# Patient Record
Sex: FEMALE | Race: WHITE | NOT HISPANIC OR LATINO | Employment: OTHER | URBAN - METROPOLITAN AREA
[De-identification: names, ages, dates, MRNs, and addresses within clinical notes are randomized per-mention and may not be internally consistent; named-entity substitution may affect disease eponyms.]

---

## 2018-04-11 ENCOUNTER — TELEPHONE (OUTPATIENT)
Dept: FAMILY MEDICINE | Facility: CLINIC | Age: 63
End: 2018-04-11

## 2018-04-11 NOTE — TELEPHONE ENCOUNTER
Pt called back stated she has a knot on head that feels like it has fluid in it and it also have fever. Knot has been there for about a month.

## 2018-04-11 NOTE — TELEPHONE ENCOUNTER
Advised pt should go to the Er, she stated she would go tomorrow as she has another appointment today.

## 2018-04-11 NOTE — TELEPHONE ENCOUNTER
----- Message from Stephanie Quispe sent at 4/11/2018  1:06 PM CDT -----  Please call patient in regards to a knot on her head, she is requesting a MRI, [phone] (home)

## 2018-04-11 NOTE — TELEPHONE ENCOUNTER
Called pt to get details about the knot on her head. No answer left a message. She called requesting an MRI.

## 2018-04-17 ENCOUNTER — TELEPHONE (OUTPATIENT)
Dept: FAMILY MEDICINE | Facility: CLINIC | Age: 63
End: 2018-04-17

## 2018-04-17 NOTE — TELEPHONE ENCOUNTER
----- Message from Devante Ruiz sent at 4/17/2018  2:03 PM CDT -----  Contact: Destiny/ISABEL at [facility]  Unsuccessful call placed to office.  Destiny called in and stated that the attached patient showed up to get an MRI done that patient stated Dr. Riddle ordered for her but there is no order??  [phone] Destiny's call back number

## 2018-04-17 NOTE — TELEPHONE ENCOUNTER
Spoke with pt. She stated that she is at the ER and has been since 2:00 and has not been seen; she stated that Dr. Riddle instructed her to go to the ER and he would order an MRI.  It was explained in detail to pt that Dr. Riddle cannot order an MRI based on the patient being seen at the emergency room, that she would have to be seen here in the office for examination.  Pt did not seem to understand these instructions.  She was instructed to wait to be seen by a physician at the emergency room.  Ana

## 2018-05-02 NOTE — TELEPHONE ENCOUNTER
----- Message from Emeterio Garg sent at 5/2/2018  3:48 PM CDT -----  Contact: Patient  Comfort, [phone]. Calling in regards to a refill request for pantoprazole (PROTONIX) 40 MG tablet. Says the pharmacy faxed over a request and haven't heard back from the office. Please advise. Thanks.    [facility]  [address] [place] [address]  Phone [phone]

## 2018-05-03 RX ORDER — PANTOPRAZOLE SODIUM 40 MG/1
40 TABLET, DELAYED RELEASE ORAL DAILY
Qty: 30 TABLET | Refills: 5 | Status: SHIPPED | OUTPATIENT
Start: 2018-05-03 | End: 2018-12-12 | Stop reason: SDUPTHER

## 2018-05-07 ENCOUNTER — OFFICE VISIT (OUTPATIENT)
Dept: FAMILY MEDICINE | Facility: CLINIC | Age: 63
End: 2018-05-07
Payer: MEDICARE

## 2018-05-07 VITALS
HEIGHT: 68 IN | WEIGHT: 125 LBS | HEART RATE: 92 BPM | RESPIRATION RATE: 18 BRPM | OXYGEN SATURATION: 96 % | BODY MASS INDEX: 18.94 KG/M2 | SYSTOLIC BLOOD PRESSURE: 117 MMHG | DIASTOLIC BLOOD PRESSURE: 83 MMHG

## 2018-05-07 DIAGNOSIS — I65.21 STENOSIS OF RIGHT CAROTID ARTERY: Primary | ICD-10-CM

## 2018-05-07 DIAGNOSIS — R22.0 SUPERFICIAL SWELLING OF SCALP: ICD-10-CM

## 2018-05-07 PROCEDURE — 99999 PR PBB SHADOW E&M-EST. PATIENT-LVL III: CPT | Mod: PBBFAC,,, | Performed by: FAMILY MEDICINE

## 2018-05-07 PROCEDURE — 99213 OFFICE O/P EST LOW 20 MIN: CPT | Mod: S$PBB,,, | Performed by: FAMILY MEDICINE

## 2018-05-07 PROCEDURE — 99213 OFFICE O/P EST LOW 20 MIN: CPT | Mod: PBBFAC,PN | Performed by: FAMILY MEDICINE

## 2018-05-09 NOTE — PROGRESS NOTES
"Subjective:       Patient ID: Comfort Cantu is a 62 y.o. female.    Chief Complaint: Follow-up    HPI    Ms. Cantu presents for follow-up. Has known right carotid artery stenosis, s/p CEA (done ~4 years ago per patient). This is typically evaluated once a year in December with carotid dopplers. She was told by her cardiologist at her last visit that there is a "whistling noise" on that side now that wasn't there before. Reportedly was told to ask her PCP (Dr. Riddle) about getting a carotid doppler ultrasound early to evaluate this.     She also complains of right posterior scalp pain and swelling for approximately 2 months. Denies drainage, trauma to the area. Reports it seems to have gotten slightly smaller in the past few weeks.    Review of Systems   Constitutional: Negative for activity change, appetite change, chills and fever.   Cardiovascular: Negative for chest pain, palpitations and leg swelling.   Neurological: Negative for dizziness, tremors, syncope, facial asymmetry and weakness.       Past Medical History:   Diagnosis Date    Anxiety     Atherosclerosis     Depression     GERD (gastroesophageal reflux disease)     Hypertension      Past Surgical History:   Procedure Laterality Date    APPENDECTOMY      CAROTID STENT  2014    FEMORAL ARTERY STENT       Social History     Social History    Marital status: Single     Spouse name: N/A    Number of children: N/A    Years of education: N/A     Occupational History    Not on file.     Social History Main Topics    Smoking status: Current Every Day Smoker    Smokeless tobacco: Not on file    Alcohol use Yes      Comment: occ    Drug use: No    Sexual activity: Not Currently     Other Topics Concern    Not on file     Social History Narrative    No narrative on file     No family history on file.    Objective:      /83 (BP Location: Left arm, Patient Position: Sitting)   Pulse 92   Resp 18   Ht 5' 8" (1.727 m)   Wt 56.7 kg (125 " lb)   SpO2 96%   BMI 19.01 kg/m²   Physical Exam   Constitutional: She is oriented to person, place, and time. She appears well-developed and well-nourished. No distress.   HENT:   Head: Normocephalic.       Neurological: She is alert and oriented to person, place, and time. No cranial nerve deficit.   Skin: Skin is warm and dry. No rash noted. She is not diaphoretic.   Psychiatric: She has a normal mood and affect. Her behavior is normal. Judgment and thought content normal.   Vitals reviewed.      Assessment:       1. Stenosis of right carotid artery    2. Superficial swelling of scalp        Plan:       Stenosis of right carotid artery  -     CT Head Without Contrast; Future; Expected date: 05/07/2018  -      Carotid Right; Future; Expected date: 05/07/2018    Superficial swelling of scalp  -     CT Head Without Contrast; Future; Expected date: 05/07/2018            Risks, benefits, and side effects were discussed with the patient. All questions were answered to the fullest satisfaction of the patient, and pt verbalized understanding and agreement to treatment plan. Pt was to call with any new or worsening symptoms, or present to the ER.

## 2018-05-10 ENCOUNTER — HOSPITAL ENCOUNTER (OUTPATIENT)
Dept: RADIOLOGY | Facility: HOSPITAL | Age: 63
Discharge: HOME | End: 2018-05-10
Attending: FAMILY MEDICINE
Payer: MEDICARE

## 2018-05-10 DIAGNOSIS — I65.21 OCCLUSION OF RIGHT CAROTID ARTERY: ICD-10-CM

## 2018-05-10 DIAGNOSIS — I65.21 OCCLUSION OF RIGHT CAROTID ARTERY: Primary | ICD-10-CM

## 2018-05-10 DIAGNOSIS — R22.0 SUPERFICIAL SWELLING OF SCALP: ICD-10-CM

## 2018-05-10 DIAGNOSIS — I65.21 STENOSIS OF RIGHT CAROTID ARTERY: ICD-10-CM

## 2018-05-10 PROCEDURE — 93880 EXTRACRANIAL BILAT STUDY: CPT | Mod: TC

## 2018-05-10 PROCEDURE — 70450 CT HEAD/BRAIN W/O DYE: CPT | Mod: TC

## 2018-05-10 PROCEDURE — 93880 EXTRACRANIAL BILAT STUDY: CPT | Mod: 26,,, | Performed by: RADIOLOGY

## 2018-05-10 PROCEDURE — 70450 CT HEAD/BRAIN W/O DYE: CPT | Mod: 26,,, | Performed by: RADIOLOGY

## 2018-05-15 ENCOUNTER — OFFICE VISIT (OUTPATIENT)
Dept: FAMILY MEDICINE | Facility: CLINIC | Age: 63
End: 2018-05-15
Payer: MEDICARE

## 2018-05-15 VITALS
BODY MASS INDEX: 18.94 KG/M2 | WEIGHT: 125 LBS | HEART RATE: 85 BPM | OXYGEN SATURATION: 95 % | DIASTOLIC BLOOD PRESSURE: 73 MMHG | RESPIRATION RATE: 20 BRPM | HEIGHT: 68 IN | SYSTOLIC BLOOD PRESSURE: 120 MMHG

## 2018-05-15 DIAGNOSIS — D17.0 LIPOMA OF SCALP: ICD-10-CM

## 2018-05-15 DIAGNOSIS — R22.0 SUPERFICIAL SWELLING OF SCALP: ICD-10-CM

## 2018-05-15 DIAGNOSIS — I65.21 STENOSIS OF RIGHT CAROTID ARTERY: Primary | ICD-10-CM

## 2018-05-15 PROBLEM — E78.5 HYPERLIPIDEMIA: Status: ACTIVE | Noted: 2018-05-15

## 2018-05-15 PROBLEM — M54.9 BACKACHE: Status: ACTIVE | Noted: 2018-05-15

## 2018-05-15 PROBLEM — I10 BENIGN ESSENTIAL HYPERTENSION: Status: ACTIVE | Noted: 2018-05-15

## 2018-05-15 PROBLEM — M25.569 KNEE PAIN: Status: ACTIVE | Noted: 2018-05-15

## 2018-05-15 PROBLEM — F17.200 TOBACCO DEPENDENCE SYNDROME: Status: ACTIVE | Noted: 2018-05-15

## 2018-05-15 PROBLEM — F41.1 ANXIETY STATE: Status: ACTIVE | Noted: 2018-05-15

## 2018-05-15 PROBLEM — I25.10 CARDIOVASCULAR SYSTEM PROBLEM: Status: ACTIVE | Noted: 2018-05-15

## 2018-05-15 PROCEDURE — 99213 OFFICE O/P EST LOW 20 MIN: CPT | Mod: S$GLB,,, | Performed by: FAMILY MEDICINE

## 2018-05-15 NOTE — PROGRESS NOTES
"Subjective:       Patient ID: Comfort Cantu is a 62 y.o. female.    Chief Complaint: No chief complaint on file.    HPI    Ms. Cantu presents for follow-up. Has known right carotid artery stenosis, s/p CEA (done ~4 years ago per patient). This is typically evaluated once a year in December with carotid dopplers. She was told by her cardiologist at her last visit that there is a "whistling noise" on that side now that wasn't there before. Reportedly was told to ask her PCP (Dr. Riddle) about getting a carotid doppler ultrasound early to evaluate this.   The carotid doppler shows 50% stenosis.    The superficial "bump on her head" does not show on CT evaluation.      Review of Systems   Constitutional: Negative for activity change, appetite change, chills and fever.   Cardiovascular: Negative for chest pain, palpitations and leg swelling.   Neurological: Negative for dizziness, tremors, syncope, facial asymmetry and weakness.       Past Medical History:   Diagnosis Date    Anxiety     Atherosclerosis     Depression     GERD (gastroesophageal reflux disease)     Hypertension      Past Surgical History:   Procedure Laterality Date    APPENDECTOMY      CAROTID STENT  2014    FEMORAL ARTERY STENT       Social History     Social History    Marital status: Single     Spouse name: N/A    Number of children: N/A    Years of education: N/A     Occupational History    Not on file.     Social History Main Topics    Smoking status: Current Every Day Smoker    Smokeless tobacco: Not on file    Alcohol use Yes      Comment: occ    Drug use: No    Sexual activity: Not Currently     Other Topics Concern    Not on file     Social History Narrative    No narrative on file     No family history on file.    Objective:      There were no vitals taken for this visit.  Physical Exam   Constitutional: She is oriented to person, place, and time. She appears well-developed and well-nourished. No distress.   HENT:   Head: " Normocephalic.       Neurological: She is alert and oriented to person, place, and time. No cranial nerve deficit.   Skin: Skin is warm and dry. No rash noted. She is not diaphoretic.   Psychiatric: She has a normal mood and affect. Her behavior is normal. Judgment and thought content normal.   Vitals reviewed.      Assessment:       1. Stenosis of right carotid artery    2. Superficial swelling of scalp        Plan:             Stenosis of right carotid artery    Superficial swelling of scalp    Lipoma of scalp     - Reassurance given

## 2018-06-25 ENCOUNTER — HOSPITAL ENCOUNTER (EMERGENCY)
Facility: HOSPITAL | Age: 63
Discharge: HOME | End: 2018-06-25
Attending: FAMILY MEDICINE
Payer: MEDICARE

## 2018-06-25 VITALS
RESPIRATION RATE: 18 BRPM | BODY MASS INDEX: 19.25 KG/M2 | OXYGEN SATURATION: 98 % | SYSTOLIC BLOOD PRESSURE: 176 MMHG | DIASTOLIC BLOOD PRESSURE: 86 MMHG | WEIGHT: 127 LBS | TEMPERATURE: 98 F | HEART RATE: 86 BPM | HEIGHT: 68 IN

## 2018-06-25 DIAGNOSIS — R52 PAIN: ICD-10-CM

## 2018-06-25 DIAGNOSIS — T14.90XA INJURY: Primary | ICD-10-CM
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
## 2018-06-25 DIAGNOSIS — S90.31XA CONTUSION OF RIGHT FOOT, INITIAL ENCOUNTER: ICD-10-CM

## 2018-06-25 PROCEDURE — 99283 EMERGENCY DEPT VISIT LOW MDM: CPT

## 2018-06-25 PROCEDURE — 73630 X-RAY EXAM OF FOOT: CPT | Mod: TC,FY,RT

## 2018-06-25 PROCEDURE — 73630 X-RAY EXAM OF FOOT: CPT | Mod: 26,RT,, | Performed by: RADIOLOGY

## 2018-06-25 RX ORDER — NAPROXEN 500 MG/1
500 TABLET ORAL 2 TIMES DAILY PRN
Qty: 30 TABLET | Refills: 0 | Status: SHIPPED | OUTPATIENT
Start: 2018-06-25 | End: 2018-11-01

## 2018-06-25 NOTE — ED TRIAGE NOTES
Pt to ed with c/o r foot injury, pt reports ground level fall causing right foot to kick coffee table 2wks pta

## 2018-06-26 NOTE — ED PROVIDER NOTES
Encounter Date: 6/25/2018       History     Chief Complaint   Patient presents with    Foot Injury     Patient with right foot pain after fall   No obvious swelling/discoloration.            Review of patient's allergies indicates:   Allergen Reactions    Penicillins      Past Medical History:   Diagnosis Date    Anxiety     Atherosclerosis     Depression     GERD (gastroesophageal reflux disease)     Hypertension      Past Surgical History:   Procedure Laterality Date    APPENDECTOMY      CAROTID STENT  2014    FEMORAL ARTERY STENT       History reviewed. No pertinent family history.  Social History   Substance Use Topics    Smoking status: Current Every Day Smoker    Smokeless tobacco: Not on file    Alcohol use Yes      Comment: occ     Review of Systems   Constitutional: Negative for fever.   HENT: Negative for sore throat.    Respiratory: Negative for shortness of breath.    Cardiovascular: Negative for chest pain.   Gastrointestinal: Negative for nausea.   Genitourinary: Negative for dysuria.   Musculoskeletal: Negative for back pain. Arthralgias: Right foot pain.   Skin: Negative for rash.   Neurological: Negative for weakness.   Hematological: Does not bruise/bleed easily.   All other systems reviewed and are negative.      Physical Exam     Initial Vitals [06/25/18 1412]   BP Pulse Resp Temp SpO2   (!) 176/86 86 18 98.1 °F (36.7 °C) 98 %      MAP       --         Physical Exam    Nursing note and vitals reviewed.  Constitutional: She appears well-developed and well-nourished.   HENT:   Head: Normocephalic.   Eyes: Pupils are equal, round, and reactive to light.   Neck: Normal range of motion.   Cardiovascular: Normal rate, regular rhythm and normal heart sounds.   Pulmonary/Chest: Breath sounds normal.   Musculoskeletal:        Right foot: There is decreased range of motion, tenderness and bony tenderness. There is no swelling, no deformity and no laceration.        Feet:    Neurological: She  is alert and oriented to person, place, and time.   Skin: Skin is warm and dry.   Psychiatric: She has a normal mood and affect. Her behavior is normal. Judgment and thought content normal.         ED Course   Procedures  Labs Reviewed - No data to display       Imaging Results          X-Ray Foot Complete Right (Final result)  Result time 06/25/18 15:37:10    Final result by Сергей Ulrich MD (06/25/18 15:37:10)                 Impression:      Healing 5th toe fracture      Electronically signed by: Сергей Ulrich MD  Date:    06/25/2018  Time:    15:37             Narrative:    EXAMINATION:  XR FOOT COMPLETE 3 VIEW RIGHT    CLINICAL HISTORY:  . Injury, unspecified, initial encounter    TECHNIQUE:  AP, lateral, and oblique views of the right foot were performed.    COMPARISON:  None    FINDINGS:  There is a healing transverse fracture at the proximal diametaphyseal is of the 5th proximal phalanx.  Alignment and positioning are good.  The foot is otherwise unremarkable except for mild joint space narrowing in the DIP joints.                                 Medical Decision Making:   Initial Assessment:   Foot pain  Differential Diagnosis:   Foot contusion  Foot fracture    ED Management:  Xray done with no acute fracture  Pain medication given  Patient refused crutches.   Surgical shoe     Discussed physical exam findings with patient  No acute emergent medication condition identified at this time to warrant further testing/diagnostics.  Plan to discharge patient home with instructions to follow-up with PCP in 2-5 days or return to ED if symptoms worsen.  Patient verbalized agreement to discharge treatment plan.                        Clinical Impression:   The primary encounter diagnosis was Injury. Diagnoses of Pain, Fall, and Contusion of right foot, initial encounter were also pertinent to this visit.                             Polina Collazo NP  06/25/18 3078

## 2018-07-12 ENCOUNTER — TELEPHONE (OUTPATIENT)
Dept: FAMILY MEDICINE | Facility: CLINIC | Age: 63
End: 2018-07-12

## 2018-07-12 ENCOUNTER — HOSPITAL ENCOUNTER (OUTPATIENT)
Facility: HOSPITAL | Age: 63
Discharge: HOME | End: 2018-07-14
Attending: EMERGENCY MEDICINE | Admitting: INTERNAL MEDICINE
Payer: MEDICARE

## 2018-07-12 DIAGNOSIS — F10.11 H/O ETOH ABUSE: ICD-10-CM

## 2018-07-12 DIAGNOSIS — R05.9 COUGH: ICD-10-CM

## 2018-07-12 DIAGNOSIS — K86.3 PSEUDOCYST OF PANCREAS: ICD-10-CM

## 2018-07-12 DIAGNOSIS — K85.00 IDIOPATHIC ACUTE PANCREATITIS WITHOUT INFECTION OR NECROSIS: ICD-10-CM

## 2018-07-12 DIAGNOSIS — R11.2 NAUSEA AND VOMITING, INTRACTABILITY OF VOMITING NOT SPECIFIED, UNSPECIFIED VOMITING TYPE: ICD-10-CM

## 2018-07-12 DIAGNOSIS — R10.9 ABDOMINAL PAIN, ACUTE: Primary | ICD-10-CM

## 2018-07-12 DIAGNOSIS — D49.0 PANCREAS NEOPLASM: ICD-10-CM

## 2018-07-12 LAB
ALBUMIN SERPL BCP-MCNC: 4.5 G/DL
ALP SERPL-CCNC: 98 U/L
ALT SERPL W/O P-5'-P-CCNC: 15 U/L
ANION GAP SERPL CALC-SCNC: 10 MMOL/L
AST SERPL-CCNC: 22 U/L
BASOPHILS # BLD AUTO: 0.07 K/UL
BASOPHILS NFR BLD: 0.6 %
BILIRUB SERPL-MCNC: 0.7 MG/DL
BUN SERPL-MCNC: 9 MG/DL
CALCIUM SERPL-MCNC: 9.7 MG/DL
CHLORIDE SERPL-SCNC: 95 MMOL/L
CO2 SERPL-SCNC: 27 MMOL/L
CREAT SERPL-MCNC: 0.7 MG/DL
DIFFERENTIAL METHOD: ABNORMAL
EOSINOPHIL # BLD AUTO: 0.2 K/UL
EOSINOPHIL NFR BLD: 2 %
ERYTHROCYTE [DISTWIDTH] IN BLOOD BY AUTOMATED COUNT: 15.2 %
EST. GFR  (AFRICAN AMERICAN): >60 ML/MIN/1.73 M^2
EST. GFR  (NON AFRICAN AMERICAN): >60 ML/MIN/1.73 M^2
GLUCOSE SERPL-MCNC: 107 MG/DL
HCT VFR BLD AUTO: 30.2 %
HGB BLD-MCNC: 10.3 G/DL
IMM GRANULOCYTES # BLD AUTO: 0.04 K/UL
IMM GRANULOCYTES NFR BLD AUTO: 0.3 %
LIPASE SERPL-CCNC: 262 U/L
LYMPHOCYTES # BLD AUTO: 1.7 K/UL
LYMPHOCYTES NFR BLD: 14.2 %
MCH RBC QN AUTO: 32.8 PG
MCHC RBC AUTO-ENTMCNC: 34.1 G/DL
MCV RBC AUTO: 96 FL
MONOCYTES # BLD AUTO: 1.2 K/UL
MONOCYTES NFR BLD: 9.8 %
NEUTROPHILS # BLD AUTO: 8.7 K/UL
NEUTROPHILS NFR BLD: 73.1 %
NRBC BLD-RTO: 0 /100 WBC
PLATELET # BLD AUTO: 415 K/UL
PMV BLD AUTO: 8.9 FL
POTASSIUM SERPL-SCNC: 3.1 MMOL/L
PROT SERPL-MCNC: 8 G/DL
RBC # BLD AUTO: 3.14 M/UL
SODIUM SERPL-SCNC: 132 MMOL/L
WBC # BLD AUTO: 11.9 K/UL

## 2018-07-12 PROCEDURE — 85025 COMPLETE CBC W/AUTO DIFF WBC: CPT

## 2018-07-12 PROCEDURE — G0378 HOSPITAL OBSERVATION PER HR: HCPCS

## 2018-07-12 PROCEDURE — 25500020 PHARM REV CODE 255: Performed by: NURSE PRACTITIONER

## 2018-07-12 PROCEDURE — 99285 EMERGENCY DEPT VISIT HI MDM: CPT | Mod: 25

## 2018-07-12 PROCEDURE — 63600175 PHARM REV CODE 636 W HCPCS: Performed by: NURSE PRACTITIONER

## 2018-07-12 PROCEDURE — 96372 THER/PROPH/DIAG INJ SC/IM: CPT | Mod: 59

## 2018-07-12 PROCEDURE — 71046 X-RAY EXAM CHEST 2 VIEWS: CPT | Mod: TC,FY

## 2018-07-12 PROCEDURE — 71046 X-RAY EXAM CHEST 2 VIEWS: CPT | Mod: 26,,, | Performed by: RADIOLOGY

## 2018-07-12 PROCEDURE — 74177 CT ABD & PELVIS W/CONTRAST: CPT | Mod: TC

## 2018-07-12 PROCEDURE — 25000003 PHARM REV CODE 250: Performed by: NURSE PRACTITIONER

## 2018-07-12 PROCEDURE — 80053 COMPREHEN METABOLIC PANEL: CPT

## 2018-07-12 PROCEDURE — 83690 ASSAY OF LIPASE: CPT

## 2018-07-12 PROCEDURE — 74177 CT ABD & PELVIS W/CONTRAST: CPT | Mod: 26,,, | Performed by: RADIOLOGY

## 2018-07-12 RX ORDER — ONDANSETRON 4 MG/1
4 TABLET, ORALLY DISINTEGRATING ORAL
Status: COMPLETED | OUTPATIENT
Start: 2018-07-12 | End: 2018-07-12

## 2018-07-12 RX ORDER — MEPERIDINE HYDROCHLORIDE 50 MG/ML
50 INJECTION INTRAMUSCULAR; INTRAVENOUS; SUBCUTANEOUS
Status: COMPLETED | OUTPATIENT
Start: 2018-07-12 | End: 2018-07-12

## 2018-07-12 RX ORDER — PROMETHAZINE HYDROCHLORIDE 25 MG/ML
12.5 INJECTION, SOLUTION INTRAMUSCULAR; INTRAVENOUS
Status: COMPLETED | OUTPATIENT
Start: 2018-07-12 | End: 2018-07-12

## 2018-07-12 RX ADMIN — IOHEXOL 15 ML: 350 INJECTION, SOLUTION INTRAVENOUS at 07:07

## 2018-07-12 RX ADMIN — MEPERIDINE HYDROCHLORIDE 50 MG: 50 INJECTION INTRAMUSCULAR; INTRAVENOUS; SUBCUTANEOUS at 05:07

## 2018-07-12 RX ADMIN — PROMETHAZINE HYDROCHLORIDE 12.5 MG: 25 INJECTION INTRAMUSCULAR; INTRAVENOUS at 05:07

## 2018-07-12 RX ADMIN — IOHEXOL 75 ML: 350 INJECTION, SOLUTION INTRAVENOUS at 08:07

## 2018-07-12 RX ADMIN — ONDANSETRON 4 MG: 4 TABLET, ORALLY DISINTEGRATING ORAL at 04:07

## 2018-07-12 NOTE — ED NOTES
Patient reportedly sprayed Spectracide in her house x 1 week ago.Patient has felt poorly for the past 2 days. She attributes her symptoms to the use of that chemical. Lungs are clear. Spo2 is 100% on room air. Patient appears tearful/anxious.

## 2018-07-12 NOTE — ED PROVIDER NOTES
CHIEF COMPLAINT  Chief Complaint   Patient presents with    Nausea     reports she breathed in some spectracide 7 days ago    Vomiting    Diarrhea    Back Pain       HPI  Comfort Wallace a 62 y.o. female who presents to the emergency room with several complaints today.  Originally she stated that she breathed in some SPECT decide about a week ago and has had shortness of breath since.  She then stated that she has been having some nausea and vomiting back pain and abdominal pain. She has not taken anything for symptoms.     CURRENT MEDICATIONS  No current facility-administered medications on file prior to encounter.      Current Outpatient Prescriptions on File Prior to Encounter   Medication Sig Dispense Refill    atorvastatin (LIPITOR) 10 MG tablet Take 10 mg by mouth once daily.      clopidogrel (PLAVIX) 75 mg tablet Take 75 mg by mouth once daily.      hydroCHLOROthiazide (HYDRODIURIL) 25 MG tablet Take 25 mg by mouth once daily.      isosorbide mononitrate (IMDUR) 30 MG 24 hr tablet Take 30 mg by mouth once daily.      lisinopril (PRINIVIL,ZESTRIL) 5 MG tablet Take 5 mg by mouth once daily.      LORazepam (ATIVAN) 0.5 MG tablet Take 0.5 mg by mouth every 6 (six) hours as needed for Anxiety.      naproxen (NAPROSYN) 500 MG tablet Take 1 tablet (500 mg total) by mouth 2 (two) times daily as needed. 30 tablet 0    pantoprazole (PROTONIX) 40 MG tablet Take 1 tablet (40 mg total) by mouth once daily. 30 tablet 5    vilazodone (VIIBRYD) 20 mg Tab Take by mouth.         ALLERGIES  Review of patient's allergies indicates:   Allergen Reactions    Penicillins          There is no immunization history on file for this patient.    PAST MEDICAL HISTORY  Past Medical History:   Diagnosis Date    Anxiety     Atherosclerosis     Depression     GERD (gastroesophageal reflux disease)     Hypertension        SURGICAL HISTORY  Past Surgical History:   Procedure Laterality Date    APPENDECTOMY      CAROTID  "STENT  2014    FEMORAL ARTERY STENT         SOCIAL HISTORY  Social History     Social History    Marital status: Single     Spouse name: N/A    Number of children: N/A    Years of education: N/A     Occupational History    Not on file.     Social History Main Topics    Smoking status: Current Every Day Smoker    Smokeless tobacco: Not on file    Alcohol use Yes      Comment: occ    Drug use: No    Sexual activity: Not Currently     Other Topics Concern    Not on file     Social History Narrative    No narrative on file       FAMILY HISTORY  History reviewed. No pertinent family history.    REVIEW OF SYSTEMS  Constitutional: No fever, chills, or weakness.  Eyes: No redness, pain, or discharge  HENT: No ear pain, no headache, no rhinorrhea, no throat pain  Respiratory: No cough, wheezing or shortness of breath  Cardiovascular: No chest pain, palpitations or edema  GI: No abdominal pain, nausea, vomiting or diarrhea  Gu: No dysuria, no hematuria, or discharge  Musculoskeletal: No pain, full range of motion. Good sensation  Skin: No rash or abrasion  Neurologic: No focal weakness or sensory changes.  All systems otherwise negative except as noted in the Review of Systems and History of Present Illness      PHYSICAL EXAM  Reviewed Triage Note  VITAL SIGNS:   Patient Vitals for the past 24 hrs:   BP Temp Temp src Pulse Resp SpO2 Height Weight   07/12/18 1852 109/76 - - 75 18 98 % - -   07/12/18 1605 (!) 156/82 98.3 °F (36.8 °C) Oral 102 (!) 28 100 % 5' 8" (1.727 m) 57.2 kg (126 lb)     Constitutional: Well developed, well nourished, Alert and oriented x3, No acute distress, non-toxic appearance.  HENT: Normocephalic, Atraumatic, Bilateral external ears normal, external nose negative, oropharynx moist, No oral exudates.  Eyes: PERRL, EOMI, Conjunctiva normal, No discharge.  Neck: Normal range of motion, no tenderness, supple, no carotid bruits  Respiratory: Normal breath sounds, no respiratory distress, no " "wheezing, no rhonchi, no rales  Cardiovascular: Normal heart rate, normal rhythm, no murmurs, no rubs, no gallops.  Gi: Bowel sounds normal, soft, no tenderness, non-distended, no masses, no pulsatile masses.  Musculoskeletal: No edema, no tenderness, no cyanosis, no clubbing. Good range of motion in all major joints. No tenderness to palpation or major deformities noted.   Integument: Warm, Dry, No erythema, no rash  Neurologic: Normal motor function, normal sensory function. No focal deficits noted. Intact distal pulses  Psychiatric: Affect normal, judgment normal, mood normal      LABS  Pertinent labs reviewed. (see chart for details)  Labs Reviewed   CBC W/ AUTO DIFFERENTIAL - Abnormal; Notable for the following:        Result Value    RBC 3.14 (*)     Hemoglobin 10.3 (*)     Hematocrit 30.2 (*)     MCH 32.8 (*)     RDW 15.2 (*)     Platelets 415 (*)     MPV 8.9 (*)     Gran # (ANC) 8.7 (*)     Mono # 1.2 (*)     Gran% 73.1 (*)     Lymph% 14.2 (*)     All other components within normal limits   COMPREHENSIVE METABOLIC PANEL - Abnormal; Notable for the following:     Sodium 132 (*)     Potassium 3.1 (*)     All other components within normal limits   LIPASE - Abnormal; Notable for the following:     Lipase 262 (*)     All other components within normal limits       RADIOLOGY  X-Ray Chest PA And Lateral   Final Result      1. COPD.   2. Mild S shaped scoliosis.         Electronically signed by: Leroy Morgan   Date:    07/12/2018   Time:    16:26      CT Abdomen Pelvis With Contrast    (Results Pending)         PROCEDURE  Procedures      ED COURSE & MEDICAL DECISION MAKING  ED Course as of Jul 12 2109   Thu Jul 12, 2018   1643 Pt states abdominal pain is severe. Only has drank "a couple of sips" of coffee today.   Very nauseated, had diarrhea a couple of days ago  [NP]   1800 Discussed results with Dr Lima  [NP]   2108 Reported off to Dr Lima at end of [facility]shift  [NP]      ED Course User Index  [NP] Eileen Laguna, " AZIZA     [facility]           DISPOSITION  Patient  stable condition No discharge date for patient encounter.      CLINICAL IMPRESSION:  The encounter diagnosis was Cough.    Patient advised to follow-up with your PCP within 3 days for BP re-check if Blood Pressure was >120/80 without history of hypertension.         AZIZA Li  07/12/18 2109       AZIZA Li  07/12/18 2109

## 2018-07-13 LAB
ALBUMIN SERPL BCP-MCNC: 4.1 G/DL
ALP SERPL-CCNC: 90 U/L
ALT SERPL W/O P-5'-P-CCNC: 14 U/L
ANION GAP SERPL CALC-SCNC: 5 MMOL/L
AST SERPL-CCNC: 17 U/L
BASOPHILS # BLD AUTO: 0.06 K/UL
BASOPHILS NFR BLD: 0.7 %
BILIRUB SERPL-MCNC: 0.3 MG/DL
BUN SERPL-MCNC: 6 MG/DL
CALCIUM SERPL-MCNC: 9.4 MG/DL
CHLORIDE SERPL-SCNC: 101 MMOL/L
CO2 SERPL-SCNC: 29 MMOL/L
CREAT SERPL-MCNC: 0.6 MG/DL
DIFFERENTIAL METHOD: ABNORMAL
EOSINOPHIL # BLD AUTO: 0.2 K/UL
EOSINOPHIL NFR BLD: 3 %
ERYTHROCYTE [DISTWIDTH] IN BLOOD BY AUTOMATED COUNT: 15.3 %
EST. GFR  (AFRICAN AMERICAN): >60 ML/MIN/1.73 M^2
EST. GFR  (NON AFRICAN AMERICAN): >60 ML/MIN/1.73 M^2
GLUCOSE SERPL-MCNC: 104 MG/DL
HCT VFR BLD AUTO: 28.8 %
HGB BLD-MCNC: 9.5 G/DL
IMM GRANULOCYTES # BLD AUTO: 0.02 K/UL
IMM GRANULOCYTES NFR BLD AUTO: 0.2 %
LIPASE SERPL-CCNC: 199 U/L
LYMPHOCYTES # BLD AUTO: 1 K/UL
LYMPHOCYTES NFR BLD: 12.1 %
MCH RBC QN AUTO: 32.4 PG
MCHC RBC AUTO-ENTMCNC: 33 G/DL
MCV RBC AUTO: 98 FL
MONOCYTES # BLD AUTO: 0.9 K/UL
MONOCYTES NFR BLD: 11 %
NEUTROPHILS # BLD AUTO: 5.9 K/UL
NEUTROPHILS NFR BLD: 73 %
NRBC BLD-RTO: 0 /100 WBC
PLATELET # BLD AUTO: 360 K/UL
PMV BLD AUTO: 8.7 FL
POTASSIUM SERPL-SCNC: 5.6 MMOL/L
PROT SERPL-MCNC: 7.3 G/DL
RBC # BLD AUTO: 2.93 M/UL
SODIUM SERPL-SCNC: 135 MMOL/L
WBC # BLD AUTO: 8.02 K/UL

## 2018-07-13 PROCEDURE — 80053 COMPREHEN METABOLIC PANEL: CPT

## 2018-07-13 PROCEDURE — 86301 IMMUNOASSAY TUMOR CA 19-9: CPT

## 2018-07-13 PROCEDURE — G0378 HOSPITAL OBSERVATION PER HR: HCPCS

## 2018-07-13 PROCEDURE — 63600175 PHARM REV CODE 636 W HCPCS

## 2018-07-13 PROCEDURE — 25000003 PHARM REV CODE 250: Performed by: INTERNAL MEDICINE

## 2018-07-13 PROCEDURE — 96365 THER/PROPH/DIAG IV INF INIT: CPT

## 2018-07-13 PROCEDURE — 36415 COLL VENOUS BLD VENIPUNCTURE: CPT

## 2018-07-13 PROCEDURE — 96361 HYDRATE IV INFUSION ADD-ON: CPT

## 2018-07-13 PROCEDURE — 85025 COMPLETE CBC W/AUTO DIFF WBC: CPT

## 2018-07-13 PROCEDURE — 83690 ASSAY OF LIPASE: CPT

## 2018-07-13 PROCEDURE — 96375 TX/PRO/DX INJ NEW DRUG ADDON: CPT

## 2018-07-13 PROCEDURE — 25000003 PHARM REV CODE 250: Performed by: EMERGENCY MEDICINE

## 2018-07-13 PROCEDURE — 63600175 PHARM REV CODE 636 W HCPCS: Performed by: EMERGENCY MEDICINE

## 2018-07-13 PROCEDURE — 96376 TX/PRO/DX INJ SAME DRUG ADON: CPT

## 2018-07-13 PROCEDURE — 63600175 PHARM REV CODE 636 W HCPCS: Performed by: INTERNAL MEDICINE

## 2018-07-13 RX ORDER — MORPHINE SULFATE 4 MG/ML
4 INJECTION, SOLUTION INTRAMUSCULAR; INTRAVENOUS EVERY 4 HOURS PRN
Status: DISCONTINUED | OUTPATIENT
Start: 2018-07-13 | End: 2018-07-13

## 2018-07-13 RX ORDER — CLONAZEPAM 0.5 MG/1
0.5 TABLET ORAL 2 TIMES DAILY PRN
COMMUNITY
End: 2019-04-03

## 2018-07-13 RX ORDER — LORAZEPAM 0.5 MG/1
0.5 TABLET ORAL EVERY 6 HOURS PRN
Status: DISCONTINUED | OUTPATIENT
Start: 2018-07-13 | End: 2018-07-14 | Stop reason: HOSPADM

## 2018-07-13 RX ORDER — HYDROCHLOROTHIAZIDE 12.5 MG/1
25 TABLET ORAL DAILY
Status: DISCONTINUED | OUTPATIENT
Start: 2018-07-13 | End: 2018-07-14 | Stop reason: HOSPADM

## 2018-07-13 RX ORDER — ONDANSETRON 2 MG/ML
4 INJECTION INTRAMUSCULAR; INTRAVENOUS EVERY 8 HOURS PRN
Status: DISCONTINUED | OUTPATIENT
Start: 2018-07-13 | End: 2018-07-14 | Stop reason: HOSPADM

## 2018-07-13 RX ORDER — ISOSORBIDE MONONITRATE 30 MG/1
30 TABLET, EXTENDED RELEASE ORAL DAILY
Status: DISCONTINUED | OUTPATIENT
Start: 2018-07-13 | End: 2018-07-14 | Stop reason: HOSPADM

## 2018-07-13 RX ORDER — MORPHINE SULFATE 4 MG/ML
INJECTION, SOLUTION INTRAMUSCULAR; INTRAVENOUS
Status: COMPLETED
Start: 2018-07-13 | End: 2018-07-13

## 2018-07-13 RX ORDER — SODIUM CHLORIDE 9 MG/ML
INJECTION, SOLUTION INTRAVENOUS CONTINUOUS
Status: DISCONTINUED | OUTPATIENT
Start: 2018-07-13 | End: 2018-07-14 | Stop reason: HOSPADM

## 2018-07-13 RX ORDER — SODIUM CHLORIDE 9 MG/ML
1000 INJECTION, SOLUTION INTRAVENOUS
Status: ACTIVE | OUTPATIENT
Start: 2018-07-13 | End: 2018-07-13

## 2018-07-13 RX ORDER — HYDROCODONE BITARTRATE AND ACETAMINOPHEN 5; 325 MG/1; MG/1
1 TABLET ORAL EVERY 6 HOURS PRN
Status: DISCONTINUED | OUTPATIENT
Start: 2018-07-13 | End: 2018-07-14 | Stop reason: HOSPADM

## 2018-07-13 RX ORDER — ATORVASTATIN CALCIUM 10 MG/1
10 TABLET, FILM COATED ORAL DAILY
Status: DISCONTINUED | OUTPATIENT
Start: 2018-07-13 | End: 2018-07-14 | Stop reason: HOSPADM

## 2018-07-13 RX ORDER — LISINOPRIL 2.5 MG/1
5 TABLET ORAL DAILY
Status: DISCONTINUED | OUTPATIENT
Start: 2018-07-13 | End: 2018-07-14 | Stop reason: HOSPADM

## 2018-07-13 RX ORDER — POTASSIUM CHLORIDE 7.45 MG/ML
10 INJECTION INTRAVENOUS ONCE
Status: COMPLETED | OUTPATIENT
Start: 2018-07-13 | End: 2018-07-13

## 2018-07-13 RX ORDER — PANTOPRAZOLE SODIUM 40 MG/1
40 TABLET, DELAYED RELEASE ORAL DAILY
Status: DISCONTINUED | OUTPATIENT
Start: 2018-07-13 | End: 2018-07-14 | Stop reason: HOSPADM

## 2018-07-13 RX ORDER — ONDANSETRON 2 MG/ML
4 INJECTION INTRAMUSCULAR; INTRAVENOUS EVERY 6 HOURS PRN
Status: DISCONTINUED | OUTPATIENT
Start: 2018-07-13 | End: 2018-07-14 | Stop reason: HOSPADM

## 2018-07-13 RX ORDER — CLOPIDOGREL BISULFATE 75 MG/1
75 TABLET ORAL DAILY
Status: DISCONTINUED | OUTPATIENT
Start: 2018-07-13 | End: 2018-07-14 | Stop reason: HOSPADM

## 2018-07-13 RX ORDER — HYDROCODONE BITARTRATE AND ACETAMINOPHEN 5; 325 MG/1; MG/1
1 TABLET ORAL EVERY 6 HOURS PRN
Qty: 20 TABLET | Refills: 0 | Status: SHIPPED | OUTPATIENT
Start: 2018-07-13 | End: 2018-11-01

## 2018-07-13 RX ORDER — POTASSIUM CHLORIDE 20 MEQ/15ML
40 SOLUTION ORAL ONCE
Status: COMPLETED | OUTPATIENT
Start: 2018-07-13 | End: 2018-07-13

## 2018-07-13 RX ADMIN — MORPHINE SULFATE 4 MG: 4 INJECTION, SOLUTION INTRAMUSCULAR; INTRAVENOUS at 12:07

## 2018-07-13 RX ADMIN — SODIUM CHLORIDE 1000 ML: 0.9 INJECTION, SOLUTION INTRAVENOUS at 12:07

## 2018-07-13 RX ADMIN — MORPHINE SULFATE 4 MG: 4 INJECTION, SOLUTION INTRAMUSCULAR; INTRAVENOUS at 11:07

## 2018-07-13 RX ADMIN — MORPHINE SULFATE 4 MG: 4 INJECTION, SOLUTION INTRAMUSCULAR; INTRAVENOUS at 05:07

## 2018-07-13 RX ADMIN — SODIUM POLYSTYRENE SULFONATE 15 G: 15 SUSPENSION ORAL; RECTAL at 06:07

## 2018-07-13 RX ADMIN — SODIUM CHLORIDE: 900 INJECTION, SOLUTION INTRAVENOUS at 11:07

## 2018-07-13 RX ADMIN — SODIUM CHLORIDE: 900 INJECTION, SOLUTION INTRAVENOUS at 05:07

## 2018-07-13 RX ADMIN — POTASSIUM CHLORIDE 10 MEQ: 10 INJECTION, SOLUTION INTRAVENOUS at 09:07

## 2018-07-13 RX ADMIN — POTASSIUM CHLORIDE 40 MEQ: 20 SOLUTION ORAL at 09:07

## 2018-07-13 NOTE — PLAN OF CARE
07/13/18 1602   DASILVA Message   Medicare Outpatient and Observation Notification regarding financial responsibility Given to patient/caregiver;Explained to patient/caregiver;Signed/date by patient/caregiver   Date DASILVA was signed 07/13/18   Time DASILVA was signed 1447   DASILVA SIGNED BY PT

## 2018-07-13 NOTE — HOSPITAL COURSE
This patient wasGiven analgesics in the emergency departmentAnd kept NPO. CT scan of the abdomen and pelvisWas completed but report isPending.  All other labs wereUnremarkable except for potassiumWhich was 3.1  7/14 d/c home out pt MRI pancrease f/u PCP discussed with patient

## 2018-07-13 NOTE — PROGRESS NOTES
[facility] Medicine  Progress Note    Patient Name: Comfort Cantu  MRN: [id number]  Patient Class: OP- Observation   Admission Date: 7/12/2018  Length of Stay: 0 days  Attending Physician: Сергей Galvan MD  Primary Care Provider: Darinel Riddle MD        Subjective:     Principal Problem:Abdominal pain, acute    HPI:  This patient pesented to the emergency department last evening Complaining ofOne day history of nausea vomiting, DiarrheaAnd back painAnd the lower abdominal painThe patientHas a past medical history of coronary heart disease, Vascular disease,Anxiety/DepressionAnd hypertension.Patient sees Dr. Moctezuma  As her cardiologist.Patient states that she was wrenching and had projectile vomiting prior to arrival in the emergency department.She denied any feverAnd stated thatThe nausea and vomitingStarted the day of presentation.She stated that she was eating and then had to throw up.The patient denied chest painAnd localized her pain in the lower abdomen and into the lower backIn the flank.  This patient does have aSignificantHistory of alcohol abuse.    July 13, 2018:The patient is stable in her roomAnd sleeping.She states abdominal pain is much improvedBut still havingSome lower back pain.I felt like this was more related to wretching than pathologic.  This patientDeniesAny nausea and vomiting This morning.LabsDid show a potassium of 3.1 which will be replaced    Hospital Course:  This patient wasGiven analgesics in the emergency departmentAnd kept NPO. CT scan of the abdomen and pelvisWas completed but report isPending.  All other labs wereUnremarkable except for potassiumWhich was 3.1    Interval History: Patient has done well overnightWithoutFurtherAbdominal pain. No epigastric pain was noted.Lipase was in theLow abnormal range of 232.  Hi did not feel that this was a pancreatitis Because the symptoms Were not consistentWithClinical findings of acute  Pancreatitis.    Review of Systems   Constitutional: Positive for fatigue. Negative for chills, diaphoresis and fever.   HENT: Positive for hearing loss. Negative for congestion, ear discharge, ear pain, facial swelling, nosebleeds, rhinorrhea, sinus pressure and sneezing.    Eyes: Negative.    Respiratory: Positive for cough. Negative for apnea, chest tightness, shortness of breath, wheezing and stridor.    Cardiovascular: Positive for palpitations. Negative for chest pain and leg swelling.   Gastrointestinal: Positive for abdominal pain, diarrhea, nausea and vomiting. Negative for anal bleeding, blood in stool and constipation.   Genitourinary: Negative for difficulty urinating, dysuria, hematuria and urgency.   Musculoskeletal: Positive for arthralgias and myalgias. Negative for neck stiffness.   Skin: Negative.    Allergic/Immunologic: Negative.    Neurological: Positive for dizziness, weakness, light-headedness and headaches. Negative for tremors, syncope, facial asymmetry, speech difficulty and numbness.   Hematological: Negative.    Psychiatric/Behavioral: The patient is nervous/anxious.      Objective:     Vital Signs (Most Recent):  Temp: 98.5 °F (36.9 °C) (07/13/18 0802)  Pulse: 77 (07/13/18 0802)  Resp: 18 (07/13/18 0802)  BP: (!) 126/59 (07/13/18 0802)  SpO2: 97 % (07/13/18 0802) Vital Signs (24h Range):  Temp:  [97.7 °F (36.5 °C)-98.5 °F (36.9 °C)] 98.5 °F (36.9 °C)  Pulse:  [] 77  Resp:  [15-28] 18  SpO2:  [93 %-100 %] 97 %  BP: (107-156)/(56-82) 126/59     Weight: 57.6 kg (126 lb 15.8 oz)  Body mass index is 19.31 kg/m².    Intake/Output Summary (Last 24 hours) at 07/13/18 0834  Last data filed at 07/12/18 1735   Gross per 24 hour   Intake             0.05 ml   Output                0 ml   Net             0.05 ml      Physical Exam   Constitutional: She is oriented to person, place, and time. She appears well-developed and well-nourished.   HENT:   Head: Normocephalic and atraumatic.   Eyes: EOM  are normal. Pupils are equal, round, and reactive to light.   Neck: Normal range of motion. Neck supple.   Cardiovascular: Normal rate, regular rhythm and normal heart sounds.    Pulmonary/Chest: Breath sounds normal. No respiratory distress. She has no wheezes. She has no rales. She exhibits no tenderness.   Abdominal: She exhibits no distension. There is tenderness. There is guarding. There is no rebound. No hernia.   Musculoskeletal: Normal range of motion.   Neurological: She is alert and oriented to person, place, and time.   Skin: Skin is warm and dry. Capillary refill takes 2 to 3 seconds.   Psychiatric: She has a normal mood and affect. Thought content normal.       Significant Labs: All pertinent labs within the past 24 hours have been reviewed.    Significant Imaging: I have reviewed and interpreted all pertinent imaging results/findings within the past 24 hours.    Assessment/Plan:      * Abdominal pain, acute    1.Place on the medical surgical unit for hydrationI In bowel rest.    2.Continue current IV fluids  3. Continue current pain medications  4.  Replace potassium  5. Follow up on CT reportAnd treat otherwise as clinically appropriate            VTE Risk Mitigation         Ordered     IP VTE LOW RISK PATIENT  Once      07/13/18 0041              Сергей Galvan MD  Department of Hospital Medicine   [facility] - Med Surg

## 2018-07-13 NOTE — ED NOTES
Pt resting comfortably with family at bedside. Rates pain 7/10. Awake and alert. Respirations non labored. Updated on plan of care. Awaiting contrast for CT. Voiced understanding. Denies needs at this time.

## 2018-07-13 NOTE — PLAN OF CARE
Problem: Patient Care Overview  Goal: Individualization & Mutuality  Outcome: Ongoing (interventions implemented as appropriate)  Reviewed plan of care c pt

## 2018-07-13 NOTE — ED NOTES
Pt updated on plan of care. Aware of admission status. Awaiting room assignment. Pt awake and alert. Respirations non labored. No distress.

## 2018-07-13 NOTE — NURSING
1200>Pt resting well. NADN. Aware of NPO status.   1827>Spoke c Dr. Wilson regarding MRI not being able to be done until Monday. He reports he is aware and that it will be done outpatient.

## 2018-07-13 NOTE — ASSESSMENT & PLAN NOTE
1.Place on the medical surgical unit for hydrationI In bowel rest.    2.Continue current IV fluids  3. Continue current pain medications  4.  Replace potassium  5. Follow up on CT reportAnd treat otherwise as clinically appropriate

## 2018-07-13 NOTE — ED NOTES
No acute changes. Pt lying in bed. Visitor at bedside. Updated on plan of care. Awaiting CT. Denies needs.

## 2018-07-13 NOTE — SUBJECTIVE & OBJECTIVE
Interval History: Patient has done well overnightWithoutFurtherAbdominal pain. No epigastric pain was noted.Lipase was in theLow abnormal range of 232.  Hi did not feel that this was a pancreatitis Because the symptoms Were not consistentWithClinical findings of acute Pancreatitis.    Review of Systems   Constitutional: Positive for fatigue. Negative for chills, diaphoresis and fever.   HENT: Positive for hearing loss. Negative for congestion, ear discharge, ear pain, facial swelling, nosebleeds, rhinorrhea, sinus pressure and sneezing.    Eyes: Negative.    Respiratory: Positive for cough. Negative for apnea, chest tightness, shortness of breath, wheezing and stridor.    Cardiovascular: Positive for palpitations. Negative for chest pain and leg swelling.   Gastrointestinal: Positive for abdominal pain, diarrhea, nausea and vomiting. Negative for anal bleeding, blood in stool and constipation.   Genitourinary: Negative for difficulty urinating, dysuria, hematuria and urgency.   Musculoskeletal: Positive for arthralgias and myalgias. Negative for neck stiffness.   Skin: Negative.    Allergic/Immunologic: Negative.    Neurological: Positive for dizziness, weakness, light-headedness and headaches. Negative for tremors, syncope, facial asymmetry, speech difficulty and numbness.   Hematological: Negative.    Psychiatric/Behavioral: The patient is nervous/anxious.      Objective:     Vital Signs (Most Recent):  Temp: 98.5 °F (36.9 °C) (07/13/18 0802)  Pulse: 77 (07/13/18 0802)  Resp: 18 (07/13/18 0802)  BP: (!) 126/59 (07/13/18 0802)  SpO2: 97 % (07/13/18 0802) Vital Signs (24h Range):  Temp:  [97.7 °F (36.5 °C)-98.5 °F (36.9 °C)] 98.5 °F (36.9 °C)  Pulse:  [] 77  Resp:  [15-28] 18  SpO2:  [93 %-100 %] 97 %  BP: (107-156)/(56-82) 126/59     Weight: 57.6 kg (126 lb 15.8 oz)  Body mass index is 19.31 kg/m².    Intake/Output Summary (Last 24 hours) at 07/13/18 0834  Last data filed at 07/12/18 6655   Gross per 24 hour    Intake             0.05 ml   Output                0 ml   Net             0.05 ml      Physical Exam   Constitutional: She is oriented to person, place, and time. She appears well-developed and well-nourished.   HENT:   Head: Normocephalic and atraumatic.   Eyes: EOM are normal. Pupils are equal, round, and reactive to light.   Neck: Normal range of motion. Neck supple.   Cardiovascular: Normal rate, regular rhythm and normal heart sounds.    Pulmonary/Chest: Breath sounds normal. No respiratory distress. She has no wheezes. She has no rales. She exhibits no tenderness.   Abdominal: She exhibits no distension. There is tenderness. There is guarding. There is no rebound. No hernia.   Musculoskeletal: Normal range of motion.   Neurological: She is alert and oriented to person, place, and time.   Skin: Skin is warm and dry. Capillary refill takes 2 to 3 seconds.   Psychiatric: She has a normal mood and affect. Thought content normal.       Significant Labs: All pertinent labs within the past 24 hours have been reviewed.    Significant Imaging: I have reviewed and interpreted all pertinent imaging results/findings within the past 24 hours.

## 2018-07-13 NOTE — PLAN OF CARE
07/13/18 1603   Discharge Assessment   Assessment Type Discharge Planning Assessment   Confirmed/corrected address and phone number on facesheet? Yes   Assessment information obtained from? Patient   Expected Length of Stay (days) 3   Communicated expected length of stay with patient/caregiver yes   Prior to hospitilization cognitive status: Alert/Oriented   Prior to hospitalization functional status: Independent   Current cognitive status: Alert/Oriented   Current Functional Status: Independent   Facility Arrived From: HOME   Lives With alone   Able to Return to Prior Arrangements yes   Is patient able to care for self after discharge? Yes   Who are your caregiver(s) and their phone number(s)? SISTER CLAUDIO POLO [phone]   Patient's perception of discharge disposition home or selfcare   Readmission Within The Last 30 Days no previous admission in last 30 days   Patient currently being followed by outpatient case management? No   Patient currently receives any other outside agency services? No   Equipment Currently Used at Home none   Do you have any problems affording any of your prescribed medications? (PT HAD MEDICAID UNTIL 6/30/2018 WHEN HER MEDICAID WAS D/C'S DUE TO HER INCREASED INCOME FOR HER WIDOWS' BENEFIT. SHE DOES NOT KNOW HOW MUCH HER MEDICINES WILL COST NOW THAT SHE HAS NO MEDICAID.)   Is the patient taking medications as prescribed? yes   Does the patient have transportation home? Yes   Transportation Available family or friend will provide   Does the patient receive services at the [facility]? No   Discharge Plan A Home   Patient/Family In Agreement With Plan yes   PT ANTICIPATES DISCHARGE TOMORROW. SHE SAYS SHE IS INDEPENDENT AND HAS NO DISCHARGE NEEDS. HER SISTER IN IN HER ROOM AND WILL ASSIST HER IF NEEDED. NO DISCHARGE NEEDS IDENTIFIED AT THIS TIME.

## 2018-07-13 NOTE — ED PROVIDER NOTES
Encounter Date: 7/12/2018       History     Chief Complaint   Patient presents with    Nausea     reports she breathed in some spectracide 7 days ago    Vomiting    Diarrhea    Back Pain     Patient originally chief complaint of nausea vomiting and abdominal pain initially seen by the nurse practitioner.  Patient was diagnosed with belly pain CT scan was ordered.  The patient does have a history of abdominal pain but and alcohol abuse.  Patient states she drinks 3 times a day heavily.  Patient did vomit several times.  States it was a cramping aching pain in her mid abdomen radiating to her back.            Review of patient's allergies indicates:   Allergen Reactions    Penicillins      Past Medical History:   Diagnosis Date    Anxiety     Atherosclerosis     Depression     GERD (gastroesophageal reflux disease)     Hypertension      Past Surgical History:   Procedure Laterality Date    APPENDECTOMY      CAROTID STENT  2014    FEMORAL ARTERY STENT       History reviewed. No pertinent family history.  Social History   Substance Use Topics    Smoking status: Current Every Day Smoker    Smokeless tobacco: Not on file    Alcohol use Yes      Comment: occ     Review of Systems   Constitutional: Negative for fever.   HENT: Negative for sore throat.    Respiratory: Negative for shortness of breath.    Cardiovascular: Negative for chest pain.   Gastrointestinal: Positive for abdominal pain, nausea and vomiting.   Genitourinary: Negative for dysuria.   Musculoskeletal: Negative for back pain.   Skin: Negative for rash.   Neurological: Negative for weakness.   Hematological: Does not bruise/bleed easily.       Physical Exam     Initial Vitals [07/12/18 1605]   BP Pulse Resp Temp SpO2   (!) 156/82 102 (!) 28 98.3 °F (36.8 °C) 100 %      MAP       --         Physical Exam    Nursing note and vitals reviewed.  Constitutional: Vital signs are normal. She appears well-developed and well-nourished. She appears  cachectic. She is Obese .   HENT:   Head: Normocephalic and atraumatic.   Eyes: Lids are normal. Lids are everted and swept, no foreign bodies found.   Neck: Trachea normal, normal range of motion and phonation normal. Neck supple.   Cardiovascular: Normal rate, regular rhythm and normal pulses.   Pulmonary/Chest: Breath sounds normal.   Abdominal: Soft. Normal appearance and bowel sounds are normal. There is tenderness. There is guarding.   Epigastric pain and tenderness   Musculoskeletal: Normal range of motion.   Lymphadenopathy:        Head (right side): No submental, no tonsillar, no preauricular, no posterior auricular and no occipital adenopathy present.        Head (left side): No tonsillar, no preauricular and no posterior auricular adenopathy present.     She has no cervical adenopathy.        Right cervical: No deep cervical adenopathy present.       Left cervical: No superficial cervical adenopathy present.   Neurological: She is alert and oriented to person, place, and time. No cranial nerve deficit or sensory deficit. GCS eye subscore is 4. GCS verbal subscore is 5. GCS motor subscore is 6.   Skin: Skin is warm, dry and intact.   Psychiatric: She has a normal mood and affect. Her speech is normal and behavior is normal. Cognition and memory are normal.         ED Course   Procedures  Labs Reviewed   CBC W/ AUTO DIFFERENTIAL - Abnormal; Notable for the following:        Result Value    RBC 3.14 (*)     Hemoglobin 10.3 (*)     Hematocrit 30.2 (*)     MCH 32.8 (*)     RDW 15.2 (*)     Platelets 415 (*)     MPV 8.9 (*)     Gran # (ANC) 8.7 (*)     Mono # 1.2 (*)     Gran% 73.1 (*)     Lymph% 14.2 (*)     All other components within normal limits   COMPREHENSIVE METABOLIC PANEL - Abnormal; Notable for the following:     Sodium 132 (*)     Potassium 3.1 (*)     All other components within normal limits   LIPASE - Abnormal; Notable for the following:     Lipase 262 (*)     All other components within normal  "limits          Imaging Results          CT Abdomen Pelvis With Contrast (In process)                X-Ray Chest PA And Lateral (Final result)  Result time 07/12/18 16:26:17    Final result by Leroy Morgan MD (07/12/18 16:26:17)                 Impression:      1. COPD.  2. Mild S shaped scoliosis.      Electronically signed by: Leroy Morgan  Date:    07/12/2018  Time:    16:26             Narrative:    EXAMINATION:  XR CHEST PA AND LATERAL    CLINICAL HISTORY:  Cough    TECHNIQUE:  PA and lateral views of the chest were performed.    COMPARISON:  Chest x-ray 12/22/2017    FINDINGS:  Hyperinflation with flattening diaphragms consistent with COPD.  Mild chronic interstitial change.  No focal consolidation.    Heart size normal.  Mild calcified aortic plaque mediastinal contours unremarkable.    Mild S shaped scoliosis of the thoracolumbar spine.                              X-Rays:   Independently Interpreted Readings:   Other Readings:  Patient did have a CT scan which showed of pancreatitis 1st with a pseudocyst versus a mass.    Medical Decision Making:   Initial Assessment:   Patient would have a CT scan of the abdomen and pelvis to rule out possible mass versus pancreatitis.  Differential Diagnosis:   Pancreatitis verses of gallstones versus cholecystitis versus obstruction  Clinical Tests:   Lab Tests: Reviewed  The following lab test(s) were unremarkable: CBC, BMP and Lipase  Radiological Study: Reviewed and Ordered  Other:   I discussed test(s) with the performing physician.  I have discussed this case with another health care provider.       <> Summary of the Discussion: Patient will be admitted to the hospitalist with IV fluids and pain medication                   ED Course as of Jul 12 2302   Thu Jul 12, 2018   1643 Pt states abdominal pain is severe. Only has drank "a couple of sips" of coffee today.   Very nauseated, had diarrhea a couple of days ago  [NP]   1800 Discussed results with Dr Lima  " [NP]   2108 Reported off to Dr Lima at end of myshift  [NP]      ED Course User Index  [NP] AZIZA Li     Clinical Impression:   The primary encounter diagnosis was Abdominal pain, acute. Diagnoses of Cough, Idiopathic acute pancreatitis without infection or necrosis, Pseudocyst of pancreas, Nausea and vomiting, intractability of vomiting not specified, unspecified vomiting type, and H/O ETOH abuse were also pertinent to this visit.                             Yvon Lima,   07/12/18 8891       Yvon Lima,   07/12/18 5913

## 2018-07-13 NOTE — HPI
This patient pesented to the emergency department last evening Complaining ofOne day history of nausea vomiting, DiarrheaAnd back painAnd the lower abdominal painThe patientHas a past medical history of coronary heart disease, Vascular disease,Anxiety/DepressionAnd hypertension.Patient sees Dr. Moctezuma  As her cardiologist.Patient states that she was wrenching and had projectile vomiting prior to arrival in the emergency department.She denied any feverAnd stated thatThe nausea and vomitingStarted the day of presentation.She stated that she was eating and then had to throw up.The patient denied chest painAnd localized her pain in the lower abdomen and into the lower backIn the flank.  This patient does have aSignificantHistory of alcohol abuse.    July 13, 2018:The patient is stable in her roomAnd sleeping.She states abdominal pain is much improvedBut still havingSome lower back pain.I felt like this was more related to wretching than pathologic.  This patientDeniesAny nausea and vomiting This morning.LabsDid show a potassium of 3.1 which will be replaced

## 2018-07-13 NOTE — ED NOTES
Pt and family notified of CT result.  Dr Lima reviewing results now and will be in soon for update.

## 2018-07-13 NOTE — PLAN OF CARE
Problem: Patient Care Overview  Goal: Plan of Care Review  Outcome: Ongoing (interventions implemented as appropriate)  CONT. POC

## 2018-07-13 NOTE — H&P
[facility] Medicine  History & Physical    Patient Name: Comfort Cantu  MRN: [id number]  Admission Date: 7/12/2018  Attending Physician: Сергей Galvan MD  Primary Care Provider: Darinel Riddle MD         Patient information was obtained from patient and ER records.     Subjective:     Principal Problem:<principal problem not specified>    Chief Complaint:   Chief Complaint   Patient presents with    Nausea     reports she breathed in some spectracide 7 days ago    Vomiting    Diarrhea    Back Pain        HPI: This patient pesented to the emergency department last evening Complaining ofOne day history of nausea vomiting, DiarrheaAnd back painAnd the lower abdominal painThe patientHas a past medical history of coronary heart disease, Vascular disease,Anxiety/DepressionAnd hypertension.Patient sees Dr. Moctezuma  As her cardiologist.Patient states that she was wrenching and had projectile vomiting prior to arrival in the emergency department.She denied any feverAnd stated thatThe nausea and vomitingStarted the day of presentation.She stated that she was eating and then had to throw up.The patient denied chest painAnd localized her pain in the lower abdomen and into the lower backIn the flank.  This patient does have aSignificantHistory of alcohol abuse.    Past Medical History:   Diagnosis Date    Anxiety     Atherosclerosis     Depression     GERD (gastroesophageal reflux disease)     Hypertension        Past Surgical History:   Procedure Laterality Date    APPENDECTOMY      CAROTID STENT  2014    FEMORAL ARTERY STENT         Review of patient's allergies indicates:   Allergen Reactions    Penicillins        No current facility-administered medications on file prior to encounter.      Current Outpatient Prescriptions on File Prior to Encounter   Medication Sig    atorvastatin (LIPITOR) 10 MG tablet Take 10 mg by mouth once daily.    clopidogrel (PLAVIX) 75 mg  tablet Take 75 mg by mouth once daily.    hydroCHLOROthiazide (HYDRODIURIL) 25 MG tablet Take 25 mg by mouth once daily.    isosorbide mononitrate (IMDUR) 30 MG 24 hr tablet Take 30 mg by mouth once daily.    lisinopril (PRINIVIL,ZESTRIL) 5 MG tablet Take 5 mg by mouth once daily.    naproxen (NAPROSYN) 500 MG tablet Take 1 tablet (500 mg total) by mouth 2 (two) times daily as needed.    pantoprazole (PROTONIX) 40 MG tablet Take 1 tablet (40 mg total) by mouth once daily.    vilazodone (VIIBRYD) 20 mg Tab Take by mouth.    [DISCONTINUED] LORazepam (ATIVAN) 0.5 MG tablet Take 0.5 mg by mouth every 6 (six) hours as needed for Anxiety.     Family History     None        Social History Main Topics    Smoking status: Current Every Day Smoker    Smokeless tobacco: Not on file    Alcohol use Yes      Comment: occ    Drug use: No    Sexual activity: Not Currently     Review of Systems   Constitutional: Positive for fatigue. Negative for chills, diaphoresis and fever.   HENT: Positive for hearing loss. Negative for congestion, ear discharge, ear pain, facial swelling, nosebleeds, rhinorrhea, sinus pressure and sneezing.    Eyes: Negative.    Respiratory: Positive for cough. Negative for apnea, chest tightness, shortness of breath, wheezing and stridor.    Cardiovascular: Positive for palpitations. Negative for chest pain and leg swelling.   Gastrointestinal: Positive for abdominal pain, diarrhea, nausea and vomiting. Negative for anal bleeding, blood in stool and constipation.   Genitourinary: Negative for difficulty urinating, dysuria, hematuria and urgency.   Musculoskeletal: Positive for arthralgias and myalgias. Negative for neck stiffness.   Skin: Negative.    Allergic/Immunologic: Negative.    Neurological: Positive for dizziness, weakness, light-headedness and headaches. Negative for tremors, syncope, facial asymmetry, speech difficulty and numbness.   Hematological: Negative.    Psychiatric/Behavioral:  The patient is nervous/anxious.      Objective:     Vital Signs (Most Recent):  Temp: 98.5 °F (36.9 °C) (07/13/18 0802)  Pulse: 77 (07/13/18 0802)  Resp: 18 (07/13/18 0802)  BP: (!) 126/59 (07/13/18 0802)  SpO2: 97 % (07/13/18 0802) Vital Signs (24h Range):  Temp:  [97.7 °F (36.5 °C)-98.5 °F (36.9 °C)] 98.5 °F (36.9 °C)  Pulse:  [] 77  Resp:  [15-28] 18  SpO2:  [93 %-100 %] 97 %  BP: (107-156)/(56-82) 126/59     Weight: 57.6 kg (126 lb 15.8 oz)  Body mass index is 19.31 kg/m².    Physical Exam   Constitutional: She is oriented to person, place, and time. She appears well-developed and well-nourished.   HENT:   Head: Normocephalic and atraumatic.   Eyes: EOM are normal. Pupils are equal, round, and reactive to light.   Neck: Normal range of motion. Neck supple.   Cardiovascular: Normal rate, regular rhythm and normal heart sounds.    Pulmonary/Chest: Breath sounds normal. No respiratory distress. She has no wheezes. She has no rales. She exhibits no tenderness.   Abdominal: She exhibits no distension. There is tenderness. There is guarding. There is no rebound. No hernia.   Musculoskeletal: Normal range of motion.   Neurological: She is alert and oriented to person, place, and time.   Skin: Skin is warm and dry. Capillary refill takes 2 to 3 seconds.   Psychiatric: She has a normal mood and affect. Thought content normal.         CRANIAL NERVES     CN III, IV, VI   Pupils are equal, round, and reactive to light.  Extraocular motions are normal.        Significant Labs: All pertinent labs within the past 24 hours have been reviewed.    Significant Imaging: I have reviewed and interpreted all pertinent imaging results/findings within the past 24 hours.    Assessment/Plan:     Abdominal pain, acute    1.Place on the medical surgical unit for hydrationI In bowel rest.    2.Continue current IV fluids  3. Continue current pain medications  4.  Replace potassium  5. Follow up on CT reportAnd treat otherwise as  clinically appropriate            VTE Risk Mitigation         Ordered     IP VTE LOW RISK PATIENT  Once      07/13/18 0041             Сергей Galvan MD  Department of Hospital Medicine   [facility] - Med Surg

## 2018-07-13 NOTE — SUBJECTIVE & OBJECTIVE
Past Medical History:   Diagnosis Date    Anxiety     Atherosclerosis     Depression     GERD (gastroesophageal reflux disease)     Hypertension        Past Surgical History:   Procedure Laterality Date    APPENDECTOMY      CAROTID STENT  2014    FEMORAL ARTERY STENT         Review of patient's allergies indicates:   Allergen Reactions    Penicillins        No current facility-administered medications on file prior to encounter.      Current Outpatient Prescriptions on File Prior to Encounter   Medication Sig    atorvastatin (LIPITOR) 10 MG tablet Take 10 mg by mouth once daily.    clopidogrel (PLAVIX) 75 mg tablet Take 75 mg by mouth once daily.    hydroCHLOROthiazide (HYDRODIURIL) 25 MG tablet Take 25 mg by mouth once daily.    isosorbide mononitrate (IMDUR) 30 MG 24 hr tablet Take 30 mg by mouth once daily.    lisinopril (PRINIVIL,ZESTRIL) 5 MG tablet Take 5 mg by mouth once daily.    naproxen (NAPROSYN) 500 MG tablet Take 1 tablet (500 mg total) by mouth 2 (two) times daily as needed.    pantoprazole (PROTONIX) 40 MG tablet Take 1 tablet (40 mg total) by mouth once daily.    vilazodone (VIIBRYD) 20 mg Tab Take by mouth.    [DISCONTINUED] LORazepam (ATIVAN) 0.5 MG tablet Take 0.5 mg by mouth every 6 (six) hours as needed for Anxiety.     Family History     None        Social History Main Topics    Smoking status: Current Every Day Smoker    Smokeless tobacco: Not on file    Alcohol use Yes      Comment: occ    Drug use: No    Sexual activity: Not Currently     Review of Systems   Constitutional: Positive for fatigue. Negative for chills, diaphoresis and fever.   HENT: Positive for hearing loss. Negative for congestion, ear discharge, ear pain, facial swelling, nosebleeds, rhinorrhea, sinus pressure and sneezing.    Eyes: Negative.    Respiratory: Positive for cough. Negative for apnea, chest tightness, shortness of breath, wheezing and stridor.    Cardiovascular: Positive for palpitations.  Negative for chest pain and leg swelling.   Gastrointestinal: Positive for abdominal pain, diarrhea, nausea and vomiting. Negative for anal bleeding, blood in stool and constipation.   Genitourinary: Negative for difficulty urinating, dysuria, hematuria and urgency.   Musculoskeletal: Positive for arthralgias and myalgias. Negative for neck stiffness.   Skin: Negative.    Allergic/Immunologic: Negative.    Neurological: Positive for dizziness, weakness, light-headedness and headaches. Negative for tremors, syncope, facial asymmetry, speech difficulty and numbness.   Hematological: Negative.    Psychiatric/Behavioral: The patient is nervous/anxious.      Objective:     Vital Signs (Most Recent):  Temp: 98.5 °F (36.9 °C) (07/13/18 0802)  Pulse: 77 (07/13/18 0802)  Resp: 18 (07/13/18 0802)  BP: (!) 126/59 (07/13/18 0802)  SpO2: 97 % (07/13/18 0802) Vital Signs (24h Range):  Temp:  [97.7 °F (36.5 °C)-98.5 °F (36.9 °C)] 98.5 °F (36.9 °C)  Pulse:  [] 77  Resp:  [15-28] 18  SpO2:  [93 %-100 %] 97 %  BP: (107-156)/(56-82) 126/59     Weight: 57.6 kg (126 lb 15.8 oz)  Body mass index is 19.31 kg/m².    Physical Exam   Constitutional: She is oriented to person, place, and time. She appears well-developed and well-nourished.   HENT:   Head: Normocephalic and atraumatic.   Eyes: EOM are normal. Pupils are equal, round, and reactive to light.   Neck: Normal range of motion. Neck supple.   Cardiovascular: Normal rate, regular rhythm and normal heart sounds.    Pulmonary/Chest: Breath sounds normal. No respiratory distress. She has no wheezes. She has no rales. She exhibits no tenderness.   Abdominal: She exhibits no distension. There is tenderness. There is guarding. There is no rebound. No hernia.   Musculoskeletal: Normal range of motion.   Neurological: She is alert and oriented to person, place, and time.   Skin: Skin is warm and dry. Capillary refill takes 2 to 3 seconds.   Psychiatric: She has a normal mood and affect.  Thought content normal.         CRANIAL NERVES     CN III, IV, VI   Pupils are equal, round, and reactive to light.  Extraocular motions are normal.        Significant Labs: All pertinent labs within the past 24 hours have been reviewed.    Significant Imaging: I have reviewed and interpreted all pertinent imaging results/findings within the past 24 hours.

## 2018-07-14 VITALS
DIASTOLIC BLOOD PRESSURE: 67 MMHG | HEIGHT: 68 IN | HEART RATE: 90 BPM | RESPIRATION RATE: 18 BRPM | BODY MASS INDEX: 19.25 KG/M2 | TEMPERATURE: 98 F | SYSTOLIC BLOOD PRESSURE: 155 MMHG | OXYGEN SATURATION: 97 % | WEIGHT: 127 LBS

## 2018-07-14 LAB
ANION GAP SERPL CALC-SCNC: 6 MMOL/L
BASOPHILS # BLD AUTO: 0.06 K/UL
BASOPHILS NFR BLD: 0.9 %
BUN SERPL-MCNC: 6 MG/DL
CALCIUM SERPL-MCNC: 8.8 MG/DL
CANCER AG19-9 SERPL-ACNC: 4 U/ML
CHLORIDE SERPL-SCNC: 104 MMOL/L
CO2 SERPL-SCNC: 28 MMOL/L
CREAT SERPL-MCNC: 0.4 MG/DL
DIFFERENTIAL METHOD: ABNORMAL
EOSINOPHIL # BLD AUTO: 0.3 K/UL
EOSINOPHIL NFR BLD: 3.9 %
ERYTHROCYTE [DISTWIDTH] IN BLOOD BY AUTOMATED COUNT: 15.1 %
EST. GFR  (AFRICAN AMERICAN): >60 ML/MIN/1.73 M^2
EST. GFR  (NON AFRICAN AMERICAN): >60 ML/MIN/1.73 M^2
GLUCOSE SERPL-MCNC: 80 MG/DL
HCT VFR BLD AUTO: 25.9 %
HGB BLD-MCNC: 8.4 G/DL
IMM GRANULOCYTES # BLD AUTO: 0.02 K/UL
IMM GRANULOCYTES NFR BLD AUTO: 0.3 %
LIPASE SERPL-CCNC: 169 U/L
LYMPHOCYTES # BLD AUTO: 1.2 K/UL
LYMPHOCYTES NFR BLD: 17.5 %
MCH RBC QN AUTO: 32.2 PG
MCHC RBC AUTO-ENTMCNC: 32.4 G/DL
MCV RBC AUTO: 99 FL
MONOCYTES # BLD AUTO: 0.9 K/UL
MONOCYTES NFR BLD: 12.4 %
NEUTROPHILS # BLD AUTO: 4.5 K/UL
NEUTROPHILS NFR BLD: 65 %
NRBC BLD-RTO: 0 /100 WBC
PLATELET # BLD AUTO: 332 K/UL
PMV BLD AUTO: 9 FL
POTASSIUM SERPL-SCNC: 3.8 MMOL/L
RBC # BLD AUTO: 2.61 M/UL
SODIUM SERPL-SCNC: 138 MMOL/L
WBC # BLD AUTO: 6.91 K/UL

## 2018-07-14 PROCEDURE — 96361 HYDRATE IV INFUSION ADD-ON: CPT

## 2018-07-14 PROCEDURE — G0378 HOSPITAL OBSERVATION PER HR: HCPCS

## 2018-07-14 PROCEDURE — 83690 ASSAY OF LIPASE: CPT

## 2018-07-14 PROCEDURE — 36415 COLL VENOUS BLD VENIPUNCTURE: CPT

## 2018-07-14 PROCEDURE — 25000003 PHARM REV CODE 250: Performed by: INTERNAL MEDICINE

## 2018-07-14 PROCEDURE — 80048 BASIC METABOLIC PNL TOTAL CA: CPT

## 2018-07-14 PROCEDURE — 85025 COMPLETE CBC W/AUTO DIFF WBC: CPT

## 2018-07-14 RX ADMIN — HYDROCODONE BITARTRATE AND ACETAMINOPHEN 1 TABLET: 5; 325 TABLET ORAL at 01:07

## 2018-07-14 RX ADMIN — SODIUM CHLORIDE: 900 INJECTION, SOLUTION INTRAVENOUS at 01:07

## 2018-07-14 NOTE — PLAN OF CARE
Problem: Pancreatitis, Acute/Chronic (Adult)  Intervention: Monitor/Manage Fluid Electrolyte Balance   07/14/18 0242   Nutrition Interventions   Fluid/Electrolyte Management fluids provided;oral rehydration therapy initiated

## 2018-07-14 NOTE — DISCHARGE SUMMARY
[facility]  Hospital Medicine  Discharge Summary      Patient Name: Comfort Cantu  MRN: [id number]  Admission Date: 7/12/2018  Hospital Length of Stay: 0 days  Discharge Date and Time:  07/14/2018 7:27 AM  Attending Physician: Сергей Galvan MD   Discharging Provider: Adan Wilson MD  Primary Care Provider: Darinel Riddle MD      HPI:   This patient pesented to the emergency department last evening Complaining ofOne day history of nausea vomiting, DiarrheaAnd back painAnd the lower abdominal painThe patientHas a past medical history of coronary heart disease, Vascular disease,Anxiety/DepressionAnd hypertension.Patient sees Dr. Moctezuma  As her cardiologist.Patient states that she was wrenching and had projectile vomiting prior to arrival in the emergency department.She denied any feverAnd stated thatThe nausea and vomitingStarted the day of presentation.She stated that she was eating and then had to throw up.The patient denied chest painAnd localized her pain in the lower abdomen and into the lower backIn the flank.  This patient does have aSignificantHistory of alcohol abuse.    July 13, 2018:The patient is stable in her roomAnd sleeping.She states abdominal pain is much improvedBut still havingSome lower back pain.I felt like this was more related to wretching than pathologic.  This patientDeniesAny nausea and vomiting This morning.LabsDid show a potassium of 3.1 which will be replaced    * No surgery found *      Hospital Course:   This patient wasGiven analgesics in the emergency departmentAnd kept NPO. CT scan of the abdomen and pelvisWas completed but report isPending.  All other labs wereUnremarkable except for potassiumWhich was 3.1  7/14 d/c home out pt MRI pancrease f/u PCP discussed with patient     Consults:     * Abdominal pain, acute    1.Place on the medical surgical unit for hydrationI In bowel rest.    2.Continue current IV fluids  3. Continue current pain  medications  4.  Replace potassium  5. Follow up on CT reportAnd treat otherwise as clinically appropriate  7/14 will need out pt MRI pancrease anf f/u with PCP on MRI and ca 19-9,and adjustment of life style,impressed need for f/u to w/u suspected pancreatic issue and possible cystic mass ie r/o cancer            Final Active Diagnoses:    Diagnosis Date Noted POA    PRINCIPAL PROBLEM:  Abdominal pain, acute [R10.9] 07/12/2018 Yes      Problems Resolved During this Admission:    Diagnosis Date Noted Date Resolved POA       Discharged Condition: good    Disposition: Home or Self Care    Follow Up:  Follow-up Information     Darinel Riddle MD In 3 days.    Specialty:  Family Medicine  Contact information:  [address] [place] [address] [phone]             Darinel Riddle MD.    Specialty:  Family Medicine  Contact information:  [address] [place] [address] [phone]                 Patient Instructions:     MRI Abdomen W WO Contrast   Standing Status: Future  Standing Exp. Date: 07/14/19   Order Specific Question Answer Comments   Does the patient have a pacemaker or a defibrilator? No    Does the patient have a cerebral aneurysm or surgical clip, pump, nerve or brain stimulator, middle or inner ear prosthesis, or other metal implant or  been injured by a metal object(i.e. bullet, bb, shrapnel)? No    Is the patient claustrophobic? No    Will the patient require sedation? No    Does the patient have any of the following conditions? Diabetes, History of Renal Disease or Hypertension requiring medical therapy? No    May the Radiologist modify the order per protocol to meet the clinical needs of the patient? Yes    Is this part of a Research Study? No    Recist criteria? No    Will this service be billed to a Worker's Comp policy? No    Does the patient have on a skin patch for medication with aluminized backing? No          Significant Diagnostic Studies: Labs:   BMP:    Recent Labs  Lab 07/12/18  1649 07/13/18  1029 07/14/18  0536    104 80   * 135* 138   K 3.1* 5.6* 3.8   CL 95 101 104   CO2 27 29 28   BUN 9 6* 6*   CREATININE 0.7 0.6 0.4*   CALCIUM 9.7 9.4 8.8   , CMP   Recent Labs  Lab 07/12/18  1649 07/13/18  1029 07/14/18  0536   * 135* 138   K 3.1* 5.6* 3.8   CL 95 101 104   CO2 27 29 28    104 80   BUN 9 6* 6*   CREATININE 0.7 0.6 0.4*   CALCIUM 9.7 9.4 8.8   PROT 8.0 7.3  --    ALBUMIN 4.5 4.1  --    BILITOT 0.7 0.3  --    ALKPHOS 98 90  --    AST 22 17  --    ALT 15 14  --    ANIONGAP 10 5* 6*   ESTGFRAFRICA >60.0 >60.0 >60.0   EGFRNONAA >60.0 >60.0 >60.0   , CBC   Recent Labs  Lab 07/12/18  1649 07/13/18  1030 07/14/18  0536   WBC 11.90 8.02 6.91   HGB 10.3* 9.5* 8.4*   HCT 30.2* 28.8* 25.9*   * 360* 332    and All labs within the past 24 hours have been reviewed    Pending Diagnostic Studies:     Procedure Component Value Units Date/Time    MRI Abdomen W WO Contrast [[id number]]     Order Status:  Sent Lab Status:  No result          Medications:  Reconciled Home Medications:      Medication List      START taking these medications    HYDROcodone-acetaminophen 5-325 mg per tablet  Commonly known as:  NORCO  Take 1 tablet by mouth every 6 (six) hours as needed for Pain.        CONTINUE taking these medications    atorvastatin 10 MG tablet  Commonly known as:  LIPITOR  Take 10 mg by mouth once daily.     clonazePAM 0.5 MG tablet  Commonly known as:  KLONOPIN  Take 0.5 mg by mouth 2 (two) times daily as needed for Anxiety.     clopidogrel 75 mg tablet  Commonly known as:  PLAVIX  Take 75 mg by mouth once daily.     hydroCHLOROthiazide 25 MG tablet  Commonly known as:  HYDRODIURIL  Take 25 mg by mouth once daily.     isosorbide mononitrate 30 MG 24 hr tablet  Commonly known as:  IMDUR  Take 30 mg by mouth once daily.     lisinopril 5 MG tablet  Commonly known as:  PRINIVIL,ZESTRIL  Take 5 mg by mouth once daily.     naproxen 500 MG  tablet  Commonly known as:  NAPROSYN  Take 1 tablet (500 mg total) by mouth 2 (two) times daily as needed.     pantoprazole 40 MG tablet  Commonly known as:  PROTONIX  Take 1 tablet (40 mg total) by mouth once daily.     VIIBRYD 20 mg Tab  Generic drug:  vilazodone  Take by mouth.            Indwelling Lines/Drains at time of discharge:   Lines/Drains/Airways          No matching active lines, drains, or airways          Time spent on the discharge of patient: 35 minutes  Patient was seen and examined on the date of discharge and determined to be suitable for discharge.         Adan Wilson MD  Department of Hospital Medicine  [facility] - Med Surg

## 2018-07-14 NOTE — ASSESSMENT & PLAN NOTE
1.Place on the medical surgical unit for hydrationI In bowel rest.    2.Continue current IV fluids  3. Continue current pain medications  4.  Replace potassium  5. Follow up on CT reportAnd treat otherwise as clinically appropriate  7/14 will need out pt MRI pancrease anf f/u with PCP on MRI and ca 19-9,and adjustment of life style,impressed need for f/u to w/u suspected pancreatic issue and possible cystic mass ie r/o cancer

## 2018-07-14 NOTE — NURSING
0800>Pt refused am meds and reports she will take her home meds this morning. IV removed per SN Radha.   0919>Pt ambulated off unit after verb dc instruction. Discouraged any alcohol use as well. Escorted to exit. Pt hugged and thanked nurse. Message left with case management to make follow up appointment with Dr. Riddle for outpatient MRI abdomen.

## 2018-07-14 NOTE — PLAN OF CARE
Problem: Pancreatitis, Acute/Chronic (Adult)  Goal: Signs and Symptoms of Listed Potential Problems Will be Absent, Minimized or Managed (Pancreatitis, Acute/Chronic)  Signs and symptoms of listed potential problems will be absent, minimized or managed by discharge/transition of care (reference Pancreatitis, Acute/Chronic (Adult) CPG).   Outcome: Ongoing (interventions implemented as appropriate)  Educated pt and friend about pancreatitis

## 2018-07-14 NOTE — PLAN OF CARE
Problem: Pancreatitis, Acute/Chronic (Adult)  Intervention: Monitor/Manage Pain   07/14/18 0241   Safety Interventions   Medication Review/Management medications reviewed   Pain/Comfort/Sleep Interventions   Pain Management Interventions medication;pain management plan reviewed with patient/caregiver;quiet environment facilitated

## 2018-07-14 NOTE — PLAN OF CARE
Problem: Patient Care Overview  Goal: Plan of Care Review  Outcome: Ongoing (interventions implemented as appropriate)   07/14/18 8334   Coping/Psychosocial   Plan Of Care Reviewed With patient

## 2018-07-16 NOTE — PLAN OF CARE
CM called pt to advise of date and time for follow up appt 7/17 2:30 pm.  Patient states that her discharge instructions state that she is to go to Dr. Riddle today at 3pm. CM called Dr Faith's office back to confirm if there was an appt  For 7/16 3pm . Christiane states that there is no appt for 7/16 at 3p and the appt just made by RAMA at 2:30 7/17 stands.  CM advised pt and apologized for any confusion and confirmed her understanding that appt is actually tomorrow 7/17 at 2:.30 knowledge demonstrated by feedback

## 2018-07-16 NOTE — PLAN OF CARE
07/16/18 1059   Final Note   Assessment Type Final Discharge Note   Discharge Disposition Home   What phone number can be called within the next 1-3 days to see how you are doing after discharge? [phone]   Hospital Follow Up  Appt(s) scheduled? Yes   Discharge plans and expectations educations in teach back method with documentation complete? Yes

## 2018-07-17 ENCOUNTER — OFFICE VISIT (OUTPATIENT)
Dept: FAMILY MEDICINE | Facility: CLINIC | Age: 63
End: 2018-07-17
Payer: MEDICARE

## 2018-07-17 VITALS
DIASTOLIC BLOOD PRESSURE: 62 MMHG | SYSTOLIC BLOOD PRESSURE: 124 MMHG | HEIGHT: 68 IN | BODY MASS INDEX: 19.25 KG/M2 | HEART RATE: 91 BPM | RESPIRATION RATE: 18 BRPM | OXYGEN SATURATION: 99 % | WEIGHT: 127 LBS

## 2018-07-17 DIAGNOSIS — D49.0 PANCREAS NEOPLASM: ICD-10-CM

## 2018-07-17 DIAGNOSIS — K86.2 CYSTIC MASS OF PANCREAS: Primary | ICD-10-CM

## 2018-07-17 PROCEDURE — 99495 TRANSJ CARE MGMT MOD F2F 14D: CPT | Mod: PBBFAC,PN | Performed by: FAMILY MEDICINE

## 2018-07-17 PROCEDURE — 99213 OFFICE O/P EST LOW 20 MIN: CPT | Mod: PBBFAC,PN | Performed by: FAMILY MEDICINE

## 2018-07-17 PROCEDURE — 99999 PR PBB SHADOW E&M-EST. PATIENT-LVL III: CPT | Mod: PBBFAC,,, | Performed by: FAMILY MEDICINE

## 2018-07-17 PROCEDURE — 99213 OFFICE O/P EST LOW 20 MIN: CPT | Mod: S$PBB,,, | Performed by: FAMILY MEDICINE

## 2018-07-17 NOTE — PROGRESS NOTES
Subjective:       Patient ID: Comfort Cantu is a 62 y.o. female.    Chief Complaint: Follow-up (Hospital ([facility] [place] [facility]) follow up)    Patient presents for hospital follow up (transition of care visit).    Was admitted for nausea and vomiting, found to have mild panreatitis, which improved with npo and IV fluids. Reports today that she has been able to tolerate a bland diet, and denies any further abdominal pain.  On the CT scan at the hospital, a mass was seen on CT, which is most likely a pseudocyst, but a neoplasm could not be ruled out. Ca19-19 was negative.      Review of Systems   Constitutional: Negative for activity change, appetite change, chills, fatigue and fever.   HENT: Negative for congestion, dental problem, facial swelling, nosebleeds, postnasal drip, sinus pain, sore throat, trouble swallowing and voice change.    Eyes: Negative for pain, discharge and visual disturbance.   Respiratory: Negative for apnea, cough, chest tightness and shortness of breath.    Cardiovascular: Negative for chest pain and palpitations.   Gastrointestinal: Negative for abdominal pain, blood in stool, constipation and nausea.   Endocrine: Negative for cold intolerance, polydipsia and polyuria.   Genitourinary: Negative for difficulty urinating, enuresis and flank pain.   Musculoskeletal: Negative for arthralgias and back pain.   Skin: Negative for color change.   Allergic/Immunologic: Negative for environmental allergies and immunocompromised state.   Neurological: Negative for dizziness and light-headedness.   Hematological: Negative for adenopathy.   Psychiatric/Behavioral: Negative for agitation, behavioral problems, decreased concentration and dysphoric mood. The patient is not nervous/anxious.    All other systems reviewed and are negative.        Reviewed family, medical, surgical, and social history.    Objective:      /62 (BP Location: Left arm, Patient Position: Sitting, BP Method: Medium (Automatic))   Pulse 91    "Resp 18   Ht 5' 8" (1.727 m)   Wt 57.6 kg (127 lb)   SpO2 99%   BMI 19.31 kg/m²   Physical Exam   Constitutional: She is oriented to person, place, and time. She appears well-developed and well-nourished. No distress.   HENT:   Head: Normocephalic and atraumatic.   Nose: Nose normal.   Mouth/Throat: Oropharynx is clear and moist. No oropharyngeal exudate.   Eyes: Conjunctivae and EOM are normal. Pupils are equal, round, and reactive to light. No scleral icterus.   Neck: Normal range of motion. Neck supple. No thyromegaly present.   Cardiovascular: Normal rate, regular rhythm and normal heart sounds.  Exam reveals no gallop and no friction rub.    No murmur heard.  Pulmonary/Chest: Effort normal and breath sounds normal. No respiratory distress. She has no wheezes. She has no rales. She exhibits no tenderness.   Abdominal: Soft. Bowel sounds are normal. She exhibits no distension. There is no tenderness. There is no guarding.   Musculoskeletal: Normal range of motion. She exhibits no edema, tenderness or deformity.   Lymphadenopathy:     She has no cervical adenopathy.   Neurological: She is alert and oriented to person, place, and time. She displays normal reflexes. No cranial nerve deficit or sensory deficit. She exhibits normal muscle tone.   Skin: Skin is warm and dry. No rash noted. She is not diaphoretic. No erythema. No pallor.   Psychiatric: She has a normal mood and affect. Her behavior is normal. Judgment and thought content normal.   Nursing note and vitals reviewed.      Assessment:       1. Cystic mass of pancreas    2. Pancreas neoplasm        Plan:       Cystic mass of pancreas  -     MRI Abdomen W WO Contrast; Future; Expected date: 07/17/2018  -     Comprehensive metabolic panel; Future; Expected date: 07/17/2018    Pancreas neoplasm  -     MRI Abdomen W WO Contrast; Future; Expected date: 07/17/2018  -     Comprehensive metabolic panel; Future; Expected date: 07/17/2018    Will obtain MRI to " rule out neoplasm of pancreas.  Patient was contacted within two days of discharge for appointment; med rec was performed at today's visit, and a moderate amount of mdm was used.        Risks, benefits, and side effects were discussed with the patient. All questions were answered to the fullest satisfaction of the patient, and pt verbalized understanding and agreement to treatment plan. Pt was to call with any new or worsening symptoms, or present to the ER.

## 2018-07-18 ENCOUNTER — HOSPITAL ENCOUNTER (OUTPATIENT)
Dept: RADIOLOGY | Facility: HOSPITAL | Age: 63
Discharge: HOME | End: 2018-07-18
Attending: FAMILY MEDICINE
Payer: MEDICARE

## 2018-07-18 DIAGNOSIS — K86.2 CYSTIC MASS OF PANCREAS: ICD-10-CM

## 2018-07-18 DIAGNOSIS — D49.0 PANCREAS NEOPLASM: ICD-10-CM

## 2018-07-18 PROCEDURE — 25500020 PHARM REV CODE 255: Performed by: FAMILY MEDICINE

## 2018-07-18 PROCEDURE — 74183 MRI ABD W/O CNTR FLWD CNTR: CPT | Mod: TC

## 2018-07-18 PROCEDURE — 74183 MRI ABD W/O CNTR FLWD CNTR: CPT | Mod: 26,,, | Performed by: RADIOLOGY

## 2018-07-18 PROCEDURE — A9579 GAD-BASE MR CONTRAST NOS,1ML: HCPCS | Performed by: FAMILY MEDICINE

## 2018-07-18 RX ADMIN — GADOPENTETATE DIMEGLUMINE 20 ML: 469.01 INJECTION INTRAVENOUS at 03:07

## 2018-07-20 RX ORDER — POTASSIUM CHLORIDE 20 MEQ/1
20 TABLET, EXTENDED RELEASE ORAL 2 TIMES DAILY
Qty: 20 TABLET | Refills: 0 | Status: SHIPPED | OUTPATIENT
Start: 2018-07-20 | End: 2018-07-30

## 2018-08-16 ENCOUNTER — OFFICE VISIT (OUTPATIENT)
Dept: FAMILY MEDICINE | Facility: CLINIC | Age: 63
End: 2018-08-16
Payer: MEDICARE

## 2018-08-16 VITALS
DIASTOLIC BLOOD PRESSURE: 59 MMHG | SYSTOLIC BLOOD PRESSURE: 121 MMHG | HEART RATE: 81 BPM | RESPIRATION RATE: 16 BRPM | HEIGHT: 68 IN | WEIGHT: 123 LBS | OXYGEN SATURATION: 95 % | BODY MASS INDEX: 18.64 KG/M2

## 2018-08-16 DIAGNOSIS — K86.89 PANCREATIC MASS: Primary | ICD-10-CM

## 2018-08-16 PROCEDURE — 99999 PR PBB SHADOW E&M-EST. PATIENT-LVL IV: CPT | Mod: PBBFAC,,, | Performed by: FAMILY MEDICINE

## 2018-08-16 PROCEDURE — 99214 OFFICE O/P EST MOD 30 MIN: CPT | Mod: PBBFAC,PN | Performed by: FAMILY MEDICINE

## 2018-08-16 PROCEDURE — 99213 OFFICE O/P EST LOW 20 MIN: CPT | Mod: S$PBB,,, | Performed by: FAMILY MEDICINE

## 2018-08-16 NOTE — PROGRESS NOTES
"Subjective:       Patient ID: Comfort Cantu is a 62 y.o. female.    Chief Complaint: Follow-up (MRI results)    Discussed MRI with patient, no current abdominal pain, no current nausea.      Review of Systems   Constitutional: Negative for activity change, appetite change, chills, fatigue and fever.   HENT: Negative for congestion, dental problem, facial swelling, nosebleeds, postnasal drip, sinus pain, sore throat, trouble swallowing and voice change.    Eyes: Negative for pain, discharge and visual disturbance.   Respiratory: Negative for apnea, cough, chest tightness and shortness of breath.    Cardiovascular: Negative for chest pain and palpitations.   Gastrointestinal: Negative for abdominal pain, blood in stool, constipation and nausea.   Endocrine: Negative for cold intolerance, polydipsia and polyuria.   Genitourinary: Negative for difficulty urinating, enuresis and flank pain.   Musculoskeletal: Negative for arthralgias and back pain.   Skin: Negative for color change.   Allergic/Immunologic: Negative for environmental allergies and immunocompromised state.   Neurological: Negative for dizziness and light-headedness.   Hematological: Negative for adenopathy.   Psychiatric/Behavioral: Negative for agitation, behavioral problems, decreased concentration and dysphoric mood. The patient is not nervous/anxious.    All other systems reviewed and are negative.        Reviewed family, medical, surgical, and social history.    Objective:      BP (!) 121/59 (BP Location: Left arm, Patient Position: Sitting, BP Method: Small (Automatic))   Pulse 81   Resp 16   Ht 5' 8" (1.727 m)   Wt 55.8 kg (123 lb)   SpO2 95%   BMI 18.70 kg/m²   Physical Exam   Constitutional: She is oriented to person, place, and time. She appears well-developed and well-nourished. No distress.   HENT:   Head: Normocephalic and atraumatic.   Nose: Nose normal.   Mouth/Throat: Oropharynx is clear and moist. No oropharyngeal exudate.   Eyes: " Conjunctivae and EOM are normal. Pupils are equal, round, and reactive to light. No scleral icterus.   Neck: Normal range of motion. Neck supple. No thyromegaly present.   Cardiovascular: Normal rate, regular rhythm and normal heart sounds. Exam reveals no gallop and no friction rub.   No murmur heard.  Pulmonary/Chest: Effort normal and breath sounds normal. No respiratory distress. She has no wheezes. She has no rales. She exhibits no tenderness.   Abdominal: Soft. Bowel sounds are normal. She exhibits no distension. There is no tenderness. There is no guarding.   Musculoskeletal: Normal range of motion. She exhibits no edema, tenderness or deformity.   Lymphadenopathy:     She has no cervical adenopathy.   Neurological: She is alert and oriented to person, place, and time. She displays normal reflexes. No cranial nerve deficit or sensory deficit. She exhibits normal muscle tone.   Skin: Skin is warm and dry. No rash noted. She is not diaphoretic. No erythema. No pallor.   Psychiatric: She has a normal mood and affect. Her behavior is normal. Judgment and thought content normal.   Nursing note and vitals reviewed.      Assessment:       1. Pancreatic mass        Plan:       Pancreatic mass  -     Ambulatory referral to Gastroenterology    Will refer for consideration of ERCP.        Risks, benefits, and side effects were discussed with the patient. All questions were answered to the fullest satisfaction of the patient, and pt verbalized understanding and agreement to treatment plan. Pt was to call with any new or worsening symptoms, or present to the ER.

## 2018-09-19 ENCOUNTER — OFFICE VISIT (OUTPATIENT)
Dept: GASTROENTEROLOGY | Facility: CLINIC | Age: 63
End: 2018-09-19
Payer: MEDICARE

## 2018-09-19 VITALS
SYSTOLIC BLOOD PRESSURE: 109 MMHG | DIASTOLIC BLOOD PRESSURE: 59 MMHG | WEIGHT: 125.69 LBS | HEART RATE: 85 BPM | BODY MASS INDEX: 19.11 KG/M2

## 2018-09-19 DIAGNOSIS — K86.2 CYSTIC MASS OF PANCREAS: Primary | ICD-10-CM

## 2018-09-19 PROCEDURE — 99999 PR PBB SHADOW E&M-EST. PATIENT-LVL III: CPT | Mod: PBBFAC,,, | Performed by: INTERNAL MEDICINE

## 2018-09-19 PROCEDURE — 99213 OFFICE O/P EST LOW 20 MIN: CPT | Mod: PBBFAC,PO | Performed by: INTERNAL MEDICINE

## 2018-09-19 PROCEDURE — 99204 OFFICE O/P NEW MOD 45 MIN: CPT | Mod: S$PBB,,, | Performed by: INTERNAL MEDICINE

## 2018-09-19 RX ORDER — PRASUGREL 5 MG/1
5 TABLET, FILM COATED ORAL DAILY
COMMUNITY
Start: 2018-08-21 | End: 2024-02-01 | Stop reason: SDUPTHER

## 2018-09-19 RX ORDER — TRAZODONE HYDROCHLORIDE 100 MG/1
TABLET ORAL NIGHTLY
COMMUNITY
End: 2019-09-04

## 2018-09-19 NOTE — LETTER
September 20, 2018      Darinel Riddle MD  [address] [place] [address]           [place] [facility]  [address]  [place] [address]  Phone: [phone]          Patient: Comfort Cantu   MR Number: [id number]   YOB: 1955   Date of Visit: 9/19/2018       Dear Dr. Darinel Riddle:    Thank you for referring Comfort Cantu to me for evaluation. Attached you will find relevant portions of my assessment and plan of care.    If you have questions, please do not hesitate to call me. I look forward to following Comfort Cantu along with you.    Sincerely,    Celeste Musa MD    Enclosure  CC:  No Recipients    If you would like to receive this communication electronically, please contact [email or web address] or [phone] to request more information on [employer] access.    For providers and/or their staff who would like to refer a patient to [facility], please contact us through our one-stop-shop provider referral line, [facility], at [phone].    If you feel you have received this communication in error or would no longer like to receive these types of communications, please e-mail [email or web address]

## 2018-09-20 NOTE — PROGRESS NOTES
[facility]     CC: Pancreatic mass    HPI 62 y.o. female with history of GERD and tobacco abuse presents for evaluation of ~2 cm cystic pancreatic tail mass with ? Extension into pancreatic duct, not associated with biliary obstruction, jaundice, weight loss or abdominal pain. The mass was found during admission for acute pancreatitis that was ? ETOH induced. She denies recurrent acute pancreatitis and since hospital discharge she has been feeling well. There is no family history of pancreatic cancer, however her sister does have a benign pancreatic lesion.     Past Medical History:   Diagnosis Date    Anxiety     Atherosclerosis     Depression     GERD (gastroesophageal reflux disease)     Hypertension        Past Surgical History:   Procedure Laterality Date    APPENDECTOMY      CAROTID STENT  2014    FEMORAL ARTERY STENT         Social History     Tobacco Use    Smoking status: Current Every Day Smoker   Substance Use Topics    Alcohol use: Yes     Comment: occ    Drug use: No       Family History:  -No family history of colon or pancreatic cancer; sister with benign pancreatic lesion    Review of Systems  General ROS: negative for - chills, fever or weight loss  Psychological ROS: negative for - hallucination, depression or suicidal ideation  Ophthalmic ROS: negative for - blurry vision, photophobia or eye pain  ENT ROS: negative for - epistaxis, sore throat or rhinorrhea  Respiratory ROS: no cough, shortness of breath, or wheezing  Cardiovascular ROS: no chest pain or dyspnea on exertion  Gastrointestinal ROS: resolved abdominal pain, nausea and vomiting, no diarrhea  Genito-Urinary ROS: no dysuria, trouble voiding, or hematuria  Musculoskeletal ROS: negative for - arthralgia, myalgia, weakness  Neurological ROS: no syncope or seizures; no ataxia  Dermatological ROS: negative for pruritis, rash and jaundice    Physical Examination  BP (!) 109/59   Pulse 85   Wt 57 kg (125 lb 10.6 oz)    BMI 19.11 kg/m²   General appearance: alert, cooperative, no distress  HENT: Normocephalic, atraumatic, neck symmetrical, no nasal discharge   Eyes: conjunctivae/corneas clear, PERRL, EOM's intact, sclera anicteric  Lungs: clear to auscultation bilaterally, no dullness to percussion bilaterally, symmetric expansion, breathing unlabored  Heart: regular rate and rhythm without rub; no displacement of the PMI   Abdomen: soft, nontender,nondistended, BS normoactive , no organomegaly appreciated  Extremities: extremities symmetric; no clubbing, cyanosis, or edema  Integument: Skin color, texture, turgor normal; no rashes; hair distrubution normal, no jaundice  Neurologic: Alert and oriented X 3, no focal sensory or motor neurologic deficits  Psychiatric: no pressured speech; normal affect; no evidence of impaired cognition, no anxiety/depression     Labs:  Lab Results   Component Value Date    WBC 6.91 07/14/2018    HGB 8.4 (L) 07/14/2018    HCT 25.9 (L) 07/14/2018    MCV 99 (H) 07/14/2018     07/14/2018     -Bilirubin- 0.3, Alk Phos- 75, AST- 16, ALT- 11  -Ca 19-9- 4  -Lipase- 262    Imaging:  MRI abdomen report was independently visualized and reviewed by me and showed mild pancreatitis, 2.3 x 1.4 x 2.2 cm cystic pancreatic tail mass with ? Extension into pancreatic duct    Assessment:   62 y.o. female presents for evaluation of cystic pancreatic tail lesion, not associated with elevated LFTs or biliary dilation.     Plan:  -Refer to [facility] advanced endoscopy for EUS    Celeste Musa MD  [facility]  [address], [address]  [place] [address]  Office: [phone]  Fax: [phone]

## 2018-09-21 ENCOUNTER — TELEPHONE (OUTPATIENT)
Dept: ENDOSCOPY | Facility: HOSPITAL | Age: 63
End: 2018-09-21

## 2018-09-21 DIAGNOSIS — R93.89 ABNORMAL FINDING ON IMAGING: Primary | ICD-10-CM

## 2018-09-21 NOTE — TELEPHONE ENCOUNTER
----- Message from Guy Santillan MD sent at 9/21/2018  1:52 PM CDT -----  No problem Hermila guerrero- Please arrange for EUS of pancreas. Can be a 30 min slot.    ----- Message -----  From: Celeste Musa MD  Sent: 9/20/2018   2:15 PM  To: Guy Santillan MD    Hey!    This lady was referred to me for ~2 cm pancreatic tail cystic mass with ? Extension into pancreatic duct, normal LFTS and Ca 19-9, was discovered on admission for first episode of acute pancreatitis (? Due to ETOH). Was hoping you could EUS her    Thanks!  Celeste

## 2018-09-25 ENCOUNTER — LAB VISIT (OUTPATIENT)
Dept: LAB | Facility: HOSPITAL | Age: 63
End: 2018-09-25
Attending: INTERNAL MEDICINE
Payer: MEDICARE

## 2018-09-25 ENCOUNTER — TELEPHONE (OUTPATIENT)
Dept: ENDOSCOPY | Facility: HOSPITAL | Age: 63
End: 2018-09-25

## 2018-09-25 DIAGNOSIS — E04.1 NONTOXIC UNINODULAR GOITER: Primary | ICD-10-CM

## 2018-09-25 LAB — TSH SERPL DL<=0.005 MIU/L-ACNC: 1.8 UIU/ML

## 2018-09-25 PROCEDURE — 84443 ASSAY THYROID STIM HORMONE: CPT

## 2018-09-25 PROCEDURE — 36415 COLL VENOUS BLD VENIPUNCTURE: CPT

## 2018-09-25 NOTE — TELEPHONE ENCOUNTER
----- Message from Tanesha Varghese MA sent at 9/25/2018  1:27 PM CDT -----  Contact: [phone]  Rosmery,  Are you trying to reach this pt  to set up a bx of the pancreas?  She had a message to call gastro but no other info.     [phone]

## 2018-09-27 ENCOUNTER — TELEPHONE (OUTPATIENT)
Dept: ENDOSCOPY | Facility: HOSPITAL | Age: 63
End: 2018-09-27

## 2018-09-27 NOTE — TELEPHONE ENCOUNTER
----- Message from Beatrice Davidson sent at 9/27/2018 10:53 AM CDT -----  Contact: Self- [phone]  Janelle pt is returning a missed call from Rosmery- please contact pt at [phone]

## 2018-09-27 NOTE — TELEPHONE ENCOUNTER
Spoke with patient. EUS scheduled for 10/15 at 11a pending ok ot hold Effient. Reviewed prep instructions. Ms Cantu verbalized understanding.

## 2018-09-28 ENCOUNTER — TELEPHONE (OUTPATIENT)
Dept: ENDOSCOPY | Facility: HOSPITAL | Age: 63
End: 2018-09-28

## 2018-09-28 NOTE — TELEPHONE ENCOUNTER
Faxed patient's vascular surgeon,Dr Сергей Malagon in [place], [place] (ph [phone]/fx [phone]) requesting permission to hold Effient for 5 days prior to UEUS scheduled 10/16/18.

## 2018-10-02 ENCOUNTER — TELEPHONE (OUTPATIENT)
Dept: ENDOSCOPY | Facility: HOSPITAL | Age: 63
End: 2018-10-02

## 2018-10-02 NOTE — TELEPHONE ENCOUNTER
Received fax from Dr Сергей Malagon giving permission to hold Effient for 5 days prior to UEUS scheduled 10/16/18 at 1100.  Scanned to media tab.  Called patient about procedure .  Left message with call back number for questions.  Instructions mailed.

## 2018-10-16 ENCOUNTER — HOSPITAL ENCOUNTER (OUTPATIENT)
Facility: HOSPITAL | Age: 63
Discharge: HOME | End: 2018-10-16
Attending: INTERNAL MEDICINE | Admitting: INTERNAL MEDICINE
Payer: MEDICARE

## 2018-10-16 ENCOUNTER — ANESTHESIA (OUTPATIENT)
Dept: ENDOSCOPY | Facility: HOSPITAL | Age: 63
End: 2018-10-16
Payer: MEDICARE

## 2018-10-16 ENCOUNTER — ANESTHESIA EVENT (OUTPATIENT)
Dept: ENDOSCOPY | Facility: HOSPITAL | Age: 63
End: 2018-10-16
Payer: MEDICARE

## 2018-10-16 VITALS
SYSTOLIC BLOOD PRESSURE: 154 MMHG | HEART RATE: 71 BPM | OXYGEN SATURATION: 99 % | HEIGHT: 68 IN | RESPIRATION RATE: 20 BRPM | BODY MASS INDEX: 19.4 KG/M2 | WEIGHT: 128 LBS | TEMPERATURE: 98 F | DIASTOLIC BLOOD PRESSURE: 73 MMHG

## 2018-10-16 DIAGNOSIS — K86.2 PANCREAS CYST: Primary | ICD-10-CM

## 2018-10-16 PROCEDURE — 37000008 HC ANESTHESIA 1ST 15 MINUTES: Performed by: INTERNAL MEDICINE

## 2018-10-16 PROCEDURE — 43242 EGD US FINE NEEDLE BX/ASPIR: CPT | Performed by: INTERNAL MEDICINE

## 2018-10-16 PROCEDURE — 27202059 HC NEEDLE, FNA (ANY): Performed by: INTERNAL MEDICINE

## 2018-10-16 PROCEDURE — 25000003 PHARM REV CODE 250: Performed by: NURSE ANESTHETIST, CERTIFIED REGISTERED

## 2018-10-16 PROCEDURE — 88305 TISSUE EXAM BY PATHOLOGIST: CPT | Performed by: PATHOLOGY

## 2018-10-16 PROCEDURE — 25000003 PHARM REV CODE 250: Performed by: INTERNAL MEDICINE

## 2018-10-16 PROCEDURE — 37000009 HC ANESTHESIA EA ADD 15 MINS: Performed by: INTERNAL MEDICINE

## 2018-10-16 PROCEDURE — 63600175 PHARM REV CODE 636 W HCPCS: Performed by: NURSE ANESTHETIST, CERTIFIED REGISTERED

## 2018-10-16 PROCEDURE — 88173 CYTOPATH EVAL FNA REPORT: CPT | Mod: 26,,, | Performed by: PATHOLOGY

## 2018-10-16 PROCEDURE — D9220A PRA ANESTHESIA: Mod: ANES,,, | Performed by: ANESTHESIOLOGY

## 2018-10-16 PROCEDURE — 88172 CYTP DX EVAL FNA 1ST EA SITE: CPT | Mod: 26,,, | Performed by: PATHOLOGY

## 2018-10-16 PROCEDURE — 43242 EGD US FINE NEEDLE BX/ASPIR: CPT | Mod: ,,, | Performed by: INTERNAL MEDICINE

## 2018-10-16 PROCEDURE — D9220A PRA ANESTHESIA: Mod: CRNA,,, | Performed by: NURSE ANESTHETIST, CERTIFIED REGISTERED

## 2018-10-16 RX ORDER — SODIUM CHLORIDE 9 MG/ML
INJECTION, SOLUTION INTRAVENOUS CONTINUOUS
Status: DISCONTINUED | OUTPATIENT
Start: 2018-10-16 | End: 2018-10-16 | Stop reason: HOSPADM

## 2018-10-16 RX ORDER — CIPROFLOXACIN 2 MG/ML
INJECTION, SOLUTION INTRAVENOUS
Status: DISCONTINUED | OUTPATIENT
Start: 2018-10-16 | End: 2018-10-16

## 2018-10-16 RX ORDER — PROPOFOL 10 MG/ML
VIAL (ML) INTRAVENOUS CONTINUOUS PRN
Status: DISCONTINUED | OUTPATIENT
Start: 2018-10-16 | End: 2018-10-16

## 2018-10-16 RX ORDER — ONDANSETRON 2 MG/ML
4 INJECTION INTRAMUSCULAR; INTRAVENOUS ONCE AS NEEDED
Status: DISCONTINUED | OUTPATIENT
Start: 2018-10-16 | End: 2018-10-16 | Stop reason: HOSPADM

## 2018-10-16 RX ORDER — PROPOFOL 10 MG/ML
VIAL (ML) INTRAVENOUS
Status: DISCONTINUED | OUTPATIENT
Start: 2018-10-16 | End: 2018-10-16

## 2018-10-16 RX ORDER — GLYCOPYRROLATE 0.2 MG/ML
INJECTION INTRAMUSCULAR; INTRAVENOUS
Status: DISCONTINUED | OUTPATIENT
Start: 2018-10-16 | End: 2018-10-16

## 2018-10-16 RX ORDER — LIDOCAINE HCL/PF 100 MG/5ML
SYRINGE (ML) INTRAVENOUS
Status: DISCONTINUED | OUTPATIENT
Start: 2018-10-16 | End: 2018-10-16

## 2018-10-16 RX ORDER — SODIUM CHLORIDE 0.9 % (FLUSH) 0.9 %
3 SYRINGE (ML) INJECTION
Status: DISCONTINUED | OUTPATIENT
Start: 2018-10-16 | End: 2018-10-16 | Stop reason: HOSPADM

## 2018-10-16 RX ORDER — CIPROFLOXACIN 500 MG/1
500 TABLET ORAL EVERY 12 HOURS
Qty: 6 TABLET | Refills: 0 | Status: SHIPPED | OUTPATIENT
Start: 2018-10-16 | End: 2018-10-19

## 2018-10-16 RX ADMIN — LIDOCAINE HYDROCHLORIDE 100 MG: 20 INJECTION, SOLUTION INTRAVENOUS at 11:10

## 2018-10-16 RX ADMIN — PROPOFOL 60 MG: 10 INJECTION, EMULSION INTRAVENOUS at 11:10

## 2018-10-16 RX ADMIN — SODIUM CHLORIDE: 0.9 INJECTION, SOLUTION INTRAVENOUS at 10:10

## 2018-10-16 RX ADMIN — PROPOFOL 150 MCG/KG/MIN: 10 INJECTION, EMULSION INTRAVENOUS at 11:10

## 2018-10-16 RX ADMIN — PROPOFOL 40 MG: 10 INJECTION, EMULSION INTRAVENOUS at 11:10

## 2018-10-16 RX ADMIN — GLYCOPYRROLATE 0.1 MG: 0.2 INJECTION, SOLUTION INTRAMUSCULAR; INTRAVENOUS at 11:10

## 2018-10-16 RX ADMIN — CIPROFLOXACIN 400 MG: 2 INJECTION, SOLUTION INTRAVENOUS at 11:10

## 2018-10-16 NOTE — ANESTHESIA POSTPROCEDURE EVALUATION
"Anesthesia Post Evaluation    Patient: Comfort Cantu    Procedure(s) Performed: Procedure(s) (LRB):  ULTRASOUND, UPPER GI TRACT, ENDOSCOPIC (N/A)    Final Anesthesia Type: general  Patient location during evaluation: PACU  Patient participation: Yes- Able to Participate  Level of consciousness: awake and alert and oriented  Post-procedure vital signs: reviewed and stable  Pain management: adequate  Airway patency: patent  PONV status at discharge: No PONV  Anesthetic complications: no      Cardiovascular status: blood pressure returned to baseline and hemodynamically stable  Respiratory status: unassisted, room air and spontaneous ventilation  Hydration status: euvolemic  Follow-up not needed.        Visit Vitals  /63 (BP Location: Left arm, Patient Position: Lying)   Pulse 87   Temp 36.6 °C (97.9 °F) (Temporal)   Resp 20   Ht 5' 8" (1.727 m)   Wt 58.1 kg (128 lb)   SpO2 98%   Breastfeeding? No   BMI 19.46 kg/m²       Pain/Deann Score: Pain Assessment Performed: Yes (10/16/2018 11:49 AM)  Presence of Pain: denies (10/16/2018 11:49 AM)        "

## 2018-10-16 NOTE — PLAN OF CARE
Discharge instructions given and explained. Patient verbalized understanding. Consents in chart. VS back to baseline. Paper prescriptions given.     Dr Philip Wang came to review case with patient.

## 2018-10-16 NOTE — ANESTHESIA PREPROCEDURE EVALUATION
10/16/2018  Pre-operative evaluation for Procedure(s) (LRB):  ULTRASOUND, UPPER GI TRACT, ENDOSCOPIC (N/A)    Comfort Cantu is a 62 y.o. female     Patient Active Problem List   Diagnosis    Anxiety state    Backache    Benign essential hypertension    Cardiovascular system problem    Hyperlipidemia    Knee pain    Tobacco dependence syndrome    Abdominal pain, acute       Review of patient's allergies indicates:   Allergen Reactions    Penicillins        No current facility-administered medications on file prior to encounter.      Current Outpatient Medications on File Prior to Encounter   Medication Sig Dispense Refill    atorvastatin (LIPITOR) 10 MG tablet Take 10 mg by mouth once daily.      clonazePAM (KLONOPIN) 0.5 MG tablet Take 0.5 mg by mouth 2 (two) times daily as needed for Anxiety.      hydroCHLOROthiazide (HYDRODIURIL) 25 MG tablet Take 25 mg by mouth once daily.      isosorbide mononitrate (IMDUR) 30 MG 24 hr tablet Take 30 mg by mouth once daily.      lisinopril (PRINIVIL,ZESTRIL) 5 MG tablet Take 5 mg by mouth once daily.      pantoprazole (PROTONIX) 40 MG tablet Take 1 tablet (40 mg total) by mouth once daily. 30 tablet 5    traZODone (DESYREL) 100 MG tablet nightly.      vilazodone (VIIBRYD) 20 mg Tab Take by mouth.      HYDROcodone-acetaminophen (NORCO) 5-325 mg per tablet Take 1 tablet by mouth every 6 (six) hours as needed for Pain. 20 tablet 0    naproxen (NAPROSYN) 500 MG tablet Take 1 tablet (500 mg total) by mouth 2 (two) times daily as needed. 30 tablet 0    prasugrel (EFFIENT) 5 mg Tab once daily.         Past Surgical History:   Procedure Laterality Date    APPENDECTOMY      CAROTID STENT  2014    FEMORAL ARTERY STENT         Social History     Socioeconomic History    Marital status: Single     Spouse name: Not on file    Number of children: Not on file     Years of education: Not on file    Highest education level: Not on file   Social Needs    Financial resource strain: Not on file    Food insecurity - worry: Not on file    Food insecurity - inability: Not on file    Transportation needs - medical: Not on file    Transportation needs - non-medical: Not on file   Occupational History    Not on file   Tobacco Use    Smoking status: Current Every Day Smoker     Packs/day: 1.00     Years: 23.00     Pack years: 23.00     Types: Cigarettes     Start date: 10/1/1988    Smokeless tobacco: Never Used   Substance and Sexual Activity    Alcohol use: Yes     Comment: occ    Drug use: No    Sexual activity: Not Currently   Other Topics Concern    Not on file   Social History Narrative    Not on file       Diagnostic Studies:      EKD Echo:  No results found for this or any previous visit.      Anesthesia Evaluation    I have reviewed the Patient Summary Reports.    I have reviewed the Nursing Notes.   I have reviewed the Medications.     Review of Systems  Anesthesia Hx:  Denies Family Hx of Anesthesia complications.   Denies Personal Hx of Anesthesia complications.   Social:  Alcohol Use, Smoker    Cardiovascular:   Hypertension Denies CABG/stent.  Denies Dysrhythmias.   Denies Angina.            Pulmonary:   Denies COPD.  Denies Asthma.    Renal/:   Denies Chronic Renal Disease.     Hepatic/GI:   GERD  Pancreatic Disease, Cystic Neoplasm Pancreatic Tumor  Musculoskeletal:   Denies Arthritis.     Neurological:   Denies CVA. Denies Seizures.    Endocrine:   Denies Diabetes.    Psych:   anxiety depression          Physical Exam  General:  Well nourished    Airway/Jaw/Neck:  Airway Findings: Mouth Opening: Normal Tongue: Normal  General Airway Assessment: Adult  Mallampati: IV  Improves to III with phonation.  TM Distance: Normal, at least 6 cm  Jaw/Neck Findings:  Neck ROM: Normal ROM      Dental:  Dental Findings: Upper Dentures, lower partial  dentures    Chest/Lungs:  Chest/Lungs Findings: Clear to auscultation, Normal Respiratory Rate     Heart/Vascular:  Heart Findings: Rate: Normal  Rhythm: Regular Rhythm        Mental Status:  Mental Status Findings:  Cooperative, Alert and Oriented         Anesthesia Plan  Type of Anesthesia, risks & benefits discussed:  Anesthesia Type:  general  Patient's Preference:   Intra-op Monitoring Plan: standard ASA monitors  Intra-op Monitoring Plan Comments:   Post Op Pain Control Plan: multimodal analgesia and per primary service following discharge from PACU  Post Op Pain Control Plan Comments:   Induction:   IV  Beta Blocker:  Patient is not currently on a Beta-Blocker (No further documentation required).       Informed Consent: Patient understands risks and agrees with Anesthesia plan.  Questions answered. Anesthesia consent signed with patient.  ASA Score: 2     Day of Surgery Review of History & Physical:    H&P update referred to the surgeon.         Ready For Surgery From Anesthesia Perspective.

## 2018-10-16 NOTE — H&P
History & Physical - Short Stay  Gastroenterology      SUBJECTIVE:     Procedure: EUS    Chief Complaint/Indication for Procedure: pancreas cyst      History of Present Illness:  Patient is a 62 y.o. female with pancreas cyst coming for EUS eval.     PTA Medications   Medication Sig    atorvastatin (LIPITOR) 10 MG tablet Take 10 mg by mouth once daily.    clonazePAM (KLONOPIN) 0.5 MG tablet Take 0.5 mg by mouth 2 (two) times daily as needed for Anxiety.    hydroCHLOROthiazide (HYDRODIURIL) 25 MG tablet Take 25 mg by mouth once daily.    isosorbide mononitrate (IMDUR) 30 MG 24 hr tablet Take 30 mg by mouth once daily.    lisinopril (PRINIVIL,ZESTRIL) 5 MG tablet Take 5 mg by mouth once daily.    pantoprazole (PROTONIX) 40 MG tablet Take 1 tablet (40 mg total) by mouth once daily.    traZODone (DESYREL) 100 MG tablet nightly.    vilazodone (VIIBRYD) 20 mg Tab Take by mouth.    HYDROcodone-acetaminophen (NORCO) 5-325 mg per tablet Take 1 tablet by mouth every 6 (six) hours as needed for Pain.    naproxen (NAPROSYN) 500 MG tablet Take 1 tablet (500 mg total) by mouth 2 (two) times daily as needed.    prasugrel (EFFIENT) 5 mg Tab once daily.       Review of patient's allergies indicates:   Allergen Reactions    Penicillins         Past Medical History:   Diagnosis Date    Anxiety     Atherosclerosis     Depression     GERD (gastroesophageal reflux disease)     Hypertension      Past Surgical History:   Procedure Laterality Date    APPENDECTOMY      CAROTID STENT  2014    FEMORAL ARTERY STENT       History reviewed. No pertinent family history.  Social History     Tobacco Use    Smoking status: Current Every Day Smoker     Packs/day: 1.00     Years: 23.00     Pack years: 23.00     Types: Cigarettes     Start date: 10/1/1988    Smokeless tobacco: Never Used   Substance Use Topics    Alcohol use: Yes     Comment: occ    Drug use: No          OBJECTIVE:     Vital Signs (Most Recent)  Temp: 98.1 °F  (36.7 °C) (10/16/18 1010)  Pulse: 77 (10/16/18 1010)  Resp: 16 (10/16/18 1010)  BP: 125/66 (10/16/18 1011)  SpO2: 99 % (10/16/18 1010)         ASSESSMENT/PLAN:     Patient is a 62 y.o. female with pancreas cyst coming for EUS eval.     Plan: EUS    Anesthesia Plan: Moderate Sedation    ASA Grade: ASA 2 - Patient with mild systemic disease with no functional limitations

## 2018-10-16 NOTE — PROVATION PATIENT INSTRUCTIONS
Discharge Summary/Instructions after an Endoscopic Procedure  Patient Name: Comfort Cantu  Patient MRN: [id number]  Patient YOB: 1955 Tuesday, October 16, 2018  Kumar Wang MD  RESTRICTIONS:  During your procedure today, you received medications for sedation.  These   medications may affect your judgment, balance and coordination.  Therefore,   for 24 hours, you have the following restrictions:   - DO NOT drive a car, operate machinery, make legal/financial decisions,   sign important papers or drink alcohol.    ACTIVITY:  Today: no heavy lifting, straining or running due to procedural   sedation/anesthesia.  The following day: return to full activity including work.  DIET:  Eat and drink normally unless instructed otherwise.     TREATMENT FOR COMMON SIDE EFFECTS:  - Mild abdominal pain, nausea, belching, bloating or excessive gas:  rest,   eat lightly and use a heating pad.  - Sore Throat: treat with throat lozenges and/or gargle with warm salt   water.  - Because air was used during the procedure, expelling large amounts of air   from your rectum or belching is normal.  - If a bowel prep was taken, you may not have a bowel movement for 1-3 days.    This is normal.  SYMPTOMS TO WATCH FOR AND REPORT TO YOUR PHYSICIAN:  1. Abdominal pain or bloating, other than gas cramps.  2. Chest pain.  3. Back pain.  4. Signs of infection such as: chills or fever occurring within 24 hours   after the procedure.  5. Rectal bleeding, which would show as bright red, maroon, or black stools.   (A tablespoon of blood from the rectum is not serious, especially if   hemorrhoids are present.)  6. Vomiting.  7. Weakness or dizziness.  GO DIRECTLY TO THE NEAREST EMERGENCY ROOM IF YOU HAVE ANY OF THE FOLLOWING:      Difficulty breathing              Chills and/or fever over 101 F   Persistent vomiting and/or vomiting blood   Severe abdominal pain   Severe chest pain   Black, tarry stools   Bleeding- more than one  tablespoon   Any other symptom or condition that you feel may need urgent attention  Your doctor recommends these additional instructions:  If any biopsies were taken, your doctors clinic will contact you in 1 to 2   weeks with any results.  - Discharge patient to home.   - Resume previous diet.   - Continue present medications.   - Return to referring physician.   - Await cytology results.  For questions, problems or results please call your physician - Kumar Wang MD at Work:  [phone].  [facility], EMERGENCY ROOM PHONE NUMBER: [phone]  IF A COMPLICATION OR EMERGENCY SITUATION ARISES AND YOU ARE UNABLE TO REACH   YOUR PHYSICIAN - GO DIRECTLY TO THE EMERGENCY ROOM.  Kumar Wang MD  10/16/2018 11:59:50 AM  This report has been verified and signed electronically.  [employer]

## 2018-10-16 NOTE — DISCHARGE INSTRUCTIONS

## 2018-10-16 NOTE — TRANSFER OF CARE
"Anesthesia Transfer of Care Note    Patient: Comfort Cantu    Procedure(s) Performed: Procedure(s) (LRB):  ULTRASOUND, UPPER GI TRACT, ENDOSCOPIC (N/A)    Patient location: PACU    Anesthesia Type: general    Transport from OR: Transported from OR on 2-3 L/min O2 by NC with adequate spontaneous ventilation    Post pain: adequate analgesia    Post assessment: no apparent anesthetic complications    Post vital signs: stable    Level of consciousness: awake, alert and oriented    Nausea/Vomiting: no nausea/vomiting    Complications: none    Transfer of care protocol was followed      Last vitals:   Visit Vitals  /66   Pulse 77   Temp 36.7 °C (98.1 °F) (Temporal)   Resp 16   Ht 5' 8" (1.727 m)   Wt 58.1 kg (128 lb)   SpO2 99%   Breastfeeding? No   BMI 19.46 kg/m²     "

## 2018-10-17 ENCOUNTER — TELEPHONE (OUTPATIENT)
Dept: ENDOSCOPY | Facility: HOSPITAL | Age: 63
End: 2018-10-17

## 2018-10-17 DIAGNOSIS — R93.2 ABNORMAL FINDINGS ON DIAGNOSTIC IMAGING OF LIVER AND BILIARY TRACT: ICD-10-CM

## 2018-10-17 DIAGNOSIS — R93.89 ABNORMAL FINDING ON IMAGING: Primary | ICD-10-CM

## 2018-10-17 NOTE — TELEPHONE ENCOUNTER
----- Message from Kumar Wang MD sent at 10/17/2018 12:22 PM CDT -----  Biopsy of the pancreas is normal. Need to stop alc and smoking. MRCP in 2 months to follow on pancreas tail lesion.

## 2018-10-23 ENCOUNTER — TELEPHONE (OUTPATIENT)
Dept: ENDOSCOPY | Facility: HOSPITAL | Age: 63
End: 2018-10-23

## 2018-10-24 ENCOUNTER — TELEPHONE (OUTPATIENT)
Dept: ENDOSCOPY | Facility: HOSPITAL | Age: 63
End: 2018-10-24

## 2018-10-24 NOTE — TELEPHONE ENCOUNTER
----- Message from Zully Duckworth sent at 10/24/2018  1:11 PM CDT -----  Contact: self   [address] - returning your call - please call patient at

## 2018-10-29 ENCOUNTER — TELEPHONE (OUTPATIENT)
Dept: ENDOSCOPY | Facility: HOSPITAL | Age: 63
End: 2018-10-29

## 2018-10-29 NOTE — TELEPHONE ENCOUNTER
----- Message from Beatrice Davidson sent at 10/29/2018  2:04 PM CDT -----  Contact: Self- [phone]  Philip Wang- pt is returning a call to schedule MRI- please contact pt at [phone]

## 2018-10-29 NOTE — TELEPHONE ENCOUNTER
----- Message from Melida Pelaez sent at 10/29/2018  1:20 PM CDT -----  Contact: self [phone]  Patient Returning Call from [facility]    Who Left Message for Patient: Rosmery  Communication Preference: self [phone]  Additional Information: Pt ask if Rosmery can give her a call back within the next hour

## 2018-10-29 NOTE — TELEPHONE ENCOUNTER
----- Message from Beatrice Davidson sent at 10/29/2018  2:04 PM CDT -----  Contact: Self- [phone]  Philip Wang- pt is returning a call to schedule MRI- please contact pt at [phone]

## 2018-10-30 ENCOUNTER — TELEPHONE (OUTPATIENT)
Dept: ENDOSCOPY | Facility: HOSPITAL | Age: 63
End: 2018-10-30

## 2018-10-30 ENCOUNTER — TELEPHONE (OUTPATIENT)
Dept: FAMILY MEDICINE | Facility: CLINIC | Age: 63
End: 2018-10-30

## 2018-10-30 NOTE — TELEPHONE ENCOUNTER
----- Message from Naye Argueta sent at 10/30/2018  2:05 PM CDT -----  Call pt at [phone] / has an infection in left ovary asking for antibiotics   [facility] - [place] - [address]  [address] [place] [address]  Phone: [phone] Fax: [phone]

## 2018-10-30 NOTE — TELEPHONE ENCOUNTER
----- Message from Beatrice Davidson sent at 10/30/2018  4:12 PM CDT -----  Contact: Self- [phone]  Philip Wang- pt is returning a call to schedule MRI- please contact pt at [phone]

## 2018-10-30 NOTE — TELEPHONE ENCOUNTER
----- Message from Candy Solorio sent at 10/30/2018  1:12 PM CDT -----  Contact: Pt:[phone]  .Patient Returning Call from [facility]    Who Left Message for Patient:Anna   Communication Preference:[phone]  Additional Information:Pt states she missed a call in regards to schedule her MRI. Pt would like to know if she could have her MRI done at [place].

## 2018-10-30 NOTE — TELEPHONE ENCOUNTER
I have spoken with pt and explained that she will need to be seen, as pt has never had a diagnosis of left ovarian cyst and due to her recent h/o pancreatic disease.  Appt made for Thursday with Gabby Acuna.  Christiane

## 2018-10-30 NOTE — TELEPHONE ENCOUNTER
----- Message from Beatrice Davidson sent at 10/30/2018  4:12 PM CDT -----  Contact: Self- [phone]  Philip Wang- pt is returning a call to schedule MRI- please contact pt at [phone]

## 2018-10-31 NOTE — TELEPHONE ENCOUNTER
Message   Received: Today   Message Contents   MD Anna Stallworth MA   Caller: Unspecified (Yesterday,  4:56 PM)             No, it is only for prophylaxis. I'm not treating anything.

## 2018-11-01 ENCOUNTER — HOSPITAL ENCOUNTER (OUTPATIENT)
Dept: RADIOLOGY | Facility: HOSPITAL | Age: 63
Discharge: HOME | End: 2018-11-01
Attending: NURSE PRACTITIONER
Payer: MEDICARE

## 2018-11-01 ENCOUNTER — OFFICE VISIT (OUTPATIENT)
Dept: FAMILY MEDICINE | Facility: CLINIC | Age: 63
End: 2018-11-01
Payer: MEDICARE

## 2018-11-01 VITALS
SYSTOLIC BLOOD PRESSURE: 133 MMHG | HEART RATE: 73 BPM | DIASTOLIC BLOOD PRESSURE: 72 MMHG | HEIGHT: 68 IN | TEMPERATURE: 98 F | WEIGHT: 127 LBS | BODY MASS INDEX: 19.25 KG/M2

## 2018-11-01 DIAGNOSIS — R10.9 FLANK PAIN, ACUTE: ICD-10-CM

## 2018-11-01 DIAGNOSIS — R31.9 HEMATURIA, UNSPECIFIED TYPE: ICD-10-CM

## 2018-11-01 DIAGNOSIS — R30.0 DYSURIA: Primary | ICD-10-CM

## 2018-11-01 LAB
BILIRUB SERPL-MCNC: ABNORMAL MG/DL
BLOOD URINE, POC: ABNORMAL
COLOR, POC UA: YELLOW
GLUCOSE UR QL STRIP: ABNORMAL
KETONES UR QL STRIP: ABNORMAL
LEUKOCYTE ESTERASE URINE, POC: ABNORMAL
NITRITE, POC UA: ABNORMAL
PH, POC UA: 7
PROTEIN, POC: ABNORMAL
SPECIFIC GRAVITY, POC UA: 10.1
UROBILINOGEN, POC UA: ABNORMAL

## 2018-11-01 PROCEDURE — 99213 OFFICE O/P EST LOW 20 MIN: CPT | Mod: PBBFAC,PN | Performed by: NURSE PRACTITIONER

## 2018-11-01 PROCEDURE — 74018 RADEX ABDOMEN 1 VIEW: CPT | Mod: TC,FY

## 2018-11-01 PROCEDURE — 99999 PR PBB SHADOW E&M-EST. PATIENT-LVL III: CPT | Mod: PBBFAC,,, | Performed by: NURSE PRACTITIONER

## 2018-11-01 PROCEDURE — 99214 OFFICE O/P EST MOD 30 MIN: CPT | Mod: S$PBB,,, | Performed by: NURSE PRACTITIONER

## 2018-11-01 PROCEDURE — 74018 RADEX ABDOMEN 1 VIEW: CPT | Mod: 26,,, | Performed by: RADIOLOGY

## 2018-11-01 PROCEDURE — 81002 URINALYSIS NONAUTO W/O SCOPE: CPT | Mod: PBBFAC,PN | Performed by: NURSE PRACTITIONER

## 2018-11-01 RX ORDER — NITROFURANTOIN (MACROCRYSTALS) 100 MG/1
100 CAPSULE ORAL EVERY 12 HOURS
Qty: 14 CAPSULE | Refills: 0 | Status: SHIPPED | OUTPATIENT
Start: 2018-11-01 | End: 2018-11-08

## 2018-11-01 NOTE — PROGRESS NOTES
"Chief Complaint  Chief Complaint   Patient presents with    Bladder Pain       HPI:  Comfort Cantu is a 62 y.o. female with medical diagnoses as listed and reviewed within the medical history and problem list that presents for complaints of left flank pain with dysuria x2 days. Pt reports that she had an endoscopic procedure done on 10/16 for a cyst on her pancreas. She took antibiotics for 3 days following this procedure. She began having some vomiting that is now resolved but is now having pain to her left flank radiating into her groin. She has slight burning with urination. Denies frequency or hesitancy. She does not notice any blood in her urine. Denies fever or chills. Pt is calm and cooperative. States, "I think I have a cyst again".       PAST MEDICAL HISTORY:  Past Medical History:   Diagnosis Date    Anxiety     Atherosclerosis     Depression     GERD (gastroesophageal reflux disease)     Hypertension        PAST SURGICAL HISTORY:  Past Surgical History:   Procedure Laterality Date    APPENDECTOMY      CAROTID STENT  2014    ENDOSCOPIC ULTRASOUND OF UPPER GASTROINTESTINAL TRACT N/A 10/16/2018    Procedure: ULTRASOUND, UPPER GI TRACT, ENDOSCOPIC;  Surgeon: Kumar Wang MD;  Location: [facility] ([address] [place] [address]);  Service: Endoscopy;  Laterality: N/A;  5 day hold Effient, Dr Сергей Malagon    FEMORAL ARTERY STENT      ULTRASOUND, UPPER GI TRACT, ENDOSCOPIC N/A 10/16/2018    Performed by Kumar Wang MD at [facility] ([address] [place] [address])       SOCIAL HISTORY:  Social History     Socioeconomic History    Marital status: Single     Spouse name: Not on file    Number of children: Not on file    Years of education: Not on file    Highest education level: Not on file   Social Needs    Financial resource strain: Not on file    Food insecurity - worry: Not on file    Food insecurity - inability: Not on file    Transportation needs - medical: Not on file    Transportation needs - non-medical: Not on " file   Occupational History    Not on file   Tobacco Use    Smoking status: Current Every Day Smoker     Packs/day: 1.00     Years: 23.00     Pack years: 23.00     Types: Cigarettes     Start date: 10/1/1988    Smokeless tobacco: Never Used   Substance and Sexual Activity    Alcohol use: Yes     Comment: occ    Drug use: No    Sexual activity: Not Currently   Other Topics Concern    Not on file   Social History Narrative    Not on file       FAMILY HISTORY:  History reviewed. No pertinent family history.    ALLERGIES AND MEDICATIONS: updated and reviewed.  Review of patient's allergies indicates:   Allergen Reactions    Penicillins      Current Outpatient Medications   Medication Sig Dispense Refill    atorvastatin (LIPITOR) 10 MG tablet Take 10 mg by mouth once daily.      clonazePAM (KLONOPIN) 0.5 MG tablet Take 0.5 mg by mouth 2 (two) times daily as needed for Anxiety.      hydroCHLOROthiazide (HYDRODIURIL) 25 MG tablet Take 25 mg by mouth once daily.      isosorbide mononitrate (IMDUR) 30 MG 24 hr tablet Take 30 mg by mouth once daily.      lisinopril (PRINIVIL,ZESTRIL) 5 MG tablet Take 5 mg by mouth once daily.      pantoprazole (PROTONIX) 40 MG tablet Take 1 tablet (40 mg total) by mouth once daily. 30 tablet 5    prasugrel (EFFIENT) 5 mg Tab once daily.      traZODone (DESYREL) 100 MG tablet nightly.      vilazodone (VIIBRYD) 20 mg Tab Take by mouth.      nitrofurantoin (MACRODANTIN) 100 MG capsule Take 1 capsule (100 mg total) by mouth every 12 (twelve) hours. for 7 days 14 capsule 0     No current facility-administered medications for this visit.          ROS  Review of Systems   Constitutional: Negative for appetite change, chills, fatigue and fever.   HENT: Negative for congestion, postnasal drip, rhinorrhea and sore throat.    Respiratory: Negative for cough, chest tightness, shortness of breath and wheezing.    Cardiovascular: Negative for chest pain and palpitations.  "  Gastrointestinal: Positive for abdominal pain. Negative for abdominal distention, constipation, diarrhea, nausea and vomiting.   Genitourinary: Positive for dysuria, flank pain and pelvic pain. Negative for difficulty urinating, hematuria, urgency, vaginal discharge and vaginal pain.   Musculoskeletal: Positive for myalgias.   Skin: Negative for color change and rash.   Neurological: Negative for dizziness, weakness and headaches.   Psychiatric/Behavioral: Negative for confusion and sleep disturbance. The patient is not nervous/anxious.            PHYSICAL EXAM  Vitals:    11/01/18 1310   BP: 133/72   BP Location: Left arm   Patient Position: Sitting   BP Method: Medium (Automatic)   Pulse: 73   Temp: 98.2 °F (36.8 °C)   TempSrc: Oral   Weight: 57.6 kg (127 lb)   Height: 5' 8" (1.727 m)    Body mass index is 19.31 kg/m².  Weight: 57.6 kg (127 lb)   Height: 5' 8" (172.7 cm)       Physical Exam   Constitutional: She is oriented to person, place, and time. She appears well-developed and well-nourished.   HENT:   Head: Normocephalic.   Eyes: Pupils are equal, round, and reactive to light.   Neck: Normal range of motion. Neck supple.   Cardiovascular: Normal rate, regular rhythm, S1 normal and S2 normal.   No murmur heard.  Pulmonary/Chest: Effort normal and breath sounds normal. She has no wheezes.   Abdominal: Soft. Normal appearance and bowel sounds are normal. She exhibits no distension. There is no tenderness.   Genitourinary:   Genitourinary Comments: Left CV tenderness.    Musculoskeletal: Normal range of motion.   Neurological: She is alert and oriented to person, place, and time.   Skin: Skin is warm and dry. Capillary refill takes less than 2 seconds.   Psychiatric: She has a normal mood and affect. Her speech is normal and behavior is normal. Thought content normal. Cognition and memory are normal.   Vitals reviewed.        Health Maintenance       Date Due Completion Date    Hepatitis C Screening 1955 " ---    TETANUS VACCINE 11/14/1973 ---    Pneumococcal PPSV23 (Medium Risk) (1) 11/14/1973 ---    Zoster Vaccine 11/14/2015 ---    Influenza Vaccine 08/01/2018 ---    Mammogram 10/23/2019 10/23/2017    High Dose Statin 11/01/2019 11/1/2018    Pap Smear with HPV Cotest 09/06/2020 9/6/2017    Lipid Panel 06/12/2022 6/12/2017    Colonoscopy 10/30/2027 10/30/2017               Assessment & Plan    Comfort was seen today for bladder pain.    Diagnoses and all orders for this visit:    Dysuria  -     POCT URINE DIPSTICK WITHOUT MICROSCOPE  -     nitrofurantoin (MACRODANTIN) 100 MG capsule; Take 1 capsule (100 mg total) by mouth every 12 (twelve) hours. for 7 days    Hematuria, unspecified type  -     X-Ray Abdomen AP 1 View; Future    Flank pain, acute  -     POCT URINE DIPSTICK WITHOUT MICROSCOPE - Moderate hematuria present  -     X-Ray Abdomen AP 1 View; Future    CT scan from July 2018 shows small bilateral kidney stones. Will get a KUB to verify that there is no hydronephrosis present.       Follow-up: Follow-up in about 3 months (around 2/1/2019), or if symptoms worsen or fail to improve.      Risks, benefits, and side effects were discussed with the patient. All questions were answered to the fullest satisfaction of the patient, and pt verbalized understanding and agreement to treatment plan. Pt was to call with any new or worsening symptoms, or present to the ER.

## 2018-11-02 ENCOUNTER — TELEPHONE (OUTPATIENT)
Dept: FAMILY MEDICINE | Facility: CLINIC | Age: 63
End: 2018-11-02

## 2018-11-02 ENCOUNTER — TELEPHONE (OUTPATIENT)
Dept: ENDOSCOPY | Facility: HOSPITAL | Age: 63
End: 2018-11-02

## 2018-11-02 DIAGNOSIS — N20.0 RENAL STONES: Primary | ICD-10-CM

## 2018-11-02 NOTE — TELEPHONE ENCOUNTER
Follow up MRI scheduled at [facility] as patient requested. Left message to inform of date as she requested.

## 2018-11-02 NOTE — TELEPHONE ENCOUNTER
LVM to return call about lab results.       ----- Message from Gabby Acuna NP sent at 11/2/2018  7:40 AM CDT -----  Will you please let patient know that she does have multiple kidney stones but they are not obstructing. I am going to put in a refferal to Urology for her to follow up. Thanks!

## 2018-11-08 ENCOUNTER — TELEPHONE (OUTPATIENT)
Dept: FAMILY MEDICINE | Facility: CLINIC | Age: 63
End: 2018-11-08

## 2018-11-08 NOTE — TELEPHONE ENCOUNTER
----- Message from Emeterio Garg sent at 11/8/2018  2:22 PM CST -----  Contact: patient  Type:  Test Results    Who Called:  Comfort  Name of Test (Lab/Mammo/Etc):  xray  Date of Test:  11/1  Ordering Provider:  Armand   Where the test was performed:  [facility]  Best Call Back Number:  [phone]  Additional Information:  n/a

## 2018-11-13 ENCOUNTER — TELEPHONE (OUTPATIENT)
Dept: FAMILY MEDICINE | Facility: CLINIC | Age: 63
End: 2018-11-13

## 2018-11-13 NOTE — TELEPHONE ENCOUNTER
----- Message from Faibenne Garcia RT sent at 11/13/2018  3:04 PM CST -----  Contact: pt    pt , requesting a call back soon to discuss her pain issues, thanks.

## 2018-11-13 NOTE — TELEPHONE ENCOUNTER
I have spoken with patient. She is asking what she may take for the pain from the  kidney stones because her appt with Dr. Esquivel is not until 12/13.     As a side note, when informed that she has an appt with you in two days, she stated that she very likely cannot make that because tomorrow is her birthday and she doesn't think she will be able to make a doctor's appointment the day after.  It was discussed with her that alcoholic beverages are very certainly not advised at this time and that making the doctor's appt is strongly advised. Pt stated she could not make is possibly because she would be out late the night before and that is why she doesn't think she will be able to make it; pt informed that her appt with Dr. Riddle is at 2:30.

## 2018-12-12 RX ORDER — PANTOPRAZOLE SODIUM 40 MG/1
40 TABLET, DELAYED RELEASE ORAL DAILY
Qty: 30 TABLET | Refills: 5 | Status: SHIPPED | OUTPATIENT
Start: 2018-12-12 | End: 2019-05-22 | Stop reason: SDUPTHER

## 2018-12-12 NOTE — TELEPHONE ENCOUNTER
----- Message from Lizet Penny sent at 12/12/2018  1:40 PM CST -----  Type:  RX Refill Request    Who Called:  Pataient  Refill or New Rx:  refill  RX Name and Strength:  pantoprazole (PROTONIX) 40 MG tablet   How is the patient currently taking it? (ex. 1XDay):  1xday  Is this a 30 day or 90 day RX:  30 pills  Preferred Pharmacy with phone number:   [facility] - [place] - [address]  [address] [address]  Phone: [phone] Fax: [phone]  Local or Mail Order:  Local  Ordering Provider:  same  Best Call Back Number:  [phone]  Additional Information:  Please call when completed.

## 2018-12-13 ENCOUNTER — OFFICE VISIT (OUTPATIENT)
Dept: UROLOGY | Facility: CLINIC | Age: 63
End: 2018-12-13
Payer: MEDICARE

## 2018-12-13 VITALS
TEMPERATURE: 97 F | DIASTOLIC BLOOD PRESSURE: 86 MMHG | BODY MASS INDEX: 18.31 KG/M2 | HEART RATE: 79 BPM | WEIGHT: 120.81 LBS | SYSTOLIC BLOOD PRESSURE: 145 MMHG | HEIGHT: 68 IN

## 2018-12-13 DIAGNOSIS — N20.0 NEPHROLITHIASIS: ICD-10-CM

## 2018-12-13 PROCEDURE — 99203 OFFICE O/P NEW LOW 30 MIN: CPT | Mod: S$PBB,,, | Performed by: UROLOGY

## 2018-12-13 PROCEDURE — 99213 OFFICE O/P EST LOW 20 MIN: CPT | Mod: PBBFAC,PN | Performed by: UROLOGY

## 2018-12-13 PROCEDURE — 99999 PR PBB SHADOW E&M-EST. PATIENT-LVL III: CPT | Mod: PBBFAC,,, | Performed by: UROLOGY

## 2018-12-13 RX ORDER — BUSPIRONE HYDROCHLORIDE 15 MG/1
15 TABLET ORAL 3 TIMES DAILY
COMMUNITY
End: 2019-05-06 | Stop reason: SDUPTHER

## 2018-12-13 RX ORDER — DIAZEPAM 5 MG/1
TABLET ORAL
COMMUNITY
Start: 2018-11-21 | End: 2019-05-06 | Stop reason: SDUPTHER

## 2018-12-13 NOTE — Clinical Note
December 13, 2018      Gabby Acuna, NP  [address]  [address]  [facility] [place] [address]           [facility] - Urology  [address]  [place] [address]  Phone: [phone]  Fax: [phone]          Patient: Comfort Cantu   MR Number: [id number]   YOB: 1955   Date of Visit: 12/13/2018       Dear Gabby Acuna:    Thank you for referring Comfort Cantu to me for evaluation. Attached you will find relevant portions of my assessment and plan of care.    If you have questions, please do not hesitate to call me. I look forward to following Comfort Cantu along with you.    Sincerely,    Wilmer Esquivel MD    Enclosure  CC:  No Recipients    If you would like to receive this communication electronically, please contact [email or web address] or [phone] to request more information on [facility] access.    For providers and/or their staff who would like to refer a patient to [facility], please contact us through our one-stop-shop provider referral line, [facility], at [phone].    If you feel you have received this communication in error or would no longer like to receive these types of communications, please e-mail [email or web address]

## 2018-12-13 NOTE — PROGRESS NOTES
Subjective:       Patient ID: Comfort Cantu is a 63 y.o. female.    Chief Complaint:   DATE OF VISIT:  12/13/2018    This is a consultation with Ms. Gabby Acuna NP.    CHIEF COMPLAINT:  Bilateral kidney stones.    Ms. Cantu is a 62-year-old female who was evaluated by Ms. Acuna last  time on 11/01/2018.  The patient did complain at that time to have left  flank pain radiating into her groin.  The evaluation revealed that the  patient has bilateral small stones with one upper third left ureteral  stone.  Since then, the patient had passed that stone that she did not safe  and at the present time is free of any symptoms.  The remaining of the  stone seen in the KUB are small in nature in size.  They are mildly  calcified.  The patient referred that this is the first time that she has  been told to have kidney stones and the first time that she have passed a  kidney stone.  At the present time, she has no dysuria, no hematuria, no  flank pain.  No nausea or vomiting.  The patient was unable to provide us  with a urine specimen for examination.    The patient is taking multiple medications.  They were reviewed by me  during this visit.  The patient is allergic to penicillin and the past  medical and surgical history are well documented in the medical record and  was also reviewed during this visit.    I offered the patient a 24-hour urine chemistry analysis in order to  determine the nature of the stone disease and what can be done to prevent  any further stones, but she preferred not to have that test done at this  point.  I also suggested that probably she needs to increase the water  intake and also suggested that probably will be important to do a KUB film  in a year from now if she does not have any further pain in order to  determine if she has any changes in the stone size.  She understood.  All  the questions were answered at her satisfaction and she left the office in  satisfactory  condition.        EOR/HN dd: 12/13/2018 12:34:56 (CST)   td: 12/13/2018 13:35:04 (CST)  Doc ID #[id number]   Job ID #[id number]    CC: Gabby Acuna NP            HPI  Review of Systems   Constitutional: Negative.  Negative for activity change.   HENT: Negative.  Negative for facial swelling.    Eyes: Negative for discharge.   Respiratory: Negative for cough and shortness of breath.    Cardiovascular: Negative for chest pain and palpitations.   Gastrointestinal: Negative for abdominal distention, blood in stool and constipation.   Genitourinary: Negative for dysuria, flank pain, frequency, hematuria, urgency and vaginal pain.   Musculoskeletal: Negative.  Negative for arthralgias.   Skin: Negative.    Neurological: Negative.  Negative for dizziness.   Hematological: Negative for adenopathy.   Psychiatric/Behavioral: The patient is not nervous/anxious.            Objective:      Physical Exam  not done today  Assessment:       1. Nephrolithiasis        Plan:       Nephrolithiasis    RTC in 1 yr. With a KUB

## 2018-12-14 ENCOUNTER — HOSPITAL ENCOUNTER (OUTPATIENT)
Dept: RADIOLOGY | Facility: HOSPITAL | Age: 63
Discharge: HOME | End: 2018-12-14
Attending: INTERNAL MEDICINE
Payer: MEDICARE

## 2018-12-14 DIAGNOSIS — R93.89 ABNORMAL FINDING ON IMAGING: ICD-10-CM

## 2018-12-14 DIAGNOSIS — R93.2 ABNORMAL FINDINGS ON DIAGNOSTIC IMAGING OF LIVER AND BILIARY TRACT: ICD-10-CM

## 2018-12-14 PROCEDURE — 76376 3D RENDER W/INTRP POSTPROCES: CPT | Mod: 26,,, | Performed by: RADIOLOGY

## 2018-12-14 PROCEDURE — 74181 MRI ABDOMEN W/O CONTRAST: CPT | Mod: 26,,, | Performed by: RADIOLOGY

## 2018-12-14 PROCEDURE — 76376 3D RENDER W/INTRP POSTPROCES: CPT | Mod: TC

## 2018-12-14 PROCEDURE — 74181 MRI ABDOMEN W/O CONTRAST: CPT | Mod: TC

## 2018-12-17 ENCOUNTER — TELEPHONE (OUTPATIENT)
Dept: ENDOSCOPY | Facility: HOSPITAL | Age: 63
End: 2018-12-17

## 2018-12-19 ENCOUNTER — TELEPHONE (OUTPATIENT)
Dept: ENDOSCOPY | Facility: HOSPITAL | Age: 63
End: 2018-12-19

## 2018-12-19 NOTE — TELEPHONE ENCOUNTER
Result Notes for MRI MRCP Without Contrast     Notes recorded by Kumar Wang MD on 12/17/2018 at 2:56 PM CST  Resolved pancreas cyst. No need to repeat imaging since this was inflammatory.   Return to clinic for follow up in few months.      Patient informed.

## 2018-12-19 NOTE — TELEPHONE ENCOUNTER
----- Message from Zully Duckworth sent at 12/19/2018 10:40 AM CST -----  Contact: self   lizzeth - calling for test results - please call patient at

## 2019-01-02 ENCOUNTER — OFFICE VISIT (OUTPATIENT)
Dept: FAMILY MEDICINE | Facility: CLINIC | Age: 64
End: 2019-01-02
Payer: MEDICARE

## 2019-01-02 VITALS
DIASTOLIC BLOOD PRESSURE: 82 MMHG | OXYGEN SATURATION: 98 % | HEIGHT: 68 IN | RESPIRATION RATE: 18 BRPM | HEART RATE: 77 BPM | BODY MASS INDEX: 18.34 KG/M2 | WEIGHT: 121 LBS | SYSTOLIC BLOOD PRESSURE: 156 MMHG

## 2019-01-02 DIAGNOSIS — I10 BENIGN ESSENTIAL HYPERTENSION: Primary | ICD-10-CM

## 2019-01-02 PROCEDURE — 99213 PR OFFICE/OUTPT VISIT, EST, LEVL III, 20-29 MIN: ICD-10-PCS | Mod: S$GLB,,, | Performed by: FAMILY MEDICINE

## 2019-01-02 PROCEDURE — 99999 PR PBB SHADOW E&M-EST. PATIENT-LVL III: CPT | Mod: PBBFAC,,, | Performed by: FAMILY MEDICINE

## 2019-01-02 PROCEDURE — 99213 OFFICE O/P EST LOW 20 MIN: CPT | Mod: S$GLB,,, | Performed by: FAMILY MEDICINE

## 2019-01-02 PROCEDURE — 99999 PR PBB SHADOW E&M-EST. PATIENT-LVL III: ICD-10-PCS | Mod: PBBFAC,,, | Performed by: FAMILY MEDICINE

## 2019-01-02 PROCEDURE — 99213 OFFICE O/P EST LOW 20 MIN: CPT | Mod: PBBFAC,PN | Performed by: FAMILY MEDICINE

## 2019-01-02 RX ORDER — CYANOCOBALAMIN 1000 UG/ML
100 INJECTION, SOLUTION INTRAMUSCULAR; SUBCUTANEOUS ONCE
Status: COMPLETED | OUTPATIENT
Start: 2019-01-02 | End: 2019-04-03

## 2019-01-02 NOTE — PROGRESS NOTES
"Subjective:       Patient ID: Comfort Cantu is a 63 y.o. female.    Chief Complaint: Follow-up    Follow up    Has vascular surgery on 1/7  Having some fatigue  Worsening  No sob      Review of Systems   Constitutional: Positive for fatigue. Negative for appetite change, chills and fever.   HENT: Negative for congestion, postnasal drip, rhinorrhea and sore throat.    Respiratory: Negative for cough, chest tightness, shortness of breath and wheezing.    Cardiovascular: Negative for chest pain and palpitations.   Gastrointestinal: Negative for abdominal distention, abdominal pain, constipation, diarrhea, nausea and vomiting.   Genitourinary: Negative for difficulty urinating, dysuria, flank pain, hematuria, pelvic pain, urgency, vaginal discharge and vaginal pain.   Musculoskeletal: Positive for myalgias.   Skin: Negative for color change and rash.   Neurological: Negative for dizziness, weakness and headaches.   Psychiatric/Behavioral: Negative for confusion and sleep disturbance. The patient is not nervous/anxious.          Reviewed family, medical, surgical, and social history.    Objective:      BP (!) 156/82 (BP Location: Left arm, Patient Position: Sitting, BP Method: Medium (Automatic))   Pulse 77   Resp 18   Ht 5' 8" (1.727 m)   Wt 54.9 kg (121 lb)   SpO2 98%   BMI 18.40 kg/m²   Physical Exam   Constitutional: She is oriented to person, place, and time. She appears well-developed and well-nourished. No distress.   HENT:   Head: Normocephalic and atraumatic.   Nose: Nose normal.   Mouth/Throat: Oropharynx is clear and moist. No oropharyngeal exudate.   Eyes: Conjunctivae and EOM are normal. Pupils are equal, round, and reactive to light. No scleral icterus.   Neck: Normal range of motion. Neck supple. No thyromegaly present.   Cardiovascular: Normal rate, regular rhythm and normal heart sounds. Exam reveals no gallop and no friction rub.   No murmur heard.  Pulmonary/Chest: Effort normal and breath " sounds normal. No respiratory distress. She has no wheezes. She has no rales. She exhibits no tenderness.   Abdominal: Soft. Bowel sounds are normal. She exhibits no distension. There is no tenderness. There is no guarding.   Musculoskeletal: Normal range of motion. She exhibits no edema, tenderness or deformity.   Lymphadenopathy:     She has no cervical adenopathy.   Neurological: She is alert and oriented to person, place, and time. She displays normal reflexes. No cranial nerve deficit or sensory deficit. She exhibits normal muscle tone.   Skin: Skin is warm and dry. No rash noted. She is not diaphoretic. No erythema. No pallor.   Psychiatric: She has a normal mood and affect. Her behavior is normal. Judgment and thought content normal.   Nursing note and vitals reviewed.      Assessment:       1. Benign essential hypertension        Plan:       Benign essential hypertension    Other orders  -     cyanocobalamin injection 100 mcg            Risks, benefits, and side effects were discussed with the patient. All questions were answered to the fullest satisfaction of the patient, and pt verbalized understanding and agreement to treatment plan. Pt was to call with any new or worsening symptoms, or present to the ER.

## 2019-01-29 ENCOUNTER — TELEPHONE (OUTPATIENT)
Dept: FAMILY MEDICINE | Facility: CLINIC | Age: 64
End: 2019-01-29

## 2019-01-29 RX ORDER — VARENICLINE TARTRATE 0.5 (11)-1
KIT ORAL
Qty: 1 PACKAGE | Refills: 0 | Status: SHIPPED | OUTPATIENT
Start: 2019-01-29 | End: 2019-04-03

## 2019-01-29 NOTE — TELEPHONE ENCOUNTER
Pt is requesting medication to help her stop smoking.   Please advise, thank you.       ----- Message from Stephanie Trevino sent at 1/29/2019  2:38 PM CST -----  Type: Needs Medical Advice    Who Called:  Patient    Pharmacy name and phone #:    [id number] - [place], [place] - [address]  [address]  [place] [address]  Phone: [phone] Fax: [phone]      Best Call Back Number:[phone] (home)     Additional Information: Stating had neck surgery previously, would like to stop smoking, will need a prescription sent to her pharmacy for a medication she need to assist. Please contact to discuss

## 2019-04-03 ENCOUNTER — OFFICE VISIT (OUTPATIENT)
Dept: FAMILY MEDICINE | Facility: CLINIC | Age: 64
End: 2019-04-03
Payer: MEDICARE

## 2019-04-03 ENCOUNTER — HOSPITAL ENCOUNTER (OUTPATIENT)
Dept: RADIOLOGY | Facility: HOSPITAL | Age: 64
Discharge: HOME | End: 2019-04-03
Attending: FAMILY MEDICINE
Payer: MEDICARE

## 2019-04-03 VITALS
BODY MASS INDEX: 17.9 KG/M2 | RESPIRATION RATE: 16 BRPM | SYSTOLIC BLOOD PRESSURE: 138 MMHG | DIASTOLIC BLOOD PRESSURE: 70 MMHG | WEIGHT: 118.13 LBS | HEART RATE: 86 BPM | HEIGHT: 68 IN | OXYGEN SATURATION: 98 %

## 2019-04-03 DIAGNOSIS — I10 BENIGN ESSENTIAL HYPERTENSION: Primary | ICD-10-CM

## 2019-04-03 DIAGNOSIS — M25.561 ACUTE PAIN OF RIGHT KNEE: ICD-10-CM

## 2019-04-03 DIAGNOSIS — J44.1 COPD EXACERBATION: ICD-10-CM

## 2019-04-03 PROCEDURE — 99999 PR PBB SHADOW E&M-EST. PATIENT-LVL III: ICD-10-PCS | Mod: PBBFAC,,, | Performed by: FAMILY MEDICINE

## 2019-04-03 PROCEDURE — 3008F PR BODY MASS INDEX (BMI) DOCUMENTED: ICD-10-PCS | Mod: CPTII,S$GLB,, | Performed by: FAMILY MEDICINE

## 2019-04-03 PROCEDURE — 99999 PR PBB SHADOW E&M-EST. PATIENT-LVL III: CPT | Mod: PBBFAC,,, | Performed by: FAMILY MEDICINE

## 2019-04-03 PROCEDURE — 73562 X-RAY EXAM OF KNEE 3: CPT | Mod: 26,RT,, | Performed by: RADIOLOGY

## 2019-04-03 PROCEDURE — 96372 PR INJECTION,THERAP/PROPH/DIAG2ST, IM OR SUBCUT: ICD-10-PCS | Mod: S$GLB,,, | Performed by: FAMILY MEDICINE

## 2019-04-03 PROCEDURE — 3078F DIAST BP <80 MM HG: CPT | Mod: CPTII,S$GLB,, | Performed by: FAMILY MEDICINE

## 2019-04-03 PROCEDURE — 3075F PR MOST RECENT SYSTOLIC BLOOD PRESS GE 130-139MM HG: ICD-10-PCS | Mod: CPTII,S$GLB,, | Performed by: FAMILY MEDICINE

## 2019-04-03 PROCEDURE — 3008F BODY MASS INDEX DOCD: CPT | Mod: CPTII,S$GLB,, | Performed by: FAMILY MEDICINE

## 2019-04-03 PROCEDURE — 3075F SYST BP GE 130 - 139MM HG: CPT | Mod: CPTII,S$GLB,, | Performed by: FAMILY MEDICINE

## 2019-04-03 PROCEDURE — 3078F PR MOST RECENT DIASTOLIC BLOOD PRESSURE < 80 MM HG: ICD-10-PCS | Mod: CPTII,S$GLB,, | Performed by: FAMILY MEDICINE

## 2019-04-03 PROCEDURE — 96372 THER/PROPH/DIAG INJ SC/IM: CPT | Mod: S$GLB,,, | Performed by: FAMILY MEDICINE

## 2019-04-03 PROCEDURE — 73562 X-RAY EXAM OF KNEE 3: CPT | Mod: TC,PN,RT

## 2019-04-03 PROCEDURE — 73562 XR KNEE 3 VIEW RIGHT: ICD-10-PCS | Mod: 26,RT,, | Performed by: RADIOLOGY

## 2019-04-03 PROCEDURE — 99214 OFFICE O/P EST MOD 30 MIN: CPT | Mod: 25,S$GLB,, | Performed by: FAMILY MEDICINE

## 2019-04-03 PROCEDURE — 99214 PR OFFICE/OUTPT VISIT, EST, LEVL IV, 30-39 MIN: ICD-10-PCS | Mod: 25,S$GLB,, | Performed by: FAMILY MEDICINE

## 2019-04-03 RX ORDER — DEXAMETHASONE SODIUM PHOSPHATE 4 MG/ML
8 INJECTION, SOLUTION INTRA-ARTICULAR; INTRALESIONAL; INTRAMUSCULAR; INTRAVENOUS; SOFT TISSUE
Status: COMPLETED | OUTPATIENT
Start: 2019-04-03 | End: 2019-04-03

## 2019-04-03 RX ORDER — AZITHROMYCIN 250 MG/1
TABLET, FILM COATED ORAL
Qty: 6 TABLET | Refills: 0 | Status: ON HOLD | OUTPATIENT
Start: 2019-04-03 | End: 2019-08-11 | Stop reason: HOSPADM

## 2019-04-03 RX ORDER — HYDROCODONE BITARTRATE AND ACETAMINOPHEN 5; 325 MG/1; MG/1
TABLET ORAL
Refills: 0 | COMMUNITY
Start: 2019-01-08 | End: 2019-04-03

## 2019-04-03 RX ORDER — DULOXETIN HYDROCHLORIDE 30 MG/1
CAPSULE, DELAYED RELEASE ORAL
COMMUNITY
Start: 2019-03-08 | End: 2019-05-06 | Stop reason: SDUPTHER

## 2019-04-03 RX ORDER — PREDNISONE 20 MG/1
20 TABLET ORAL 2 TIMES DAILY
Qty: 10 TABLET | Refills: 0 | Status: SHIPPED | OUTPATIENT
Start: 2019-04-03 | End: 2019-04-08

## 2019-04-03 RX ADMIN — CYANOCOBALAMIN 100 MCG: 1000 INJECTION, SOLUTION INTRAMUSCULAR; SUBCUTANEOUS at 02:04

## 2019-04-03 RX ADMIN — DEXAMETHASONE SODIUM PHOSPHATE 8 MG: 4 INJECTION, SOLUTION INTRA-ARTICULAR; INTRALESIONAL; INTRAMUSCULAR; INTRAVENOUS; SOFT TISSUE at 02:04

## 2019-04-03 NOTE — PROGRESS NOTES
"Subjective:       Patient ID: Comfort Cantu is a 63 y.o. female.    Chief Complaint: Shortness of Breath; Knee Injury (R knee); and Dizziness    Follow up    Cough  Wheezing  Last few weeks  No fever  Some mild sob  No chest pain    Fell and struck R knee  Yesterday  Swollen  Painful    Review of Systems   Constitutional: Positive for activity change, appetite change and fever. Negative for chills and fatigue.   HENT: Positive for congestion, ear pain, postnasal drip, rhinorrhea, sinus pressure, sinus pain and sore throat. Negative for dental problem, facial swelling, nosebleeds, trouble swallowing and voice change.    Eyes: Negative for pain, discharge and visual disturbance.   Respiratory: Positive for cough and wheezing. Negative for apnea, chest tightness and shortness of breath.    Cardiovascular: Negative for chest pain and palpitations.   Gastrointestinal: Negative for abdominal pain, blood in stool, constipation and nausea.   Endocrine: Negative for cold intolerance, polydipsia and polyuria.   Genitourinary: Negative for difficulty urinating, enuresis and flank pain.   Musculoskeletal: Positive for arthralgias, back pain, gait problem and myalgias.   Skin: Negative for color change.   Allergic/Immunologic: Negative for environmental allergies and immunocompromised state.   Neurological: Negative for dizziness and light-headedness.   Hematological: Negative for adenopathy.   Psychiatric/Behavioral: Negative for agitation, behavioral problems, decreased concentration and dysphoric mood. The patient is not nervous/anxious.    All other systems reviewed and are negative.        Reviewed family, medical, surgical, and social history.    Objective:      /70 (BP Location: Left arm, Patient Position: Sitting, BP Method: Small (Automatic))   Pulse 86   Resp 16   Ht 5' 8" (1.727 m)   Wt 53.6 kg (118 lb 1.6 oz)   SpO2 98%   BMI 17.96 kg/m²   Physical Exam   Constitutional: She is oriented to person, " place, and time. She appears well-developed and well-nourished. No distress.   HENT:   Head: Normocephalic and atraumatic.   Nose: Mucosal edema and rhinorrhea present. Right sinus exhibits maxillary sinus tenderness. Left sinus exhibits maxillary sinus tenderness and frontal sinus tenderness.   Mouth/Throat: Oropharynx is clear and moist. No oropharyngeal exudate.   Eyes: Pupils are equal, round, and reactive to light. Conjunctivae and EOM are normal. No scleral icterus.   Neck: Normal range of motion. Neck supple. No thyromegaly present.   Cardiovascular: Normal rate, regular rhythm and normal heart sounds. Exam reveals no gallop and no friction rub.   No murmur heard.  Pulmonary/Chest: Effort normal. No respiratory distress. She has decreased breath sounds. She has wheezes. She has rhonchi. She has no rales. She exhibits no tenderness.   Abdominal: Soft. Bowel sounds are normal. She exhibits no distension. There is no tenderness. There is no guarding.   Musculoskeletal: Normal range of motion. She exhibits tenderness and deformity. She exhibits no edema.   Lymphadenopathy:     She has no cervical adenopathy.   Neurological: She is alert and oriented to person, place, and time. She displays normal reflexes. No cranial nerve deficit or sensory deficit. She exhibits normal muscle tone.   Skin: Skin is warm and dry. No rash noted. She is not diaphoretic. No erythema. No pallor.   Psychiatric: She has a normal mood and affect. Her behavior is normal. Judgment and thought content normal.   Nursing note and vitals reviewed.      Assessment:       1. Benign essential hypertension    2. Acute pain of right knee    3. COPD exacerbation        Plan:       Benign essential hypertension    Acute pain of right knee  -     X-Ray Knee 3 View Right; Future; Expected date: 04/03/2019    COPD exacerbation  -     azithromycin (Z-ROSALIE) 250 MG tablet; Take two tablets on day 1, then 1 tablet on days 2-5.  Dispense: 6 tablet; Refill:  0  -     dexamethasone injection 8 mg  -     predniSONE (DELTASONE) 20 MG tablet; Take 1 tablet (20 mg total) by mouth 2 (two) times daily. for 5 days  Dispense: 10 tablet; Refill: 0            Risks, benefits, and side effects were discussed with the patient. All questions were answered to the fullest satisfaction of the patient, and pt verbalized understanding and agreement to treatment plan. Pt was to call with any new or worsening symptoms, or present to the ER.

## 2019-04-24 ENCOUNTER — TELEPHONE (OUTPATIENT)
Dept: ENDOSCOPY | Facility: HOSPITAL | Age: 64
End: 2019-04-24

## 2019-04-30 ENCOUNTER — TELEPHONE (OUTPATIENT)
Dept: ENDOSCOPY | Facility: HOSPITAL | Age: 64
End: 2019-04-30

## 2019-05-03 DIAGNOSIS — Z11.59 NEED FOR HEPATITIS C SCREENING TEST: ICD-10-CM

## 2019-05-06 RX ORDER — DIAZEPAM 5 MG/1
5 TABLET ORAL EVERY 12 HOURS PRN
Qty: 60 TABLET | Refills: 2 | Status: SHIPPED | OUTPATIENT
Start: 2019-05-06 | End: 2019-09-30 | Stop reason: SDUPTHER

## 2019-05-06 RX ORDER — DULOXETIN HYDROCHLORIDE 30 MG/1
30 CAPSULE, DELAYED RELEASE ORAL DAILY
Qty: 30 CAPSULE | Refills: 11 | Status: SHIPPED | OUTPATIENT
Start: 2019-05-06 | End: 2019-05-28 | Stop reason: SDUPTHER

## 2019-05-06 RX ORDER — BUSPIRONE HYDROCHLORIDE 15 MG/1
15 TABLET ORAL 2 TIMES DAILY
Qty: 60 TABLET | Refills: 11 | Status: SHIPPED | OUTPATIENT
Start: 2019-05-06 | End: 2019-05-28 | Stop reason: SDUPTHER

## 2019-05-06 NOTE — TELEPHONE ENCOUNTER
----- Message from Deavnte FANG Melaniericardo sent at 5/6/2019  2:59 PM CDT -----  Contact: same  Patient called in and stated she needs refills on the following Rx's and these would all be new Rx's for Dr. Riddle to fill.  Patient stated Dr. Riddle is aware she is on these medications and informed her to call office and he would refill for her.      1.  Buspirone 15 mg, 1 tablet, 2 x daily, Due for refill on 5/19/19    2.  Duloxetine (Cymbalta) 30 mg, 1 x daily, Due for refill on 5/15/19    3.  Diazepam, 5 mg, 1 tablets, 2 x daily, Due for refill on 5/19/19      [facility] - [place], [place] - [address] [place] [address]  Phone: [phone] Fax: [phone]    Patient call back number is [phone]

## 2019-05-22 RX ORDER — PANTOPRAZOLE SODIUM 40 MG/1
40 TABLET, DELAYED RELEASE ORAL DAILY
Qty: 30 TABLET | Refills: 5 | Status: SHIPPED | OUTPATIENT
Start: 2019-05-22 | End: 2019-05-28 | Stop reason: SDUPTHER

## 2019-05-23 ENCOUNTER — TELEPHONE (OUTPATIENT)
Dept: FAMILY MEDICINE | Facility: CLINIC | Age: 64
End: 2019-05-23

## 2019-05-23 NOTE — TELEPHONE ENCOUNTER
----- Message from RT Dar sent at 5/23/2019  2:14 PM CDT -----  Contact: pt    pt , requesting to inform her prescription for valium have not reached the pharmacy as of yet, please call to confirm, thanks.

## 2019-05-23 NOTE — TELEPHONE ENCOUNTER
I have spoken with Cole at [facility].  When pt picked up her regular prescriptions at the beginning of the month, the Valium was not yet due.  The Rx is now available for , pt informed of this. Christiane

## 2019-05-24 ENCOUNTER — TELEPHONE (OUTPATIENT)
Dept: ENDOSCOPY | Facility: HOSPITAL | Age: 64
End: 2019-05-24

## 2019-05-27 NOTE — TELEPHONE ENCOUNTER
Message   Received: Today   Message Contents   MD Anna Stallworth MA   Caller: Unspecified (3 days ago,  3:56 PM)             No need for repeat imaging at this time.

## 2019-05-28 RX ORDER — DULOXETIN HYDROCHLORIDE 30 MG/1
30 CAPSULE, DELAYED RELEASE ORAL DAILY
Qty: 90 CAPSULE | Refills: 3 | Status: ON HOLD | OUTPATIENT
Start: 2019-05-28 | End: 2019-08-11 | Stop reason: HOSPADM

## 2019-05-28 RX ORDER — BUSPIRONE HYDROCHLORIDE 15 MG/1
15 TABLET ORAL 2 TIMES DAILY
Qty: 180 TABLET | Refills: 3 | Status: SHIPPED | OUTPATIENT
Start: 2019-05-28 | End: 2020-04-11 | Stop reason: SDUPTHER

## 2019-05-28 RX ORDER — PANTOPRAZOLE SODIUM 40 MG/1
40 TABLET, DELAYED RELEASE ORAL DAILY
Qty: 90 TABLET | Refills: 3 | Status: SHIPPED | OUTPATIENT
Start: 2019-05-28 | End: 2020-04-11 | Stop reason: SDUPTHER

## 2019-06-20 DIAGNOSIS — Z12.39 BREAST CANCER SCREENING: ICD-10-CM

## 2019-08-08 ENCOUNTER — TELEPHONE (OUTPATIENT)
Dept: FAMILY MEDICINE | Facility: CLINIC | Age: 64
End: 2019-08-08

## 2019-08-08 ENCOUNTER — NURSE TRIAGE (OUTPATIENT)
Dept: ADMINISTRATIVE | Facility: CLINIC | Age: 64
End: 2019-08-08

## 2019-08-08 NOTE — TELEPHONE ENCOUNTER
"Spoke with pt she stated that for the last two days she has been eating fried foods and her BP has been increased. She checked her BP this am and it was 193/100(left arm sitting) and she had a terrible headache. Pt stated she took her BP medication, drank 3 bottles of water and put a cold towel on her head and that seemed to help a little. Pt stated she took her BP at 2:45 and it was 184/95(left arm, sitting), pt checked her BP again at 4:45 and it was 161/80(rt arm sitting). Pt rechecked BP while on the phone and BP is 176/90. Pt encouraged to go to urgent care or ER to be evaluated and she refused stating, "I don't like [facility] and if I go to urgent care they'll just send me to the hospital."  Pt scheduled with IVAN Flowers, AZIZA 08/09/19, in [facility].   "

## 2019-08-08 NOTE — TELEPHONE ENCOUNTER
----- Message from Koko Telles sent at 8/8/2019  3:24 PM CDT -----  Type: Needs Medical Advice    Who Called:  Patient  Symptoms (please be specific):  /100  How long has patient had these symptoms:  2 days  Pharmacy name and phone #:    [facility] - [place], [place] - [address]  [address]  [place] [address]  Phone: [phone] Fax: [phone]    Best Call Back Number: [phone]  Additional Information: Please call to advise

## 2019-08-10 ENCOUNTER — HOSPITAL ENCOUNTER (OUTPATIENT)
Facility: HOSPITAL | Age: 64
Discharge: HOME | End: 2019-08-11
Attending: FAMILY MEDICINE | Admitting: INTERNAL MEDICINE
Payer: MEDICARE

## 2019-08-10 DIAGNOSIS — R41.0 CONFUSION: ICD-10-CM

## 2019-08-10 DIAGNOSIS — R07.9 CHEST PAIN: ICD-10-CM

## 2019-08-10 DIAGNOSIS — E87.1 HYPONATREMIA: Primary | ICD-10-CM

## 2019-08-10 LAB
ALBUMIN SERPL BCP-MCNC: 4.6 G/DL (ref 3.5–5.2)
ALP SERPL-CCNC: 83 U/L (ref 55–135)
ALT SERPL W/O P-5'-P-CCNC: 20 U/L (ref 10–44)
AMPHET+METHAMPHET UR QL: NEGATIVE
ANION GAP SERPL CALC-SCNC: 19 MMOL/L (ref 8–16)
AST SERPL-CCNC: 36 U/L (ref 10–40)
BACTERIA #/AREA URNS HPF: ABNORMAL /HPF
BASOPHILS # BLD AUTO: 0.05 K/UL (ref 0–0.2)
BASOPHILS NFR BLD: 0.5 % (ref 0–1.9)
BENZODIAZ UR QL SCN>200 NG/ML: NORMAL
BILIRUB SERPL-MCNC: 0.6 MG/DL (ref 0.1–1)
BILIRUB UR QL STRIP: NEGATIVE
BUN SERPL-MCNC: 8 MG/DL (ref 8–23)
BZE UR QL SCN: NEGATIVE
CALCIUM SERPL-MCNC: 9 MG/DL (ref 8.7–10.5)
CANNABINOIDS UR QL SCN: NORMAL
CHLORIDE SERPL-SCNC: 87 MMOL/L (ref 95–110)
CLARITY UR: CLEAR
CO2 SERPL-SCNC: 15 MMOL/L (ref 23–29)
COLOR UR: YELLOW
CREAT SERPL-MCNC: 0.7 MG/DL (ref 0.5–1.4)
CREAT UR-MCNC: 24.8 MG/DL (ref 15–325)
DIFFERENTIAL METHOD: ABNORMAL
EOSINOPHIL # BLD AUTO: 0.1 K/UL (ref 0–0.5)
EOSINOPHIL NFR BLD: 0.5 % (ref 0–8)
ERYTHROCYTE [DISTWIDTH] IN BLOOD BY AUTOMATED COUNT: 22.3 % (ref 11.5–14.5)
EST. GFR  (AFRICAN AMERICAN): >60 ML/MIN/1.73 M^2
EST. GFR  (NON AFRICAN AMERICAN): >60 ML/MIN/1.73 M^2
GLUCOSE SERPL-MCNC: 208 MG/DL (ref 70–110)
GLUCOSE UR QL STRIP: ABNORMAL
HCT VFR BLD AUTO: 30.7 % (ref 37–48.5)
HGB BLD-MCNC: 9.6 G/DL (ref 12–16)
HGB UR QL STRIP: ABNORMAL
HYALINE CASTS #/AREA URNS LPF: 3 /LPF
IMM GRANULOCYTES # BLD AUTO: 0.07 K/UL (ref 0–0.04)
IMM GRANULOCYTES NFR BLD AUTO: 0.7 % (ref 0–0.5)
KETONES UR QL STRIP: NEGATIVE
LEUKOCYTE ESTERASE UR QL STRIP: NEGATIVE
LYMPHOCYTES # BLD AUTO: 1.1 K/UL (ref 1–4.8)
LYMPHOCYTES NFR BLD: 10.7 % (ref 18–48)
MAGNESIUM SERPL-MCNC: 1.8 MG/DL (ref 1.6–2.6)
MCH RBC QN AUTO: 26.7 PG (ref 27–31)
MCHC RBC AUTO-ENTMCNC: 31.3 G/DL (ref 32–36)
MCV RBC AUTO: 85 FL (ref 82–98)
MICROSCOPIC COMMENT: ABNORMAL
MONOCYTES # BLD AUTO: 0.6 K/UL (ref 0.3–1)
MONOCYTES NFR BLD: 6 % (ref 4–15)
NEUTROPHILS # BLD AUTO: 8.7 K/UL (ref 1.8–7.7)
NEUTROPHILS NFR BLD: 81.6 % (ref 38–73)
NITRITE UR QL STRIP: NEGATIVE
NRBC BLD-RTO: 0 /100 WBC
OPIATES UR QL SCN: NEGATIVE
PCP UR QL SCN>25 NG/ML: NEGATIVE
PH UR STRIP: 7 [PH] (ref 5–8)
PLATELET # BLD AUTO: 343 K/UL (ref 150–350)
PMV BLD AUTO: 9.2 FL (ref 9.2–12.9)
POTASSIUM SERPL-SCNC: 3.3 MMOL/L (ref 3.5–5.1)
PROT SERPL-MCNC: 7.6 G/DL (ref 6–8.4)
PROT UR QL STRIP: ABNORMAL
RBC # BLD AUTO: 3.6 M/UL (ref 4–5.4)
RBC #/AREA URNS HPF: 20 /HPF (ref 0–4)
SODIUM SERPL-SCNC: 121 MMOL/L (ref 136–145)
SP GR UR STRIP: 1.01 (ref 1–1.03)
TOXICOLOGY INFORMATION: NORMAL
URN SPEC COLLECT METH UR: ABNORMAL
UROBILINOGEN UR STRIP-ACNC: NEGATIVE EU/DL
WBC # BLD AUTO: 10.59 K/UL (ref 3.9–12.7)
WBC #/AREA URNS HPF: 3 /HPF (ref 0–5)

## 2019-08-10 PROCEDURE — G0378 HOSPITAL OBSERVATION PER HR: HCPCS

## 2019-08-10 PROCEDURE — 51702 INSERT TEMP BLADDER CATH: CPT

## 2019-08-10 PROCEDURE — 99285 EMERGENCY DEPT VISIT HI MDM: CPT | Mod: 25

## 2019-08-10 PROCEDURE — 96374 THER/PROPH/DIAG INJ IV PUSH: CPT | Performed by: FAMILY MEDICINE

## 2019-08-10 PROCEDURE — P9612 CATHETERIZE FOR URINE SPEC: HCPCS

## 2019-08-10 PROCEDURE — 96375 TX/PRO/DX INJ NEW DRUG ADDON: CPT | Performed by: FAMILY MEDICINE

## 2019-08-10 PROCEDURE — 81000 URINALYSIS NONAUTO W/SCOPE: CPT | Mod: 59

## 2019-08-10 PROCEDURE — 71045 XR CHEST AP PORTABLE: ICD-10-PCS | Mod: 26,,, | Performed by: RADIOLOGY

## 2019-08-10 PROCEDURE — 70450 CT HEAD/BRAIN W/O DYE: CPT | Mod: TC

## 2019-08-10 PROCEDURE — 20000000 HC ICU ROOM

## 2019-08-10 PROCEDURE — 63600175 PHARM REV CODE 636 W HCPCS: Performed by: FAMILY MEDICINE

## 2019-08-10 PROCEDURE — 71045 X-RAY EXAM CHEST 1 VIEW: CPT | Mod: 26,,, | Performed by: RADIOLOGY

## 2019-08-10 PROCEDURE — 85025 COMPLETE CBC W/AUTO DIFF WBC: CPT

## 2019-08-10 PROCEDURE — 70450 CT HEAD/BRAIN W/O DYE: CPT | Mod: 26,,, | Performed by: RADIOLOGY

## 2019-08-10 PROCEDURE — 80053 COMPREHEN METABOLIC PANEL: CPT

## 2019-08-10 PROCEDURE — 71045 X-RAY EXAM CHEST 1 VIEW: CPT | Mod: TC,FY

## 2019-08-10 PROCEDURE — 70450 CT HEAD WITHOUT CONTRAST: ICD-10-PCS | Mod: 26,,, | Performed by: RADIOLOGY

## 2019-08-10 PROCEDURE — 80307 DRUG TEST PRSMV CHEM ANLYZR: CPT

## 2019-08-10 PROCEDURE — 83735 ASSAY OF MAGNESIUM: CPT

## 2019-08-10 PROCEDURE — 93005 ELECTROCARDIOGRAM TRACING: CPT

## 2019-08-10 RX ORDER — LORAZEPAM 2 MG/ML
2 INJECTION INTRAMUSCULAR
Status: COMPLETED | OUTPATIENT
Start: 2019-08-10 | End: 2019-08-10

## 2019-08-10 RX ORDER — PRASUGREL 5 MG/1
5 TABLET, FILM COATED ORAL DAILY
Status: DISCONTINUED | OUTPATIENT
Start: 2019-08-11 | End: 2019-08-11 | Stop reason: HOSPADM

## 2019-08-10 RX ORDER — LORAZEPAM 2 MG/ML
INJECTION INTRAMUSCULAR
Status: COMPLETED
Start: 2019-08-10 | End: 2019-08-10

## 2019-08-10 RX ORDER — SODIUM CHLORIDE 0.9 % (FLUSH) 0.9 %
10 SYRINGE (ML) INJECTION
Status: DISCONTINUED | OUTPATIENT
Start: 2019-08-10 | End: 2019-08-11 | Stop reason: HOSPADM

## 2019-08-10 RX ORDER — PANTOPRAZOLE SODIUM 40 MG/1
40 TABLET, DELAYED RELEASE ORAL DAILY
Status: DISCONTINUED | OUTPATIENT
Start: 2019-08-11 | End: 2019-08-11 | Stop reason: HOSPADM

## 2019-08-10 RX ORDER — SODIUM CHLORIDE 9 MG/ML
INJECTION, SOLUTION INTRAVENOUS CONTINUOUS
Status: DISCONTINUED | OUTPATIENT
Start: 2019-08-10 | End: 2019-08-11 | Stop reason: HOSPADM

## 2019-08-10 RX ORDER — LISINOPRIL 2.5 MG/1
5 TABLET ORAL DAILY
Status: DISCONTINUED | OUTPATIENT
Start: 2019-08-11 | End: 2019-08-11 | Stop reason: HOSPADM

## 2019-08-10 RX ORDER — HYDROCHLOROTHIAZIDE 12.5 MG/1
25 TABLET ORAL DAILY
Status: DISCONTINUED | OUTPATIENT
Start: 2019-08-11 | End: 2019-08-11

## 2019-08-10 RX ORDER — KETOROLAC TROMETHAMINE 30 MG/ML
30 INJECTION, SOLUTION INTRAMUSCULAR; INTRAVENOUS
Status: COMPLETED | OUTPATIENT
Start: 2019-08-10 | End: 2019-08-10

## 2019-08-10 RX ORDER — SODIUM CHLORIDE 9 MG/ML
1000 INJECTION, SOLUTION INTRAVENOUS
Status: COMPLETED | OUTPATIENT
Start: 2019-08-10 | End: 2019-08-10

## 2019-08-10 RX ORDER — BUSPIRONE HYDROCHLORIDE 5 MG/1
15 TABLET ORAL 2 TIMES DAILY
Status: DISCONTINUED | OUTPATIENT
Start: 2019-08-10 | End: 2019-08-11 | Stop reason: HOSPADM

## 2019-08-10 RX ORDER — ATORVASTATIN CALCIUM 10 MG/1
10 TABLET, FILM COATED ORAL DAILY
Status: DISCONTINUED | OUTPATIENT
Start: 2019-08-11 | End: 2019-08-11 | Stop reason: HOSPADM

## 2019-08-10 RX ORDER — DULOXETIN HYDROCHLORIDE 30 MG/1
30 CAPSULE, DELAYED RELEASE ORAL DAILY
Status: DISCONTINUED | OUTPATIENT
Start: 2019-08-11 | End: 2019-08-11

## 2019-08-10 RX ORDER — TRAZODONE HYDROCHLORIDE 50 MG/1
100 TABLET ORAL NIGHTLY
Status: DISCONTINUED | OUTPATIENT
Start: 2019-08-10 | End: 2019-08-11 | Stop reason: HOSPADM

## 2019-08-10 RX ORDER — DIAZEPAM 5 MG/1
5 TABLET ORAL EVERY 12 HOURS PRN
Status: DISCONTINUED | OUTPATIENT
Start: 2019-08-10 | End: 2019-08-11 | Stop reason: HOSPADM

## 2019-08-10 RX ORDER — ISOSORBIDE MONONITRATE 30 MG/1
30 TABLET, EXTENDED RELEASE ORAL DAILY
Status: DISCONTINUED | OUTPATIENT
Start: 2019-08-11 | End: 2019-08-11 | Stop reason: HOSPADM

## 2019-08-10 RX ADMIN — KETOROLAC TROMETHAMINE 30 MG: 30 INJECTION, SOLUTION INTRAMUSCULAR at 10:08

## 2019-08-10 RX ADMIN — LORAZEPAM 2 MG: 2 INJECTION INTRAMUSCULAR; INTRAVENOUS at 10:08

## 2019-08-10 RX ADMIN — SODIUM CHLORIDE 1000 ML: 0.9 INJECTION, SOLUTION INTRAVENOUS at 09:08

## 2019-08-11 VITALS
HEART RATE: 71 BPM | RESPIRATION RATE: 18 BRPM | BODY MASS INDEX: 17.32 KG/M2 | HEIGHT: 70 IN | TEMPERATURE: 98 F | WEIGHT: 121 LBS | OXYGEN SATURATION: 92 % | SYSTOLIC BLOOD PRESSURE: 102 MMHG | DIASTOLIC BLOOD PRESSURE: 62 MMHG

## 2019-08-11 LAB
ANION GAP SERPL CALC-SCNC: 10 MMOL/L (ref 8–16)
BASOPHILS # BLD AUTO: 0.04 K/UL (ref 0–0.2)
BASOPHILS NFR BLD: 0.5 % (ref 0–1.9)
BUN SERPL-MCNC: 7 MG/DL (ref 8–23)
CALCIUM SERPL-MCNC: 8.8 MG/DL (ref 8.7–10.5)
CHLORIDE SERPL-SCNC: 95 MMOL/L (ref 95–110)
CO2 SERPL-SCNC: 24 MMOL/L (ref 23–29)
CREAT SERPL-MCNC: 0.5 MG/DL (ref 0.5–1.4)
DIFFERENTIAL METHOD: ABNORMAL
EOSINOPHIL # BLD AUTO: 0.2 K/UL (ref 0–0.5)
EOSINOPHIL NFR BLD: 2.5 % (ref 0–8)
ERYTHROCYTE [DISTWIDTH] IN BLOOD BY AUTOMATED COUNT: 22.1 % (ref 11.5–14.5)
EST. GFR  (AFRICAN AMERICAN): >60 ML/MIN/1.73 M^2
EST. GFR  (NON AFRICAN AMERICAN): >60 ML/MIN/1.73 M^2
GLUCOSE SERPL-MCNC: 97 MG/DL (ref 70–110)
HCT VFR BLD AUTO: 27.4 % (ref 37–48.5)
HGB BLD-MCNC: 8.9 G/DL (ref 12–16)
IMM GRANULOCYTES # BLD AUTO: 0.02 K/UL (ref 0–0.04)
IMM GRANULOCYTES NFR BLD AUTO: 0.2 % (ref 0–0.5)
LYMPHOCYTES # BLD AUTO: 1.4 K/UL (ref 1–4.8)
LYMPHOCYTES NFR BLD: 17.2 % (ref 18–48)
MCH RBC QN AUTO: 27.1 PG (ref 27–31)
MCHC RBC AUTO-ENTMCNC: 32.5 G/DL (ref 32–36)
MCV RBC AUTO: 84 FL (ref 82–98)
MONOCYTES # BLD AUTO: 0.7 K/UL (ref 0.3–1)
MONOCYTES NFR BLD: 9 % (ref 4–15)
NEUTROPHILS # BLD AUTO: 5.7 K/UL (ref 1.8–7.7)
NEUTROPHILS NFR BLD: 70.6 % (ref 38–73)
NRBC BLD-RTO: 0 /100 WBC
PLATELET # BLD AUTO: 290 K/UL (ref 150–350)
PMV BLD AUTO: 9.5 FL (ref 9.2–12.9)
POTASSIUM SERPL-SCNC: 3.3 MMOL/L (ref 3.5–5.1)
RBC # BLD AUTO: 3.28 M/UL (ref 4–5.4)
SODIUM SERPL-SCNC: 129 MMOL/L (ref 136–145)
WBC # BLD AUTO: 8.1 K/UL (ref 3.9–12.7)

## 2019-08-11 PROCEDURE — 80048 BASIC METABOLIC PNL TOTAL CA: CPT

## 2019-08-11 PROCEDURE — 63600175 PHARM REV CODE 636 W HCPCS: Performed by: FAMILY MEDICINE

## 2019-08-11 PROCEDURE — G0378 HOSPITAL OBSERVATION PER HR: HCPCS

## 2019-08-11 PROCEDURE — 94760 N-INVAS EAR/PLS OXIMETRY 1: CPT

## 2019-08-11 PROCEDURE — 85025 COMPLETE CBC W/AUTO DIFF WBC: CPT

## 2019-08-11 PROCEDURE — 27000221 HC OXYGEN, UP TO 24 HOURS

## 2019-08-11 PROCEDURE — 96361 HYDRATE IV INFUSION ADD-ON: CPT

## 2019-08-11 PROCEDURE — 25000003 PHARM REV CODE 250: Performed by: INTERNAL MEDICINE

## 2019-08-11 RX ORDER — POTASSIUM CHLORIDE 20 MEQ/1
40 TABLET, EXTENDED RELEASE ORAL ONCE
Status: COMPLETED | OUTPATIENT
Start: 2019-08-11 | End: 2019-08-11

## 2019-08-11 RX ADMIN — ATORVASTATIN CALCIUM 10 MG: 10 TABLET, FILM COATED ORAL at 09:08

## 2019-08-11 RX ADMIN — LISINOPRIL 5 MG: 2.5 TABLET ORAL at 09:08

## 2019-08-11 RX ADMIN — ISOSORBIDE MONONITRATE 30 MG: 30 TABLET, EXTENDED RELEASE ORAL at 09:08

## 2019-08-11 RX ADMIN — BUSPIRONE HYDROCHLORIDE 15 MG: 5 TABLET ORAL at 09:08

## 2019-08-11 RX ADMIN — POTASSIUM CHLORIDE 40 MEQ: 1500 TABLET, EXTENDED RELEASE ORAL at 09:08

## 2019-08-11 RX ADMIN — SODIUM CHLORIDE: 0.9 INJECTION, SOLUTION INTRAVENOUS at 01:08

## 2019-08-11 RX ADMIN — PRASUGREL HYDROCHLORIDE 5 MG: 5 TABLET, FILM COATED ORAL at 09:08

## 2019-08-11 RX ADMIN — PANTOPRAZOLE SODIUM 40 MG: 40 TABLET, DELAYED RELEASE ORAL at 09:08

## 2019-08-11 NOTE — SUBJECTIVE & OBJECTIVE
Past Medical History:   Diagnosis Date    Anxiety     Atherosclerosis     Depression     GERD (gastroesophageal reflux disease)     Hypertension        Past Surgical History:   Procedure Laterality Date    APPENDECTOMY      CAROTID STENT  2014    FEMORAL ARTERY STENT      ULTRASOUND, UPPER GI TRACT, ENDOSCOPIC N/A 10/16/2018    Performed by Kumar Wang MD at [facility] (2ND FLR)       Review of patient's allergies indicates:   Allergen Reactions    Penicillins        No current facility-administered medications on file prior to encounter.      Current Outpatient Medications on File Prior to Encounter   Medication Sig    atorvastatin (LIPITOR) 10 MG tablet Take 10 mg by mouth once daily.    azithromycin (Z-ROSALIE) 250 MG tablet Take two tablets on day 1, then 1 tablet on days 2-5.    busPIRone (BUSPAR) 15 MG tablet Take 1 tablet (15 mg total) by mouth 2 (two) times daily.    diazePAM (VALIUM) 5 MG tablet Take 1 tablet (5 mg total) by mouth every 12 (twelve) hours as needed for Anxiety.    DULoxetine (CYMBALTA) 30 MG capsule Take 1 capsule (30 mg total) by mouth once daily.    hydroCHLOROthiazide (HYDRODIURIL) 25 MG tablet Take 25 mg by mouth once daily.    isosorbide mononitrate (IMDUR) 30 MG 24 hr tablet Take 30 mg by mouth once daily.    lisinopril (PRINIVIL,ZESTRIL) 5 MG tablet Take 5 mg by mouth once daily.    pantoprazole (PROTONIX) 40 MG tablet Take 1 tablet (40 mg total) by mouth once daily.    prasugrel (EFFIENT) 5 mg Tab once daily.    traZODone (DESYREL) 100 MG tablet nightly.     Family History     None        Tobacco Use    Smoking status: Current Every Day Smoker     Packs/day: 1.00     Years: 23.00     Pack years: 23.00     Types: Cigarettes     Start date: 10/1/1988    Smokeless tobacco: Never Used   Substance and Sexual Activity    Alcohol use: Yes     Comment: occ    Drug use: No    Sexual activity: Not Currently       Objective:     Vital Signs (Most Recent):  Temp:  98.1 °F (36.7 °C) (08/11/19 0400)  Pulse: 68 (08/11/19 0500)  Resp: 16 (08/11/19 0500)  BP: (!) 141/79 (08/11/19 0500)  SpO2: 100 % (08/11/19 0500) Vital Signs (24h Range):  Temp:  [97.1 °F (36.2 °C)-98.5 °F (36.9 °C)] 98.1 °F (36.7 °C)  Pulse:  [] 68  Resp:  [11-21] 16  SpO2:  [98 %-100 %] 100 %  BP: (103-202)/() 141/79     Weight: 54.9 kg (121 lb)  Body mass index is 17.36 kg/m².  ROS;  Constitutional well-developed well-nourished no acute distress.  HEENT  normal cephalic ears within normal limits tympanic membranes are normal hearing is normal.  Eyes round reactive to light accommodations EOMs intact. Nose is negative.  Throat dry mucous membranes otherwise oral cavity oropharynx X in posterior friend X is normal.  NECK:  Range of motion of neck is within normal limits.  There is no bruit heard no increased thyroid.  No lymphatics are felt.  LUNGS:  Clear to auscultation and percussion.  CV:  Normal sinus rhythm no murmurs heard.  Peripheral pulses are within normal limits.  ABD:  Bowel sounds positive nontender no organomegaly no masses felt.  MUS:  Full range of motion muscle mass is intact. No abnormal findings of the musculoskeletal system.  NEURO:  Neurological system is within normal limits.  Patient is alert cooperative she is slightly drowsy.  SKIN:  Within normal limits no lesions.  IMMUNE SYSTEM; within normal limits.  Psychiatric system:  Normal behavior mood affect is normal judgment appears normal.  Physical Exam   Constitutional: She is oriented to person, place, and time. She appears well-developed and well-nourished.   HENT:   Head: Normocephalic and atraumatic.   Right Ear: External ear normal.   Left Ear: External ear normal.   Nose: Nose normal.   Eyes: Pupils are equal, round, and reactive to light. Conjunctivae, EOM and lids are normal.   Neck: Trachea normal, normal range of motion and full passive range of motion without pain. Neck supple.   Cardiovascular: Normal rate, regular  rhythm, S1 normal, S2 normal, normal heart sounds, intact distal pulses and normal pulses.   Pulmonary/Chest: Effort normal and breath sounds normal.   Abdominal: Soft. Normal aorta and bowel sounds are normal.   Musculoskeletal: Normal range of motion.   Neurological: She is alert and oriented to person, place, and time. She has normal reflexes.   Skin: Skin is warm, dry and intact.   Psychiatric: She has a normal mood and affect. Her speech is normal and behavior is normal. Judgment and thought content normal. Cognition and memory are normal.   Nursing note and vitals reviewed.        CRANIAL NERVES     CN III, IV, VI   Pupils are equal, round, and reactive to light.  Extraocular motions are normal.        Significant Labs:   BMP:   Recent Labs   Lab 08/10/19  1945   *   *   K 3.3*   CL 87*   CO2 15*   BUN 8   CREATININE 0.7   CALCIUM 9.0   MG 1.8     CBC:   Recent Labs   Lab 08/10/19  1945 08/11/19  0540   WBC 10.59 8.10   HGB 9.6* 8.9*   HCT 30.7* 27.4*    290     CMP:   Recent Labs   Lab 08/10/19  1945   *   K 3.3*   CL 87*   CO2 15*   *   BUN 8   CREATININE 0.7   CALCIUM 9.0   PROT 7.6   ALBUMIN 4.6   BILITOT 0.6   ALKPHOS 83   AST 36   ALT 20   ANIONGAP 19*   EGFRNONAA >60.0     POCT Glucose: No results for input(s): POCTGLUCOSE in the last 48 hours.  TSH: No results for input(s): TSH in the last 4320 hours.  All pertinent labs within the past 24 hours have been reviewed.    Significant Imaging: CT: I have reviewed all pertinent results/findings within the past 24 hours and my personal findings are:  CT head scan is normal small area low attenuation suggestive but not indicative of microvascular disease.  CXR: I have reviewed all pertinent results/findings within the past 24 hours and my personal findings are:  Within normal limits.  I have reviewed and interpreted all pertinent imaging results/findings within the past 24 hours.

## 2019-08-11 NOTE — ED NOTES
Pt assisted with use of bedpan. Small amount of urine obtained. Pt awaiting admit bed assignment. No change in pt's assessment. nad noted. Pt c/o headache. Dr. Jaramillo notified. V/u. No new orders received at this time.

## 2019-08-11 NOTE — ED NOTES
"Pt c/o "chest locking up." pt began with stiffening and jerking movements. Lasting approx 35 seconds. Dr. Jaramillo at  for eval. Pt placed on %. Pt's family report that just prior, pt c/o feeling hot and then chest locking up.  "

## 2019-08-11 NOTE — DISCHARGE SUMMARY
[facility] - ICU Hospital Medicine  Discharge Summary      Patient Name: Comfort Cantu  MRN: [id number]  Admission Date: 8/10/2019  Hospital Length of Stay: 1 days  Discharge Date and Time:  08/11/2019 8:43 AM  Attending Physician: Adan Wilson MD   Discharging Provider: Adan Wilson MD  Primary Care Provider: Darinel Riddle MD      HPI:   Patient was brought in by EMS due to tonic-clonic seizure activity been given 1 mg Ativan on the scene.  Patient is drowsy but alert emergency room.  Patient has history of hypertension has been compliant with the medications.  Patient felt she had been taking in excessive salt recently and increased her fluid intake.  She is not sure the quantity of water she has been drinking at this time these questions will be has again when the patient is less drowsy and more alert.  Sodium is 121 potassium 3.3the glucose is 208.    * No surgery found *      Hospital Course:   ICU normal saline monitor for seizure activity repeat electrolytes home meds.  Also hydrochlorothiazide on duloxetine also contribute to hyponatremia have DC both of those.  08/11/2019.  Patient's sodium is improved to 129.  Will discharge the patient home long discussion with the patient and the mother concern infected she did not have a seizure she did have muscle tonic clonic type activity secondary to hyponatremia she could talk during these.  The patient did not bite her tongue did not sore self.  So therefore do not feel that patient has seizure activity.  She will have a long discussion with Dr. Riddle concerning hydrochlorothiazide duloxetine and also the possibility taking potassium tablets as needed if she is going to take a diuretic.  DC home to be followed up with PCP.  Patient was not an inpatient admission did not me to be placed in the ICU.  The patient has been changed observation will be DC home this morning.  Patient is medically ready to be discharged.      Consults:     * Hyponatremia  Will DC hydrochlorothiazide also DC duloxetine both of which can cause hyponatremia.  08/11/2019.  Patient did not have a seizure disorder she had muscle rigidity in movement secondary to hyponatremia discussed with her not using hydrochlorothiazide duloxetine is is are eyes.  Also discussed with her she is going to take a diuretic she is to take potassium.  Also have her electrolytes monitored.  Long discussion with the patient and the mother concerning these facts.  The patient had other reasons for hyponatremia such as eating greasy food and she did drink a 14 balls of water over 2D.  In this seem to be the only fluids he was drinking.  This should not cause hyponatremia so other causes are present.  Hydrochlorothiazide and duloxetine most likely causes.  These will be discussed with her PCP.  Patient mother seem to have a good understanding of the findings the diagnosis and treatment and follow-up care.        Final Active Diagnoses:    Diagnosis Date Noted POA    PRINCIPAL PROBLEM:  Hyponatremia [E87.1] 08/10/2019 Yes      Problems Resolved During this Admission:       Discharged Condition: good    Disposition: Home or Self Care    Follow Up:    Patient Instructions:   No discharge procedures on file.    Significant Diagnostic Studies: Labs:   BMP:   Recent Labs   Lab 08/10/19  1945 08/11/19  0540   * 97   * 129*   K 3.3* 3.3*   CL 87* 95   CO2 15* 24   BUN 8 7*   CREATININE 0.7 0.5   CALCIUM 9.0 8.8   MG 1.8  --    , CMP   Recent Labs   Lab 08/10/19  1945 08/11/19  0540   * 129*   K 3.3* 3.3*   CL 87* 95   CO2 15* 24   * 97   BUN 8 7*   CREATININE 0.7 0.5   CALCIUM 9.0 8.8   PROT 7.6  --    ALBUMIN 4.6  --    BILITOT 0.6  --    ALKPHOS 83  --    AST 36  --    ALT 20  --    ANIONGAP 19* 10   ESTGFRAFRICA >60.0 >60.0   EGFRNONAA >60.0 >60.0   , CBC   Recent Labs   Lab 08/10/19  1945 08/11/19  0540   WBC 10.59 8.10   HGB 9.6* 8.9*   HCT 30.7* 27.4*     290    and All labs within the past 24 hours have been reviewed    Pending Diagnostic Studies:     Procedure Component Value Units Date/Time    CT Head Without Contrast [[id number]] Resulted:  08/10/19 1949    Order Status:  Sent Lab Status:  In process Updated:  08/10/19 2013    X-Ray Chest AP Portable [[id number]] Resulted:  08/10/19 1949    Order Status:  Sent Lab Status:  In process Updated:  08/10/19 1959         Medications:  Reconciled Home Medications:      Medication List      CONTINUE taking these medications    atorvastatin 10 MG tablet  Commonly known as:  LIPITOR  Take 10 mg by mouth once daily.     busPIRone 15 MG tablet  Commonly known as:  BUSPAR  Take 1 tablet (15 mg total) by mouth 2 (two) times daily.     diazePAM 5 MG tablet  Commonly known as:  VALIUM  Take 1 tablet (5 mg total) by mouth every 12 (twelve) hours as needed for Anxiety.     isosorbide mononitrate 30 MG 24 hr tablet  Commonly known as:  IMDUR  Take 30 mg by mouth once daily.     lisinopril 5 MG tablet  Commonly known as:  PRINIVIL,ZESTRIL  Take 5 mg by mouth once daily.     pantoprazole 40 MG tablet  Commonly known as:  PROTONIX  Take 1 tablet (40 mg total) by mouth once daily.     prasugrel 5 mg Tab  Commonly known as:  EFFIENT  once daily.     traZODone 100 MG tablet  Commonly known as:  DESYREL  nightly.        STOP taking these medications    azithromycin 250 MG tablet  Commonly known as:  Z-ROSALIE     DULoxetine 30 MG capsule  Commonly known as:  CYMBALTA     hydroCHLOROthiazide 25 MG tablet  Commonly known as:  HYDRODIURIL            Indwelling Lines/Drains at time of discharge:   Lines/Drains/Airways     Drain                 Urethral Catheter 08/11/19 less than 1 day                Time spent on the discharge of patient: 38 minutes  Patient was seen and examined on the date of discharge and determined to be suitable for discharge.         Adan Wilson MD  Department of Hospital Medicine  [facility]

## 2019-08-11 NOTE — ED NOTES
Dr Jaramillo at bedside. Updated pt and family on admission status. Pt reports she's been drinking a large amount of water for a few days. She reports she drank at least 8 bottle berrios yesterday.

## 2019-08-11 NOTE — H&P
[facility] - ICU Hospital Medicine  History & Physical    Patient Name: Comfort Cantu  MRN: [id number]  Admission Date: 8/10/2019  Attending Physician: Adan Wilson MD   Primary Care Provider: Darinel Riddle MD         Patient information was obtained from patient and ER records.     Subjective:     Principal Problem:Hyponatremia    Chief Complaint:   Chief Complaint   Patient presents with    Seizures        HPI: Patient was brought in by EMS due to tonic-clonic seizure activity been given 1 mg Ativan on the scene.  Patient is drowsy but alert emergency room.  Patient has history of hypertension has been compliant with the medications.  Patient felt she had been taking in excessive salt recently and increased her fluid intake.  She is not sure the quantity of water she has been drinking at this time these questions will be has again when the patient is less drowsy and more alert.  Sodium is 121 potassium 3.3the glucose is 208.    Past Medical History:   Diagnosis Date    Anxiety     Atherosclerosis     Depression     GERD (gastroesophageal reflux disease)     Hypertension        Past Surgical History:   Procedure Laterality Date    APPENDECTOMY      CAROTID STENT  2014    FEMORAL ARTERY STENT      ULTRASOUND, UPPER GI TRACT, ENDOSCOPIC N/A 10/16/2018    Performed by Kumar Wang MD at [facility] (2ND FLR)       Review of patient's allergies indicates:   Allergen Reactions    Penicillins        No current facility-administered medications on file prior to encounter.      Current Outpatient Medications on File Prior to Encounter   Medication Sig    atorvastatin (LIPITOR) 10 MG tablet Take 10 mg by mouth once daily.    azithromycin (Z-ROSALIE) 250 MG tablet Take two tablets on day 1, then 1 tablet on days 2-5.    busPIRone (BUSPAR) 15 MG tablet Take 1 tablet (15 mg total) by mouth 2 (two) times daily.    diazePAM (VALIUM) 5 MG tablet Take 1 tablet (5 mg total) by  mouth every 12 (twelve) hours as needed for Anxiety.    DULoxetine (CYMBALTA) 30 MG capsule Take 1 capsule (30 mg total) by mouth once daily.    hydroCHLOROthiazide (HYDRODIURIL) 25 MG tablet Take 25 mg by mouth once daily.    isosorbide mononitrate (IMDUR) 30 MG 24 hr tablet Take 30 mg by mouth once daily.    lisinopril (PRINIVIL,ZESTRIL) 5 MG tablet Take 5 mg by mouth once daily.    pantoprazole (PROTONIX) 40 MG tablet Take 1 tablet (40 mg total) by mouth once daily.    prasugrel (EFFIENT) 5 mg Tab once daily.    traZODone (DESYREL) 100 MG tablet nightly.     Family History     None        Tobacco Use    Smoking status: Current Every Day Smoker     Packs/day: 1.00     Years: 23.00     Pack years: 23.00     Types: Cigarettes     Start date: 10/1/1988    Smokeless tobacco: Never Used   Substance and Sexual Activity    Alcohol use: Yes     Comment: occ    Drug use: No    Sexual activity: Not Currently       Objective:     Vital Signs (Most Recent):  Temp: 98.1 °F (36.7 °C) (08/11/19 0400)  Pulse: 68 (08/11/19 0500)  Resp: 16 (08/11/19 0500)  BP: (!) 141/79 (08/11/19 0500)  SpO2: 100 % (08/11/19 0500) Vital Signs (24h Range):  Temp:  [97.1 °F (36.2 °C)-98.5 °F (36.9 °C)] 98.1 °F (36.7 °C)  Pulse:  [] 68  Resp:  [11-21] 16  SpO2:  [98 %-100 %] 100 %  BP: (103-202)/() 141/79     Weight: 54.9 kg (121 lb)  Body mass index is 17.36 kg/m².  ROS;  Constitutional well-developed well-nourished no acute distress.  HEENT  normal cephalic ears within normal limits tympanic membranes are normal hearing is normal.  Eyes round reactive to light accommodations EOMs intact. Nose is negative.  Throat dry mucous membranes otherwise oral cavity oropharynx X in posterior friend X is normal.  NECK:  Range of motion of neck is within normal limits.  There is no bruit heard no increased thyroid.  No lymphatics are felt.  LUNGS:  Clear to auscultation and percussion.  CV:  Normal sinus rhythm no murmurs heard.   Peripheral pulses are within normal limits.  ABD:  Bowel sounds positive nontender no organomegaly no masses felt.  MUS:  Full range of motion muscle mass is intact. No abnormal findings of the musculoskeletal system.  NEURO:  Neurological system is within normal limits.  Patient is alert cooperative she is slightly drowsy.  SKIN:  Within normal limits no lesions.  IMMUNE SYSTEM; within normal limits.  Psychiatric system:  Normal behavior mood affect is normal judgment appears normal.  Physical Exam   Constitutional: She is oriented to person, place, and time. She appears well-developed and well-nourished.   HENT:   Head: Normocephalic and atraumatic.   Right Ear: External ear normal.   Left Ear: External ear normal.   Nose: Nose normal.   Eyes: Pupils are equal, round, and reactive to light. Conjunctivae, EOM and lids are normal.   Neck: Trachea normal, normal range of motion and full passive range of motion without pain. Neck supple.   Cardiovascular: Normal rate, regular rhythm, S1 normal, S2 normal, normal heart sounds, intact distal pulses and normal pulses.   Pulmonary/Chest: Effort normal and breath sounds normal.   Abdominal: Soft. Normal aorta and bowel sounds are normal.   Musculoskeletal: Normal range of motion.   Neurological: She is alert and oriented to person, place, and time. She has normal reflexes.   Skin: Skin is warm, dry and intact.   Psychiatric: She has a normal mood and affect. Her speech is normal and behavior is normal. Judgment and thought content normal. Cognition and memory are normal.   Nursing note and vitals reviewed.        CRANIAL NERVES     CN III, IV, VI   Pupils are equal, round, and reactive to light.  Extraocular motions are normal.        Significant Labs:   BMP:   Recent Labs   Lab 08/10/19  1945   *   *   K 3.3*   CL 87*   CO2 15*   BUN 8   CREATININE 0.7   CALCIUM 9.0   MG 1.8     CBC:   Recent Labs   Lab 08/10/19  1945 08/11/19  0540   WBC 10.59 8.10   HGB  9.6* 8.9*   HCT 30.7* 27.4*    290     CMP:   Recent Labs   Lab 08/10/19  1945   *   K 3.3*   CL 87*   CO2 15*   *   BUN 8   CREATININE 0.7   CALCIUM 9.0   PROT 7.6   ALBUMIN 4.6   BILITOT 0.6   ALKPHOS 83   AST 36   ALT 20   ANIONGAP 19*   EGFRNONAA >60.0     POCT Glucose: No results for input(s): POCTGLUCOSE in the last 48 hours.  TSH: No results for input(s): TSH in the last 4320 hours.  All pertinent labs within the past 24 hours have been reviewed.    Significant Imaging: CT: I have reviewed all pertinent results/findings within the past 24 hours and my personal findings are:  CT head scan is normal small area low attenuation suggestive but not indicative of microvascular disease.  CXR: I have reviewed all pertinent results/findings within the past 24 hours and my personal findings are:  Within normal limits.  I have reviewed and interpreted all pertinent imaging results/findings within the past 24 hours.    Assessment/Plan:     * Hyponatremia          VTE Risk Mitigation (From admission, onward)        Ordered     Place VIDHYA hose  Until discontinued      08/10/19 2322     IP VTE LOW RISK PATIENT  Once      08/10/19 2322             Adan Wilson MD  Department of Hospital Medicine   [facility] - [facility]

## 2019-08-11 NOTE — NURSING
Pt AAOx3, VSS, NAD noted, B AC IV d/c, pt able to void spontaneously w/o difficulty after cole catheter removed, D/C instructions reviewed w/ pt and sister, verbalized understanding, pt d/c home per car w/ sister

## 2019-08-11 NOTE — ED NOTES
Bed: Exam 02  Expected date:   Expected time:   Means of arrival: Ambulance Service  Comments:  EMS

## 2019-08-11 NOTE — HPI
Patient was brought in by EMS due to tonic-clonic seizure activity been given 1 mg Ativan on the scene.  Patient is drowsy but alert emergency room.  Patient has history of hypertension has been compliant with the medications.  Patient felt she had been taking in excessive salt recently and increased her fluid intake.  She is not sure the quantity of water she has been drinking at this time these questions will be has again when the patient is less drowsy and more alert.  Sodium is 121 potassium 3.3the glucose is 208.

## 2019-08-11 NOTE — NURSING
Admitted patient to room ICU-9 per genevieve from ER.  Continues sleepy @ this time.  Able to awaken fully when spoken to and asked questions.  Asked if her sister is still here.  Oriented to room, bed, TV, call light.  Explained bed exit alarm and need to always call for assistance before attempting to get OOB.  Verbalized understanding.

## 2019-08-11 NOTE — ED TRIAGE NOTES
"Pt to ed via ems with c/o seizure activity. Pt initial c/o was chest pain and anxiety. Ems reports "tonic clonic" behavior while awake and alert. Ems gave ativan 1mg and placed a nasal trumpet/NRB.  Pt responds to verbal stimuli.   "

## 2019-08-11 NOTE — ED NOTES
Pt spontaneously opens eyes and trying to reach for NRB mask. NRB removed. Pt's resp e/u. Pt jumped and reached for PIV site upon insertion.

## 2019-08-11 NOTE — ED PROVIDER NOTES
Encounter Date: 8/10/2019       History     Chief Complaint   Patient presents with    Seizures     Patient presents to the ED by EMS after reported seizure activity prior to arrival.  EMS states patient had tonic clonic activity and was given a dose of Ativan for this.  Upon arrival to the ED patient is drowsy but answers questions.  Arouses easily to verbal stimuli.  Oriented to person only, states she is at home when asked her location.  Patient denies any history of seizure activity.  Patient does state that for the past several days she has had elevated blood pressure due to a change in diet, states she has been compliant with medications.  Denies any other complaints.        Review of patient's allergies indicates:   Allergen Reactions    Penicillins      Past Medical History:   Diagnosis Date    Anxiety     Atherosclerosis     Depression     GERD (gastroesophageal reflux disease)     Hypertension      Past Surgical History:   Procedure Laterality Date    APPENDECTOMY      CAROTID STENT  2014    FEMORAL ARTERY STENT      ULTRASOUND, UPPER GI TRACT, ENDOSCOPIC N/A 10/16/2018    Performed by Kumar Wang MD at [facility] ([address] [place] [address])     History reviewed. No pertinent family history.  Social History     Tobacco Use    Smoking status: Current Every Day Smoker     Packs/day: 1.00     Years: 23.00     Pack years: 23.00     Types: Cigarettes     Start date: 10/1/1988    Smokeless tobacco: Never Used   Substance Use Topics    Alcohol use: Yes     Comment: occ    Drug use: No     Review of Systems   Neurological: Positive for headaches.   All other systems reviewed and are negative.      Physical Exam     Initial Vitals [08/10/19 1936]   BP Pulse Resp Temp SpO2   (!) 184/89 99 12 97.1 °F (36.2 °C) 99 %      MAP       --         Physical Exam    Nursing note and vitals reviewed.  Constitutional: She appears well-developed and well-nourished. She is not diaphoretic. No distress.   HENT:   Head:  Normocephalic and atraumatic.   Mucous membranes slightly dry.   Eyes: Conjunctivae and EOM are normal. Pupils are equal, round, and reactive to light. No scleral icterus.   Neck: Normal range of motion. Neck supple.   Cardiovascular: Normal rate, regular rhythm, normal heart sounds and intact distal pulses. Exam reveals no gallop and no friction rub.    No murmur heard.  Pulmonary/Chest: Breath sounds normal. No respiratory distress. She has no wheezes. She has no rales.   Abdominal: Soft. Bowel sounds are normal. She exhibits no distension. There is no tenderness. There is no rebound and no guarding.   Musculoskeletal: Normal range of motion. She exhibits no edema.   Neurological: She has normal strength. No cranial nerve deficit.   Pt drowsy but arouses easily to verbal and tactile stimuli. Is oriented to person only upon arrival in ED.    Skin: Skin is warm and dry. Capillary refill takes less than 2 seconds. No rash noted. No erythema.         ED Course   Procedures  Labs Reviewed   CBC W/ AUTO DIFFERENTIAL   COMPREHENSIVE METABOLIC PANEL   URINALYSIS, REFLEX TO URINE CULTURE   MAGNESIUM     EKG Readings: (Independently Interpreted)   Rhythm: Normal Sinus Rhythm. Heart Rate: 89. Ectopy: No Ectopy. Conduction: Normal. ST Segments: Normal ST Segments. T Waves: Normal. Clinical Impression: Normal Sinus Rhythm       Imaging Results    None                 Labs Reviewed   CBC W/ AUTO DIFFERENTIAL - Abnormal; Notable for the following components:       Result Value    RBC 3.60 (*)     Hemoglobin 9.6 (*)     Hematocrit 30.7 (*)     Mean Corpuscular Hemoglobin 26.7 (*)     Mean Corpuscular Hemoglobin Conc 31.3 (*)     RDW 22.3 (*)     Immature Granulocytes 0.7 (*)     Gran # (ANC) 8.7 (*)     Immature Grans (Abs) 0.07 (*)     Gran% 81.6 (*)     Lymph% 10.7 (*)     All other components within normal limits   COMPREHENSIVE METABOLIC PANEL - Abnormal; Notable for the following components:    Sodium 121 (*)     Potassium  3.3 (*)     Chloride 87 (*)     CO2 15 (*)     Glucose 208 (*)     Anion Gap 19 (*)     All other components within normal limits   URINALYSIS, REFLEX TO URINE CULTURE - Abnormal; Notable for the following components:    Protein, UA 1+ (*)     Glucose, UA Trace (*)     Occult Blood UA 2+ (*)     All other components within normal limits    Narrative:     Preferred Collection Type->Urine, Clean Catch   URINALYSIS MICROSCOPIC - Abnormal; Notable for the following components:    RBC, UA 20 (*)     Hyaline Casts, UA 3 (*)     All other components within normal limits    Narrative:     Preferred Collection Type->Urine, Clean Catch   MAGNESIUM   DRUG SCREEN PANEL, URINE EMERGENCY    Narrative:     Preferred Collection Type->Urine, Clean Catch                 ED Course as of Aug 11 0509   Sat Aug 10, 2019   2035 Ct head per Vrad: FINDINGS:  Brain: No acute intracranial hemorrhage. Small areas of low attenuation in periventricular and deep  white matter are non-specific but could represent chronic microvascular ischemic changes. If there is  clinical suspicion for acute ischemia, consider MRI.  Ventricles: No ventriculomegaly.  Bones/joints: Calvarium appears intact.  Sinuses: Mild mucosal thickening of ethmoid air cells neck and right maxillary sinus..  Mastoid air cells: Unremarkable.  Soft tissues: Unremarkable.  IMPRESSION:  1. No acute intracranial hemorrhage.  2. Small areas of low attenuation in periventricular and deep white matter are non-specific but could  represent chronic microvascular ischemic changes.    [MD]   2126 Spoke with Dr. Wilson, will admit for saline and repeat labs/    [MD]   2214 Pt had active seizure activity, witnessed by myself and nursing staff. Tonic clonic seizure activity, resolved after ativan administration. Will admit to ICU for precaution.    [MD]      ED Course User Index  [MD] Yeimi Jaramillo MD     Clinical Impression:       ICD-10-CM ICD-9-CM   1. Hyponatremia E87.1 276.1    2. Confusion R41.0 298.9   3. Chest pain R07.9 786.50                                Yeimi Jaramillo MD  08/10/19 2133       Yeimi Jaramillo MD  08/11/19 0514

## 2019-08-11 NOTE — HOSPITAL COURSE
ICU normal saline monitor for seizure activity repeat electrolytes home meds.  Also hydrochlorothiazide on duloxetine also contribute to hyponatremia have DC both of those.  08/11/2019.  Patient's sodium is improved to 129.  Will discharge the patient home long discussion with the patient and the mother concern infected she did not have a seizure she did have muscle tonic clonic type activity secondary to hyponatremia she could talk during these.  The patient did not bite her tongue did not sore self.  So therefore do not feel that patient has seizure activity.  She will have a long discussion with Dr. Riddle concerning hydrochlorothiazide duloxetine and also the possibility taking potassium tablets as needed if she is going to take a diuretic.  DC home to be followed up with PCP.  Patient was not an inpatient admission did not me to be placed in the ICU.  The patient has been changed observation will be DC home this morning.  Patient is medically ready to be discharged.

## 2019-08-11 NOTE — ASSESSMENT & PLAN NOTE
Will DC hydrochlorothiazide also DC duloxetine both of which can cause hyponatremia.  08/11/2019.  Patient did not have a seizure disorder she had muscle rigidity in movement secondary to hyponatremia discussed with her not using hydrochlorothiazide duloxetine is is are eyes.  Also discussed with her she is going to take a diuretic she is to take potassium.  Also have her electrolytes monitored.  Long discussion with the patient and the mother concerning these facts.  The patient had other reasons for hyponatremia such as eating greasy food and she did drink a 14 balls of water over 2D.  In this seem to be the only fluids he was drinking.  This should not cause hyponatremia so other causes are present.  Hydrochlorothiazide and duloxetine most likely causes.  These will be discussed with her PCP.  Patient mother seem to have a good understanding of the findings the diagnosis and treatment and follow-up care.

## 2019-08-11 NOTE — PLAN OF CARE
Problem: Adult Inpatient Plan of Care  Goal: Plan of Care Review  Transferred to ICU for reported seizure activity.  Bed alarm on and instructed to call for assistance to be OOB.  Verbalized understanding

## 2019-08-12 ENCOUNTER — TELEPHONE (OUTPATIENT)
Dept: FAMILY MEDICINE | Facility: CLINIC | Age: 64
End: 2019-08-12

## 2019-08-12 NOTE — PLAN OF CARE
08/12/19 1402   Final Note   Assessment Type Final Discharge Note   Anticipated Discharge Disposition Home   What phone number can be called within the next 1-3 days to see how you are doing after discharge? [phone]   Hospital Follow Up  Appt(s) scheduled? No   Discharge plans and expectations educations in teach back method with documentation complete? Yes   Called patient to let her know someone will call her with a follow up appointment with Dr Riddle. States she just spoke to their office but they did not give her an appointment yet. States she has also called Dr Moctezuma office too to see if he wants to see her but waiting on call back from them. Denies any other discharge needs at this time.

## 2019-08-12 NOTE — TELEPHONE ENCOUNTER
Spoke with pt and she stated that she had a seizure two days ago and was in ICU overnight. She stated that she was told that she had no electrolytes left in her body. She stated that the attending  told her that she needed to stop the Cymbalta because it is contributing to her sickness. Pt is requesting a new medication be sent to the pharmacy.   Please advise.

## 2019-08-12 NOTE — TELEPHONE ENCOUNTER
----- Message from Allison Clifton sent at 8/12/2019 12:24 PM CDT -----  Contact: LEONIE FLETCHER [[id number]]  Name of Who is Calling: LEONIE FLETCHER [[id number]]      What is the request in detail:   Patient called requesting a call back as this pertains to discontinuing the medication Cymbalta. Please give a call back at your earliest convenience.     THANKS!    Can the clinic reply by [facility]: no     Number to Call Back: LEONIE FLETCHER / # [phone]

## 2019-08-12 NOTE — TELEPHONE ENCOUNTER
Comfort from Case management, stated that this pt needs an appt with you within a day or two. She states that pt was admitted to the hospital with extreme confusion a few days ago and needs a f/u.  Please advise as to whether you want pt added to your schedule.

## 2019-08-12 NOTE — TELEPHONE ENCOUNTER
----- Message from Jackie Montgomery sent at 8/12/2019  3:09 PM CDT -----  Contact: Patient  Type:  Patient Returning Call    Who Called:  Patient  Who Left Message for Patient:  Debbie  Does the patient know what this is regarding?:  scheduling  Best Call Back Number:  [phone] (home)    Additional Information:  Dr. Tran or Marilou Flowers are unavailable until 9/12

## 2019-08-13 ENCOUNTER — OFFICE VISIT (OUTPATIENT)
Dept: FAMILY MEDICINE | Facility: CLINIC | Age: 64
End: 2019-08-13
Payer: MEDICARE

## 2019-08-13 ENCOUNTER — LAB VISIT (OUTPATIENT)
Dept: LAB | Facility: HOSPITAL | Age: 64
End: 2019-08-13
Attending: FAMILY MEDICINE
Payer: MEDICARE

## 2019-08-13 VITALS
RESPIRATION RATE: 18 BRPM | BODY MASS INDEX: 18.94 KG/M2 | HEIGHT: 68 IN | DIASTOLIC BLOOD PRESSURE: 83 MMHG | HEART RATE: 77 BPM | WEIGHT: 125 LBS | OXYGEN SATURATION: 95 % | SYSTOLIC BLOOD PRESSURE: 169 MMHG

## 2019-08-13 DIAGNOSIS — D64.9 ANEMIA, UNSPECIFIED TYPE: ICD-10-CM

## 2019-08-13 DIAGNOSIS — I10 BENIGN ESSENTIAL HYPERTENSION: ICD-10-CM

## 2019-08-13 DIAGNOSIS — R73.9 HYPERGLYCEMIA: ICD-10-CM

## 2019-08-13 DIAGNOSIS — Z79.899 ENCOUNTER FOR LONG-TERM (CURRENT) USE OF MEDICATIONS: ICD-10-CM

## 2019-08-13 DIAGNOSIS — E87.1 HYPONATREMIA: Primary | ICD-10-CM

## 2019-08-13 DIAGNOSIS — Z11.59 NEED FOR HEPATITIS C SCREENING TEST: ICD-10-CM

## 2019-08-13 LAB
ALBUMIN SERPL BCP-MCNC: 4.6 G/DL (ref 3.5–5.2)
ALP SERPL-CCNC: 74 U/L (ref 55–135)
ALT SERPL W/O P-5'-P-CCNC: 20 U/L (ref 10–44)
ANION GAP SERPL CALC-SCNC: 13 MMOL/L (ref 8–16)
AST SERPL-CCNC: 28 U/L (ref 10–40)
BASOPHILS # BLD AUTO: 0.08 K/UL (ref 0–0.2)
BASOPHILS NFR BLD: 0.7 % (ref 0–1.9)
BILIRUB SERPL-MCNC: 0.4 MG/DL (ref 0.1–1)
BUN SERPL-MCNC: 8 MG/DL (ref 8–23)
CALCIUM SERPL-MCNC: 9.6 MG/DL (ref 8.7–10.5)
CHLORIDE SERPL-SCNC: 90 MMOL/L (ref 95–110)
CO2 SERPL-SCNC: 24 MMOL/L (ref 23–29)
CREAT SERPL-MCNC: 0.6 MG/DL (ref 0.5–1.4)
DIFFERENTIAL METHOD: ABNORMAL
EOSINOPHIL # BLD AUTO: 0.2 K/UL (ref 0–0.5)
EOSINOPHIL NFR BLD: 1.4 % (ref 0–8)
ERYTHROCYTE [DISTWIDTH] IN BLOOD BY AUTOMATED COUNT: 22.7 % (ref 11.5–14.5)
EST. GFR  (AFRICAN AMERICAN): >60 ML/MIN/1.73 M^2
EST. GFR  (NON AFRICAN AMERICAN): >60 ML/MIN/1.73 M^2
ESTIMATED AVG GLUCOSE: 111 MG/DL (ref 68–131)
FOLATE SERPL-MCNC: 16 NG/ML (ref 4–24)
GLUCOSE SERPL-MCNC: 111 MG/DL (ref 70–110)
HBA1C MFR BLD HPLC: 5.5 % (ref 4.5–6.2)
HCT VFR BLD AUTO: 31.1 % (ref 37–48.5)
HGB BLD-MCNC: 9.7 G/DL (ref 12–16)
IMM GRANULOCYTES # BLD AUTO: 0.04 K/UL (ref 0–0.04)
IMM GRANULOCYTES NFR BLD AUTO: 0.4 % (ref 0–0.5)
IRON SERPL-MCNC: 37 UG/DL (ref 30–160)
LYMPHOCYTES # BLD AUTO: 1.2 K/UL (ref 1–4.8)
LYMPHOCYTES NFR BLD: 11.3 % (ref 18–48)
MCH RBC QN AUTO: 26.6 PG (ref 27–31)
MCHC RBC AUTO-ENTMCNC: 31.2 G/DL (ref 32–36)
MCV RBC AUTO: 85 FL (ref 82–98)
MONOCYTES # BLD AUTO: 0.9 K/UL (ref 0.3–1)
MONOCYTES NFR BLD: 8.5 % (ref 4–15)
NEUTROPHILS # BLD AUTO: 8.5 K/UL (ref 1.8–7.7)
NEUTROPHILS NFR BLD: 77.7 % (ref 38–73)
NRBC BLD-RTO: 0 /100 WBC
PLATELET # BLD AUTO: 342 K/UL (ref 150–350)
PMV BLD AUTO: 9.7 FL (ref 9.2–12.9)
POTASSIUM SERPL-SCNC: 3.5 MMOL/L (ref 3.5–5.1)
PROT SERPL-MCNC: 7.8 G/DL (ref 6–8.4)
RBC # BLD AUTO: 3.64 M/UL (ref 4–5.4)
SATURATED IRON: 7 % (ref 20–50)
SODIUM SERPL-SCNC: 127 MMOL/L (ref 136–145)
TOTAL IRON BINDING CAPACITY: 537 UG/DL (ref 250–450)
TRANSFERRIN SERPL-MCNC: 363 MG/DL (ref 200–375)
TSH SERPL DL<=0.005 MIU/L-ACNC: 2.13 UIU/ML (ref 0.34–5.6)
VIT B12 SERPL-MCNC: 1922 PG/ML (ref 210–950)
WBC # BLD AUTO: 10.87 K/UL (ref 3.9–12.7)

## 2019-08-13 PROCEDURE — 82746 ASSAY OF FOLIC ACID SERUM: CPT

## 2019-08-13 PROCEDURE — 83036 HEMOGLOBIN GLYCOSYLATED A1C: CPT

## 2019-08-13 PROCEDURE — 84443 ASSAY THYROID STIM HORMONE: CPT

## 2019-08-13 PROCEDURE — 3008F PR BODY MASS INDEX (BMI) DOCUMENTED: ICD-10-PCS | Mod: CPTII,S$GLB,, | Performed by: FAMILY MEDICINE

## 2019-08-13 PROCEDURE — 3077F PR MOST RECENT SYSTOLIC BLOOD PRESSURE >= 140 MM HG: ICD-10-PCS | Mod: CPTII,S$GLB,, | Performed by: FAMILY MEDICINE

## 2019-08-13 PROCEDURE — 80053 COMPREHEN METABOLIC PANEL: CPT

## 2019-08-13 PROCEDURE — 86803 HEPATITIS C AB TEST: CPT

## 2019-08-13 PROCEDURE — 99214 OFFICE O/P EST MOD 30 MIN: CPT | Mod: S$GLB,,, | Performed by: FAMILY MEDICINE

## 2019-08-13 PROCEDURE — 3079F DIAST BP 80-89 MM HG: CPT | Mod: CPTII,S$GLB,, | Performed by: FAMILY MEDICINE

## 2019-08-13 PROCEDURE — 99999 PR PBB SHADOW E&M-EST. PATIENT-LVL III: ICD-10-PCS | Mod: PBBFAC,,, | Performed by: FAMILY MEDICINE

## 2019-08-13 PROCEDURE — 3077F SYST BP >= 140 MM HG: CPT | Mod: CPTII,S$GLB,, | Performed by: FAMILY MEDICINE

## 2019-08-13 PROCEDURE — 99214 PR OFFICE/OUTPT VISIT, EST, LEVL IV, 30-39 MIN: ICD-10-PCS | Mod: S$GLB,,, | Performed by: FAMILY MEDICINE

## 2019-08-13 PROCEDURE — 83540 ASSAY OF IRON: CPT

## 2019-08-13 PROCEDURE — 3079F PR MOST RECENT DIASTOLIC BLOOD PRESSURE 80-89 MM HG: ICD-10-PCS | Mod: CPTII,S$GLB,, | Performed by: FAMILY MEDICINE

## 2019-08-13 PROCEDURE — 3008F BODY MASS INDEX DOCD: CPT | Mod: CPTII,S$GLB,, | Performed by: FAMILY MEDICINE

## 2019-08-13 PROCEDURE — 82607 VITAMIN B-12: CPT

## 2019-08-13 PROCEDURE — 85025 COMPLETE CBC W/AUTO DIFF WBC: CPT

## 2019-08-13 PROCEDURE — 99999 PR PBB SHADOW E&M-EST. PATIENT-LVL III: CPT | Mod: PBBFAC,,, | Performed by: FAMILY MEDICINE

## 2019-08-13 PROCEDURE — 36415 COLL VENOUS BLD VENIPUNCTURE: CPT

## 2019-08-13 RX ORDER — LISINOPRIL 20 MG/1
20 TABLET ORAL DAILY
Qty: 90 TABLET | Refills: 1 | Status: SHIPPED | OUTPATIENT
Start: 2019-08-13 | End: 2019-09-11 | Stop reason: SDUPTHER

## 2019-08-13 NOTE — PROGRESS NOTES
Subjective:       Patient ID: Comfort Cantu is a 63 y.o. female.    Chief Complaint: Hospital Follow Up (would like to discuss medications, taken off of cymbalta & hctz in hospital)    HPI   Ms. Cantu presents for hospital follow-up. Was admitted to the ICU 3 days ago and discharged the following day. Diagnoses include hyponatremia and tardive dyskinesia. HCTZ and cymbalta were discontinued. Patient admits she has been extremely thirsty, drinking ~10 bottles of water, for the past few months. Also admits to consuming 6-7 bottles of beer 3-4 days a week. Hospital labs also showed elevated glucose as well as anemia. She reports she is taking iron and that she had a colonoscopy at [facility] with Dr. Rehman 2-3 years ago (cannot remember the results).    Would like to get back on depression medication. Can't remember all the meds she has tried but remembers trintellix did not work well.    Review of Systems   Constitutional: Positive for fatigue. Negative for chills, fever and unexpected weight change.   Respiratory: Positive for shortness of breath. Negative for cough, chest tightness and wheezing.    Cardiovascular: Negative for chest pain, palpitations and leg swelling.   Endocrine: Positive for cold intolerance, polydipsia and polyuria. Negative for heat intolerance.   Neurological: Positive for weakness. Negative for dizziness, tremors, seizures and headaches.   Psychiatric/Behavioral: Positive for dysphoric mood. Negative for decreased concentration, hallucinations and suicidal ideas.       Past Medical History:   Diagnosis Date    Anxiety     Atherosclerosis     Depression     GERD (gastroesophageal reflux disease)     Hypertension      Past Surgical History:   Procedure Laterality Date    APPENDECTOMY      CAROTID STENT  2014    FEMORAL ARTERY STENT      ULTRASOUND, UPPER GI TRACT, ENDOSCOPIC N/A 10/16/2018    Performed by Kumar Wang MD at [facility] END[facility] (2ND FLR)     Social History  "    Socioeconomic History    Marital status:      Spouse name: Not on file    Number of children: Not on file    Years of education: Not on file    Highest education level: Not on file   Occupational History    Not on file   Social Needs    Financial resource strain: Not on file    Food insecurity:     Worry: Not on file     Inability: Not on file    Transportation needs:     Medical: Not on file     Non-medical: Not on file   Tobacco Use    Smoking status: Current Every Day Smoker     Packs/day: 1.00     Years: 23.00     Pack years: 23.00     Types: Cigarettes     Start date: 10/1/1988    Smokeless tobacco: Never Used   Substance and Sexual Activity    Alcohol use: Yes     Comment: occ    Drug use: No    Sexual activity: Not Currently   Lifestyle    Physical activity:     Days per week: Not on file     Minutes per session: Not on file    Stress: Not on file   Relationships    Social connections:     Talks on phone: Not on file     Gets together: Not on file     Attends [employer] service: Not on file     Active member of club or organization: Not on file     Attends meetings of clubs or organizations: Not on file     Relationship status: Not on file   Other Topics Concern    Not on file   Social History Narrative    Not on file     History reviewed. No pertinent family history.    Objective:      BP (!) 169/83 (BP Location: Left arm, Patient Position: Sitting, BP Method: Small (Automatic))   Pulse 77   Resp 18   Ht 5' 8" (1.727 m)   Wt 56.7 kg (125 lb)   SpO2 95%   BMI 19.01 kg/m²   Physical Exam   Constitutional: She is oriented to person, place, and time. She appears well-developed and well-nourished. No distress.   Cardiovascular: Normal rate, regular rhythm and normal heart sounds.   No murmur heard.  Pulmonary/Chest: Effort normal and breath sounds normal. No stridor. No respiratory distress.   Neurological: She is alert and oriented to person, place, and time. No cranial nerve " deficit.   Skin: She is not diaphoretic.   Psychiatric: She has a normal mood and affect. Her behavior is normal. Judgment and thought content normal.   Vitals reviewed.      Assessment:       1. Hyponatremia    2. Benign essential hypertension    3. Anemia, unspecified type    4. Hyperglycemia    5. Encounter for long-term (current) use of medications        Plan:       Hyponatremia  - repeat cmp today  - consider starting low dose ssri   - patient encouraged to reduce alcohol consumption    Benign essential hypertension  -     Elevated since hctz d/c'd; increase lisinopril from 5 mg to 20 mg daily  -     TSH; Future; Expected date: 08/13/2019    Anemia, unspecified type  -     Repeat labs; refer back to Dr. Rehman, as patient known to him/his clinic  -     CBC auto differential; Future; Expected date: 08/13/2019  -     Iron and TIBC; Future; Expected date: 08/13/2019  -     Ambulatory referral to General Surgery    Hyperglycemia  -     Comprehensive metabolic panel; Future; Expected date: 08/13/2019  -     Hemoglobin A1c; Future; Expected date: 08/13/2019    Encounter for long-term (current) use of medications  -     Vitamin B12; Future; Expected date: 08/13/2019  -     Folate; Future; Expected date: 08/13/2019            Risks, benefits, and side effects were discussed with the patient. All questions were answered to the fullest satisfaction of the patient, and pt verbalized understanding and agreement to treatment plan. Pt was to call with any new or worsening symptoms, or present to the ER.

## 2019-08-13 NOTE — PATIENT INSTRUCTIONS
We are checking your sodium level, your anemia, your b12, iron, and a diabetes test. Please follow-up as scheduled to discuss these results. I am increasing your lisinopril for your blood pressure. Please cut back on any intake of alcohol, as this can lower your sodium as wel.

## 2019-08-14 LAB — HCV AB SERPL QL IA: NEGATIVE

## 2019-08-15 ENCOUNTER — PATIENT OUTREACH (OUTPATIENT)
Dept: ADMINISTRATIVE | Facility: HOSPITAL | Age: 64
End: 2019-08-15

## 2019-08-15 NOTE — LETTER
August 15, 2019    Comfort Cantu  [address] [place] [address]             [facility]  [address] [address]  Phone: [phone] Dear Comfort    [facility] is committed to your overall health and would like to ensure that you are up to date on your recommended test and/or procedures.   Darinel Riddle MD  has found that your chart shows you may be due for the following:    ANNUAL LABS  PAP SMEAR  MAMMOGRAM  BONE MINERAL DENSITY SCAN  COLORECTAL CANCER SCREENING    If you have had any of the above done at another facility, please let us know so that we may obtain copies from that facility.  If you have a copy of these records, please provide a copy for us to scan into your chart.  You are welcome to request that the report be faxed to us at  ([phone]).     Otherwise, please schedule these appointments at your earliest convenience by calling [phone] or going to [email or web address].    If you have an upcoming scheduled appointment for the item above, please disregard this letter.    Sincerely,  Your [facility]  MD Daysi Cardoza L.P.N. Clinical Care Coordinator  [address] [place] [address], [place] [address] [phone]

## 2019-08-17 ENCOUNTER — HOSPITAL ENCOUNTER (EMERGENCY)
Facility: HOSPITAL | Age: 64
Discharge: HOME | End: 2019-08-17
Attending: FAMILY MEDICINE
Payer: MEDICARE

## 2019-08-17 VITALS
HEIGHT: 68 IN | OXYGEN SATURATION: 96 % | TEMPERATURE: 99 F | HEART RATE: 78 BPM | SYSTOLIC BLOOD PRESSURE: 135 MMHG | WEIGHT: 124 LBS | RESPIRATION RATE: 19 BRPM | BODY MASS INDEX: 18.79 KG/M2 | DIASTOLIC BLOOD PRESSURE: 97 MMHG

## 2019-08-17 DIAGNOSIS — N30.00 ACUTE CYSTITIS WITHOUT HEMATURIA: Primary | ICD-10-CM

## 2019-08-17 DIAGNOSIS — I10 HYPERTENSION: ICD-10-CM

## 2019-08-17 LAB
ALBUMIN SERPL BCP-MCNC: 4.4 G/DL (ref 3.5–5.2)
ALP SERPL-CCNC: 79 U/L (ref 55–135)
ALT SERPL W/O P-5'-P-CCNC: 17 U/L (ref 10–44)
ANION GAP SERPL CALC-SCNC: 8 MMOL/L (ref 8–16)
AST SERPL-CCNC: 21 U/L (ref 10–40)
BACTERIA #/AREA URNS HPF: ABNORMAL /HPF
BASOPHILS # BLD AUTO: 0.05 K/UL (ref 0–0.2)
BASOPHILS NFR BLD: 0.8 % (ref 0–1.9)
BILIRUB SERPL-MCNC: 0.4 MG/DL (ref 0.1–1)
BILIRUB UR QL STRIP: NEGATIVE
BUN SERPL-MCNC: 5 MG/DL (ref 8–23)
CALCIUM SERPL-MCNC: 9.2 MG/DL (ref 8.7–10.5)
CHLORIDE SERPL-SCNC: 99 MMOL/L (ref 95–110)
CLARITY UR: ABNORMAL
CO2 SERPL-SCNC: 24 MMOL/L (ref 23–29)
COLOR UR: YELLOW
CREAT SERPL-MCNC: 0.5 MG/DL (ref 0.5–1.4)
DIFFERENTIAL METHOD: ABNORMAL
EOSINOPHIL # BLD AUTO: 0.1 K/UL (ref 0–0.5)
EOSINOPHIL NFR BLD: 2.1 % (ref 0–8)
ERYTHROCYTE [DISTWIDTH] IN BLOOD BY AUTOMATED COUNT: 22.3 % (ref 11.5–14.5)
EST. GFR  (AFRICAN AMERICAN): >60 ML/MIN/1.73 M^2
EST. GFR  (NON AFRICAN AMERICAN): >60 ML/MIN/1.73 M^2
GLUCOSE SERPL-MCNC: 129 MG/DL (ref 70–110)
GLUCOSE UR QL STRIP: NEGATIVE
HCT VFR BLD AUTO: 28.4 % (ref 37–48.5)
HGB BLD-MCNC: 8.9 G/DL (ref 12–16)
HGB UR QL STRIP: ABNORMAL
IMM GRANULOCYTES # BLD AUTO: 0.01 K/UL (ref 0–0.04)
IMM GRANULOCYTES NFR BLD AUTO: 0.2 % (ref 0–0.5)
KETONES UR QL STRIP: NEGATIVE
LEUKOCYTE ESTERASE UR QL STRIP: ABNORMAL
LYMPHOCYTES # BLD AUTO: 1.1 K/UL (ref 1–4.8)
LYMPHOCYTES NFR BLD: 16.4 % (ref 18–48)
MCH RBC QN AUTO: 27.1 PG (ref 27–31)
MCHC RBC AUTO-ENTMCNC: 31.3 G/DL (ref 32–36)
MCV RBC AUTO: 86 FL (ref 82–98)
MICROSCOPIC COMMENT: ABNORMAL
MONOCYTES # BLD AUTO: 0.7 K/UL (ref 0.3–1)
MONOCYTES NFR BLD: 10.4 % (ref 4–15)
NEUTROPHILS # BLD AUTO: 4.7 K/UL (ref 1.8–7.7)
NEUTROPHILS NFR BLD: 70.1 % (ref 38–73)
NITRITE UR QL STRIP: NEGATIVE
NRBC BLD-RTO: 0 /100 WBC
PH UR STRIP: 7 [PH] (ref 5–8)
PLATELET # BLD AUTO: 331 K/UL (ref 150–350)
PMV BLD AUTO: 9.2 FL (ref 9.2–12.9)
POTASSIUM SERPL-SCNC: 3.5 MMOL/L (ref 3.5–5.1)
PROT SERPL-MCNC: 7.3 G/DL (ref 6–8.4)
PROT UR QL STRIP: NEGATIVE
RBC # BLD AUTO: 3.29 M/UL (ref 4–5.4)
RBC #/AREA URNS HPF: 4 /HPF (ref 0–4)
SODIUM SERPL-SCNC: 131 MMOL/L (ref 136–145)
SP GR UR STRIP: 1.01 (ref 1–1.03)
URN SPEC COLLECT METH UR: ABNORMAL
UROBILINOGEN UR STRIP-ACNC: NEGATIVE EU/DL
WBC # BLD AUTO: 6.64 K/UL (ref 3.9–12.7)
WBC #/AREA URNS HPF: 10 /HPF (ref 0–5)

## 2019-08-17 PROCEDURE — 70450 CT HEAD/BRAIN W/O DYE: CPT | Mod: 26,,, | Performed by: RADIOLOGY

## 2019-08-17 PROCEDURE — 96372 THER/PROPH/DIAG INJ SC/IM: CPT | Mod: 59

## 2019-08-17 PROCEDURE — 93005 ELECTROCARDIOGRAM TRACING: CPT

## 2019-08-17 PROCEDURE — 81000 URINALYSIS NONAUTO W/SCOPE: CPT

## 2019-08-17 PROCEDURE — 70450 CT HEAD WITHOUT CONTRAST: ICD-10-PCS | Mod: 26,,, | Performed by: RADIOLOGY

## 2019-08-17 PROCEDURE — 96374 THER/PROPH/DIAG INJ IV PUSH: CPT

## 2019-08-17 PROCEDURE — 63600175 PHARM REV CODE 636 W HCPCS: Performed by: INTERNAL MEDICINE

## 2019-08-17 PROCEDURE — 80053 COMPREHEN METABOLIC PANEL: CPT

## 2019-08-17 PROCEDURE — 70450 CT HEAD/BRAIN W/O DYE: CPT | Mod: TC

## 2019-08-17 PROCEDURE — 99285 EMERGENCY DEPT VISIT HI MDM: CPT | Mod: 25

## 2019-08-17 PROCEDURE — 96375 TX/PRO/DX INJ NEW DRUG ADDON: CPT

## 2019-08-17 PROCEDURE — 85025 COMPLETE CBC W/AUTO DIFF WBC: CPT

## 2019-08-17 RX ORDER — DEXAMETHASONE SODIUM PHOSPHATE 100 MG/10ML
4 INJECTION INTRAMUSCULAR; INTRAVENOUS
Status: COMPLETED | OUTPATIENT
Start: 2019-08-17 | End: 2019-08-17

## 2019-08-17 RX ORDER — KETOROLAC TROMETHAMINE 30 MG/ML
15 INJECTION, SOLUTION INTRAMUSCULAR; INTRAVENOUS
Status: COMPLETED | OUTPATIENT
Start: 2019-08-17 | End: 2019-08-17

## 2019-08-17 RX ORDER — ONDANSETRON 2 MG/ML
4 INJECTION INTRAMUSCULAR; INTRAVENOUS
Status: COMPLETED | OUTPATIENT
Start: 2019-08-17 | End: 2019-08-17

## 2019-08-17 RX ORDER — SULFAMETHOXAZOLE AND TRIMETHOPRIM 800; 160 MG/1; MG/1
1 TABLET ORAL 2 TIMES DAILY
Qty: 14 TABLET | Refills: 0 | Status: SHIPPED | OUTPATIENT
Start: 2019-08-17 | End: 2019-08-24

## 2019-08-17 RX ORDER — HYDRALAZINE HYDROCHLORIDE 20 MG/ML
10 INJECTION INTRAMUSCULAR; INTRAVENOUS
Status: COMPLETED | OUTPATIENT
Start: 2019-08-17 | End: 2019-08-17

## 2019-08-17 RX ORDER — CLONIDINE HYDROCHLORIDE 0.1 MG/1
0.1 TABLET ORAL 2 TIMES DAILY
Qty: 60 TABLET | Refills: 11 | Status: SHIPPED | OUTPATIENT
Start: 2019-08-17 | End: 2020-03-24 | Stop reason: SDUPTHER

## 2019-08-17 RX ADMIN — HYDRALAZINE HYDROCHLORIDE 10 MG: 20 INJECTION INTRAMUSCULAR; INTRAVENOUS at 07:08

## 2019-08-17 RX ADMIN — KETOROLAC TROMETHAMINE 15 MG: 30 INJECTION, SOLUTION INTRAMUSCULAR at 06:08

## 2019-08-17 RX ADMIN — DEXAMETHASONE SODIUM PHOSPHATE 4 MG: 10 INJECTION INTRAMUSCULAR; INTRAVENOUS at 06:08

## 2019-08-17 RX ADMIN — ONDANSETRON 4 MG: 2 INJECTION INTRAMUSCULAR; INTRAVENOUS at 06:08

## 2019-08-17 NOTE — ED PROVIDER NOTES
Encounter Date: 8/17/2019       History     Chief Complaint   Patient presents with    Hypertension    Headache     Patient is a 63-year-old female that presented to the emergency department complaining of increased blood pressure today.  Patient was recently admitted to the hospital for similar problem.  Blood pressure today she states was over 200 over 100 and presented due to headache.  Patient was eating lunch and got a headache and caused her to present to the emergency department for treatment.  Patient denies any nausea vomiting fever or chills and has had no other intercurrent illnesses.  Patient denies chest pain palpitations or diaphoresis patient patient is a past medical history of anxiety, arterial sclerosis, depression, gastroesophageal reflux disease and hypertension        Review of patient's allergies indicates:   Allergen Reactions    Penicillins      Past Medical History:   Diagnosis Date    Anxiety     Atherosclerosis     Depression     GERD (gastroesophageal reflux disease)     Hypertension      Past Surgical History:   Procedure Laterality Date    APPENDECTOMY      CAROTID STENT  2014    FEMORAL ARTERY STENT      ULTRASOUND, UPPER GI TRACT, ENDOSCOPIC N/A 10/16/2018    Performed by Kumar Wang MD at [facility] ([address] [place] [address])     History reviewed. No pertinent family history.  Social History     Tobacco Use    Smoking status: Current Every Day Smoker     Packs/day: 1.00     Years: 23.00     Pack years: 23.00     Types: Cigarettes     Start date: 10/1/1988    Smokeless tobacco: Never Used   Substance Use Topics    Alcohol use: Yes     Comment: occ    Drug use: No     Review of Systems   Constitutional: Negative for activity change, appetite change, fatigue and fever.   HENT: Negative for congestion, ear discharge, mouth sores, nosebleeds, rhinorrhea, sinus pressure, sinus pain and tinnitus.    Eyes: Negative.  Negative for pain, redness and itching.   Respiratory: Negative for  apnea, cough, choking, chest tightness, shortness of breath, wheezing and stridor.    Cardiovascular: Negative for chest pain, palpitations and leg swelling.   Gastrointestinal: Negative for abdominal distention, abdominal pain, anal bleeding, blood in stool, constipation and diarrhea.   Endocrine: Negative.    Genitourinary: Negative for difficulty urinating, flank pain, frequency and urgency.   Musculoskeletal: Negative for arthralgias, back pain, gait problem and myalgias.   Skin: Negative for color change and pallor.   Allergic/Immunologic: Negative.    Neurological: Positive for dizziness, weakness, light-headedness and headaches. Negative for facial asymmetry.   Hematological: Negative for adenopathy. Does not bruise/bleed easily.   Psychiatric/Behavioral: The patient is nervous/anxious.        Physical Exam     Initial Vitals [08/17/19 1729]   BP Pulse Resp Temp SpO2   (!) 203/103 88 20 98.7 °F (37.1 °C) 100 %      MAP       --         Physical Exam    Nursing note and vitals reviewed.  Constitutional: She appears well-developed and well-nourished.   HENT:   Head: Normocephalic and atraumatic.   Eyes: EOM are normal. Pupils are equal, round, and reactive to light.   Neck: Normal range of motion. Neck supple.   Cardiovascular: Normal rate.   Pulmonary/Chest: Breath sounds normal.   Abdominal: Soft. Bowel sounds are normal.   Musculoskeletal: Normal range of motion.   Neurological: She is alert and oriented to person, place, and time. She has normal strength and normal reflexes. GCS score is 15. GCS eye subscore is 4. GCS verbal subscore is 5. GCS motor subscore is 6.   Skin: Skin is warm and dry. Capillary refill takes less than 2 seconds.   Psychiatric: She has a normal mood and affect. Her behavior is normal. Thought content normal.         ED Course   Procedures  Labs Reviewed - No data to display       Imaging Results    None                            ED Course as of Aug 18 0642   Sat Aug 17, 2019   1947  LabsReviewed with patient and shows hemoglobin 8.9 hematocrit 28.7 urine shows white blood cell count greater than 10 leukocytes 2+ and chemistry revealed no significant abnormalities.    [JK]   1947 Hemoglobin(!): 8.9 [JK]   1948 Hematocrit(!): 28.4 [JK]   1948 Occult Blood UA(!): 3+ [JK]   1948 Leukocytes, UA(!): 2+ [JK]      ED Course User Index  [JK] Сергей Galvan MD     Clinical Impression:       ICD-10-CM ICD-9-CM   1. Acute cystitis without hematuria N30.00 595.0   2. Hypertension I10 401.9                                Lino Torres MD  08/18/19 0646

## 2019-08-18 NOTE — ED NOTES
Discharge instructions given to patient. Educated patient to take medications as prescribed, be sure to complete antibiotic regimen, and follow up with PCP. Encouraged patient to return to ER as needed for new or worsening symptoms. Verbalized understanding. No questions or concerns at this time. Patient ambulatory to registration to complete discharge.

## 2019-08-18 NOTE — DISCHARGE INSTRUCTIONS
Medications as prescribed  Follow up with primary care physician if no improvement in 1-3 days  Finished all antibiotics.

## 2019-08-30 ENCOUNTER — OFFICE VISIT (OUTPATIENT)
Dept: FAMILY MEDICINE | Facility: CLINIC | Age: 64
End: 2019-08-30
Payer: MEDICARE

## 2019-08-30 VITALS
BODY MASS INDEX: 18.46 KG/M2 | HEART RATE: 78 BPM | SYSTOLIC BLOOD PRESSURE: 129 MMHG | WEIGHT: 121.81 LBS | RESPIRATION RATE: 20 BRPM | HEIGHT: 68 IN | DIASTOLIC BLOOD PRESSURE: 65 MMHG

## 2019-08-30 DIAGNOSIS — R27.0 ATAXIA: ICD-10-CM

## 2019-08-30 DIAGNOSIS — E55.9 VITAMIN D DEFICIENCY: ICD-10-CM

## 2019-08-30 DIAGNOSIS — E78.5 HYPERLIPIDEMIA, UNSPECIFIED HYPERLIPIDEMIA TYPE: Primary | ICD-10-CM

## 2019-08-30 DIAGNOSIS — D50.8 OTHER IRON DEFICIENCY ANEMIA: ICD-10-CM

## 2019-08-30 PROCEDURE — 3008F PR BODY MASS INDEX (BMI) DOCUMENTED: ICD-10-PCS | Mod: CPTII,S$GLB,, | Performed by: FAMILY MEDICINE

## 2019-08-30 PROCEDURE — 3008F BODY MASS INDEX DOCD: CPT | Mod: CPTII,S$GLB,, | Performed by: FAMILY MEDICINE

## 2019-08-30 PROCEDURE — 3074F SYST BP LT 130 MM HG: CPT | Mod: CPTII,S$GLB,, | Performed by: FAMILY MEDICINE

## 2019-08-30 PROCEDURE — 3078F DIAST BP <80 MM HG: CPT | Mod: CPTII,S$GLB,, | Performed by: FAMILY MEDICINE

## 2019-08-30 PROCEDURE — 99999 PR PBB SHADOW E&M-EST. PATIENT-LVL IV: CPT | Mod: PBBFAC,,, | Performed by: FAMILY MEDICINE

## 2019-08-30 PROCEDURE — 99214 PR OFFICE/OUTPT VISIT, EST, LEVL IV, 30-39 MIN: ICD-10-PCS | Mod: S$GLB,,, | Performed by: FAMILY MEDICINE

## 2019-08-30 PROCEDURE — 99214 OFFICE O/P EST MOD 30 MIN: CPT | Mod: S$GLB,,, | Performed by: FAMILY MEDICINE

## 2019-08-30 PROCEDURE — 3078F PR MOST RECENT DIASTOLIC BLOOD PRESSURE < 80 MM HG: ICD-10-PCS | Mod: CPTII,S$GLB,, | Performed by: FAMILY MEDICINE

## 2019-08-30 PROCEDURE — 99999 PR PBB SHADOW E&M-EST. PATIENT-LVL IV: ICD-10-PCS | Mod: PBBFAC,,, | Performed by: FAMILY MEDICINE

## 2019-08-30 PROCEDURE — 3074F PR MOST RECENT SYSTOLIC BLOOD PRESSURE < 130 MM HG: ICD-10-PCS | Mod: CPTII,S$GLB,, | Performed by: FAMILY MEDICINE

## 2019-08-30 RX ORDER — HYDROCHLOROTHIAZIDE 25 MG/1
TABLET ORAL
COMMUNITY
Start: 2019-08-28 | End: 2019-10-04

## 2019-08-30 NOTE — PROGRESS NOTES
"Subjective:       Patient ID: Comfort Cantu is a 63 y.o. female.    Chief Complaint: Follow-up    Follow up    Still with fatigue  Ataxia  Taking meds as prescribed  Currently taking 10mg of lisinopril    Review of Systems   Constitutional: Positive for fatigue. Negative for chills, fever and unexpected weight change.   Respiratory: Positive for shortness of breath. Negative for cough, chest tightness and wheezing.    Cardiovascular: Negative for chest pain, palpitations and leg swelling.   Endocrine: Positive for cold intolerance, polydipsia and polyuria. Negative for heat intolerance.   Neurological: Positive for weakness. Negative for dizziness, tremors, seizures and headaches.   Psychiatric/Behavioral: Positive for dysphoric mood. Negative for decreased concentration, hallucinations and suicidal ideas.         Reviewed family, medical, surgical, and social history.    Objective:      /65 Comment: home reading  Pulse 78   Resp 20   Ht 5' 8" (1.727 m)   Wt 55.2 kg (121 lb 12.8 oz)   BMI 18.52 kg/m²   Physical Exam   Constitutional: She is oriented to person, place, and time. She appears well-developed and well-nourished. No distress.   Cardiovascular: Normal rate, regular rhythm and normal heart sounds.   No murmur heard.  Pulmonary/Chest: Effort normal and breath sounds normal. No stridor. No respiratory distress.   Neurological: She is alert and oriented to person, place, and time. No cranial nerve deficit.   Skin: She is not diaphoretic.   Psychiatric: She has a normal mood and affect. Her behavior is normal. Judgment and thought content normal.   Vitals reviewed.      Assessment:       1. Hyperlipidemia, unspecified hyperlipidemia type    2. Vitamin D deficiency    3. Other iron deficiency anemia    4. Ataxia        Plan:       Hyperlipidemia, unspecified hyperlipidemia type  -     Vitamin D; Future; Expected date: 08/30/2019  -     CBC auto differential; Future; Expected date: 08/30/2019  -    "  Comprehensive metabolic panel; Future; Expected date: 08/30/2019  -     Iron and TIBC; Future; Expected date: 08/30/2019    Vitamin D deficiency  -     Vitamin D; Future; Expected date: 08/30/2019  -     CBC auto differential; Future; Expected date: 08/30/2019  -     Comprehensive metabolic panel; Future; Expected date: 08/30/2019  -     Iron and TIBC; Future; Expected date: 08/30/2019    Other iron deficiency anemia  -     Vitamin D; Future; Expected date: 08/30/2019  -     CBC auto differential; Future; Expected date: 08/30/2019  -     Comprehensive metabolic panel; Future; Expected date: 08/30/2019  -     Iron and TIBC; Future; Expected date: 08/30/2019    Ataxia  -     MRI Brain Without Contrast; Future; Expected date: 08/30/2019    Concern for cpm with continued ataxia, will obtain mri        Risks, benefits, and side effects were discussed with the patient. All questions were answered to the fullest satisfaction of the patient, and pt verbalized understanding and agreement to treatment plan. Pt was to call with any new or worsening symptoms, or present to the ER.

## 2019-09-03 ENCOUNTER — HOSPITAL ENCOUNTER (OUTPATIENT)
Dept: RADIOLOGY | Facility: HOSPITAL | Age: 64
Discharge: HOME | End: 2019-09-03
Attending: FAMILY MEDICINE
Payer: MEDICARE

## 2019-09-03 VITALS — HEIGHT: 68 IN | BODY MASS INDEX: 18.34 KG/M2 | WEIGHT: 121 LBS

## 2019-09-03 DIAGNOSIS — Z12.39 BREAST CANCER SCREENING: ICD-10-CM

## 2019-09-03 PROCEDURE — 77067 SCR MAMMO BI INCL CAD: CPT | Mod: TC

## 2019-09-03 PROCEDURE — 77067 SCR MAMMO BI INCL CAD: CPT | Mod: 26,,, | Performed by: RADIOLOGY

## 2019-09-03 PROCEDURE — 77063 BREAST TOMOSYNTHESIS BI: CPT | Mod: 26,,, | Performed by: RADIOLOGY

## 2019-09-03 PROCEDURE — 77067 MAMMO DIGITAL SCREENING BILAT WITH TOMOSYNTHESIS_CAD: ICD-10-PCS | Mod: 26,,, | Performed by: RADIOLOGY

## 2019-09-03 PROCEDURE — 77063 MAMMO DIGITAL SCREENING BILAT WITH TOMOSYNTHESIS_CAD: ICD-10-PCS | Mod: 26,,, | Performed by: RADIOLOGY

## 2019-09-04 ENCOUNTER — OFFICE VISIT (OUTPATIENT)
Dept: SURGERY | Facility: CLINIC | Age: 64
End: 2019-09-04
Payer: MEDICARE

## 2019-09-04 VITALS
HEIGHT: 68 IN | BODY MASS INDEX: 18.33 KG/M2 | HEART RATE: 90 BPM | OXYGEN SATURATION: 95 % | RESPIRATION RATE: 18 BRPM | DIASTOLIC BLOOD PRESSURE: 74 MMHG | TEMPERATURE: 98 F | WEIGHT: 120.94 LBS | SYSTOLIC BLOOD PRESSURE: 112 MMHG

## 2019-09-04 DIAGNOSIS — Z01.818 PRE-OP TESTING: ICD-10-CM

## 2019-09-04 DIAGNOSIS — D64.9 ANEMIA, UNSPECIFIED TYPE: Primary | ICD-10-CM

## 2019-09-04 PROCEDURE — 3078F DIAST BP <80 MM HG: CPT | Mod: CPTII,S$GLB,, | Performed by: SURGERY

## 2019-09-04 PROCEDURE — 3074F SYST BP LT 130 MM HG: CPT | Mod: CPTII,S$GLB,, | Performed by: SURGERY

## 2019-09-04 PROCEDURE — 99204 PR OFFICE/OUTPT VISIT, NEW, LEVL IV, 45-59 MIN: ICD-10-PCS | Mod: S$GLB,,, | Performed by: SURGERY

## 2019-09-04 PROCEDURE — 3078F PR MOST RECENT DIASTOLIC BLOOD PRESSURE < 80 MM HG: ICD-10-PCS | Mod: CPTII,S$GLB,, | Performed by: SURGERY

## 2019-09-04 PROCEDURE — 99204 OFFICE O/P NEW MOD 45 MIN: CPT | Mod: S$GLB,,, | Performed by: SURGERY

## 2019-09-04 PROCEDURE — 3074F PR MOST RECENT SYSTOLIC BLOOD PRESSURE < 130 MM HG: ICD-10-PCS | Mod: CPTII,S$GLB,, | Performed by: SURGERY

## 2019-09-04 PROCEDURE — 3008F PR BODY MASS INDEX (BMI) DOCUMENTED: ICD-10-PCS | Mod: CPTII,S$GLB,, | Performed by: SURGERY

## 2019-09-04 PROCEDURE — 3008F BODY MASS INDEX DOCD: CPT | Mod: CPTII,S$GLB,, | Performed by: SURGERY

## 2019-09-04 RX ORDER — DULOXETIN HYDROCHLORIDE 30 MG/1
30 CAPSULE, DELAYED RELEASE ORAL DAILY
COMMUNITY
End: 2019-09-10 | Stop reason: SDUPTHER

## 2019-09-04 RX ORDER — SODIUM CHLORIDE 9 MG/ML
INJECTION, SOLUTION INTRAVENOUS CONTINUOUS
Status: CANCELLED | OUTPATIENT
Start: 2019-09-04

## 2019-09-04 RX ORDER — LIDOCAINE HYDROCHLORIDE 10 MG/ML
1 INJECTION, SOLUTION EPIDURAL; INFILTRATION; INTRACAUDAL; PERINEURAL ONCE
Status: DISCONTINUED | OUTPATIENT
Start: 2019-09-04 | End: 2019-10-07 | Stop reason: HOSPADM

## 2019-09-04 NOTE — LETTER
September 4, 2019      Celestina Tran DO  [address] [facility] [place] [address]           [facility] - General [facility]  [address] [facility] [place] [address]  Phone: [phone]  Fax: [phone]          Patient: Comfort Cantu   MR Number: [id number]   YOB: 1955   Date of Visit: 9/4/2019       Dear Dr. Celestina Tran:    Thank you for referring Comfort Cantu to me for evaluation. Attached you will find relevant portions of my assessment and plan of care.    If you have questions, please do not hesitate to call me. I look forward to following Comfort Cantu along with you.    Sincerely,    Olaf Alba MD    Enclosure  CC:  No Recipients    If you would like to receive this communication electronically, please contact [email or web address] or [phone] to request more information on [facility] access.    For providers and/or their staff who would like to refer a patient to [facility], please contact us through our one-stop-shop provider referral line, [facility], at [phone].    If you feel you have received this communication in error or would no longer like to receive these types of communications, please e-mail [email or web address]

## 2019-09-04 NOTE — H&P
[facility] H&P Note    Subjective:       Patient ID: Comfort Cantu is a 63 y.o. female.    Chief Complaint: Consult (Fpwufypv-Dgyafi-[facility])    HPI:  Comfort Cantu is a 63 y.o. female with history of anxiety, atherosclerosis, depression, gastroesophageal reflux disease, hypertension, previous colon polyps, previous leg and neck stents for atherosclerosis currently effient therapy presents today as a referral from Dr. Tran for evaluation of anemia with iron deficiency.  Patient states recently she has been having issues with high blood pressure.  This is better controlled now.  Patient recently saw her cardiologist who recommend a stress test.  This has not been done yet.  She recently saw her primary care physician and underwent routine laboratory assessments.  She was found to have and iron deficient component anemia.  Patient previously underwent EGD and colonoscopy 2 years ago.  Evidence of gastritis.  Evidence of colon polyps x3.  Patient with no family history of colon cancer.  No blood seen in the stool.  No weight changes or bowel habit changes.  She now presents today for evaluation of iron deficiency anemia.    Past Medical History:   Diagnosis Date    Anxiety     Atherosclerosis     Depression     GERD (gastroesophageal reflux disease)     Hypertension      Past Surgical History:   Procedure Laterality Date    APPENDECTOMY      CAROTID STENT  2014    FEMORAL ARTERY STENT      ULTRASOUND, UPPER GI TRACT, ENDOSCOPIC N/A 10/16/2018    Performed by Kumar Wang MD at [facility] ([address] [place] [address])     Family History   Problem Relation Age of Onset    Heart attack Brother     Diabetes Brother     Hyperlipidemia Brother      Social History     Socioeconomic History    Marital status:      Spouse name: Not on file    Number of children: Not on file    Years of education: Not on file    Highest education level: Not on file   Occupational History    Not on file   Social  Needs    Financial resource strain: Not on file    Food insecurity:     Worry: Not on file     Inability: Not on file    Transportation needs:     Medical: Not on file     Non-medical: Not on file   Tobacco Use    Smoking status: Current Every Day Smoker     Packs/day: 1.00     Years: 23.00     Pack years: 23.00     Types: Cigarettes     Start date: 10/1/1988    Smokeless tobacco: Never Used   Substance and Sexual Activity    Alcohol use: Yes     Comment: occ    Drug use: No    Sexual activity: Not Currently   Lifestyle    Physical activity:     Days per week: Not on file     Minutes per session: Not on file    Stress: Not on file   Relationships    Social connections:     Talks on phone: Not on file     Gets together: Not on file     Attends Denominational service: Not on file     Active member of club or organization: Not on file     Attends meetings of clubs or organizations: Not on file     Relationship status: Not on file   Other Topics Concern    Not on file   Social History Narrative    Not on file       Current Outpatient Medications   Medication Sig Dispense Refill    atorvastatin (LIPITOR) 10 MG tablet Take 10 mg by mouth once daily.      busPIRone (BUSPAR) 15 MG tablet Take 1 tablet (15 mg total) by mouth 2 (two) times daily. 180 tablet 3    cloNIDine (CATAPRES) 0.1 MG tablet Take 1 tablet (0.1 mg total) by mouth 2 (two) times daily. 60 tablet 11    diazePAM (VALIUM) 5 MG tablet Take 1 tablet (5 mg total) by mouth every 12 (twelve) hours as needed for Anxiety. 60 tablet 2    DULoxetine (CYMBALTA) 30 MG capsule Take 30 mg by mouth once daily.      hydroCHLOROthiazide (HYDRODIURIL) 25 MG tablet       isosorbide mononitrate (IMDUR) 30 MG 24 hr tablet Take 30 mg by mouth once daily.      lisinopril (PRINIVIL,ZESTRIL) 20 MG tablet Take 1 tablet (20 mg total) by mouth once daily. 90 tablet 1    pantoprazole (PROTONIX) 40 MG tablet Take 1 tablet (40 mg total) by mouth once daily. 90 tablet 3     "prasugrel (EFFIENT) 5 mg Tab once daily.       Current Facility-Administered Medications   Medication Dose Route Frequency Provider Last Rate Last Dose    lidocaine (PF) 10 mg/ml (1%) injection 10 mg  1 mL Intradermal Once Olaf Alba MD         Review of patient's allergies indicates:   Allergen Reactions    Penicillins        Review of Systems   Constitutional: Negative for activity change, appetite change, chills and fever.   HENT: Negative for congestion, dental problem and ear discharge.    Eyes: Negative for discharge and itching.   Respiratory: Negative for apnea, choking and chest tightness.    Cardiovascular: Negative for chest pain and leg swelling.   Gastrointestinal: Negative for abdominal distention, abdominal pain, anal bleeding, constipation, diarrhea and nausea.   Endocrine: Negative for cold intolerance and heat intolerance.   Genitourinary: Negative for difficulty urinating and dyspareunia.   Musculoskeletal: Negative for arthralgias and back pain.   Skin: Negative for color change and pallor.   Neurological: Negative for dizziness and facial asymmetry.   Hematological: Negative for adenopathy. Does not bruise/bleed easily.   Psychiatric/Behavioral: Negative for agitation and behavioral problems.       Objective:      Vitals:    09/04/19 1459   BP: 112/74   Pulse: 90   Resp: 18   Temp: 98.2 °F (36.8 °C)   TempSrc: Oral   SpO2: 95%   Weight: 54.9 kg (120 lb 14.8 oz)   Height: 5' 8" (1.727 m)     Physical Exam   Constitutional: She is oriented to person, place, and time. She appears well-developed and well-nourished. No distress.   HENT:   Head: Normocephalic and atraumatic.   Eyes: Pupils are equal, round, and reactive to light. EOM are normal.   Neck: Normal range of motion. Neck supple. No thyromegaly present.   Cardiovascular: Normal rate and regular rhythm.   Pulmonary/Chest: Effort normal and breath sounds normal.   Abdominal: Soft. Bowel sounds are normal. She exhibits no " distension. There is no tenderness.   Musculoskeletal: Normal range of motion. She exhibits no edema or deformity.   Neurological: She is alert and oriented to person, place, and time. No cranial nerve deficit.   Skin: Skin is warm. Capillary refill takes less than 2 seconds. No erythema.   Psychiatric: She has a normal mood and affect. Her behavior is normal.           Lab Results   Component Value Date    WBC 6.64 08/17/2019    HGB 8.9 (L) 08/17/2019    HCT 28.4 (L) 08/17/2019    MCV 86 08/17/2019     08/17/2019     Iron 37.  Saturated iron 7%.  TIBC 537, up.    Assessment:       1. Anemia, unspecified type    2. Pre-op testing        Plan:   Anemia, unspecified type  -     Case Request Operating Room: COLONOSCOPY, ESOPHAGOGASTRODUODENOSCOPY (EGD)  -     Insert peripheral IV; Standing  -     Full code; Standing    Pre-op testing  -     CBC auto differential; Future; Expected date: 12/04/2019  -     Comprehensive metabolic panel; Future; Expected date: 12/04/2019  -     EKG 12-lead; Future; Expected date: 12/04/2019    Other orders  -     lidocaine (PF) 10 mg/ml (1%) injection 10 mg        Medical Decision Making/Counseling:  New issue.  Iron deficiency anemia.  Patient with previous EGD and colonoscopy 2 years ago.  Colon polyps found. Differential diagnosis with iron deficiency anemia could be gastrointestinal neoplasms, arteriovenous malformations, hemorrhoids, diverticulosis, gastritis, peptic ulcer disease, Helicobacter pylori infection, etc.  Ultimately I believe the next best steps for this patient's care would be for the patient undergo EGD and colonoscopy for further delineation of this iron deficiency anemia.    Patient high risk for post endoscopy bleeding.  Chronic anticoagulant therapy.  Will obtain cardiac clearance from the patient's cardiologist given stress test pending.  Will also obtain vascular surgery clearance for the patient to stop effient for 3 days prior to endoscopy.  Patient  voiced understanding.  She will be notified to stop Effient 3 days before if her vascular surgeon clears her to do so.    Will obtain preoperative lab test to include CBC, CMP and EKG for review by the Anesthesiologist the day of the procedure prior to induction of the anesthetic agent of choice.    Risk and benefits of EGD were discussed in clinic in depth.  Risk of EGD were discussed to include bleeding as well as perforation.  Patient understands that if the above procedural risks were to occur, he could need further intervention to include but not exclude a blood transfusion, repeat procedure, admission to the hospital, or even surgery which would likely require transfer to a higher level of care.     Risks and benefits of Colonoscopy were discussed in depth in clinic as well.  From a procedural standpoint, we discuss the benefits of colonoscopy to be finding colon cancers at early stages, including polyps which can be endoscopically resectable, to finding early stage colon cancers which can be better treated with current medical and surgical therapies in order to give patients a longer survival, if found in these early stages.  From a standpoint of risks, the risk of bleeding and perforation of the colon were discussed.  I personally discussed that if complications of bleeding or perforation were to occur, the patient could need as little as a blood transfusion and as much as possible hospital admission, repeat procedure, or even surgery.  During today's discussion of the procedure of colonoscopy with the patient, I personally christine the patient a picture to assist with counseling.  Total clinic time spent today 45 minutes face-to-face, with greater than half of the time spent in face to face counseling.    Patient instructed that best way to communicate with my office staff is for patient to get on the [facility] patient portal to expedite communication and communication issues that may occur.  Patient was  given instructions on how to get on the portal.  I encouraged patient to obtain portal access as well.  Ultimately it is up to the patient to obtain access.  Patient voiced understanding.

## 2019-09-06 ENCOUNTER — HOSPITAL ENCOUNTER (OUTPATIENT)
Dept: RADIOLOGY | Facility: HOSPITAL | Age: 64
Discharge: HOME | End: 2019-09-06
Attending: FAMILY MEDICINE
Payer: MEDICARE

## 2019-09-06 DIAGNOSIS — R27.0 ATAXIA: ICD-10-CM

## 2019-09-06 PROCEDURE — 70551 MRI BRAIN STEM W/O DYE: CPT | Mod: 26,,, | Performed by: RADIOLOGY

## 2019-09-06 PROCEDURE — 70551 MRI BRAIN WITHOUT CONTRAST: ICD-10-PCS | Mod: 26,,, | Performed by: RADIOLOGY

## 2019-09-06 PROCEDURE — 70551 MRI BRAIN STEM W/O DYE: CPT | Mod: TC

## 2019-09-10 NOTE — TELEPHONE ENCOUNTER
----- Message from Viviana Khoury sent at 9/10/2019  3:58 PM CDT -----  Contact: patient  Type:  RX Refill Request    Who Called:  patient  Refill or New Rx:  refill  RX Name and Strength:  DULoxetine (CYMBALTA) 30 MG capsule  How is the patient currently taking it? (ex. 1XDay):  Route: Take 30 mg by mouth once daily. - Oral  Is this a 30 day or 90 day RX:  90  Preferred Pharmacy with phone number:    [facility] - [place], [place] - [address]  [address] [facility] [address]  Phone: [phone] Fax: [phone]      Local or Mail Order:  Mail order  Ordering Provider:    Marcos Call Back Number:  [phone]  Additional Information:

## 2019-09-10 NOTE — TELEPHONE ENCOUNTER
----- Message from Samara Rabago sent at 9/10/2019  1:44 PM CDT -----  Type:  RX Refill Request    Who Called:  patient  Refill or New Rx:  refill  RX Name and Strength:  Lisinopril 10mg (can not take 20mg - can not cut in half)  How is the patient currently taking it? (ex. 1XDay):  Takes one tablet daily  Is this a 30 day or 90 day RX:  30 days  Preferred Pharmacy with phone number:    [facility] - [place], [place] - [address]  [address] [facility] [address]  Phone: [phone] Fax: [phone]  Local or Mail Order:  local  Ordering Provider:  Dr Darinel Riddle  Best Call Back Number:  [phone]  Additional Information:  Patient is saying that she can not take the 20mg and can not cut in half as they are too small/she is asking for either 5mg or 10mg to please be called to pharmacy as she is out of this medication ([facility])

## 2019-09-11 ENCOUNTER — TELEPHONE (OUTPATIENT)
Dept: FAMILY MEDICINE | Facility: CLINIC | Age: 64
End: 2019-09-11

## 2019-09-11 DIAGNOSIS — I10 BENIGN ESSENTIAL HYPERTENSION: ICD-10-CM

## 2019-09-11 RX ORDER — LISINOPRIL 10 MG/1
10 TABLET ORAL DAILY
Qty: 90 TABLET | Refills: 3 | Status: SHIPPED | OUTPATIENT
Start: 2019-09-11 | End: 2019-09-11 | Stop reason: SDUPTHER

## 2019-09-11 RX ORDER — DULOXETIN HYDROCHLORIDE 30 MG/1
30 CAPSULE, DELAYED RELEASE ORAL DAILY
Qty: 90 CAPSULE | Refills: 3 | Status: SHIPPED | OUTPATIENT
Start: 2019-09-11 | End: 2020-04-14 | Stop reason: SDUPTHER

## 2019-09-11 RX ORDER — LISINOPRIL 10 MG/1
10 TABLET ORAL DAILY
Qty: 7 TABLET | Refills: 0 | Status: SHIPPED | OUTPATIENT
Start: 2019-09-11 | End: 2019-10-04 | Stop reason: SDUPTHER

## 2019-09-11 NOTE — TELEPHONE ENCOUNTER
----- Message from Sabrina Mitchell sent at 9/11/2019  1:26 PM CDT -----  Contact: pt  Pt calling states that she is completely out of her blood pressure medication. Needing the dr to calling 1 week supply to the local pharmacy then to the mail order [facility],please. lisinopril (PRINIVIL,ZESTRIL) 10 MG tablet. [facility] needs refill for 10 mg they only have 5 mg so needing orders for 10 mg. Tried to split 20 mg and was to much ,cause blood pressure to go low. Also needing DULoxetine (CYMBALTA) 30 MG capsule called. Pt wants a 90 day supply of both sent to the [facility] pharmacy,please.Pt phone call dropped and another call came through..[phone] (home)         .  [facility] - [place], [place] - [address] [place] [address]  Phone: [phone] Fax: [phone]      [facility] - [facility] [address]  [address] [facility] [address]  Phone: [phone] Fax: [phone]

## 2019-09-11 NOTE — TELEPHONE ENCOUNTER
Pt requesting Lisinopril decreased to 10mg daily.  Attempted to call pt to discuss further, but no answer.    Order pended as requested.  Please advise, thank you!      ----- Message from Petra Kong sent at 9/11/2019  1:46 PM CDT -----   Type:  RX Refill Request    Who Called: pt  RefillRX Name and Strength: linsopril 10 mg  / 1  Day   90 days/duloxetine 30  Mg / 1 a day /90 days/  [facility] - [place], [place] - [address]  [address] [facility] [address]  Phone: [phone] Fax: [phone]  Best Call Back Number: [phone]  Additional Information:    Pt  Needs  Refill  // pt   Requesting the  10  Mg  linsopril

## 2019-09-11 NOTE — TELEPHONE ENCOUNTER
Pt Lisinopril prescription was sent to [facility] today, pt is requesting that a week supply of that medication be sent to [place] pharmacy as she is completely out of the medication.   Please advise

## 2019-09-19 ENCOUNTER — PATIENT OUTREACH (OUTPATIENT)
Dept: ADMINISTRATIVE | Facility: HOSPITAL | Age: 64
End: 2019-09-19

## 2019-09-19 NOTE — LETTER
September 19, 2019    Comfort Cantu  [address] [place] [address]             [facility] [address] [place] [address]  Phone: [phone] Dear Comfort    [facility] is committed to your overall health and would like to ensure that you are up to date on your recommended test and/or procedures.   Darinel Riddle MD  has found that your chart shows you may be due for the following:    Health Maintenance Due   Topic Date Due    TETANUS VACCINE  11/14/1973    Pneumococcal Vaccine (Medium Risk) (1 of 1 - PPSV23) 11/14/1974    Shingles Vaccine (1 of 2) 11/14/2005    Lipid Panel  06/12/2018    Influenza Vaccine (1) 09/01/2019     If you have had any of the above done at another facility, please let us know so that we may obtain copies from that facility.  If you have a copy of these records, please provide a copy for us to scan into your chart.  You are welcome to request that the report be faxed to us at  ([phone]).     Otherwise, please schedule these appointments at your earliest convenience by calling [phone] or going to [email or web address].    If you have an upcoming scheduled appointment for the item above, please disregard this letter.    Sincerely,  Your [facility]  MD Daysi Cardoza L.P.N. Clinical Care Coordinator  [address] [place] [address], [place] [address] [phone]

## 2019-09-27 ENCOUNTER — LAB VISIT (OUTPATIENT)
Dept: LAB | Facility: HOSPITAL | Age: 64
End: 2019-09-27
Attending: FAMILY MEDICINE
Payer: MEDICARE

## 2019-09-27 DIAGNOSIS — E55.9 VITAMIN D DEFICIENCY: ICD-10-CM

## 2019-09-27 DIAGNOSIS — E78.5 HYPERLIPIDEMIA, UNSPECIFIED HYPERLIPIDEMIA TYPE: ICD-10-CM

## 2019-09-27 DIAGNOSIS — D50.8 OTHER IRON DEFICIENCY ANEMIA: ICD-10-CM

## 2019-09-27 LAB
25(OH)D3+25(OH)D2 SERPL-MCNC: 35 NG/ML (ref 30–96)
ALBUMIN SERPL BCP-MCNC: 4.5 G/DL (ref 3.5–5.2)
ALP SERPL-CCNC: 94 U/L (ref 55–135)
ALT SERPL W/O P-5'-P-CCNC: 22 U/L (ref 10–44)
ANION GAP SERPL CALC-SCNC: 10 MMOL/L (ref 8–16)
AST SERPL-CCNC: 29 U/L (ref 10–40)
BASOPHILS # BLD AUTO: 0.07 K/UL (ref 0–0.2)
BASOPHILS NFR BLD: 1 % (ref 0–1.9)
BILIRUB SERPL-MCNC: 0.5 MG/DL (ref 0.1–1)
BUN SERPL-MCNC: 9 MG/DL (ref 8–23)
CALCIUM SERPL-MCNC: 9.7 MG/DL (ref 8.7–10.5)
CHLORIDE SERPL-SCNC: 97 MMOL/L (ref 95–110)
CO2 SERPL-SCNC: 28 MMOL/L (ref 23–29)
CREAT SERPL-MCNC: 0.6 MG/DL (ref 0.5–1.4)
DIFFERENTIAL METHOD: ABNORMAL
EOSINOPHIL # BLD AUTO: 0.2 K/UL (ref 0–0.5)
EOSINOPHIL NFR BLD: 2.3 % (ref 0–8)
ERYTHROCYTE [DISTWIDTH] IN BLOOD BY AUTOMATED COUNT: 19.4 % (ref 11.5–14.5)
EST. GFR  (AFRICAN AMERICAN): >60 ML/MIN/1.73 M^2
EST. GFR  (NON AFRICAN AMERICAN): >60 ML/MIN/1.73 M^2
GLUCOSE SERPL-MCNC: 100 MG/DL (ref 70–110)
HCT VFR BLD AUTO: 35 % (ref 37–48.5)
HGB BLD-MCNC: 11.7 G/DL (ref 12–16)
IMM GRANULOCYTES # BLD AUTO: 0.02 K/UL (ref 0–0.04)
IMM GRANULOCYTES NFR BLD AUTO: 0.3 % (ref 0–0.5)
IRON SERPL-MCNC: 91 UG/DL (ref 30–160)
LYMPHOCYTES # BLD AUTO: 1.3 K/UL (ref 1–4.8)
LYMPHOCYTES NFR BLD: 18.7 % (ref 18–48)
MCH RBC QN AUTO: 32.3 PG (ref 27–31)
MCHC RBC AUTO-ENTMCNC: 33.4 G/DL (ref 32–36)
MCV RBC AUTO: 97 FL (ref 82–98)
MONOCYTES # BLD AUTO: 0.6 K/UL (ref 0.3–1)
MONOCYTES NFR BLD: 9.3 % (ref 4–15)
NEUTROPHILS # BLD AUTO: 4.7 K/UL (ref 1.8–7.7)
NEUTROPHILS NFR BLD: 68.4 % (ref 38–73)
NRBC BLD-RTO: 0 /100 WBC
PLATELET # BLD AUTO: 336 K/UL (ref 150–350)
PMV BLD AUTO: 8.5 FL (ref 9.2–12.9)
POTASSIUM SERPL-SCNC: 3.8 MMOL/L (ref 3.5–5.1)
PROT SERPL-MCNC: 7.3 G/DL (ref 6–8.4)
RBC # BLD AUTO: 3.62 M/UL (ref 4–5.4)
SATURATED IRON: 21 % (ref 20–50)
SODIUM SERPL-SCNC: 135 MMOL/L (ref 136–145)
TOTAL IRON BINDING CAPACITY: 435 UG/DL (ref 250–450)
TRANSFERRIN SERPL-MCNC: 294 MG/DL (ref 200–375)
WBC # BLD AUTO: 6.9 K/UL (ref 3.9–12.7)

## 2019-09-27 PROCEDURE — 85025 COMPLETE CBC W/AUTO DIFF WBC: CPT

## 2019-09-27 PROCEDURE — 83540 ASSAY OF IRON: CPT

## 2019-09-27 PROCEDURE — 36415 COLL VENOUS BLD VENIPUNCTURE: CPT

## 2019-09-27 PROCEDURE — 82306 VITAMIN D 25 HYDROXY: CPT

## 2019-09-27 PROCEDURE — 80053 COMPREHEN METABOLIC PANEL: CPT

## 2019-10-01 RX ORDER — DIAZEPAM 5 MG/1
TABLET ORAL
Qty: 60 TABLET | Refills: 0 | Status: SHIPPED | OUTPATIENT
Start: 2019-10-01 | End: 2019-11-04 | Stop reason: SDUPTHER

## 2019-10-01 NOTE — TELEPHONE ENCOUNTER
reviewed and consistent  Will refill medicine as shown given that patient receives greater than 30% benefit from medicine and keeps patient functional and able to complete ADL's.  UDS reviewed  Pain contract reviewed  No abuse concerns

## 2019-10-02 ENCOUNTER — HOSPITAL ENCOUNTER (OUTPATIENT)
Dept: CARDIOLOGY | Facility: HOSPITAL | Age: 64
Discharge: HOME | End: 2019-10-02
Attending: SURGERY
Payer: MEDICARE

## 2019-10-02 DIAGNOSIS — Z01.818 PRE-OP TESTING: ICD-10-CM

## 2019-10-02 PROCEDURE — 93005 ELECTROCARDIOGRAM TRACING: CPT

## 2019-10-04 ENCOUNTER — TELEPHONE (OUTPATIENT)
Dept: FAMILY MEDICINE | Facility: CLINIC | Age: 64
End: 2019-10-04

## 2019-10-04 ENCOUNTER — OFFICE VISIT (OUTPATIENT)
Dept: FAMILY MEDICINE | Facility: CLINIC | Age: 64
End: 2019-10-04
Payer: MEDICARE

## 2019-10-04 VITALS
SYSTOLIC BLOOD PRESSURE: 149 MMHG | BODY MASS INDEX: 18.74 KG/M2 | HEIGHT: 68 IN | HEART RATE: 89 BPM | OXYGEN SATURATION: 93 % | DIASTOLIC BLOOD PRESSURE: 80 MMHG | WEIGHT: 123.63 LBS | RESPIRATION RATE: 20 BRPM

## 2019-10-04 DIAGNOSIS — Z02.83 ENCOUNTER FOR DRUG SCREENING: ICD-10-CM

## 2019-10-04 DIAGNOSIS — L98.9 SKIN LESION: ICD-10-CM

## 2019-10-04 DIAGNOSIS — R27.0 ATAXIA: Primary | ICD-10-CM

## 2019-10-04 DIAGNOSIS — I10 BENIGN ESSENTIAL HYPERTENSION: ICD-10-CM

## 2019-10-04 DIAGNOSIS — J32.0 CHRONIC MAXILLARY SINUSITIS: ICD-10-CM

## 2019-10-04 DIAGNOSIS — Z23 NEED FOR VACCINATION: ICD-10-CM

## 2019-10-04 DIAGNOSIS — E87.1 HYPONATREMIA: ICD-10-CM

## 2019-10-04 DIAGNOSIS — I73.9 PERIPHERAL VASCULAR DISEASE: ICD-10-CM

## 2019-10-04 DIAGNOSIS — Z13.220 LIPID SCREENING: ICD-10-CM

## 2019-10-04 PROCEDURE — 99214 OFFICE O/P EST MOD 30 MIN: CPT | Mod: S$GLB,,, | Performed by: FAMILY MEDICINE

## 2019-10-04 PROCEDURE — 3077F SYST BP >= 140 MM HG: CPT | Mod: CPTII,S$GLB,, | Performed by: FAMILY MEDICINE

## 2019-10-04 PROCEDURE — 99999 PR PBB SHADOW E&M-EST. PATIENT-LVL IV: CPT | Mod: PBBFAC,,, | Performed by: FAMILY MEDICINE

## 2019-10-04 PROCEDURE — 80307 DRUG TEST PRSMV CHEM ANLYZR: CPT

## 2019-10-04 PROCEDURE — 3077F PR MOST RECENT SYSTOLIC BLOOD PRESSURE >= 140 MM HG: ICD-10-PCS | Mod: CPTII,S$GLB,, | Performed by: FAMILY MEDICINE

## 2019-10-04 PROCEDURE — 99214 PR OFFICE/OUTPT VISIT, EST, LEVL IV, 30-39 MIN: ICD-10-PCS | Mod: S$GLB,,, | Performed by: FAMILY MEDICINE

## 2019-10-04 PROCEDURE — 3079F PR MOST RECENT DIASTOLIC BLOOD PRESSURE 80-89 MM HG: ICD-10-PCS | Mod: CPTII,S$GLB,, | Performed by: FAMILY MEDICINE

## 2019-10-04 PROCEDURE — 3008F BODY MASS INDEX DOCD: CPT | Mod: CPTII,S$GLB,, | Performed by: FAMILY MEDICINE

## 2019-10-04 PROCEDURE — 3008F PR BODY MASS INDEX (BMI) DOCUMENTED: ICD-10-PCS | Mod: CPTII,S$GLB,, | Performed by: FAMILY MEDICINE

## 2019-10-04 PROCEDURE — 3079F DIAST BP 80-89 MM HG: CPT | Mod: CPTII,S$GLB,, | Performed by: FAMILY MEDICINE

## 2019-10-04 PROCEDURE — 99999 PR PBB SHADOW E&M-EST. PATIENT-LVL IV: ICD-10-PCS | Mod: PBBFAC,,, | Performed by: FAMILY MEDICINE

## 2019-10-04 RX ORDER — LISINOPRIL 10 MG/1
10 TABLET ORAL DAILY
Qty: 7 TABLET | Refills: 0 | Status: SHIPPED | OUTPATIENT
Start: 2019-10-04 | End: 2019-10-11

## 2019-10-04 RX ORDER — LISINOPRIL 10 MG/1
10 TABLET ORAL DAILY
Qty: 7 TABLET | Refills: 0 | Status: SHIPPED | OUTPATIENT
Start: 2019-10-04 | End: 2019-10-04 | Stop reason: SDUPTHER

## 2019-10-04 RX ORDER — POLYETHYLENE GLYCOL-3350 AND ELECTROLYTES 236; 6.74; 5.86; 2.97; 22.74 G/274.31G; G/274.31G; G/274.31G; G/274.31G; G/274.31G
POWDER, FOR SOLUTION ORAL
COMMUNITY
Start: 2019-09-06 | End: 2020-09-17

## 2019-10-04 RX ORDER — CLINDAMYCIN HYDROCHLORIDE 300 MG/1
300 CAPSULE ORAL EVERY 6 HOURS
Qty: 40 CAPSULE | Refills: 0 | Status: SHIPPED | OUTPATIENT
Start: 2019-10-04 | End: 2019-10-04 | Stop reason: SDUPTHER

## 2019-10-04 RX ORDER — LISINOPRIL 10 MG/1
10 TABLET ORAL DAILY
Qty: 90 TABLET | Refills: 0 | Status: SHIPPED | OUTPATIENT
Start: 2019-10-04 | End: 2019-10-04 | Stop reason: SDUPTHER

## 2019-10-04 RX ORDER — CLINDAMYCIN HYDROCHLORIDE 300 MG/1
300 CAPSULE ORAL EVERY 6 HOURS
Qty: 40 CAPSULE | Refills: 0 | Status: SHIPPED | OUTPATIENT
Start: 2019-10-04 | End: 2019-10-14

## 2019-10-04 NOTE — PROGRESS NOTES
"Subjective:       Patient ID: Comfort Cantu is a 63 y.o. female.    Chief Complaint: Follow-up (MRI review, pt refused flu & pneumonia vaccines) and Medication Refill    HTN  Still taking HCTZ  Still consuming alcohol with binge drinking behavior    Review of Systems   Constitutional: Positive for fatigue. Negative for chills, fever and unexpected weight change.   Respiratory: Positive for shortness of breath. Negative for cough, chest tightness and wheezing.    Cardiovascular: Negative for chest pain, palpitations and leg swelling.   Endocrine: Positive for cold intolerance, polydipsia and polyuria. Negative for heat intolerance.   Neurological: Positive for weakness. Negative for dizziness, tremors, seizures and headaches.   Psychiatric/Behavioral: Positive for dysphoric mood. Negative for decreased concentration, hallucinations and suicidal ideas.         Reviewed family, medical, surgical, and social history.    Objective:      BP (!) 149/80 (BP Location: Left arm, Patient Position: Sitting, BP Method: Small (Automatic))   Pulse 89   Resp 20   Ht 5' 8" (1.727 m)   Wt 56.1 kg (123 lb 9.6 oz)   SpO2 (!) 93%   BMI 18.79 kg/m²   Physical Exam   Constitutional: She is oriented to person, place, and time. She appears well-developed and well-nourished. No distress.   Cardiovascular: Normal rate, regular rhythm and normal heart sounds.   No murmur heard.  Pulmonary/Chest: Effort normal and breath sounds normal. No stridor. No respiratory distress.   Neurological: She is alert and oriented to person, place, and time. No cranial nerve deficit.   Skin: She is not diaphoretic.   Psychiatric: She has a normal mood and affect. Her behavior is normal. Judgment and thought content normal.   Vitals reviewed.      Assessment:       1. Ataxia    2. Lipid screening    3. Peripheral vascular disease    4. Need for vaccination    5. Encounter for drug screening    6. Hyponatremia    7. Chronic maxillary sinusitis    8. " Skin lesion    9. Benign essential hypertension        Plan:       Ataxia    Lipid screening    Peripheral vascular disease    Need for vaccination  -     diphth,pertus,acell,,tetanus (BOOSTRIX) 2.5-8-5 Lf-mcg-Lf/0.5mL Susp; Inject 0.5 mLs into the muscle once. for 1 dose  Dispense: 0.5 mL; Refill: 0    Encounter for drug screening  -     [facility] Drug Screen    Hyponatremia    Chronic maxillary sinusitis  -     Discontinue: clindamycin (CLEOCIN) 300 MG capsule; Take 1 capsule (300 mg total) by mouth every 6 (six) hours. for 10 days  Dispense: 40 capsule; Refill: 0  -     clindamycin (CLEOCIN) 300 MG capsule; Take 1 capsule (300 mg total) by mouth every 6 (six) hours. for 10 days  Dispense: 40 capsule; Refill: 0    Skin lesion  -     Ambulatory referral to Dermatology    Benign essential hypertension  -     Discontinue: lisinopril 10 MG tablet; Take 1 tablet (10 mg total) by mouth once daily. for 7 days  Dispense: 7 tablet; Refill: 0  -     Discontinue: lisinopril 10 MG tablet; Take 1 tablet (10 mg total) by mouth once daily.  Dispense: 90 tablet; Refill: 0  -     lisinopril 10 MG tablet; Take 1 tablet (10 mg total) by mouth once daily. for 7 days  Dispense: 7 tablet; Refill: 0    Encouraged patient to stop HCTZ, stop alcohol        Risks, benefits, and side effects were discussed with the patient. All questions were answered to the fullest satisfaction of the patient, and pt verbalized understanding and agreement to treatment plan. Pt was to call with any new or worsening symptoms, or present to the ER.

## 2019-10-04 NOTE — TELEPHONE ENCOUNTER
----- Message from Zoe Cooper sent at 10/4/2019  2:45 PM CDT -----  Contact: Licha [facility]  Type: Needs Medical Advice    Who Called:  Licha  Best Call Back Number: [phone]  Additional Information: licha wanted to make doctor aware that the DTP shot they dont do . Doctor may need to send it somewhere else   Please Advise ---Thank you

## 2019-10-07 ENCOUNTER — ANESTHESIA EVENT (OUTPATIENT)
Dept: SURGERY | Facility: HOSPITAL | Age: 64
End: 2019-10-07
Payer: MEDICARE

## 2019-10-07 ENCOUNTER — HOSPITAL ENCOUNTER (OUTPATIENT)
Facility: HOSPITAL | Age: 64
Discharge: HOME | End: 2019-10-07
Attending: SURGERY | Admitting: SURGERY
Payer: MEDICARE

## 2019-10-07 ENCOUNTER — ANESTHESIA (OUTPATIENT)
Dept: SURGERY | Facility: HOSPITAL | Age: 64
End: 2019-10-07
Payer: MEDICARE

## 2019-10-07 VITALS
RESPIRATION RATE: 20 BRPM | SYSTOLIC BLOOD PRESSURE: 133 MMHG | DIASTOLIC BLOOD PRESSURE: 80 MMHG | OXYGEN SATURATION: 98 % | HEIGHT: 68 IN | BODY MASS INDEX: 18.79 KG/M2 | TEMPERATURE: 98 F | HEART RATE: 70 BPM | WEIGHT: 124 LBS

## 2019-10-07 DIAGNOSIS — D64.9 ANEMIA, UNSPECIFIED TYPE: ICD-10-CM

## 2019-10-07 PROCEDURE — 88305 TISSUE SPECIMEN TO PATHOLOGY - SURGERY: ICD-10-PCS | Mod: 26,,, | Performed by: PATHOLOGY

## 2019-10-07 PROCEDURE — D9220A PRA ANESTHESIA: Mod: ANES,,, | Performed by: ANESTHESIOLOGY

## 2019-10-07 PROCEDURE — 27202131 HC NEEDLE, FNB SINGLE (ANY): Performed by: SURGERY

## 2019-10-07 PROCEDURE — 27201012 HC FORCEPS, HOT/COLD, DISP: Performed by: SURGERY

## 2019-10-07 PROCEDURE — D9220A PRA ANESTHESIA: Mod: CRNA,,, | Performed by: NURSE ANESTHETIST, CERTIFIED REGISTERED

## 2019-10-07 PROCEDURE — 45384 PR COLONOSCOPY,REMV LESN,FORCEP/CAUTERY: ICD-10-PCS | Mod: ,,, | Performed by: SURGERY

## 2019-10-07 PROCEDURE — 88305 TISSUE EXAM BY PATHOLOGIST: CPT | Mod: 26,,, | Performed by: PATHOLOGY

## 2019-10-07 PROCEDURE — 43235 EGD DIAGNOSTIC BRUSH WASH: CPT | Mod: 51,,, | Performed by: SURGERY

## 2019-10-07 PROCEDURE — 88305 TISSUE EXAM BY PATHOLOGIST: CPT | Mod: 59 | Performed by: PATHOLOGY

## 2019-10-07 PROCEDURE — 45384 COLONOSCOPY W/LESION REMOVAL: CPT | Performed by: SURGERY

## 2019-10-07 PROCEDURE — 63600175 PHARM REV CODE 636 W HCPCS: Performed by: NURSE ANESTHETIST, CERTIFIED REGISTERED

## 2019-10-07 PROCEDURE — 37000009 HC ANESTHESIA EA ADD 15 MINS: Performed by: SURGERY

## 2019-10-07 PROCEDURE — 45381 COLONOSCOPY SUBMUCOUS NJX: CPT | Mod: 51,,, | Performed by: SURGERY

## 2019-10-07 PROCEDURE — 45381 COLONOSCOPY SUBMUCOUS NJX: CPT | Performed by: SURGERY

## 2019-10-07 PROCEDURE — 45384 COLONOSCOPY W/LESION REMOVAL: CPT | Mod: ,,, | Performed by: SURGERY

## 2019-10-07 PROCEDURE — 45380 PR COLONOSCOPY,BIOPSY: ICD-10-PCS | Mod: 59,,, | Performed by: SURGERY

## 2019-10-07 PROCEDURE — 45380 COLONOSCOPY AND BIOPSY: CPT | Mod: 59,,, | Performed by: SURGERY

## 2019-10-07 PROCEDURE — 43235 PR EGD, FLEX, DIAGNOSTIC: ICD-10-PCS | Mod: 51,,, | Performed by: SURGERY

## 2019-10-07 PROCEDURE — D9220A PRA ANESTHESIA: ICD-10-PCS | Mod: CRNA,,, | Performed by: NURSE ANESTHETIST, CERTIFIED REGISTERED

## 2019-10-07 PROCEDURE — 43235 EGD DIAGNOSTIC BRUSH WASH: CPT | Performed by: SURGERY

## 2019-10-07 PROCEDURE — 45380 COLONOSCOPY AND BIOPSY: CPT | Mod: 59

## 2019-10-07 PROCEDURE — D9220A PRA ANESTHESIA: ICD-10-PCS | Mod: ANES,,, | Performed by: ANESTHESIOLOGY

## 2019-10-07 PROCEDURE — 37000008 HC ANESTHESIA 1ST 15 MINUTES: Performed by: SURGERY

## 2019-10-07 PROCEDURE — 45381 PR COLONOSCPY,FLEX,W/DIR SUBMUC INJECT: ICD-10-PCS | Mod: 51,,, | Performed by: SURGERY

## 2019-10-07 PROCEDURE — 63600175 PHARM REV CODE 636 W HCPCS: Performed by: SURGERY

## 2019-10-07 PROCEDURE — 25000003 PHARM REV CODE 250: Performed by: ANESTHESIOLOGY

## 2019-10-07 PROCEDURE — S0028 INJECTION, FAMOTIDINE, 20 MG: HCPCS | Performed by: ANESTHESIOLOGY

## 2019-10-07 RX ORDER — PROPOFOL 10 MG/ML
VIAL (ML) INTRAVENOUS
Status: DISCONTINUED | OUTPATIENT
Start: 2019-10-07 | End: 2019-10-07

## 2019-10-07 RX ORDER — SODIUM CHLORIDE, SODIUM LACTATE, POTASSIUM CHLORIDE, CALCIUM CHLORIDE 600; 310; 30; 20 MG/100ML; MG/100ML; MG/100ML; MG/100ML
125 INJECTION, SOLUTION INTRAVENOUS CONTINUOUS
Status: DISCONTINUED | OUTPATIENT
Start: 2019-10-07 | End: 2019-10-07 | Stop reason: HOSPADM

## 2019-10-07 RX ORDER — SODIUM CHLORIDE 9 MG/ML
INJECTION, SOLUTION INTRAVENOUS CONTINUOUS
Status: DISCONTINUED | OUTPATIENT
Start: 2019-10-07 | End: 2019-10-07 | Stop reason: HOSPADM

## 2019-10-07 RX ORDER — MIDAZOLAM HYDROCHLORIDE 1 MG/ML
INJECTION, SOLUTION INTRAMUSCULAR; INTRAVENOUS
Status: DISCONTINUED | OUTPATIENT
Start: 2019-10-07 | End: 2019-10-07

## 2019-10-07 RX ORDER — GLUCAGON 1 MG
KIT INJECTION
Status: DISCONTINUED | OUTPATIENT
Start: 2019-10-07 | End: 2019-10-07

## 2019-10-07 RX ORDER — FAMOTIDINE 10 MG/ML
20 INJECTION INTRAVENOUS ONCE
Status: COMPLETED | OUTPATIENT
Start: 2019-10-07 | End: 2019-10-07

## 2019-10-07 RX ORDER — DIPHENHYDRAMINE HYDROCHLORIDE 50 MG/ML
12.5 INJECTION INTRAMUSCULAR; INTRAVENOUS
Status: DISCONTINUED | OUTPATIENT
Start: 2019-10-07 | End: 2019-10-07 | Stop reason: HOSPADM

## 2019-10-07 RX ORDER — LIDOCAINE HYDROCHLORIDE 10 MG/ML
1 INJECTION, SOLUTION EPIDURAL; INFILTRATION; INTRACAUDAL; PERINEURAL ONCE
Status: DISCONTINUED | OUTPATIENT
Start: 2019-10-07 | End: 2019-10-07 | Stop reason: HOSPADM

## 2019-10-07 RX ORDER — FAMOTIDINE 10 MG/ML
INJECTION INTRAVENOUS
Status: DISCONTINUED
Start: 2019-10-07 | End: 2019-10-07 | Stop reason: HOSPADM

## 2019-10-07 RX ORDER — SODIUM CHLORIDE, SODIUM LACTATE, POTASSIUM CHLORIDE, CALCIUM CHLORIDE 600; 310; 30; 20 MG/100ML; MG/100ML; MG/100ML; MG/100ML
INJECTION, SOLUTION INTRAVENOUS CONTINUOUS
Status: DISCONTINUED | OUTPATIENT
Start: 2019-10-07 | End: 2019-10-07 | Stop reason: HOSPADM

## 2019-10-07 RX ORDER — ONDANSETRON 2 MG/ML
4 INJECTION INTRAMUSCULAR; INTRAVENOUS DAILY PRN
Status: DISCONTINUED | OUTPATIENT
Start: 2019-10-07 | End: 2019-10-07 | Stop reason: HOSPADM

## 2019-10-07 RX ADMIN — PROPOFOL 30 MG: 10 INJECTION, EMULSION INTRAVENOUS at 10:10

## 2019-10-07 RX ADMIN — MIDAZOLAM HYDROCHLORIDE 2 MG: 1 INJECTION, SOLUTION INTRAMUSCULAR; INTRAVENOUS at 09:10

## 2019-10-07 RX ADMIN — GLUCAGON HYDROCHLORIDE 0.5 MG: KIT at 10:10

## 2019-10-07 RX ADMIN — PROPOFOL 30 MG: 10 INJECTION, EMULSION INTRAVENOUS at 09:10

## 2019-10-07 RX ADMIN — PROPOFOL 20 MG: 10 INJECTION, EMULSION INTRAVENOUS at 10:10

## 2019-10-07 RX ADMIN — PROPOFOL 20 MG: 10 INJECTION, EMULSION INTRAVENOUS at 09:10

## 2019-10-07 RX ADMIN — SODIUM CHLORIDE: 0.9 INJECTION, SOLUTION INTRAVENOUS at 10:10

## 2019-10-07 RX ADMIN — SODIUM CHLORIDE: 0.9 INJECTION, SOLUTION INTRAVENOUS at 09:10

## 2019-10-07 RX ADMIN — FAMOTIDINE 20 MG: 10 INJECTION INTRAVENOUS at 09:10

## 2019-10-07 RX ADMIN — PROPOFOL 100 MG: 10 INJECTION, EMULSION INTRAVENOUS at 09:10

## 2019-10-07 NOTE — DISCHARGE INSTRUCTIONS
Upper GI Endoscopy     During endoscopy, a long, flexible tube is used to view the inside of your upper GI tract.      Upper GI endoscopy allows your healthcare provider to look directly into the beginning of your gastrointestinal (GI) tract. The esophagus, stomach, and duodenum (the first part of the small intestine) make up the upper GI tract.   Before the exam  Follow these and any other instructions you are given before your endoscopy. If you dont follow the healthcare providers instructions carefully, the test may need to be canceled or done over:  · Don't eat or drink anything after midnight the night before your exam. If your exam is in the afternoon, drink only clear liquids in the morning. Don't eat or drink anything for 8 hours before the exam. In some cases, you may be able to take medicines with sips of water until 2 hours before the procedure. Speak with your healthcare provider about this.   · Bring your X-rays and any other test results you have.  · Because you will be sedated, arrange for an adult to drive you home after the exam.  · Tell your healthcare provider before the exam if you are taking any medicines or have any medical problems.  The procedure  Here is what to expect:  · You will lie on the endoscopy table. Usually patients lie on the left side.  · You will be monitored and given oxygen.  · Your throat may be numbed with a spray or gargle. You are given medicine through an intravenous (IV) line that will help you relax and remain comfortable. You may be awake or asleep during the procedure.  · The healthcare provider will put the endoscope in your mouth and down your esophagus. It is thinner than most pieces of food that you swallow. It will not affect your breathing. The medicine helps keep you from gagging.  · Air is put into your GI tract to expand it. It can make you burp.  · During the procedure, the healthcare provider can take biopsies (tissue samples), remove abnormalities,  such as polyps, or treat abnormalities through a variety of devices placed through the endoscope. You will not feel this.   · The endoscope carries images of your upper GI tract to a video screen. If you are awake, you may be able to look at the images.  · After the procedure is done, you will rest for a time. An adult must drive you home.  When to call your healthcare provider  Contact your healthcare provider if you have:  · Black or tarry stools, or blood in your stool  · Fever  · Pain in your belly that does not go away  · Nausea and vomiting, or vomiting blood   Date Last Reviewed: 7/1/2016 © 2000-2017 [employer]. [address] [place] [address], [place] [address]. All rights reserved. This information is not intended as a substitute for professional medical care. Always follow your healthcare professional's instructions.        Colonoscopy     A camera attached to a flexible tube with a viewing lens is used to take video pictures.     Colonoscopy is a test to view the inside of your lower digestive tract (colon and rectum). Sometimes it can show the last part of the small intestine (ileum). During the test, small pieces of tissue may be removed for testing. This is called a biopsy. Small growths, such as polyps, may also be removed.   Why is colonoscopy done?  The test is done to help look for colon cancer. And it can help find the source of abdominal pain, bleeding, and changes in bowel habits. It may be needed once a year, depending on factors such as your:  · Age  · Health history  · Family health history  · Symptoms  · Results from any prior colonoscopy  Risks and possible complications  These include:  · Bleeding               · A puncture or tear in the colon   · Risks of anesthesia  · A cancer lesion not being seen  Getting ready   To prepare for the test:  · Talk with your healthcare provider about the risks of the test (see below). Also ask your healthcare provider about alternatives to the  test.  · Tell your healthcare provider about any medicines you take. Also tell him or her about any health conditions you may have.  · Make sure your rectum and colon are empty for the test. Follow the diet and bowel prep instructions exactly. If you dont, the test may need to be rescheduled.  · Plan for a friend or family member to drive you home after the test.     Colonoscopy provides an inside view of the entire colon.     You may discuss the results with your doctor right away or at a future visit.  During the test   The test is usually done in the hospital on an outpatient basis. This means you go home the same day. The procedure takes about 30 minutes. During that time:  · You are given relaxing (sedating) medicine through an IV line. You may be drowsy, or fully asleep.  · The healthcare provider will first give you a physical exam to check for anal and rectal problems.  · Then the anus is lubricated and the scope inserted.  · If you are awake, you may have a feeling similar to needing to have a bowel movement. You may also feel pressure as air is pumped into the colon. Its OK to pass gas during the procedure.  · Biopsy, polyp removal, or other treatments may be done during the test.  After the test   You may have gas right after the test. It can help to try to pass it to help prevent later bloating. Your healthcare provider may discuss the results with you right away. Or you may need to schedule a follow-up visit to talk about the results. After the test, you can go back to your normal eating and other activities. You may be tired from the sedation and need to rest for a few hours.  Date Last Reviewed: 11/1/2016 © 2000-2017 [employer]. [address] [place] [address]. All rights reserved. This information is not intended as a substitute for professional medical care. Always follow your healthcare professional's instructions.        Discharge Instructions: After Your  Surgery  Youve just had surgery. During surgery, you were given medicine called anesthesia to keep you relaxed and free of pain. After surgery, you may have some pain or nausea. This is common. Here are some tips for feeling better and getting well after surgery.     Stay on schedule with your medicine.   Going home  Your healthcare provider will show you how to take care of yourself when you go home. He or she will also answer your questions. Have an adult family member or friend drive you home. For the first 24 hours after your surgery:  · Do not drive or use heavy equipment.  · Do not make important decisions or sign legal papers.  · Do not drink alcohol.  · Have someone stay with you, if needed. He or she can watch for problems and help keep you safe.  Be sure to go to all follow-up visits with your healthcare provider. And rest after your surgery for as long as your healthcare provider tells you to.  Coping with pain  If you have pain after surgery, pain medicine will help you feel better. Take it as told, before pain becomes severe. Also, ask your healthcare provider or pharmacist about other ways to control pain. This might be with heat, ice, or relaxation. And follow any other instructions your surgeon or nurse gives you.  Tips for taking pain medicine  To get the best relief possible, remember these points:  · Pain medicines can upset your stomach. Taking them with a little food may help.  · Most pain relievers taken by mouth need at least 20 to 30 minutes to start to work.  · Taking medicine on a schedule can help you remember to take it. Try to time your medicine so that you can take it before starting an activity. This might be before you get dressed, go for a walk, or sit down for dinner.  · Constipation is a common side effect of pain medicines. Call your healthcare provider before taking any medicines such as laxatives or stool softeners to help ease constipation. Also ask if you should skip any  foods. Drinking lots of fluids and eating foods such as fruits and vegetables that are high in fiber can also help. Remember, do not take laxatives unless your surgeon has prescribed them.  · Drinking alcohol and taking pain medicine can cause dizziness and slow your breathing. It can even be deadly. Do not drink alcohol while taking pain medicine.  · Pain medicine can make you react more slowly to things. Do not drive or run machinery while taking pain medicine.  Your healthcare provider may tell you to take acetaminophen to help ease your pain. Ask him or her how much you are supposed to take each day. Acetaminophen or other pain relievers may interact with your prescription medicines or other over-the-counter (OTC) medicines. Some prescription medicines have acetaminophen and other ingredients. Using both prescription and OTC acetaminophen for pain can cause you to overdose. Read the labels on your OTC medicines with care. This will help you to clearly know the list of ingredients, how much to take, and any warnings. It may also help you not take too much acetaminophen. If you have questions or do not understand the information, ask your pharmacist or healthcare provider to explain it to you before you take the OTC medicine.  Managing nausea  Some people have an upset stomach after surgery. This is often because of anesthesia, pain, or pain medicine, or the stress of surgery. These tips will help you handle nausea and eat healthy foods as you get better. If you were on a special food plan before surgery, ask your healthcare provider if you should follow it while you get better. These tips may help:  · Do not push yourself to eat. Your body will tell you when to eat and how much.  · Start off with clear liquids and soup. They are easier to digest.  · Next try semi-solid foods, such as mashed potatoes, applesauce, and gelatin, as you feel ready.  · Slowly move to solid foods. Dont eat fatty, rich, or spicy foods  at first.  · Do not force yourself to have 3 large meals a day. Instead eat smaller amounts more often.  · Take pain medicines with a small amount of solid food, such as crackers or toast, to avoid nausea.     Call your surgeon if  · You still have pain an hour after taking medicine. The medicine may not be strong enough.  · You feel too sleepy, dizzy, or groggy. The medicine may be too strong.  · You have side effects like nausea, vomiting, or skin changes, such as rash, itching, or hives.       If you have obstructive sleep apnea  You were given anesthesia medicine during surgery to keep you comfortable and free of pain. After surgery, you may have more apnea spells because of this medicine and other medicines you were given. The spells may last longer than usual.   At home:  · Keep using the continuous positive airway pressure (CPAP) device when you sleep. Unless your healthcare provider tells you not to, use it when you sleep, day or night. CPAP is a common device used to treat obstructive sleep apnea.  · Talk with your provider before taking any pain medicine, muscle relaxants, or sedatives. Your provider will tell you about the possible dangers of taking these medicines.  Date Last Reviewed: 12/1/2016  © 9495-2949 [employer]. [address] [place] [address], [place] [address]. All rights reserved. This information is not intended as a substitute for professional medical care. Always follow your healthcare professional's instructions.

## 2019-10-07 NOTE — [employer] PATIENT INSTRUCTIONS
Discharge Summary/Instructions after an Endoscopic Procedure  Patient Name: Comfort Cantu  Patient MRN: [id number]  Patient YOB: 1955 Monday, October 07, 2019  Olaf Alba MD  RESTRICTIONS:  During your procedure today, you received medications for sedation.  These   medications may affect your judgment, balance and coordination.  Therefore,   for 24 hours, you have the following restrictions:   - DO NOT drive a car, operate machinery, make legal/financial decisions,   sign important papers or drink alcohol.    ACTIVITY:  Today: no heavy lifting, straining or running due to procedural   sedation/anesthesia.  The following day: return to full activity including work.  DIET:  Eat and drink normally unless instructed otherwise.     TREATMENT FOR COMMON SIDE EFFECTS:  - Mild abdominal pain, nausea, belching, bloating or excessive gas:  rest,   eat lightly and use a heating pad.  - Sore Throat: treat with throat lozenges and/or gargle with warm salt   water.  - Because air was used during the procedure, expelling large amounts of air   from your rectum or belching is normal.  - If a bowel prep was taken, you may not have a bowel movement for 1-3 days.    This is normal.  SYMPTOMS TO WATCH FOR AND REPORT TO YOUR PHYSICIAN:  1. Abdominal pain or bloating, other than gas cramps.  2. Chest pain.  3. Back pain.  4. Signs of infection such as: chills or fever occurring within 24 hours   after the procedure.  5. Rectal bleeding, which would show as bright red, maroon, or black stools.   (A tablespoon of blood from the rectum is not serious, especially if   hemorrhoids are present.)  6. Vomiting.  7. Weakness or dizziness.  GO DIRECTLY TO THE NEAREST EMERGENCY ROOM IF YOU HAVE ANY OF THE FOLLOWING:      Difficulty breathing              Chills and/or fever over 101 F   Persistent vomiting and/or vomiting blood   Severe abdominal pain   Severe chest pain   Black, tarry stools   Bleeding- more than one  tablespoon   Any other symptom or condition that you feel may need urgent attention  Your doctor recommends these additional instructions:  If any biopsies were taken, your doctors clinic will contact you in 1 to 2   weeks with any results.  - Discharge patient to home (ambulatory).   - High fiber diet.   - Continue present medications.   - Await pathology results.   - Return to my office in 2 weeks.  For questions, problems or results please call your physician - Olaf Alba MD at Work:  [phone].  [facility] EMERGENCY ROOM PHONE NUMBER: [phone]  IF A COMPLICATION OR EMERGENCY SITUATION ARISES AND YOU ARE UNABLE TO REACH   YOUR PHYSICIAN - GO DIRECTLY TO THE EMERGENCY ROOM.  MD Olaf Samuels MD  10/7/2019 11:07:51 AM  This report has been verified and signed electronically.  [employer]

## 2019-10-07 NOTE — PROGRESS NOTES
[facility] Surgery H&P Note    Subjective:       Patient ID: Comfort Cantu is a 63 y.o. female.    Chief Complaint: No chief complaint on file.    HPI:  Comfort Cantu is a 63 y.o. female with history of anxiety, atherosclerosis, depression, gastroesophageal reflux disease, hypertension, previous colon polyps, previous leg and neck stents for atherosclerosis currently effient therapy presents today as a referral from Dr. Tran for evaluation of anemia with iron deficiency.  Patient states recently she has been having issues with high blood pressure.  This is better controlled now.  Patient recently saw her cardiologist who recommend a stress test.  This has not been done yet.  She recently saw her primary care physician and underwent routine laboratory assessments.  She was found to have and iron deficient component anemia.  Patient previously underwent EGD and colonoscopy 2 years ago.  Evidence of gastritis.  Evidence of colon polyps x3.  Patient with no family history of colon cancer.  No blood seen in the stool.  No weight changes or bowel habit changes.  She now presents today for evaluation of iron deficiency anemia.    Past Medical History:   Diagnosis Date    Anxiety     Atherosclerosis     Depression     GERD (gastroesophageal reflux disease)     Hypertension      Past Surgical History:   Procedure Laterality Date    APPENDECTOMY      CAROTID STENT  2014    ENDOSCOPIC ULTRASOUND OF UPPER GASTROINTESTINAL TRACT N/A 10/16/2018    Procedure: ULTRASOUND, UPPER GI TRACT, ENDOSCOPIC;  Surgeon: Kumar Wang MD;  Location: [facility] ([address] [place] [address]);  Service: Endoscopy;  Laterality: N/A;  5 day hold Effient, Dr Сергей Malagon    FEMORAL ARTERY STENT       Family History   Problem Relation Age of Onset    Heart attack Brother     Diabetes Brother     Hyperlipidemia Brother      Social History     Socioeconomic History    Marital status:      Spouse name: Not on file    Number of  children: Not on file    Years of education: Not on file    Highest education level: Not on file   Occupational History    Not on file   Social Needs    Financial resource strain: Not on file    Food insecurity:     Worry: Not on file     Inability: Not on file    Transportation needs:     Medical: Not on file     Non-medical: Not on file   Tobacco Use    Smoking status: Current Every Day Smoker     Packs/day: 1.00     Years: 23.00     Pack years: 23.00     Types: Cigarettes     Start date: 10/1/1988    Smokeless tobacco: Never Used   Substance and Sexual Activity    Alcohol use: Yes     Comment: occ    Drug use: No    Sexual activity: Not Currently   Lifestyle    Physical activity:     Days per week: Not on file     Minutes per session: Not on file    Stress: Not on file   Relationships    Social connections:     Talks on phone: Not on file     Gets together: Not on file     Attends Congregation service: Not on file     Active member of club or organization: Not on file     Attends meetings of clubs or organizations: Not on file     Relationship status: Not on file   Other Topics Concern    Not on file   Social History Narrative    Not on file       Current Facility-Administered Medications   Medication Dose Route Frequency Provider Last Rate Last Dose    0.9%  NaCl infusion   Intravenous Continuous Olaf Alba MD        0.9%  NaCl infusion   Intravenous Continuous Olaf Alba MD        diphenhydrAMINE injection 12.5 mg  12.5 mg Intravenous PRN Adan Cheung MD        famotidine (PF) 20 mg/2 mL injection             lactated ringers infusion   Intravenous Continuous Adan Cheung MD        lactated ringers infusion  125 mL/hr Intravenous Continuous Adan Cheung MD        lidocaine (PF) 10 mg/ml (1%) injection 10 mg  1 mL Intradermal Once Adan Cheung MD        ondansetron injection 4 mg  4 mg Intravenous Daily PRN Adan Cheung MD        promethazine  "(PHENERGAN) 6.25 mg in dextrose 5 % 50 mL IVPB  6.25 mg Intravenous Q10 Min PRN Adan Cheung MD         Review of patient's allergies indicates:   Allergen Reactions    Penicillins        Review of Systems   Constitutional: Negative for activity change, appetite change, chills and fever.   HENT: Negative for congestion, dental problem and ear discharge.    Eyes: Negative for discharge and itching.   Respiratory: Negative for apnea, choking and chest tightness.    Cardiovascular: Negative for chest pain and leg swelling.   Gastrointestinal: Negative for abdominal distention, abdominal pain, anal bleeding, constipation, diarrhea and nausea.   Endocrine: Negative for cold intolerance and heat intolerance.   Genitourinary: Negative for difficulty urinating and dyspareunia.   Musculoskeletal: Negative for arthralgias and back pain.   Skin: Negative for color change and pallor.   Neurological: Negative for dizziness and facial asymmetry.   Hematological: Negative for adenopathy. Does not bruise/bleed easily.   Psychiatric/Behavioral: Negative for agitation and behavioral problems.       Objective:      Vitals:    10/07/19 0812 10/07/19 0826   BP: (!) 161/88 (!) 161/88   BP Location: Right arm    Patient Position: Lying    Pulse: 82    Resp: 20    Temp: 98 °F (36.7 °C)    TempSrc: Oral    SpO2: 97%    Weight: 56.2 kg (124 lb)    Height: 5' 8" (1.727 m)      Physical Exam   Constitutional: She is oriented to person, place, and time. She appears well-developed and well-nourished. No distress.   HENT:   Head: Normocephalic and atraumatic.   Eyes: Pupils are equal, round, and reactive to light. EOM are normal.   Neck: Normal range of motion. Neck supple. No thyromegaly present.   Cardiovascular: Normal rate and regular rhythm.   Pulmonary/Chest: Effort normal and breath sounds normal.   Abdominal: Soft. Bowel sounds are normal. She exhibits no distension. There is no tenderness.   Musculoskeletal: Normal range of motion. " She exhibits no edema or deformity.   Neurological: She is alert and oriented to person, place, and time. No cranial nerve deficit.   Skin: Skin is warm. Capillary refill takes less than 2 seconds. No erythema.   Psychiatric: She has a normal mood and affect. Her behavior is normal.           Lab Results   Component Value Date    WBC 8.31 10/02/2019    HGB 10.4 (L) 10/02/2019    HCT 30.7 (L) 10/02/2019    MCV 98 10/02/2019     10/02/2019     Iron 37.  Saturated iron 7%.  TIBC 537, up.    Iron deficiency anemia    Medical Decision Making/Counseling:  New issue.  Iron deficiency anemia.  Patient with previous EGD and colonoscopy 2 years ago.  Colon polyps found. Differential diagnosis with iron deficiency anemia could be gastrointestinal neoplasms, arteriovenous malformations, hemorrhoids, diverticulosis, gastritis, peptic ulcer disease, Helicobacter pylori infection, etc.  Ultimately I believe the next best steps for this patient's care would be for the patient undergo EGD and colonoscopy for further delineation of this iron deficiency anemia.    Patient high risk for post endoscopy bleeding.  Chronic anticoagulant therapy.  Will obtain cardiac clearance from the patient's cardiologist given stress test pending.  Will also obtain vascular surgery clearance for the patient to stop effient for 3 days prior to endoscopy.  Patient voiced understanding.  She will be notified to stop Effient 3 days before if her vascular surgeon clears her to do so.    Will obtain preoperative lab test to include CBC, CMP and EKG for review by the Anesthesiologist the day of the procedure prior to induction of the anesthetic agent of choice.    Risk and benefits of EGD were discussed in clinic in depth.  Risk of EGD were discussed to include bleeding as well as perforation.  Patient understands that if the above procedural risks were to occur, he could need further intervention to include but not exclude a blood transfusion, repeat  procedure, admission to the hospital, or even surgery which would likely require transfer to a higher level of care.     Risks and benefits of Colonoscopy were discussed in depth in clinic as well.  From a procedural standpoint, we discuss the benefits of colonoscopy to be finding colon cancers at early stages, including polyps which can be endoscopically resectable, to finding early stage colon cancers which can be better treated with current medical and surgical therapies in order to give patients a longer survival, if found in these early stages.  From a standpoint of risks, the risk of bleeding and perforation of the colon were discussed.  I personally discussed that if complications of bleeding or perforation were to occur, the patient could need as little as a blood transfusion and as much as possible hospital admission, repeat procedure, or even surgery.  During today's discussion of the procedure of colonoscopy with the patient, I personally christine the patient a picture to assist with counseling.  Total clinic time spent today 45 minutes face-to-face, with greater than half of the time spent in face to face counseling.    Patient instructed that best way to communicate with my office staff is for patient to get on the [facility] patient portal to expedite communication and communication issues that may occur.  Patient was given instructions on how to get on the portal.  I encouraged patient to obtain portal access as well.  Ultimately it is up to the patient to obtain access.  Patient voiced understanding.    Patient seen and examined.  Last dose of blood thinner end of last week.  Has been off for greater than 72 hours.  Prep yesterday.  Risks and benefits of egd and colonoscopy discussed.  Patient voiced understanding and wishes to proceed today by signing informed consent.

## 2019-10-07 NOTE — DISCHARGE SUMMARY
Discharge Note        SUMMARY     Admit Date: 10/7/2019    Attending Physician: Olaf Alba MD     Discharge Physician: Olaf Alba MD    Discharge Date: 10/7/2019 11:00 AM      Hospital Course: Patient tolerated procedure well.     Disposition: Home or Self Care    Patient Instructions:   Current Discharge Medication List      CONTINUE these medications which have NOT CHANGED    Details   atorvastatin (LIPITOR) 10 MG tablet Take 10 mg by mouth once daily.      busPIRone (BUSPAR) 15 MG tablet Take 1 tablet (15 mg total) by mouth 2 (two) times daily.  Qty: 180 tablet, Refills: 3      cloNIDine (CATAPRES) 0.1 MG tablet Take 1 tablet (0.1 mg total) by mouth 2 (two) times daily.  Qty: 60 tablet, Refills: 11    Associated Diagnoses: Hypertension; Acute cystitis without hematuria      diazePAM (VALIUM) 5 MG tablet TAKE 1 TABLET EVERY 12 HOURS AS NEEDED FOR ANXIETY  Qty: 60 tablet, Refills: 0      DULoxetine (CYMBALTA) 30 MG capsule Take 1 capsule (30 mg total) by mouth once daily.  Qty: 90 capsule, Refills: 3      GAVILYTE-G 236-22.74-6.74 -5.86 gram suspension       isosorbide mononitrate (IMDUR) 30 MG 24 hr tablet Take 30 mg by mouth once daily.      lisinopril 10 MG tablet Take 1 tablet (10 mg total) by mouth once daily. for 7 days  Qty: 7 tablet, Refills: 0    Associated Diagnoses: Benign essential hypertension      pantoprazole (PROTONIX) 40 MG tablet Take 1 tablet (40 mg total) by mouth once daily.  Qty: 90 tablet, Refills: 3      clindamycin (CLEOCIN) 300 MG capsule Take 1 capsule (300 mg total) by mouth every 6 (six) hours. for 10 days  Qty: 40 capsule, Refills: 0    Associated Diagnoses: Chronic maxillary sinusitis      prasugrel (EFFIENT) 5 mg Tab once daily.             Discharge Procedure Orders (must include Diet, Follow-up, Activity):   Discharge Procedure Orders (must include Diet, Follow-up, Activity)   Diet general     Call MD for:  temperature >100.4     Call MD for:  persistent  nausea and vomiting     Call MD for:  severe uncontrolled pain     Call MD for:  difficulty breathing, headache or visual disturbances     Call MD for:  redness, tenderness, or signs of infection (pain, swelling, redness, odor or green/yellow discharge around incision site)     Call MD for:  persistent dizziness or light-headedness     Other restrictions (specify):   Order Comments: Patient may restart effient on Saturday 10/12/19 If no bleeding noted.        Follow Up:  Follow up as scheduled.  Resume routine diet.  Activity as tolerated.    No driving day of procedure.

## 2019-10-07 NOTE — PROGRESS NOTES
[facility] Surgery H&P Note    Subjective:       Patient ID: Comfort Cantu is a 63 y.o. female.    Chief Complaint: No chief complaint on file.    HPI:  Comfort Cantu is a 63 y.o. female with history of anxiety, atherosclerosis, depression, gastroesophageal reflux disease, hypertension, previous colon polyps, previous leg and neck stents for atherosclerosis currently effient therapy presents today as a referral from Dr. Tran for evaluation of anemia with iron deficiency.  Patient states recently she has been having issues with high blood pressure.  This is better controlled now.  Patient recently saw her cardiologist who recommend a stress test.  This has not been done yet.  She recently saw her primary care physician and underwent routine laboratory assessments.  She was found to have and iron deficient component anemia.  Patient previously underwent EGD and colonoscopy 2 years ago.  Evidence of gastritis.  Evidence of colon polyps x3.  Patient with no family history of colon cancer.  No blood seen in the stool.  No weight changes or bowel habit changes.  She now presents today for evaluation of iron deficiency anemia.    Past Medical History:   Diagnosis Date    Anxiety     Atherosclerosis     Depression     GERD (gastroesophageal reflux disease)     Hypertension      Past Surgical History:   Procedure Laterality Date    APPENDECTOMY      CAROTID STENT  2014    ENDOSCOPIC ULTRASOUND OF UPPER GASTROINTESTINAL TRACT N/A 10/16/2018    Procedure: ULTRASOUND, UPPER GI TRACT, ENDOSCOPIC;  Surgeon: Kumar Wang MD;  Location: [facility] ([address] [place] [address]);  Service: Endoscopy;  Laterality: N/A;  5 day hold Effient, Dr Сергей Malagon    FEMORAL ARTERY STENT       Family History   Problem Relation Age of Onset    Heart attack Brother     Diabetes Brother     Hyperlipidemia Brother      Social History     Socioeconomic History    Marital status:      Spouse name: Not on file    Number of  children: Not on file    Years of education: Not on file    Highest education level: Not on file   Occupational History    Not on file   Social Needs    Financial resource strain: Not on file    Food insecurity:     Worry: Not on file     Inability: Not on file    Transportation needs:     Medical: Not on file     Non-medical: Not on file   Tobacco Use    Smoking status: Current Every Day Smoker     Packs/day: 1.00     Years: 23.00     Pack years: 23.00     Types: Cigarettes     Start date: 10/1/1988    Smokeless tobacco: Never Used   Substance and Sexual Activity    Alcohol use: Yes     Comment: occ    Drug use: No    Sexual activity: Not Currently   Lifestyle    Physical activity:     Days per week: Not on file     Minutes per session: Not on file    Stress: Not on file   Relationships    Social connections:     Talks on phone: Not on file     Gets together: Not on file     Attends Oriental orthodox service: Not on file     Active member of club or organization: Not on file     Attends meetings of clubs or organizations: Not on file     Relationship status: Not on file   Other Topics Concern    Not on file   Social History Narrative    Not on file       No current facility-administered medications for this encounter.      Review of patient's allergies indicates:   Allergen Reactions    Penicillins        Review of Systems   Constitutional: Negative for activity change, appetite change, chills and fever.   HENT: Negative for congestion, dental problem and ear discharge.    Eyes: Negative for discharge and itching.   Respiratory: Negative for apnea, choking and chest tightness.    Cardiovascular: Negative for chest pain and leg swelling.   Gastrointestinal: Negative for abdominal distention, abdominal pain, anal bleeding, constipation, diarrhea and nausea.   Endocrine: Negative for cold intolerance and heat intolerance.   Genitourinary: Negative for difficulty urinating and dyspareunia.   Musculoskeletal:  "Negative for arthralgias and back pain.   Skin: Negative for color change and pallor.   Neurological: Negative for dizziness and facial asymmetry.   Hematological: Negative for adenopathy. Does not bruise/bleed easily.   Psychiatric/Behavioral: Negative for agitation and behavioral problems.       Objective:      Vitals:    10/07/19 0812 10/07/19 0826   BP: (!) 161/88 (!) 161/88   BP Location: Right arm    Patient Position: Lying    Pulse: 82    Resp: 20    Temp: 98 °F (36.7 °C)    TempSrc: Oral    SpO2: 97%    Weight: 56.2 kg (124 lb)    Height: 5' 8" (1.727 m)      Physical Exam   Constitutional: She is oriented to person, place, and time. She appears well-developed and well-nourished. No distress.   HENT:   Head: Normocephalic and atraumatic.   Eyes: Pupils are equal, round, and reactive to light. EOM are normal.   Neck: Normal range of motion. Neck supple. No thyromegaly present.   Cardiovascular: Normal rate and regular rhythm.   Pulmonary/Chest: Effort normal and breath sounds normal.   Abdominal: Soft. Bowel sounds are normal. She exhibits no distension. There is no tenderness.   Musculoskeletal: Normal range of motion. She exhibits no edema or deformity.   Neurological: She is alert and oriented to person, place, and time. No cranial nerve deficit.   Skin: Skin is warm. Capillary refill takes less than 2 seconds. No erythema.   Psychiatric: She has a normal mood and affect. Her behavior is normal.           Lab Results   Component Value Date    WBC 8.31 10/02/2019    HGB 10.4 (L) 10/02/2019    HCT 30.7 (L) 10/02/2019    MCV 98 10/02/2019     10/02/2019     Iron 37.  Saturated iron 7%.  TIBC 537, up.    Iron deficiency anemia    Medical Decision Making/Counseling:  New issue.  Iron deficiency anemia.  Patient with previous EGD and colonoscopy 2 years ago.  Colon polyps found. Differential diagnosis with iron deficiency anemia could be gastrointestinal neoplasms, arteriovenous malformations, " hemorrhoids, diverticulosis, gastritis, peptic ulcer disease, Helicobacter pylori infection, etc.  Ultimately I believe the next best steps for this patient's care would be for the patient undergo EGD and colonoscopy for further delineation of this iron deficiency anemia.    Patient high risk for post endoscopy bleeding.  Chronic anticoagulant therapy.  Will obtain cardiac clearance from the patient's cardiologist given stress test pending.  Will also obtain vascular surgery clearance for the patient to stop effient for 3 days prior to endoscopy.  Patient voiced understanding.  She will be notified to stop Effient 3 days before if her vascular surgeon clears her to do so.    Will obtain preoperative lab test to include CBC, CMP and EKG for review by the Anesthesiologist the day of the procedure prior to induction of the anesthetic agent of choice.    Risk and benefits of EGD were discussed in clinic in depth.  Risk of EGD were discussed to include bleeding as well as perforation.  Patient understands that if the above procedural risks were to occur, he could need further intervention to include but not exclude a blood transfusion, repeat procedure, admission to the hospital, or even surgery which would likely require transfer to a higher level of care.     Risks and benefits of Colonoscopy were discussed in depth in clinic as well.  From a procedural standpoint, we discuss the benefits of colonoscopy to be finding colon cancers at early stages, including polyps which can be endoscopically resectable, to finding early stage colon cancers which can be better treated with current medical and surgical therapies in order to give patients a longer survival, if found in these early stages.  From a standpoint of risks, the risk of bleeding and perforation of the colon were discussed.  I personally discussed that if complications of bleeding or perforation were to occur, the patient could need as little as a blood  transfusion and as much as possible hospital admission, repeat procedure, or even surgery.  During today's discussion of the procedure of colonoscopy with the patient, I personally christine the patient a picture to assist with counseling.  Total clinic time spent today 45 minutes face-to-face, with greater than half of the time spent in face to face counseling.    Patient instructed that best way to communicate with my office staff is for patient to get on the [facility] epic patient portal to expedite communication and communication issues that may occur.  Patient was given instructions on how to get on the portal.  I encouraged patient to obtain portal access as well.  Ultimately it is up to the patient to obtain access.  Patient voiced understanding.    Patient seen and examined.  Last dose of blood thinner end of last week.  Has been off for greater than 72 hours.  Prep yesterday.  Risks and benefits of egd and colonoscopy discussed.  Patient voiced understanding and wishes to proceed today by signing informed consent.

## 2019-10-07 NOTE — ANESTHESIA POSTPROCEDURE EVALUATION
Anesthesia Post Evaluation    Patient: oCmfort Cantu    Procedure(s) Performed: Procedure(s) (LRB):  COLONOSCOPY (N/A)  ESOPHAGOGASTRODUODENOSCOPY (EGD) (N/A)    Final Anesthesia Type: general  Patient location during evaluation: PACU  Patient participation: Yes- Able to Participate  Level of consciousness: awake and alert  Post-procedure vital signs: reviewed and stable  Pain management: adequate  Airway patency: patent  PONV status at discharge: No PONV  Anesthetic complications: no      Cardiovascular status: blood pressure returned to baseline  Respiratory status: unassisted  Hydration status: euvolemic  Follow-up not needed.          Vitals Value Taken Time   /79 10/7/2019 11:38 AM   Temp 36.6 °C (97.9 °F) 10/7/2019 11:00 AM   Pulse 63 10/7/2019 11:38 AM   Resp 15 10/7/2019 11:38 AM   SpO2 98 % 10/7/2019 11:30 AM   Vitals shown include unvalidated device data.      Event Time     Out of Recovery 11:30:45          Pain/Deann Score: Deann Score: 10 (10/7/2019 11:30 AM)

## 2019-10-07 NOTE — ANESTHESIA PREPROCEDURE EVALUATION
10/07/2019  Comfort Cantu is a 63 y.o., female.    Anesthesia Evaluation    I have reviewed the Patient Summary Reports.    I have reviewed the Nursing Notes.   I have reviewed the Medications.     Review of Systems  Anesthesia Hx:  No problems with previous Anesthesia    Social:  Smoker    Hematology/Oncology:  Hematology Normal   Oncology Normal     EENT/Dental:EENT/Dental Normal   Cardiovascular:   Hypertension CAD   PVD    Pulmonary:  Pulmonary Normal    Renal/:   Chronic Renal Disease    Hepatic/GI:   GERD    Endocrine:  Endocrine Normal    Psych:   Psychiatric History anxiety          Physical Exam  General:  Well nourished    Airway/Jaw/Neck:  Airway Findings: Mouth Opening: Normal Tongue: Normal  General Airway Assessment: Adult  Mallampati: II  TM Distance: Normal, at least 6 cm  Jaw/Neck Findings:  Neck ROM: Normal ROM      Dental:  Dental Findings: Loose teeth   Chest/Lungs:  Chest/Lungs Findings: Clear to auscultation     Heart/Vascular:  Heart Findings: Rate: Normal  Rhythm: Regular Rhythm        Mental Status:  Mental Status Findings:  Cooperative, Alert and Oriented         Anesthesia Plan  Type of Anesthesia, risks & benefits discussed:  Anesthesia Type:  general  Patient's Preference:   Intra-op Monitoring Plan: standard ASA monitors  Intra-op Monitoring Plan Comments:   Post Op Pain Control Plan: IV/PO Opioids PRN  Post Op Pain Control Plan Comments:   Induction:    Beta Blocker:  Patient is not currently on a Beta-Blocker (No further documentation required).       Informed Consent:  Anesthesia consent signed with patient.  ASA Score: 3     Day of Surgery Review of History & Physical: I have interviewed and examined the patient. I have reviewed the patient's H&P dated:            Ready For Surgery From Anesthesia Perspective.

## 2019-10-07 NOTE — [employer] PATIENT INSTRUCTIONS
Discharge Summary/Instructions after an Endoscopic Procedure  Patient Name: Comfort Cantu  Patient MRN: [id number]  Patient YOB: 1955 Monday, October 07, 2019  Olaf Alba MD  RESTRICTIONS:  During your procedure today, you received medications for sedation.  These   medications may affect your judgment, balance and coordination.  Therefore,   for 24 hours, you have the following restrictions:   - DO NOT drive a car, operate machinery, make legal/financial decisions,   sign important papers or drink alcohol.    ACTIVITY:  Today: no heavy lifting, straining or running due to procedural   sedation/anesthesia.  The following day: return to full activity including work.  DIET:  Eat and drink normally unless instructed otherwise.     TREATMENT FOR COMMON SIDE EFFECTS:  - Mild abdominal pain, nausea, belching, bloating or excessive gas:  rest,   eat lightly and use a heating pad.  - Sore Throat: treat with throat lozenges and/or gargle with warm salt   water.  - Because air was used during the procedure, expelling large amounts of air   from your rectum or belching is normal.  - If a bowel prep was taken, you may not have a bowel movement for 1-3 days.    This is normal.  SYMPTOMS TO WATCH FOR AND REPORT TO YOUR PHYSICIAN:  1. Abdominal pain or bloating, other than gas cramps.  2. Chest pain.  3. Back pain.  4. Signs of infection such as: chills or fever occurring within 24 hours   after the procedure.  5. Rectal bleeding, which would show as bright red, maroon, or black stools.   (A tablespoon of blood from the rectum is not serious, especially if   hemorrhoids are present.)  6. Vomiting.  7. Weakness or dizziness.  GO DIRECTLY TO THE NEAREST EMERGENCY ROOM IF YOU HAVE ANY OF THE FOLLOWING:      Difficulty breathing              Chills and/or fever over 101 F   Persistent vomiting and/or vomiting blood   Severe abdominal pain   Severe chest pain   Black, tarry stools   Bleeding- more than one  tablespoon   Any other symptom or condition that you feel may need urgent attention  Your doctor recommends these additional instructions:  If any biopsies were taken, your doctors clinic will contact you in 1 to 2   weeks with any results.  - Discharge patient to home (ambulatory).   - Resume previous diet.   - Continue present medications.   - Await pathology results.   - Repeat upper endoscopy in 2 years for surveillance.   - Return to my office in 2 weeks.  For questions, problems or results please call your physician - Olaf Alba MD at Work:  [phone].  [facility] EMERGENCY ROOM PHONE NUMBER: [phone]  IF A COMPLICATION OR EMERGENCY SITUATION ARISES AND YOU ARE UNABLE TO REACH   YOUR PHYSICIAN - GO DIRECTLY TO THE EMERGENCY ROOM.  MD Olaf Samuels MD  10/7/2019 11:03:25 AM  This report has been verified and signed electronically.  [employer]

## 2019-10-07 NOTE — H&P
[facility] Surgery H&P Note    Subjective:       Patient ID: Comfort Cantu is a 63 y.o. female.    Chief Complaint: No chief complaint on file.    HPI:  Comfort Cantu is a 63 y.o. female with history of anxiety, atherosclerosis, depression, gastroesophageal reflux disease, hypertension, previous colon polyps, previous leg and neck stents for atherosclerosis currently effient therapy presents today as a referral from Dr. Tran for evaluation of anemia with iron deficiency.  Patient states recently she has been having issues with high blood pressure.  This is better controlled now.  Patient recently saw her cardiologist who recommend a stress test.  This has not been done yet.  She recently saw her primary care physician and underwent routine laboratory assessments.  She was found to have and iron deficient component anemia.  Patient previously underwent EGD and colonoscopy 2 years ago.  Evidence of gastritis.  Evidence of colon polyps x3.  Patient with no family history of colon cancer.  No blood seen in the stool.  No weight changes or bowel habit changes.  She now presents today for evaluation of iron deficiency anemia.    Past Medical History:   Diagnosis Date    Anxiety     Atherosclerosis     Depression     GERD (gastroesophageal reflux disease)     Hypertension      Past Surgical History:   Procedure Laterality Date    APPENDECTOMY      CAROTID STENT  2014    ENDOSCOPIC ULTRASOUND OF UPPER GASTROINTESTINAL TRACT N/A 10/16/2018    Procedure: ULTRASOUND, UPPER GI TRACT, ENDOSCOPIC;  Surgeon: Kumar Wang MD;  Location: [facility] ([address] [place] [address]);  Service: Endoscopy;  Laterality: N/A;  5 day hold Effient, Dr Сергей Malagon    FEMORAL ARTERY STENT       Family History   Problem Relation Age of Onset    Heart attack Brother     Diabetes Brother     Hyperlipidemia Brother      Social History     Socioeconomic History    Marital status:      Spouse name: Not on file    Number of  children: Not on file    Years of education: Not on file    Highest education level: Not on file   Occupational History    Not on file   Social Needs    Financial resource strain: Not on file    Food insecurity:     Worry: Not on file     Inability: Not on file    Transportation needs:     Medical: Not on file     Non-medical: Not on file   Tobacco Use    Smoking status: Current Every Day Smoker     Packs/day: 1.00     Years: 23.00     Pack years: 23.00     Types: Cigarettes     Start date: 10/1/1988    Smokeless tobacco: Never Used   Substance and Sexual Activity    Alcohol use: Yes     Comment: occ    Drug use: No    Sexual activity: Not Currently   Lifestyle    Physical activity:     Days per week: Not on file     Minutes per session: Not on file    Stress: Not on file   Relationships    Social connections:     Talks on phone: Not on file     Gets together: Not on file     Attends Spiritism service: Not on file     Active member of club or organization: Not on file     Attends meetings of clubs or organizations: Not on file     Relationship status: Not on file   Other Topics Concern    Not on file   Social History Narrative    Not on file       Current Facility-Administered Medications   Medication Dose Route Frequency Provider Last Rate Last Dose    0.9%  NaCl infusion   Intravenous Continuous Olaf Alba MD        0.9%  NaCl infusion   Intravenous Continuous Olaf Alba MD        diphenhydrAMINE injection 12.5 mg  12.5 mg Intravenous PRN Adan Cheung MD        famotidine (PF) 20 mg/2 mL injection             lactated ringers infusion   Intravenous Continuous Adan Cheung MD        lactated ringers infusion  125 mL/hr Intravenous Continuous Adan Cheung MD        lidocaine (PF) 10 mg/ml (1%) injection 10 mg  1 mL Intradermal Once Adan Cheung MD        ondansetron injection 4 mg  4 mg Intravenous Daily PRN Adan Cheung MD        promethazine  "(PHENERGAN) 6.25 mg in dextrose 5 % 50 mL IVPB  6.25 mg Intravenous Q10 Min PRN Adan Cheung MD         Review of patient's allergies indicates:   Allergen Reactions    Penicillins        Review of Systems   Constitutional: Negative for activity change, appetite change, chills and fever.   HENT: Negative for congestion, dental problem and ear discharge.    Eyes: Negative for discharge and itching.   Respiratory: Negative for apnea, choking and chest tightness.    Cardiovascular: Negative for chest pain and leg swelling.   Gastrointestinal: Negative for abdominal distention, abdominal pain, anal bleeding, constipation, diarrhea and nausea.   Endocrine: Negative for cold intolerance and heat intolerance.   Genitourinary: Negative for difficulty urinating and dyspareunia.   Musculoskeletal: Negative for arthralgias and back pain.   Skin: Negative for color change and pallor.   Neurological: Negative for dizziness and facial asymmetry.   Hematological: Negative for adenopathy. Does not bruise/bleed easily.   Psychiatric/Behavioral: Negative for agitation and behavioral problems.       Objective:      Vitals:    10/07/19 0812 10/07/19 0826   BP: (!) 161/88 (!) 161/88   BP Location: Right arm    Patient Position: Lying    Pulse: 82    Resp: 20    Temp: 98 °F (36.7 °C)    TempSrc: Oral    SpO2: 97%    Weight: 56.2 kg (124 lb)    Height: 5' 8" (1.727 m)      Physical Exam   Constitutional: She is oriented to person, place, and time. She appears well-developed and well-nourished. No distress.   HENT:   Head: Normocephalic and atraumatic.   Eyes: Pupils are equal, round, and reactive to light. EOM are normal.   Neck: Normal range of motion. Neck supple. No thyromegaly present.   Cardiovascular: Normal rate and regular rhythm.   Pulmonary/Chest: Effort normal and breath sounds normal.   Abdominal: Soft. Bowel sounds are normal. She exhibits no distension. There is no tenderness.   Musculoskeletal: Normal range of motion. " She exhibits no edema or deformity.   Neurological: She is alert and oriented to person, place, and time. No cranial nerve deficit.   Skin: Skin is warm. Capillary refill takes less than 2 seconds. No erythema.   Psychiatric: She has a normal mood and affect. Her behavior is normal.           Lab Results   Component Value Date    WBC 8.31 10/02/2019    HGB 10.4 (L) 10/02/2019    HCT 30.7 (L) 10/02/2019    MCV 98 10/02/2019     10/02/2019     Iron 37.  Saturated iron 7%.  TIBC 537, up.    Iron deficiency anemia    Medical Decision Making/Counseling:  New issue.  Iron deficiency anemia.  Patient with previous EGD and colonoscopy 2 years ago.  Colon polyps found. Differential diagnosis with iron deficiency anemia could be gastrointestinal neoplasms, arteriovenous malformations, hemorrhoids, diverticulosis, gastritis, peptic ulcer disease, Helicobacter pylori infection, etc.  Ultimately I believe the next best steps for this patient's care would be for the patient undergo EGD and colonoscopy for further delineation of this iron deficiency anemia.    Patient high risk for post endoscopy bleeding.  Chronic anticoagulant therapy.  Will obtain cardiac clearance from the patient's cardiologist given stress test pending.  Will also obtain vascular surgery clearance for the patient to stop effient for 3 days prior to endoscopy.  Patient voiced understanding.  She will be notified to stop Effient 3 days before if her vascular surgeon clears her to do so.    Will obtain preoperative lab test to include CBC, CMP and EKG for review by the Anesthesiologist the day of the procedure prior to induction of the anesthetic agent of choice.    Risk and benefits of EGD were discussed in clinic in depth.  Risk of EGD were discussed to include bleeding as well as perforation.  Patient understands that if the above procedural risks were to occur, he could need further intervention to include but not exclude a blood transfusion, repeat  procedure, admission to the hospital, or even surgery which would likely require transfer to a higher level of care.     Risks and benefits of Colonoscopy were discussed in depth in clinic as well.  From a procedural standpoint, we discuss the benefits of colonoscopy to be finding colon cancers at early stages, including polyps which can be endoscopically resectable, to finding early stage colon cancers which can be better treated with current medical and surgical therapies in order to give patients a longer survival, if found in these early stages.  From a standpoint of risks, the risk of bleeding and perforation of the colon were discussed.  I personally discussed that if complications of bleeding or perforation were to occur, the patient could need as little as a blood transfusion and as much as possible hospital admission, repeat procedure, or even surgery.  During today's discussion of the procedure of colonoscopy with the patient, I personally christine the patient a picture to assist with counseling.  Total clinic time spent today 45 minutes face-to-face, with greater than half of the time spent in face to face counseling.    Patient instructed that best way to communicate with my office staff is for patient to get on the [facility] patient portal to expedite communication and communication issues that may occur.  Patient was given instructions on how to get on the portal.  I encouraged patient to obtain portal access as well.  Ultimately it is up to the patient to obtain access.  Patient voiced understanding.    Patient seen and examined.  Last dose of blood thinner end of last week.  Has been off for greater than 72 hours.  Prep yesterday.  Risks and benefits of egd and colonoscopy discussed.  Patient voiced understanding and wishes to proceed today by signing informed consent.

## 2019-10-07 NOTE — ANESTHESIA POSTPROCEDURE EVALUATION
Anesthesia Post Evaluation    Patient: Comfort Cantu    Procedure(s) Performed: Procedure(s) (LRB):  COLONOSCOPY (N/A)  ESOPHAGOGASTRODUODENOSCOPY (EGD) (N/A)    Final Anesthesia Type: general  Patient location during evaluation: PACU  Patient participation: Yes- Able to Participate  Level of consciousness: awake and alert  Post-procedure vital signs: reviewed and stable  Pain management: adequate  Airway patency: patent  PONV status at discharge: No PONV  Anesthetic complications: no      Cardiovascular status: blood pressure returned to baseline  Respiratory status: unassisted  Hydration status: euvolemic  Follow-up not needed.          Vitals Value Taken Time   /79 10/7/2019 11:38 AM   Temp 36.6 °C (97.9 °F) 10/7/2019 11:00 AM   Pulse 63 10/7/2019 11:38 AM   Resp 15 10/7/2019 11:38 AM   SpO2 98 % 10/7/2019 11:30 AM   Vitals shown include unvalidated device data.      Event Time     Out of Recovery 11:30:45          Pain/Deann Score: Deann Score: 10 (10/7/2019 11:30 AM)

## 2019-10-07 NOTE — TRANSFER OF CARE
"Anesthesia Transfer of Care Note    Patient: Comfort Cantu    Procedure(s) Performed: Procedure(s) (LRB):  COLONOSCOPY (N/A)  ESOPHAGOGASTRODUODENOSCOPY (EGD) (N/A)    Patient location: PACU    Anesthesia Type: general    Transport from OR: Transported from OR on room air with adequate spontaneous ventilation    Post pain: adequate analgesia    Post assessment: no apparent anesthetic complications and tolerated procedure well    Post vital signs: stable    Level of consciousness: awake, alert and oriented    Nausea/Vomiting: no nausea/vomiting    Complications: none    Transfer of care protocol was followed      Last vitals:   Visit Vitals  BP (!) 161/88   Pulse 82   Temp 36.7 °C (98 °F) (Oral)   Resp 20   Ht 5' 8" (1.727 m)   Wt 56.2 kg (124 lb)   SpO2 97%   Breastfeeding? No   BMI 18.85 kg/m²     "

## 2019-10-11 DIAGNOSIS — I10 BENIGN ESSENTIAL HYPERTENSION: ICD-10-CM

## 2019-10-11 LAB
6MAM UR QL: NOT DETECTED
7AMINOCLONAZEPAM UR QL: NOT DETECTED
A-OH ALPRAZ UR QL: NOT DETECTED
ALPRAZ UR QL: NOT DETECTED
AMPHET UR QL SCN: NOT DETECTED
BARBITURATES UR QL: NOT DETECTED
BUPRENORPHINE UR QL: NOT DETECTED
BZE UR QL: NOT DETECTED
CARBOXYTHC UR QL: PRESENT
CARISOPRODOL UR QL: NOT DETECTED
CLONAZEPAM UR QL: NOT DETECTED
CODEINE UR QL: NOT DETECTED
CREAT UR-MCNC: <20 MG/DL (ref 20–400)
DIAZEPAM UR QL: NOT DETECTED
ETHYL GLUCURONIDE UR QL: PRESENT
FENTANYL UR QL: NOT DETECTED
HYDROCODONE UR QL: NOT DETECTED
HYDROMORPHONE UR QL: NOT DETECTED
LORAZEPAM UR QL: NOT DETECTED
MDA UR QL: NOT DETECTED
MDEA UR QL: NOT DETECTED
MDMA UR QL: NOT DETECTED
ME-PHENIDATE UR QL: NOT DETECTED
MEPERIDINE UR QL: NOT DETECTED
METHADONE UR QL: NOT DETECTED
METHAMPHET UR QL: NOT DETECTED
MIDAZOLAM UR QL SCN: NOT DETECTED
MORPHINE UR QL: NOT DETECTED
NORBUPRENORPHINE UR QL CFM: NOT DETECTED
NORDIAZEPAM UR QL: NOT DETECTED
NORFENTANYL UR QL: NOT DETECTED
NORHYDROCODONE UR QL CFM: NOT DETECTED
NOROXYCODONE UR QL CFM: NOT DETECTED
NOROXYMORPHONE: NOT DETECTED
OXAZEPAM UR QL: PRESENT
OXYCODONE UR QL: NOT DETECTED
OXYMORPHONE UR QL: NOT DETECTED
PATHOLOGY STUDY: ABNORMAL
PCP UR QL: NOT DETECTED
PHENTERMINE UR QL: NOT DETECTED
PROPOXYPH UR QL: NOT DETECTED
SERVICE CMNT-IMP: ABNORMAL
TAPENTADOL UR QL SCN: NOT DETECTED
TAPENTADOL-O-SULF: NOT DETECTED
TEMAZEPAM UR QL: PRESENT
TRAMADOL UR QL: NOT DETECTED
ZOLPIDEM UR QL: NOT DETECTED

## 2019-10-11 RX ORDER — LISINOPRIL 10 MG/1
10 TABLET ORAL DAILY
Qty: 90 TABLET | Refills: 3 | Status: SHIPPED | OUTPATIENT
Start: 2019-10-11 | End: 2020-04-14

## 2019-10-11 NOTE — TELEPHONE ENCOUNTER
----- Message from Smitha Sanchez sent at 10/11/2019 10:10 AM CDT -----  Contact: self   Patient requesting refill on lisinopril 10 MG tablet ,patient states she will be out soon only has 4 pills left per patient     To be sent to       [facility] - [place], [place] - [address]   [address] [facility] [address]  Phone: [phone] Fax: [phone]      Patient contact [phone] (home)

## 2019-10-14 ENCOUNTER — TELEPHONE (OUTPATIENT)
Dept: SURGERY | Facility: CLINIC | Age: 64
End: 2019-10-14

## 2019-10-14 ENCOUNTER — TELEPHONE (OUTPATIENT)
Dept: FAMILY MEDICINE | Facility: CLINIC | Age: 64
End: 2019-10-14

## 2019-10-14 NOTE — TELEPHONE ENCOUNTER
LVMx3 to inform pt of failed UDS results.       ----- Message from Darinel Riddle MD sent at 10/11/2019  6:55 AM CDT -----  Would you let her know that she had marijuana in her uds, and that we will have to wean her off the diazepam?

## 2019-10-14 NOTE — TELEPHONE ENCOUNTER
LVM for patient that her test results were not in yet but she will get them at her f/u on 10/23/19 @ 3:00 pm.

## 2019-10-14 NOTE — TELEPHONE ENCOUNTER
----- Message from Loyd Estrella MA sent at 10/14/2019 12:59 PM CDT -----  Wrong Dr. Alba .  ----- Message -----  From: Carline Brito  Sent: 10/14/2019  12:27 PM CDT  To: Parth Barraza Staff    Type:  Patient Returning Call    Who Called: Patient  Who Left Message for Patient:  Dana  Does the patient know what this is regarding?:  Possible test results  Best Call Back Number:  [phone]  Additional Information:

## 2019-10-14 NOTE — TELEPHONE ENCOUNTER
----- Message from Olaf Alba MD sent at 10/9/2019 11:31 AM CDT -----  Ok that sounds like a reasonable plan.  No worse it wound like since after the procedure. If it does she needs to be seen.    SD  ----- Message -----  From: Dana Pelayo LPN  Sent: 10/8/2019   2:23 PM CDT  To: MD Dr Parth Cash    Writer spoke to patient and patient stated that she is still having pain in her L- side as she was before her procedure. Pt was advised to continue taking OTC medication for pain and to report to the ER if pain severly increased. Please advise.    Thanks D    ----- Message -----  From: Carline Brito  Sent: 10/8/2019   1:24 PM CDT  To: Parth Babin Staff    Type: Needs Medical Advice    Who Called:  Patient  Best Call Back Number: [phone]  Additional Information: Patient had colonoscopy done yesterday and experiencing pain on left side today/would like to speak with nurse/please call patient back to advise.

## 2019-10-18 ENCOUNTER — TELEPHONE (OUTPATIENT)
Dept: FAMILY MEDICINE | Facility: CLINIC | Age: 64
End: 2019-10-18

## 2019-10-18 NOTE — TELEPHONE ENCOUNTER
TALAM for pt to return call r/t lab results.         ----- Message from Koko Telles sent at 10/18/2019  3:54 PM CDT -----  Type: Needs Medical Advice    Who Called:  Patient    Best Call Back Number: [phone]  Additional Information: Patient states that she would like a callback regarding her urine test results

## 2019-11-04 RX ORDER — DIAZEPAM 5 MG/1
TABLET ORAL
Qty: 60 TABLET | Refills: 2 | Status: SHIPPED | OUTPATIENT
Start: 2019-11-04 | End: 2019-12-23

## 2019-11-07 ENCOUNTER — OFFICE VISIT (OUTPATIENT)
Dept: SURGERY | Facility: CLINIC | Age: 64
End: 2019-11-07
Payer: MEDICARE

## 2019-11-07 VITALS
TEMPERATURE: 99 F | BODY MASS INDEX: 19.06 KG/M2 | RESPIRATION RATE: 14 BRPM | DIASTOLIC BLOOD PRESSURE: 95 MMHG | OXYGEN SATURATION: 95 % | HEIGHT: 68 IN | HEART RATE: 24 BPM | SYSTOLIC BLOOD PRESSURE: 193 MMHG | WEIGHT: 125.75 LBS

## 2019-11-07 DIAGNOSIS — D12.6 HIGH GRADE DYSPLASIA IN COLONIC ADENOMA: Primary | ICD-10-CM

## 2019-11-07 PROCEDURE — 3080F PR MOST RECENT DIASTOLIC BLOOD PRESSURE >= 90 MM HG: ICD-10-PCS | Mod: CPTII,S$GLB,, | Performed by: SURGERY

## 2019-11-07 PROCEDURE — 99214 PR OFFICE/OUTPT VISIT, EST, LEVL IV, 30-39 MIN: ICD-10-PCS | Mod: S$GLB,,, | Performed by: SURGERY

## 2019-11-07 PROCEDURE — 99214 OFFICE O/P EST MOD 30 MIN: CPT | Mod: S$GLB,,, | Performed by: SURGERY

## 2019-11-07 PROCEDURE — 3008F BODY MASS INDEX DOCD: CPT | Mod: CPTII,S$GLB,, | Performed by: SURGERY

## 2019-11-07 PROCEDURE — 3008F PR BODY MASS INDEX (BMI) DOCUMENTED: ICD-10-PCS | Mod: CPTII,S$GLB,, | Performed by: SURGERY

## 2019-11-07 PROCEDURE — 3077F SYST BP >= 140 MM HG: CPT | Mod: CPTII,S$GLB,, | Performed by: SURGERY

## 2019-11-07 PROCEDURE — 3080F DIAST BP >= 90 MM HG: CPT | Mod: CPTII,S$GLB,, | Performed by: SURGERY

## 2019-11-07 PROCEDURE — 3077F PR MOST RECENT SYSTOLIC BLOOD PRESSURE >= 140 MM HG: ICD-10-PCS | Mod: CPTII,S$GLB,, | Performed by: SURGERY

## 2019-11-07 RX ORDER — SODIUM CHLORIDE 9 MG/ML
INJECTION, SOLUTION INTRAVENOUS CONTINUOUS
Status: CANCELLED | OUTPATIENT
Start: 2019-11-07

## 2019-11-07 RX ORDER — LIDOCAINE HYDROCHLORIDE 10 MG/ML
1 INJECTION, SOLUTION EPIDURAL; INFILTRATION; INTRACAUDAL; PERINEURAL ONCE
Status: DISCONTINUED | OUTPATIENT
Start: 2019-11-07 | End: 2019-12-16 | Stop reason: HOSPADM

## 2019-11-07 NOTE — PATIENT INSTRUCTIONS
Upper GI Endoscopy     During endoscopy, a long, flexible tube is used to view the inside of your upper GI tract.      Upper GI endoscopy allows your healthcare provider to look directly into the beginning of your gastrointestinal (GI) tract. The esophagus, stomach, and duodenum (the first part of the small intestine) make up the upper GI tract.   Before the exam  Follow these and any other instructions you are given before your endoscopy. If you dont follow the healthcare providers instructions carefully, the test may need to be canceled or done over:  · Don't eat or drink anything after midnight the night before your exam. If your exam is in the afternoon, drink only clear liquids in the morning. Don't eat or drink anything for 8 hours before the exam. In some cases, you may be able to take medicines with sips of water until 2 hours before the procedure. Speak with your healthcare provider about this.   · Bring your X-rays and any other test results you have.  · Because you will be sedated, arrange for an adult to drive you home after the exam.  · Tell your healthcare provider before the exam if you are taking any medicines or have any medical problems.  The procedure  Here is what to expect:  · You will lie on the endoscopy table. Usually patients lie on the left side.  · You will be monitored and given oxygen.  · Your throat may be numbed with a spray or gargle. You are given medicine through an intravenous (IV) line that will help you relax and remain comfortable. You may be awake or asleep during the procedure.  · The healthcare provider will put the endoscope in your mouth and down your esophagus. It is thinner than most pieces of food that you swallow. It will not affect your breathing. The medicine helps keep you from gagging.  · Air is put into your GI tract to expand it. It can make you burp.  · During the procedure, the healthcare provider can take biopsies (tissue samples), remove abnormalities,  such as polyps, or treat abnormalities through a variety of devices placed through the endoscope. You will not feel this.   · The endoscope carries images of your upper GI tract to a video screen. If you are awake, you may be able to look at the images.  · After the procedure is done, you will rest for a time. An adult must drive you home.  When to call your healthcare provider  Contact your healthcare provider if you have:  · Black or tarry stools, or blood in your stool  · Fever  · Pain in your belly that does not go away  · Nausea and vomiting, or vomiting blood   Date Last Reviewed: 7/1/2016  © 9662-9994 [employer]. [address] [place] [address], [place] [address]. All rights reserved. This information is not intended as a substitute for professional medical care. Always follow your healthcare professional's instructions.        HIDA Scan    A HIDA scan is an imaging test. It can be used to check for problems in the liver, gallbladder, and bile ducts. During the test, a small amount of radioactive substance (tracer) is injected into a vein in your arm or hand. Pictures are then taken to track the movement of the tracer. The test takes about 2 hours. In some cases, more pictures may need to be taken after a wait of 4 hours. Youll be told as the test progresses how long your test may take.  Before the test  · Follow any directions youre given for not eating or drinking before the procedure. Your healthcare provider will give you instructions if required.  · Tell your healthcare provider what medicines youre taking. This includes vitamins, herbs, and over-the-counter medicines. You may be told to stop taking some or all of them in the days before the test.  · Follow any other instructions youre given to get ready for the test.  For your safety  Let the technologist know if you:  · Are taking any medicines or have allergies to any medicines  · Had recent X-rays or tests that used barium  · Had recent  surgery  · Have other health problems, such as diabetes  · Are pregnant or might be pregnant  · Are breastfeeding   During the test  The test is done by a nuclear medicine or radiology technologist. It can be done in a hospital or test center:  · Youll lie on your back on a table. A special camera (also called a scanner) will be positioned above your belly (abdomen).  · An IV (intravenous) needle or IV line is placed into a vein in your arm or hand. The tracer is then injected through the IV line.  · Pictures are taken as the tracer follows the movement of bile through the liver, gallbladder, and bile ducts, and the first part of the small intestine (duodenum). Bile is a substance made by the liver that helps you digest fat.  · Youll need to lie still to help ensure that the pictures are not blurry.  · Based on your healthcare provider's practices, you may be given a substance by mouth or injected thru a vein that causes the gallbladder to contract and release bile. Be sure to let the technologist know if you feel discomfort.  · In some cases, pain medicine called morphine is injected through the IV line. Morphine helps move the tracer into the gallbladder.  · If needed, more pictures will be taken after 4 hours.  After the test  · The technologist will let you know when the test is completed.  · If you were given a pain medicine such as morphine, have a family member or friend drive you home. The morphine can make you more tired than usual. Rest for as long as needed before you return to your normal routine.  · The tracer will pass out of the body in your stool and urine within 24 hours. Drink plenty of fluids to help the tracer pass.  Follow-up  Your healthcare provider will go over test results with you when they are ready. This is likely within a few days of the test.  Risks and possible complications of a HIDA scan  These can include:  · Problems at the IV site  · Allergic reaction to the tracer or medicine  used during the test  · Radiation exposure from the tracer   Date Last Reviewed: 3/1/2017  © 6401-9524 [employer]. [address] [place] [address], [place] [address]. All rights reserved. This information is not intended as a substitute for professional medical care. Always follow your healthcare professional's instructions.

## 2019-11-07 NOTE — H&P
[facility] Surgery H&P Note    Subjective:       Patient ID: Comfort Cantu is a 63 y.o. female.    Chief Complaint: Follow-up (EGD/Colonoscopy (10/07/19))    HPI:  Comfort Cantu is a 63 y.o. female with history of anxiety, depression, gastroesophageal reflux disease, hypertension presents today as established patient for follow-up after EGD and colonoscopy for iron deficiency anemia.  Patient on EGD found to have mild gastritis.  Patient colonoscopy found to have 3 colon polyps.  Two removed during colonoscope.  One underwent biopsy.  The 1 in the sigmoid colon underwent biopsy shows evidence of high-grade dysplasia.  Tortuosity of the colon led to inability to endoscopically resected that time.  No bleeding since colonoscopy.  Patient has restarted Effient.  No new issues or complaints.    Past Medical History:   Diagnosis Date    Anxiety     Atherosclerosis     Depression     GERD (gastroesophageal reflux disease)     Hypertension      Past Surgical History:   Procedure Laterality Date    APPENDECTOMY      CAROTID STENT  2014    COLONOSCOPY N/A 10/7/2019    Procedure: COLONOSCOPY;  Surgeon: Olaf Alba MD;  Location: [facility];  Service: General;  Laterality: N/A;    ENDOSCOPIC ULTRASOUND OF UPPER GASTROINTESTINAL TRACT N/A 10/16/2018    Procedure: ULTRASOUND, UPPER GI TRACT, ENDOSCOPIC;  Surgeon: Kumar Wang MD;  Location: [facility] ([address] [place] [address]);  Service: Endoscopy;  Laterality: N/A;  5 day hold Effient, Dr Сергей Malagon    ESOPHAGOGASTRODUODENOSCOPY N/A 10/7/2019    Procedure: ESOPHAGOGASTRODUODENOSCOPY (EGD);  Surgeon: Olaf Alba MD;  Location: [facility];  Service: General;  Laterality: N/A;    FEMORAL ARTERY STENT       Family History   Problem Relation Age of Onset    Heart attack Brother     Diabetes Brother     Hyperlipidemia Brother      Social History     Socioeconomic History    Marital status:      Spouse name: Not on file    Number of  children: Not on file    Years of education: Not on file    Highest education level: Not on file   Occupational History    Not on file   Social Needs    Financial resource strain: Not on file    Food insecurity:     Worry: Not on file     Inability: Not on file    Transportation needs:     Medical: Not on file     Non-medical: Not on file   Tobacco Use    Smoking status: Current Every Day Smoker     Packs/day: 1.00     Years: 23.00     Pack years: 23.00     Types: Cigarettes     Start date: 10/1/1988    Smokeless tobacco: Never Used   Substance and Sexual Activity    Alcohol use: Yes     Comment: occ    Drug use: No    Sexual activity: Not Currently   Lifestyle    Physical activity:     Days per week: Not on file     Minutes per session: Not on file    Stress: Not on file   Relationships    Social connections:     Talks on phone: Not on file     Gets together: Not on file     Attends Nondenominational service: Not on file     Active member of club or organization: Not on file     Attends meetings of clubs or organizations: Not on file     Relationship status: Not on file   Other Topics Concern    Not on file   Social History Narrative    Not on file       Current Outpatient Medications   Medication Sig Dispense Refill    atorvastatin (LIPITOR) 10 MG tablet Take 10 mg by mouth once daily.      busPIRone (BUSPAR) 15 MG tablet Take 1 tablet (15 mg total) by mouth 2 (two) times daily. 180 tablet 3    cloNIDine (CATAPRES) 0.1 MG tablet Take 1 tablet (0.1 mg total) by mouth 2 (two) times daily. 60 tablet 11    diazePAM (VALIUM) 5 MG tablet TAKE 1 TABLET EVERY 12 HOURS AS NEEDED FOR ANXIETY 60 tablet 2    DULoxetine (CYMBALTA) 30 MG capsule Take 1 capsule (30 mg total) by mouth once daily. 90 capsule 3    isosorbide mononitrate (IMDUR) 30 MG 24 hr tablet Take 30 mg by mouth once daily.      lisinopril 10 MG tablet Take 1 tablet (10 mg total) by mouth once daily. 90 tablet 3    pantoprazole (PROTONIX) 40 MG  "tablet Take 1 tablet (40 mg total) by mouth once daily. 90 tablet 3    prasugrel (EFFIENT) 5 mg Tab once daily.      GAVILYTE-G 236-22.74-6.74 -5.86 gram suspension        Current Facility-Administered Medications   Medication Dose Route Frequency Provider Last Rate Last Dose    lidocaine (PF) 10 mg/ml (1%) injection 10 mg  1 mL Intradermal Once Olaf Alba MD         Review of patient's allergies indicates:   Allergen Reactions    Penicillins        Review of Systems   Constitutional: Negative for activity change, appetite change, chills and fever.   HENT: Negative for congestion, dental problem and ear discharge.    Eyes: Negative for discharge and itching.   Respiratory: Negative for apnea, choking and chest tightness.    Cardiovascular: Negative for chest pain and leg swelling.   Gastrointestinal: Negative for abdominal distention, abdominal pain, anal bleeding, constipation, diarrhea and nausea.   Endocrine: Negative for cold intolerance and heat intolerance.   Genitourinary: Negative for difficulty urinating and dyspareunia.   Musculoskeletal: Negative for arthralgias and back pain.   Skin: Negative for color change and pallor.   Neurological: Negative for dizziness and facial asymmetry.   Hematological: Negative for adenopathy. Does not bruise/bleed easily.   Psychiatric/Behavioral: Negative for agitation and behavioral problems.       Objective:      Vitals:    11/07/19 1451   BP: (!) 193/95   Pulse: (!) 24   Resp: 14   Temp: 98.6 °F (37 °C)   TempSrc: Oral   SpO2: 95%   Weight: 57.1 kg (125 lb 12.4 oz)   Height: 5' 8" (1.727 m)     Physical Exam   Constitutional: She is oriented to person, place, and time. She appears well-developed and well-nourished.   HENT:   Head: Normocephalic and atraumatic.   Eyes: Pupils are equal, round, and reactive to light.   Neck: Normal range of motion. Neck supple.   Cardiovascular: Normal rate and regular rhythm.   Pulmonary/Chest: Effort normal and breath " sounds normal.   Abdominal: Soft. Bowel sounds are normal.   Musculoskeletal: Normal range of motion.   Neurological: She is alert and oriented to person, place, and time.     Pathology reviewed from colonoscopy.  Tubular adenoma x1.  Sigmoid colon mass with high-grade dysplasia adenomatous polyp.  Rectal polyp hyperplastic.    Assessment:       1. High grade dysplasia in colonic adenoma        Plan:   High grade dysplasia in colonic adenoma  -     Case Request Operating Room: COLONOSCOPY  -     EKG 12-lead; Future  -     Basic metabolic panel; Future; Expected date: 11/07/2019  -     CBC auto differential; Future; Expected date: 11/07/2019    Other orders  -     Insert peripheral IV; Standing  -     lidocaine (PF) 10 mg/ml (1%) injection 10 mg  -     Full code; Standing        Medical Decision Making/Counseling:  Complex patient, complex clinical issue.    Sigmoid colon mass sigmoid colon significantly tortuous difficult to obtain access to the sigmoid colon.  Appears pedunculated on last colonoscopy however inability to place near around polyp during colonoscopy.  Given high-grade dysplasia options will be repeat colonoscopy with possible snare polypectomy versus sigmoid colon resection.  Given patient's medical history recommendation at this time will be the least invasive method possible, repeat endoscopy with possible snare polypectomy if possible.  Risk and benefits of colonoscopy were discussed in detail in clinic today.  Patient voiced understanding wished to proceed.    Given patient's anticoagulation history as well as current use of anticoagulation will recommend Effient therapy be discontinued 3 days prior to colonoscopy.  Patient voiced understanding.    Will obtain preoperative lab test to include CBC, CMP and EKG for review by the Anesthesiologist the day of the procedure prior to induction of the anesthetic agent of choice.    Risks and benefits of endoscopy were discussed in depth in clinic.  From a  procedural standpoint, we discuss the benefits of colonoscopy to be finding colon cancers at early stages, including polyps which can be endoscopically resectable, to finding early stage colon cancers which can be better treated with current medical and surgical therapies in order to give patients a longer survival, if found in these early stages.  From a standpoint of risks, the risk of bleeding and perforation of the colon were discussed.  I personally discussed that if complications of bleeding or perforation were to occur, the patient could need as little as a blood transfusion and as much as possible hospital admission, repeat procedure, or even surgery.  During today's discussion of the procedure of colonoscopy with the patient, I personally christine the patient a picture to assist with counseling.  Total clinic time spent today 45 minutes with greater than half of the time spent in face to face counseling.    Patient instructed that best way to communicate with my office staff is for patient to get on the [facility] patient portal to expedite communication and communication issues that may occur.  Patient was given instructions on how to get on the portal.  I encouraged patient to obtain portal access as well.  Ultimately it is up to the patient to obtain access.  Patient voiced understanding.

## 2019-11-11 RX ORDER — POLYETHYLENE GLYCOL 3350, SODIUM SULFATE ANHYDROUS, SODIUM BICARBONATE, SODIUM CHLORIDE, POTASSIUM CHLORIDE 236; 22.74; 6.74; 5.86; 2.97 G/4L; G/4L; G/4L; G/4L; G/4L
POWDER, FOR SOLUTION ORAL ONCE
COMMUNITY
End: 2020-09-17

## 2019-12-04 ENCOUNTER — TELEPHONE (OUTPATIENT)
Dept: SURGERY | Facility: CLINIC | Age: 64
End: 2019-12-04

## 2019-12-04 NOTE — TELEPHONE ENCOUNTER
JURGEN for patient that her Rx Golytely bowel prep was sent to her [place] pharmacy on 11/07/19. Pt was advised that if she wanted her Rx sent to [facility] to call clinic back and we will get it changed.

## 2019-12-06 ENCOUNTER — HOSPITAL ENCOUNTER (OUTPATIENT)
Dept: CARDIOLOGY | Facility: HOSPITAL | Age: 64
Discharge: HOME | End: 2019-12-06
Attending: SURGERY
Payer: MEDICARE

## 2019-12-06 ENCOUNTER — TELEPHONE (OUTPATIENT)
Dept: SURGERY | Facility: CLINIC | Age: 64
End: 2019-12-06

## 2019-12-06 DIAGNOSIS — D12.6 HIGH GRADE DYSPLASIA IN COLONIC ADENOMA: ICD-10-CM

## 2019-12-06 PROCEDURE — 93005 ELECTROCARDIOGRAM TRACING: CPT

## 2019-12-06 NOTE — TELEPHONE ENCOUNTER
----- Message from Lucinda Bauer sent at 12/6/2019 11:37 AM CST -----  Contact: Patient  Type: Needs Medical Advice    Who Called:  Patient  Best Call Back Number:   Additional Information: Calling to request the refill to be sent to   [facility] - [place], [place] - [address]  [address] [place] [address]  Phone: [phone] Fax: [phone]

## 2019-12-06 NOTE — TELEPHONE ENCOUNTER
Writer spoke to patient and patient stated that [facility] stated that they did not receive her new Rx Golytely that was sent on 11/11/19. Writer called pharmacy and gave Violet a verbal per Dr Alba.

## 2019-12-09 ENCOUNTER — TELEPHONE (OUTPATIENT)
Dept: SURGERY | Facility: CLINIC | Age: 64
End: 2019-12-09

## 2019-12-09 NOTE — TELEPHONE ENCOUNTER
----- Message from Eve Georges sent at 12/9/2019  2:16 PM CST -----  Contact: Comfort lackey  Type: Needs Medical Advice    Who Called:  Comfort  Best Call Back Number: [phone]  Additional Information: Pt cannot have the procedure In the am. She did not get off her blood thinners in time

## 2019-12-09 NOTE — TELEPHONE ENCOUNTER
Writer spoke to patient and rescheduled her procedure from 12/10/19 to 12/16/19. Surgery contacted via fax.

## 2019-12-16 ENCOUNTER — ANESTHESIA (OUTPATIENT)
Dept: SURGERY | Facility: HOSPITAL | Age: 64
End: 2019-12-16
Payer: MEDICARE

## 2019-12-16 ENCOUNTER — HOSPITAL ENCOUNTER (OUTPATIENT)
Facility: HOSPITAL | Age: 64
Discharge: HOME | End: 2019-12-16
Attending: SURGERY | Admitting: SURGERY
Payer: MEDICARE

## 2019-12-16 ENCOUNTER — ANESTHESIA EVENT (OUTPATIENT)
Dept: SURGERY | Facility: HOSPITAL | Age: 64
End: 2019-12-16
Payer: MEDICARE

## 2019-12-16 VITALS
WEIGHT: 125 LBS | HEART RATE: 70 BPM | RESPIRATION RATE: 18 BRPM | TEMPERATURE: 98 F | OXYGEN SATURATION: 91 % | DIASTOLIC BLOOD PRESSURE: 62 MMHG | BODY MASS INDEX: 19.01 KG/M2 | SYSTOLIC BLOOD PRESSURE: 113 MMHG

## 2019-12-16 DIAGNOSIS — D12.6 HIGH GRADE DYSPLASIA IN COLONIC ADENOMA: ICD-10-CM

## 2019-12-16 PROCEDURE — 27201089 HC SNARE, DISP (ANY): Performed by: SURGERY

## 2019-12-16 PROCEDURE — S0028 INJECTION, FAMOTIDINE, 20 MG: HCPCS

## 2019-12-16 PROCEDURE — 88305 TISSUE EXAM BY PATHOLOGIST: CPT | Mod: 26,,, | Performed by: PATHOLOGY

## 2019-12-16 PROCEDURE — 45384 PR COLONOSCOPY,REMV LESN,FORCEP/CAUTERY: ICD-10-PCS | Mod: 52,PT,, | Performed by: SURGERY

## 2019-12-16 PROCEDURE — D9220A PRA ANESTHESIA: Mod: PT,ANES,, | Performed by: ANESTHESIOLOGY

## 2019-12-16 PROCEDURE — 27202059 HC NEEDLE, FNA (ANY): Performed by: SURGERY

## 2019-12-16 PROCEDURE — 45380 COLONOSCOPY AND BIOPSY: CPT | Mod: 59,52,, | Performed by: SURGERY

## 2019-12-16 PROCEDURE — 63600175 PHARM REV CODE 636 W HCPCS: Performed by: NURSE ANESTHETIST, CERTIFIED REGISTERED

## 2019-12-16 PROCEDURE — 45380 COLONOSCOPY AND BIOPSY: CPT | Performed by: SURGERY

## 2019-12-16 PROCEDURE — 45384 COLONOSCOPY W/LESION REMOVAL: CPT | Performed by: SURGERY

## 2019-12-16 PROCEDURE — 45381 COLONOSCOPY SUBMUCOUS NJX: CPT | Performed by: SURGERY

## 2019-12-16 PROCEDURE — 37000008 HC ANESTHESIA 1ST 15 MINUTES: Performed by: SURGERY

## 2019-12-16 PROCEDURE — 27201012 HC FORCEPS, HOT/COLD, DISP: Performed by: SURGERY

## 2019-12-16 PROCEDURE — 88305 TISSUE EXAM BY PATHOLOGIST: ICD-10-PCS | Mod: 26,,, | Performed by: PATHOLOGY

## 2019-12-16 PROCEDURE — 88305 TISSUE EXAM BY PATHOLOGIST: CPT | Mod: 59 | Performed by: PATHOLOGY

## 2019-12-16 PROCEDURE — 37000009 HC ANESTHESIA EA ADD 15 MINS: Performed by: SURGERY

## 2019-12-16 PROCEDURE — 45384 COLONOSCOPY W/LESION REMOVAL: CPT | Mod: 52,PT,, | Performed by: SURGERY

## 2019-12-16 PROCEDURE — 63600175 PHARM REV CODE 636 W HCPCS: Performed by: SURGERY

## 2019-12-16 PROCEDURE — D9220A PRA ANESTHESIA: ICD-10-PCS | Mod: PT,CRNA,, | Performed by: NURSE ANESTHETIST, CERTIFIED REGISTERED

## 2019-12-16 PROCEDURE — D9220A PRA ANESTHESIA: ICD-10-PCS | Mod: PT,ANES,, | Performed by: ANESTHESIOLOGY

## 2019-12-16 PROCEDURE — 25000003 PHARM REV CODE 250

## 2019-12-16 PROCEDURE — D9220A PRA ANESTHESIA: Mod: PT,CRNA,, | Performed by: NURSE ANESTHETIST, CERTIFIED REGISTERED

## 2019-12-16 PROCEDURE — 25000003 PHARM REV CODE 250: Performed by: NURSE ANESTHETIST, CERTIFIED REGISTERED

## 2019-12-16 PROCEDURE — 45380 PR COLONOSCOPY,BIOPSY: ICD-10-PCS | Mod: 59,52,, | Performed by: SURGERY

## 2019-12-16 RX ORDER — ONDANSETRON 2 MG/ML
4 INJECTION INTRAMUSCULAR; INTRAVENOUS DAILY PRN
Status: CANCELLED | OUTPATIENT
Start: 2019-12-16

## 2019-12-16 RX ORDER — SODIUM CHLORIDE 9 MG/ML
INJECTION, SOLUTION INTRAVENOUS CONTINUOUS
Status: DISCONTINUED | OUTPATIENT
Start: 2019-12-16 | End: 2019-12-16 | Stop reason: HOSPADM

## 2019-12-16 RX ORDER — SODIUM CHLORIDE, SODIUM LACTATE, POTASSIUM CHLORIDE, CALCIUM CHLORIDE 600; 310; 30; 20 MG/100ML; MG/100ML; MG/100ML; MG/100ML
125 INJECTION, SOLUTION INTRAVENOUS CONTINUOUS
Status: CANCELLED | OUTPATIENT
Start: 2019-12-16

## 2019-12-16 RX ORDER — FAMOTIDINE 10 MG/ML
20 INJECTION INTRAVENOUS ONCE
Status: CANCELLED | OUTPATIENT
Start: 2019-12-16 | End: 2019-12-16

## 2019-12-16 RX ORDER — FAMOTIDINE 10 MG/ML
INJECTION INTRAVENOUS
Status: COMPLETED
Start: 2019-12-16 | End: 2019-12-16

## 2019-12-16 RX ORDER — PROPOFOL 10 MG/ML
VIAL (ML) INTRAVENOUS
Status: DISCONTINUED | OUTPATIENT
Start: 2019-12-16 | End: 2019-12-16

## 2019-12-16 RX ORDER — DIPHENHYDRAMINE HYDROCHLORIDE 50 MG/ML
12.5 INJECTION INTRAMUSCULAR; INTRAVENOUS
Status: CANCELLED | OUTPATIENT
Start: 2019-12-16

## 2019-12-16 RX ORDER — SODIUM CHLORIDE 9 MG/ML
INJECTION, SOLUTION INTRAVENOUS CONTINUOUS
Status: CANCELLED | OUTPATIENT
Start: 2019-12-16

## 2019-12-16 RX ORDER — GLUCAGON 1 MG
KIT INJECTION
Status: DISCONTINUED | OUTPATIENT
Start: 2019-12-16 | End: 2019-12-16

## 2019-12-16 RX ORDER — LIDOCAINE HYDROCHLORIDE 10 MG/ML
INJECTION INFILTRATION; PERINEURAL
Status: DISCONTINUED | OUTPATIENT
Start: 2019-12-16 | End: 2019-12-16

## 2019-12-16 RX ORDER — LIDOCAINE HYDROCHLORIDE 10 MG/ML
1 INJECTION, SOLUTION EPIDURAL; INFILTRATION; INTRACAUDAL; PERINEURAL ONCE
Status: CANCELLED | OUTPATIENT
Start: 2019-12-16 | End: 2019-12-16

## 2019-12-16 RX ORDER — SODIUM CHLORIDE, SODIUM LACTATE, POTASSIUM CHLORIDE, CALCIUM CHLORIDE 600; 310; 30; 20 MG/100ML; MG/100ML; MG/100ML; MG/100ML
INJECTION, SOLUTION INTRAVENOUS CONTINUOUS
Status: CANCELLED | OUTPATIENT
Start: 2019-12-16

## 2019-12-16 RX ADMIN — GLUCAGON HYDROCHLORIDE 0.5 MG: KIT at 07:12

## 2019-12-16 RX ADMIN — PROPOFOL 50 MG: 10 INJECTION, EMULSION INTRAVENOUS at 07:12

## 2019-12-16 RX ADMIN — PROPOFOL 50 MG: 10 INJECTION, EMULSION INTRAVENOUS at 08:12

## 2019-12-16 RX ADMIN — SODIUM CHLORIDE: 0.9 INJECTION, SOLUTION INTRAVENOUS at 07:12

## 2019-12-16 RX ADMIN — LIDOCAINE HYDROCHLORIDE 50 MG: 10 INJECTION, SOLUTION INFILTRATION; PERINEURAL at 07:12

## 2019-12-16 RX ADMIN — FAMOTIDINE 20 MG: 10 INJECTION INTRAVENOUS at 07:12

## 2019-12-16 NOTE — H&P
[facility] Surgery H&P Note    Subjective:       Patient ID: Comfort Cantu is a 64 y.o. female.    Chief Complaint: No chief complaint on file.    HPI:  Comfort Cantu is a 64 y.o. female with history of anxiety, depression, gastroesophageal reflux disease, hypertension presents today as established patient for follow-up after EGD and colonoscopy for iron deficiency anemia.  Patient on EGD found to have mild gastritis.  Patient colonoscopy found to have 3 colon polyps.  Two removed during colonoscope.  One underwent biopsy.  The 1 in the sigmoid colon underwent biopsy shows evidence of high-grade dysplasia.  Tortuosity of the colon led to inability to endoscopically resected that time.  No bleeding since colonoscopy.  Patient has restarted Effient.  No new issues or complaints.    Past Medical History:   Diagnosis Date    Anxiety     Atherosclerosis     Depression     GERD (gastroesophageal reflux disease)     Hypertension      Past Surgical History:   Procedure Laterality Date    APPENDECTOMY      CAROTID STENT  2014    COLONOSCOPY N/A 10/7/2019    Procedure: COLONOSCOPY;  Surgeon: Olaf Alba MD;  Location: [facility];  Service: General;  Laterality: N/A;    ENDOSCOPIC ULTRASOUND OF UPPER GASTROINTESTINAL TRACT N/A 10/16/2018    Procedure: ULTRASOUND, UPPER GI TRACT, ENDOSCOPIC;  Surgeon: Kumar Wang MD;  Location: [facility] ([address] [place] [address]);  Service: Endoscopy;  Laterality: N/A;  5 day hold Effient, Dr Сергей Malagon    ESOPHAGOGASTRODUODENOSCOPY N/A 10/7/2019    Procedure: ESOPHAGOGASTRODUODENOSCOPY (EGD);  Surgeon: Olaf Alba MD;  Location: [facility];  Service: General;  Laterality: N/A;    FEMORAL ARTERY STENT       Family History   Problem Relation Age of Onset    Heart attack Brother     Diabetes Brother     Hyperlipidemia Brother      Social History     Socioeconomic History    Marital status:      Spouse name: Not on file    Number of children:  Not on file    Years of education: Not on file    Highest education level: Not on file   Occupational History    Not on file   Social Needs    Financial resource strain: Not on file    Food insecurity:     Worry: Not on file     Inability: Not on file    Transportation needs:     Medical: Not on file     Non-medical: Not on file   Tobacco Use    Smoking status: Current Every Day Smoker     Packs/day: 1.00     Years: 23.00     Pack years: 23.00     Types: Cigarettes     Start date: 10/1/1988    Smokeless tobacco: Never Used   Substance and Sexual Activity    Alcohol use: Yes     Comment: occ    Drug use: No    Sexual activity: Not Currently   Lifestyle    Physical activity:     Days per week: Not on file     Minutes per session: Not on file    Stress: Not on file   Relationships    Social connections:     Talks on phone: Not on file     Gets together: Not on file     Attends Buddhism service: Not on file     Active member of club or organization: Not on file     Attends meetings of clubs or organizations: Not on file     Relationship status: Not on file   Other Topics Concern    Not on file   Social History Narrative    Not on file       Current Facility-Administered Medications   Medication Dose Route Frequency Provider Last Rate Last Dose    0.9%  NaCl infusion   Intravenous Continuous Olaf Alba MD         Review of patient's allergies indicates:   Allergen Reactions    Penicillins        Review of Systems   Constitutional: Negative for activity change, appetite change, chills and fever.   HENT: Negative for congestion, dental problem and ear discharge.    Eyes: Negative for discharge and itching.   Respiratory: Negative for apnea, choking and chest tightness.    Cardiovascular: Negative for chest pain and leg swelling.   Gastrointestinal: Negative for abdominal distention, abdominal pain, anal bleeding, constipation, diarrhea and nausea.   Endocrine: Negative for cold intolerance  and heat intolerance.   Genitourinary: Negative for difficulty urinating and dyspareunia.   Musculoskeletal: Negative for arthralgias and back pain.   Skin: Negative for color change and pallor.   Neurological: Negative for dizziness and facial asymmetry.   Hematological: Negative for adenopathy. Does not bruise/bleed easily.   Psychiatric/Behavioral: Negative for agitation and behavioral problems.       Objective:      Vitals:    12/16/19 0704 12/16/19 0705   BP:  111/77   Pulse: 79    Resp: 14    Temp: 98.9 °F (37.2 °C)    TempSrc: Oral    SpO2: (!) 93%    Weight: 56.7 kg (125 lb)      Physical Exam   Constitutional: She is oriented to person, place, and time. She appears well-developed and well-nourished.   HENT:   Head: Normocephalic and atraumatic.   Eyes: Pupils are equal, round, and reactive to light.   Neck: Normal range of motion. Neck supple.   Cardiovascular: Normal rate and regular rhythm.   Pulmonary/Chest: Effort normal and breath sounds normal.   Abdominal: Soft. Bowel sounds are normal.   Musculoskeletal: Normal range of motion.   Neurological: She is alert and oriented to person, place, and time.     Pathology reviewed from colonoscopy.  Tubular adenoma x1.  Sigmoid colon mass with high-grade dysplasia adenomatous polyp.  Rectal polyp hyperplastic.    Assessment:       1. High grade dysplasia in colonic adenoma        Plan:   High grade dysplasia in colonic adenoma  -     Case Request Operating Room: COLONOSCOPY    Other orders  -     Place in Outpatient; Standing  -     Diet NPO; Standing  -     0.9%  NaCl infusion  -     Notify Physician; Standing  -     Vital signs; Standing  -     Place sequential compression device; Standing  -     Place VIDHYA hose; Standing  -     Notify Anesthesiologist if Patient on Home Insulin Pump; Standing  -     POCT glucose; Standing  -     famotidine (PF) 20 mg/2 mL injection        Medical Decision Making/Counseling:  Complex patient, complex clinical issue.    Sigmoid  colon mass sigmoid colon significantly tortuous difficult to obtain access to the sigmoid colon.  Appears pedunculated on last colonoscopy however inability to place near around polyp during colonoscopy.  Given high-grade dysplasia options will be repeat colonoscopy with possible snare polypectomy versus sigmoid colon resection.  Given patient's medical history recommendation at this time will be the least invasive method possible, repeat endoscopy with possible snare polypectomy if possible.  Risk and benefits of colonoscopy were discussed in detail in clinic today.  Patient voiced understanding wished to proceed.    Given patient's anticoagulation history as well as current use of anticoagulation will recommend Effient therapy be discontinued 3 days prior to colonoscopy.  Patient voiced understanding.    Will obtain preoperative lab test to include CBC, CMP and EKG for review by the Anesthesiologist the day of the procedure prior to induction of the anesthetic agent of choice.    Risks and benefits of endoscopy were discussed in depth in clinic.  From a procedural standpoint, we discuss the benefits of colonoscopy to be finding colon cancers at early stages, including polyps which can be endoscopically resectable, to finding early stage colon cancers which can be better treated with current medical and surgical therapies in order to give patients a longer survival, if found in these early stages.  From a standpoint of risks, the risk of bleeding and perforation of the colon were discussed.  I personally discussed that if complications of bleeding or perforation were to occur, the patient could need as little as a blood transfusion and as much as possible hospital admission, repeat procedure, or even surgery.  During today's discussion of the procedure of colonoscopy with the patient, I personally christine the patient a picture to assist with counseling.  Total clinic time spent today 45 minutes with greater than half  of the time spent in face to face counseling.    Patient instructed that best way to communicate with my office staff is for patient to get on the [facility] patient portal to expedite communication and communication issues that may occur.  Patient was given instructions on how to get on the portal.  I encouraged patient to obtain portal access as well.  Ultimately it is up to the patient to obtain access.  Patient voiced understanding.    Patient seen examined 12/16/2019 at 7:20 a.m..  Ready for colonoscopy.  Risk benefits were discussed.  Patient voiced understanding.  No changes since H&P form in clinic.

## 2019-12-16 NOTE — DISCHARGE INSTRUCTIONS
Colonoscopy     A camera attached to a flexible tube with a viewing lens is used to take video pictures.     Colonoscopy is a test to view the inside of your lower digestive tract (colon and rectum). Sometimes it can show the last part of the small intestine (ileum). During the test, small pieces of tissue may be removed for testing. This is called a biopsy. Small growths, such as polyps, may also be removed.   Why is colonoscopy done?  The test is done to help look for colon cancer. And it can help find the source of abdominal pain, bleeding, and changes in bowel habits. It may be needed once a year, depending on factors such as your:  · Age  · Health history  · Family health history  · Symptoms  · Results from any prior colonoscopy  Risks and possible complications  These include:  · Bleeding               · A puncture or tear in the colon   · Risks of anesthesia  · A cancer lesion not being seen  Getting ready   To prepare for the test:  · Talk with your healthcare provider about the risks of the test (see below). Also ask your healthcare provider about alternatives to the test.  · Tell your healthcare provider about any medicines you take. Also tell him or her about any health conditions you may have.  · Make sure your rectum and colon are empty for the test. Follow the diet and bowel prep instructions exactly. If you dont, the test may need to be rescheduled.  · Plan for a friend or family member to drive you home after the test.     Colonoscopy provides an inside view of the entire colon.     You may discuss the results with your doctor right away or at a future visit.  During the test   The test is usually done in the hospital on an outpatient basis. This means you go home the same day. The procedure takes about 30 minutes. During that time:  · You are given relaxing (sedating) medicine through an IV line. You may be drowsy, or fully asleep.  · The healthcare provider will first give you a physical exam to  check for anal and rectal problems.  · Then the anus is lubricated and the scope inserted.  · If you are awake, you may have a feeling similar to needing to have a bowel movement. You may also feel pressure as air is pumped into the colon. Its OK to pass gas during the procedure.  · Biopsy, polyp removal, or other treatments may be done during the test.  After the test   You may have gas right after the test. It can help to try to pass it to help prevent later bloating. Your healthcare provider may discuss the results with you right away. Or you may need to schedule a follow-up visit to talk about the results. After the test, you can go back to your normal eating and other activities. You may be tired from the sedation and need to rest for a few hours.  Date Last Reviewed: 11/1/2016 © 2000-2017 [employer]. [address] [place] [address]. All rights reserved. This information is not intended as a substitute for professional medical care. Always follow your healthcare professional's instructions.        Anesthesia: Monitored Anesthesia Care (MAC)    Youre due to have surgery. During surgery, youll be given medicine called anesthesia. This will keep you comfortable and pain-free. Your surgeon will use monitored anesthesia care (MAC). This sheet tells you more about this type of anesthesia.  What is monitored anesthesia care?  MAC keeps you very drowsy during surgery. You may be awake, but you will likely not remember much. And you wont feel pain. With MAC, medicines are given through an IV line into a vein in your arm or hand. A local anesthetic will usually be injected into the skin and muscle around the surgical site to numb it. The anesthesia provider monitors you during the procedure. He or she checks your heart rate and rhythm, blood pressure, and blood oxygen level.  Anesthesia tools and medicines that may be near you during your procedure  You will likely have:  · A pulse oximeter  on the end of your finger. This measures your blood oxygen level.  · Electrocardiography leads (electrodes) on your chest. These record your heart rate and rhythm.  · Medicines given through an IV. These relax you and prevent pain. You may be awake or sleep lightly. If you have local anesthetic, it is injected directly into your skin.  · A facemask to give you oxygen, if needed.  Risks and possible complications  MAC has some risks. These include:  · Breathing problems  · Nausea and vomiting  · Allergic reaction to the anesthetic    Anesthesia safety  Tips for anesthesia safety include the following:   · Follow all instructions you are given for how long not to eat or drink before your procedure.  · Be sure your healthcare provider knows what medicines you take, especially any anti-inflammatory medicine or blood thinners. This includes aspirin and any other over-the-counter medicines, herbs, and supplements.  · Have an adult family member or friend drive you home after the procedure.  · For the first 24 hours after your surgery:  ¨ Do not drive or use heavy equipment.  ¨ Do not make important decisions or sign documents.  ¨ Avoid alcohol.  ¨ Have someone stay with you, if possible. They can watch for problems and help keep you safe.  Date Last Reviewed: 12/1/2016  © 4062-9375 [employer]. [address] [place] [address], [place] [address]. All rights reserved. This information is not intended as a substitute for professional medical care. Always follow your healthcare professional's instructions.

## 2019-12-16 NOTE — DISCHARGE SUMMARY
Discharge Note        SUMMARY     Admit Date: 12/16/2019    Attending Physician: Olaf Alba MD     Discharge Physician: Olaf Alba MD    Discharge Date: 12/16/2019 8:13 AM      Hospital Course: Patient tolerated procedure well.     Disposition: Home or Self Care    Patient Instructions:   Current Discharge Medication List      CONTINUE these medications which have NOT CHANGED    Details   atorvastatin (LIPITOR) 10 MG tablet Take 10 mg by mouth once daily.      busPIRone (BUSPAR) 15 MG tablet Take 1 tablet (15 mg total) by mouth 2 (two) times daily.  Qty: 180 tablet, Refills: 3      cloNIDine (CATAPRES) 0.1 MG tablet Take 1 tablet (0.1 mg total) by mouth 2 (two) times daily.  Qty: 60 tablet, Refills: 11    Associated Diagnoses: Hypertension; Acute cystitis without hematuria      diazePAM (VALIUM) 5 MG tablet TAKE 1 TABLET EVERY 12 HOURS AS NEEDED FOR ANXIETY  Qty: 60 tablet, Refills: 2      DULoxetine (CYMBALTA) 30 MG capsule Take 1 capsule (30 mg total) by mouth once daily.  Qty: 90 capsule, Refills: 3      isosorbide mononitrate (IMDUR) 30 MG 24 hr tablet Take 30 mg by mouth once daily.      pantoprazole (PROTONIX) 40 MG tablet Take 1 tablet (40 mg total) by mouth once daily.  Qty: 90 tablet, Refills: 3      !! polyethylene glycol (GOLYTELY,NULYTELY) 236-22.74-6.74 -5.86 gram suspension Take by mouth once.      !! GAVILYTE-G 236-22.74-6.74 -5.86 gram suspension       lisinopril 10 MG tablet Take 1 tablet (10 mg total) by mouth once daily.  Qty: 90 tablet, Refills: 3    Associated Diagnoses: Benign essential hypertension      prasugrel (EFFIENT) 5 mg Tab once daily.       !! - Potential duplicate medications found. Please discuss with provider.          Discharge Procedure Orders (must include Diet, Follow-up, Activity):   Discharge Procedure Orders (must include Diet, Follow-up, Activity)   Diet general     Call MD for:  temperature >100.4     Call MD for:  persistent nausea and vomiting      Call MD for:  severe uncontrolled pain     Call MD for:  difficulty breathing, headache or visual disturbances     Call MD for:  redness, tenderness, or signs of infection (pain, swelling, redness, odor or green/yellow discharge around incision site)     Call MD for:  persistent dizziness or light-headedness        Follow Up:  Follow up as scheduled.  Resume routine diet.  Activity as tolerated.    No driving day of procedure.

## 2019-12-16 NOTE — [employer] PATIENT INSTRUCTIONS
Discharge Summary/Instructions after an Endoscopic Procedure  Patient Name: Comfort Cantu  Patient MRN: [id number]  Patient YOB: 1955 Monday, December 16, 2019  Olaf Alba MD  RESTRICTIONS:  During your procedure today, you received medications for sedation.  These   medications may affect your judgment, balance and coordination.  Therefore,   for 24 hours, you have the following restrictions:   - DO NOT drive a car, operate machinery, make legal/financial decisions,   sign important papers or drink alcohol.    ACTIVITY:  Today: no heavy lifting, straining or running due to procedural   sedation/anesthesia.  The following day: return to full activity including work.  DIET:  Eat and drink normally unless instructed otherwise.     TREATMENT FOR COMMON SIDE EFFECTS:  - Mild abdominal pain, nausea, belching, bloating or excessive gas:  rest,   eat lightly and use a heating pad.  - Sore Throat: treat with throat lozenges and/or gargle with warm salt   water.  - Because air was used during the procedure, expelling large amounts of air   from your rectum or belching is normal.  - If a bowel prep was taken, you may not have a bowel movement for 1-3 days.    This is normal.  SYMPTOMS TO WATCH FOR AND REPORT TO YOUR PHYSICIAN:  1. Abdominal pain or bloating, other than gas cramps.  2. Chest pain.  3. Back pain.  4. Signs of infection such as: chills or fever occurring within 24 hours   after the procedure.  5. Rectal bleeding, which would show as bright red, maroon, or black stools.   (A tablespoon of blood from the rectum is not serious, especially if   hemorrhoids are present.)  6. Vomiting.  7. Weakness or dizziness.  GO DIRECTLY TO THE NEAREST EMERGENCY ROOM IF YOU HAVE ANY OF THE FOLLOWING:      Difficulty breathing              Chills and/or fever over 101 F   Persistent vomiting and/or vomiting blood   Severe abdominal pain   Severe chest pain   Black, tarry stools   Bleeding- more than one  tablespoon   Any other symptom or condition that you feel may need urgent attention  Your doctor recommends these additional instructions:  If any biopsies were taken, your doctors clinic will contact you in 1 to 2   weeks with any results.  - Discharge patient to home (ambulatory).   - High fiber diet.   - Continue present medications.   - Await pathology results.   - Return to my office in 2 weeks.   - Possiblities for referral to an advance endoscopist versus sigmoid   colectomy next options for the patient.  For questions, problems or results please call your physician - Olaf Alba MD at Work:  [phone].  [facility] EMERGENCY ROOM PHONE NUMBER: [phone]  IF A COMPLICATION OR EMERGENCY SITUATION ARISES AND YOU ARE UNABLE TO REACH   YOUR PHYSICIAN - GO DIRECTLY TO THE EMERGENCY ROOM.  MD Olaf Samuels MD  12/16/2019 8:25:12 AM  This report has been verified and signed electronically.  [employer]

## 2019-12-16 NOTE — ANESTHESIA POSTPROCEDURE EVALUATION
Anesthesia Post Evaluation    Patient: Comfort Cantu    Procedure(s) Performed: Procedure(s) (LRB):  COLONOSCOPY (N/A)    Final Anesthesia Type: general    Patient location during evaluation: PACU  Patient participation: Yes- Able to Participate  Level of consciousness: awake and alert  Post-procedure vital signs: reviewed and stable  Pain management: adequate  Airway patency: patent    PONV status at discharge: No PONV  Anesthetic complications: no      Cardiovascular status: blood pressure returned to baseline  Respiratory status: unassisted  Hydration status: euvolemic  Follow-up not needed.          Vitals Value Taken Time   /94 12/16/2019  8:47 AM   Temp 36.4 °C (97.5 °F) 12/16/2019  8:30 AM   Pulse 71 12/16/2019  8:48 AM   Resp 25 12/16/2019  8:48 AM   SpO2 94 % 12/16/2019  8:48 AM   Vitals shown include unvalidated device data.      No case tracking events are documented in the log.      Pain/Deann Score: Deann Score: 10 (12/16/2019  8:45 AM)

## 2019-12-16 NOTE — ANESTHESIA PREPROCEDURE EVALUATION
12/16/2019  Comfort Cantu is a 64 y.o., female.    Anesthesia Evaluation    I have reviewed the Patient Summary Reports.    I have reviewed the Nursing Notes.   I have reviewed the Medications.     Review of Systems  Social:  Smoker    Cardiovascular:   Hypertension PVD    Renal/:   Chronic Renal Disease    Hepatic/GI:   GERD    Psych:   Psychiatric History anxiety depression          Physical Exam  General:  Well nourished, Obesity    Airway/Jaw/Neck:  Airway Findings: Mouth Opening: Normal Tongue: Normal  General Airway Assessment: Adult  Mallampati: II  TM Distance: Normal, at least 6 cm     Eyes/Ears/Nose:  Eyes/Ears/Nose Findings: ([place])    Dental:  Dental Findings: Upper Dentures   Chest/Lungs:  Chest/Lungs Findings: Clear to auscultation     Heart/Vascular:  Heart Findings: Rate: Normal  Rhythm: Regular Rhythm        Mental Status:  Mental Status Findings:  Cooperative, Alert and Oriented         Anesthesia Plan  Type of Anesthesia, risks & benefits discussed:  Anesthesia Type:  general  Patient's Preference:   Intra-op Monitoring Plan: standard ASA monitors  Intra-op Monitoring Plan Comments:   Post Op Pain Control Plan: IV/PO Opioids PRN  Post Op Pain Control Plan Comments:   Induction:   IV  Beta Blocker:  Patient is not currently on a Beta-Blocker (No further documentation required).       Informed Consent: Patient understands risks and agrees with Anesthesia plan.  Questions answered. Anesthesia consent signed with patient.  ASA Score: 3     Day of Surgery Review of History & Physical: I have interviewed and examined the patient. I have reviewed the patient's H&P dated:            Ready For Surgery From Anesthesia Perspective.

## 2019-12-16 NOTE — TRANSFER OF CARE
Anesthesia Transfer of Care Note    Patient: Comfort Cantu    Procedure(s) Performed: Procedure(s) (LRB):  COLONOSCOPY (N/A)    Patient location: PACU    Anesthesia Type: general    Transport from OR: Transported from OR on room air with adequate spontaneous ventilation    Post pain: adequate analgesia    Post assessment: no apparent anesthetic complications and tolerated procedure well    Post vital signs: stable    Level of consciousness: awake, alert and oriented    Nausea/Vomiting: no nausea/vomiting    Complications: none    Transfer of care protocol was followed      Last vitals:   Visit Vitals  BP (!) 76/40   Pulse 69   Temp 37.2 °C (98.9 °F) (Oral)   Resp 15   Wt 56.7 kg (125 lb)   SpO2 96%   Breastfeeding? No   BMI 19.01 kg/m²

## 2019-12-17 ENCOUNTER — TELEPHONE (OUTPATIENT)
Dept: SURGERY | Facility: CLINIC | Age: 64
End: 2019-12-17

## 2019-12-17 NOTE — TELEPHONE ENCOUNTER
Returned pt's phone call. Pt would like to reschedule her follow up appointment to later in the day on Thursday, 1-2-20.  Rescheduled to 3:30 pm.   Pt confirms that this time works for her.

## 2019-12-17 NOTE — TELEPHONE ENCOUNTER
----- Message from Hansel Ulrich sent at 12/17/2019  1:15 PM CST -----  Contact: Patient  Type: Needs Medical Advice    Who Called:  Patient  Best Call Back Number: [phone]  Additional Information: Patient would like to see if they can reschedule their follow up to later in the day. Please call to advise. Thanks!

## 2019-12-18 LAB
FINAL PATHOLOGIC DIAGNOSIS: NORMAL
GROSS: NORMAL

## 2019-12-23 RX ORDER — DIAZEPAM 5 MG/1
TABLET ORAL
Qty: 60 TABLET | Refills: 2 | Status: SHIPPED | OUTPATIENT
Start: 2019-12-23 | End: 2020-02-10 | Stop reason: SDUPTHER

## 2020-01-02 ENCOUNTER — OFFICE VISIT (OUTPATIENT)
Dept: SURGERY | Facility: CLINIC | Age: 65
End: 2020-01-02
Payer: MEDICARE

## 2020-01-02 VITALS
DIASTOLIC BLOOD PRESSURE: 66 MMHG | HEIGHT: 68 IN | WEIGHT: 127.63 LBS | SYSTOLIC BLOOD PRESSURE: 112 MMHG | HEART RATE: 83 BPM | RESPIRATION RATE: 18 BRPM | TEMPERATURE: 98 F | BODY MASS INDEX: 19.34 KG/M2 | OXYGEN SATURATION: 96 %

## 2020-01-02 DIAGNOSIS — D12.5 ADENOMATOUS POLYP OF SIGMOID COLON: Primary | ICD-10-CM

## 2020-01-02 PROCEDURE — 3008F BODY MASS INDEX DOCD: CPT | Mod: CPTII,S$GLB,, | Performed by: SURGERY

## 2020-01-02 PROCEDURE — 3078F DIAST BP <80 MM HG: CPT | Mod: CPTII,S$GLB,, | Performed by: SURGERY

## 2020-01-02 PROCEDURE — 3074F PR MOST RECENT SYSTOLIC BLOOD PRESSURE < 130 MM HG: ICD-10-PCS | Mod: CPTII,S$GLB,, | Performed by: SURGERY

## 2020-01-02 PROCEDURE — 99213 PR OFFICE/OUTPT VISIT, EST, LEVL III, 20-29 MIN: ICD-10-PCS | Mod: S$GLB,,, | Performed by: SURGERY

## 2020-01-02 PROCEDURE — 3074F SYST BP LT 130 MM HG: CPT | Mod: CPTII,S$GLB,, | Performed by: SURGERY

## 2020-01-02 PROCEDURE — 99213 OFFICE O/P EST LOW 20 MIN: CPT | Mod: S$GLB,,, | Performed by: SURGERY

## 2020-01-02 PROCEDURE — 3008F PR BODY MASS INDEX (BMI) DOCUMENTED: ICD-10-PCS | Mod: CPTII,S$GLB,, | Performed by: SURGERY

## 2020-01-02 PROCEDURE — 3078F PR MOST RECENT DIASTOLIC BLOOD PRESSURE < 80 MM HG: ICD-10-PCS | Mod: CPTII,S$GLB,, | Performed by: SURGERY

## 2020-01-02 NOTE — PROGRESS NOTES
[facility] Surgery  Follow-up    Subjective:       Patient ID: Comfort Cantu is a 64 y.o. female.    Chief Complaint: Follow-up (Colonoscopy (12/16/19))      HPI:  Comfort Cantu is a 64 y.o. female presents today for follow-up examination after colonoscopy.  Patient underwent repeat colonoscopy for previous biopsy of high-grade dysplasia in adenoma in sigmoid colon.  This spot was tattooed previous colonoscopy.  Patient underwent repeat colonoscopy for possible resection.  Patient with significant scarring from diverticulosis in the sigmoid colon and significant angulation at the spot polypoid formation.  I was unable to perform a resection of this polyp endoscopically during the last colonoscopy.  Biopsies were taken which showed evidence of tubulovillous adenoma.  Patient since the procedure has done well. No apparent postprocedural issues.    Review of Systems   Constitutional: Negative for activity change, appetite change, chills and fever.   HENT: Negative for congestion, dental problem and ear discharge.    Eyes: Negative for discharge and itching.   Respiratory: Negative for apnea, choking and chest tightness.    Cardiovascular: Negative for chest pain and leg swelling.   Gastrointestinal: Negative for abdominal distention, abdominal pain, anal bleeding, constipation, diarrhea and nausea.   Endocrine: Negative for cold intolerance and heat intolerance.   Genitourinary: Negative for difficulty urinating and dyspareunia.   Musculoskeletal: Negative for arthralgias and back pain.   Skin: Negative for color change and pallor.   Neurological: Negative for dizziness and facial asymmetry.   Hematological: Negative for adenopathy. Does not bruise/bleed easily.   Psychiatric/Behavioral: Negative for agitation and behavioral problems.       Objective:      Vitals:    01/02/20 1521   BP: 112/66   Pulse: 83   Resp: 18   Temp: 97.9 °F (36.6 °C)   TempSrc: Oral   SpO2: 96%   Weight: 57.9 kg (127 lb 10.3  "oz)   Height: 5' 8" (1.727 m)     Physical Exam   Constitutional: She is oriented to person, place, and time. She appears well-developed and well-nourished. No distress.   HENT:   Head: Normocephalic and atraumatic.   Eyes: Pupils are equal, round, and reactive to light. EOM are normal.   Neck: Normal range of motion. Neck supple. No thyromegaly present.   Cardiovascular: Normal rate and regular rhythm.   Pulmonary/Chest: Effort normal and breath sounds normal.   Abdominal: Soft. Bowel sounds are normal. She exhibits no distension. There is no tenderness.   Musculoskeletal: Normal range of motion. She exhibits no edema or deformity.   Neurological: She is alert and oriented to person, place, and time. No cranial nerve deficit.   Skin: Skin is warm. Capillary refill takes less than 2 seconds. No erythema.   Psychiatric: She has a normal mood and affect. Her behavior is normal.        Assessment:       1. Adenomatous polyp of sigmoid colon        Plan:   Adenomatous polyp of sigmoid colon        Medical Decision Making/Counseling:  Patient with large semi pedunculated polyp within the sigmoid colon with significant angulation sigmoid colon related to chronic diverticular changes.  I have tried twice to perform polypectomy of this polyp however I have been unsuccessful.  Biopsies taken both time show evidence of the 1st time, high-grade dysplasia, 2nd time, tubulovillous adenoma.  This spot was injected with Ankita ink.  Ultimately I believe the patient has 2 options at this point.  One would be for sigmoid colon resection.  Option 2 would be for advanced endoscopy for evaluation possible endoscopic resection.  After risk and benefits of both options were discussed, patient voiced understanding wishes to seek 2nd opinion with advanced endoscopist.  In this light, I have place a referral to Dr. Yancey.    Follow up:  3 month follow-up surgery clinic [place].    Patient instructed that best way to communicate with my " office staff is for patient to get on the [facility] epic patient portal to expedite communication and communication issues that may occur.  Patient was given instructions on how to get on the portal.  I encouraged patient to obtain portal access as well.  Ultimately it is up to the patient to obtain access.  Patient voiced understanding.

## 2020-01-16 ENCOUNTER — OFFICE VISIT (OUTPATIENT)
Dept: GASTROENTEROLOGY | Facility: CLINIC | Age: 65
End: 2020-01-16
Payer: MEDICARE

## 2020-01-16 VITALS
WEIGHT: 127.63 LBS | SYSTOLIC BLOOD PRESSURE: 143 MMHG | BODY MASS INDEX: 19.34 KG/M2 | DIASTOLIC BLOOD PRESSURE: 71 MMHG | HEART RATE: 92 BPM | HEIGHT: 68 IN

## 2020-01-16 DIAGNOSIS — D50.9 IRON DEFICIENCY ANEMIA, UNSPECIFIED IRON DEFICIENCY ANEMIA TYPE: ICD-10-CM

## 2020-01-16 DIAGNOSIS — D12.6 HIGH GRADE DYSPLASIA IN COLONIC ADENOMA: Primary | ICD-10-CM

## 2020-01-16 DIAGNOSIS — K86.2 PANCREAS CYST: ICD-10-CM

## 2020-01-16 PROCEDURE — 3077F PR MOST RECENT SYSTOLIC BLOOD PRESSURE >= 140 MM HG: ICD-10-PCS | Mod: CPTII,S$GLB,, | Performed by: INTERNAL MEDICINE

## 2020-01-16 PROCEDURE — 3077F SYST BP >= 140 MM HG: CPT | Mod: CPTII,S$GLB,, | Performed by: INTERNAL MEDICINE

## 2020-01-16 PROCEDURE — 99999 PR PBB SHADOW E&M-EST. PATIENT-LVL III: ICD-10-PCS | Mod: PBBFAC,,, | Performed by: INTERNAL MEDICINE

## 2020-01-16 PROCEDURE — 99999 PR PBB SHADOW E&M-EST. PATIENT-LVL III: CPT | Mod: PBBFAC,,, | Performed by: INTERNAL MEDICINE

## 2020-01-16 PROCEDURE — 99214 PR OFFICE/OUTPT VISIT, EST, LEVL IV, 30-39 MIN: ICD-10-PCS | Mod: S$GLB,,, | Performed by: INTERNAL MEDICINE

## 2020-01-16 PROCEDURE — 99214 OFFICE O/P EST MOD 30 MIN: CPT | Mod: S$GLB,,, | Performed by: INTERNAL MEDICINE

## 2020-01-16 PROCEDURE — 3008F PR BODY MASS INDEX (BMI) DOCUMENTED: ICD-10-PCS | Mod: CPTII,S$GLB,, | Performed by: INTERNAL MEDICINE

## 2020-01-16 PROCEDURE — 3078F PR MOST RECENT DIASTOLIC BLOOD PRESSURE < 80 MM HG: ICD-10-PCS | Mod: CPTII,S$GLB,, | Performed by: INTERNAL MEDICINE

## 2020-01-16 PROCEDURE — 3008F BODY MASS INDEX DOCD: CPT | Mod: CPTII,S$GLB,, | Performed by: INTERNAL MEDICINE

## 2020-01-16 PROCEDURE — 3078F DIAST BP <80 MM HG: CPT | Mod: CPTII,S$GLB,, | Performed by: INTERNAL MEDICINE

## 2020-01-16 NOTE — H&P (VIEW-ONLY)
"Subjective:       Patient ID: Comfort Cantu is a 64 y.o. female.    This is an established patient.        Chief Complaint: Colon polyp    Patient seen for personal history of colon polyps, diagnosed few months ago, multiple in number, one with large size (greater than 2 cm), villous adenoma histology with HGD, with associated signs/symptoms absent, and alleviating/exacerbating factors including none.  Her last colonoscopy was in December 2019 with Dr. Alba.  Per his notes, he biopsied and tattooed the lesion but was unable to remove it.  She had EUS for cystic pancreatic lesion in the past with subsequent imaging showing resolution and with biopsy unremarkable.  No other acute complaints at this time.      Review of Systems   Constitutional: Negative for chills, fatigue and fever.   HENT: Negative for sore throat and trouble swallowing.    Respiratory: Negative for cough, shortness of breath and wheezing.    Cardiovascular: Negative for chest pain and palpitations.   Gastrointestinal: Negative for abdominal pain, blood in stool, nausea and vomiting.   Genitourinary: Negative for dysuria and hematuria.   Musculoskeletal: Negative for arthralgias and myalgias.   Skin: Negative for color change and rash.   Neurological: Negative for dizziness and headaches.   Hematological: Negative for adenopathy.   Psychiatric/Behavioral: Negative for confusion. The patient is not nervous/anxious.    All other systems reviewed and are negative.      Objective:       Vitals:    01/16/20 1532   BP: (!) 143/71   Pulse: 92   Weight: 57.9 kg (127 lb 10.3 oz)   Height: 5' 8" (1.727 m)       Physical Exam   Constitutional: She appears well-developed and well-nourished.   HENT:   Head: Normocephalic and atraumatic.   Eyes: Pupils are equal, round, and reactive to light. No scleral icterus.   Neck: Normal range of motion.   Cardiovascular: Normal rate and regular rhythm.   No murmur heard.  Pulmonary/Chest: Effort normal and " breath sounds normal. She has no wheezes.   Abdominal: Soft. Bowel sounds are normal. She exhibits no distension. There is no tenderness.   Musculoskeletal: She exhibits no edema or tenderness.   Lymphadenopathy:     She has no cervical adenopathy.   Neurological: She is alert.   Skin: Skin is warm and dry. No rash noted.         Lab Results   Component Value Date    WBC 10.08 12/06/2019    HGB 8.9 (L) 12/06/2019    HCT 27.7 (L) 12/06/2019    MCV 94 12/06/2019     (H) 12/06/2019         CMP  Sodium   Date Value Ref Range Status   12/06/2019 133 (L) 136 - 145 mmol/L Final     Potassium   Date Value Ref Range Status   12/06/2019 3.7 3.5 - 5.1 mmol/L Final     Chloride   Date Value Ref Range Status   12/06/2019 98 95 - 110 mmol/L Final     CO2   Date Value Ref Range Status   12/06/2019 20 (L) 23 - 29 mmol/L Final     Glucose   Date Value Ref Range Status   12/06/2019 97 70 - 110 mg/dL Final     BUN, Bld   Date Value Ref Range Status   12/06/2019 8 8 - 23 mg/dL Final     Creatinine   Date Value Ref Range Status   12/06/2019 0.5 0.5 - 1.4 mg/dL Final     Calcium   Date Value Ref Range Status   12/06/2019 8.9 8.7 - 10.5 mg/dL Final     Total Protein   Date Value Ref Range Status   10/02/2019 7.0 6.0 - 8.4 g/dL Final     Albumin   Date Value Ref Range Status   10/02/2019 4.1 3.5 - 5.2 g/dL Final     Total Bilirubin   Date Value Ref Range Status   10/02/2019 0.4 0.1 - 1.0 mg/dL Final     Comment:     For infants and newborns, interpretation of results should be based  on gestational age, weight and in agreement with clinical  observations.  Premature Infant recommended reference ranges:  Up to 24 hours.............<8.0 mg/dL  Up to 48 hours............<12.0 mg/dL  3-5 days..................<15.0 mg/dL  6-29 days.................<15.0 mg/dL       Alkaline Phosphatase   Date Value Ref Range Status   10/02/2019 93 55 - 135 U/L Final     AST   Date Value Ref Range Status   10/02/2019 25 10 - 40 U/L Final     ALT   Date  Value Ref Range Status   10/02/2019 18 10 - 44 U/L Final     Anion Gap   Date Value Ref Range Status   12/06/2019 15 8 - 16 mmol/L Final     eGFR if    Date Value Ref Range Status   12/06/2019 >60.0 >60 mL/min/1.73 m^2 Final     eGFR if non    Date Value Ref Range Status   12/06/2019 >60.0 >60 mL/min/1.73 m^2 Final     Comment:     Calculation used to obtain the estimated glomerular filtration  rate (eGFR) is the CKD-EPI equation.          Past MRCP was independently visualized and reviewed by me and showed resolution of cystic pancreatic lesion.    Old records from Dr. Alba reviewed and are as summarized above in the HPI.        Assessment:       1. 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 Pancreas cyst    3. Iron deficiency anemia, unspecified iron deficiency anemia type        Plan:       1.  Schedule colonoscopy.  Increased risk secondary to vascular disease on Effient.  Will need Effient held prior to procedure.  2.  May need to consider small bowel evaluation for MERON at some point but will defer until after above  3.  Further recommendations to follow after above.  4.  Communication will be sent to the referring provider, Dr. Alba regarding my assessment and plan on this patient via EPIC.

## 2020-01-16 NOTE — LETTER
January 16, 2020      Olaf Alba MD  [address] [place] [address]           [facility]  [address]  [place] [address]  Phone: [phone]          Patient: Comfort Cantu   MR Number: [id number]   YOB: 1955   Date of Visit: 1/16/2020       Dear Dr. Olaf Alba:    Thank you for referring Comfort Cantu to me for evaluation. Attached you will find relevant portions of my assessment and plan of care.    If you have questions, please do not hesitate to call me. I look forward to following Comfort Cantu along with you.    Sincerely,    Esteban Yancey MD    Enclosure  CC:  No Recipients    If you would like to receive this communication electronically, please contact [email or web address] or [phone] to request more information on [employer] access.    For providers and/or their staff who would like to refer a patient to [facility], please contact us through our one-stop-shop provider referral line, [facility], at [phone].    If you feel you have received this communication in error or would no longer like to receive these types of communications, please e-mail [email or web address]

## 2020-01-16 NOTE — PROGRESS NOTES
"Subjective:       Patient ID: Comfort Cantu is a 64 y.o. female.    This is an established patient.        Chief Complaint: Colon polyp    Patient seen for personal history of colon polyps, diagnosed few months ago, multiple in number, one with large size (greater than 2 cm), villous adenoma histology with HGD, with associated signs/symptoms absent, and alleviating/exacerbating factors including none.  Her last colonoscopy was in December 2019 with Dr. Alab.  Per his notes, he biopsied and tattooed the lesion but was unable to remove it.  She had EUS for cystic pancreatic lesion in the past with subsequent imaging showing resolution and with biopsy unremarkable.  No other acute complaints at this time.      Review of Systems   Constitutional: Negative for chills, fatigue and fever.   HENT: Negative for sore throat and trouble swallowing.    Respiratory: Negative for cough, shortness of breath and wheezing.    Cardiovascular: Negative for chest pain and palpitations.   Gastrointestinal: Negative for abdominal pain, blood in stool, nausea and vomiting.   Genitourinary: Negative for dysuria and hematuria.   Musculoskeletal: Negative for arthralgias and myalgias.   Skin: Negative for color change and rash.   Neurological: Negative for dizziness and headaches.   Hematological: Negative for adenopathy.   Psychiatric/Behavioral: Negative for confusion. The patient is not nervous/anxious.    All other systems reviewed and are negative.      Objective:       Vitals:    01/16/20 1532   BP: (!) 143/71   Pulse: 92   Weight: 57.9 kg (127 lb 10.3 oz)   Height: 5' 8" (1.727 m)       Physical Exam   Constitutional: She appears well-developed and well-nourished.   HENT:   Head: Normocephalic and atraumatic.   Eyes: Pupils are equal, round, and reactive to light. No scleral icterus.   Neck: Normal range of motion.   Cardiovascular: Normal rate and regular rhythm.   No murmur heard.  Pulmonary/Chest: Effort normal and " breath sounds normal. She has no wheezes.   Abdominal: Soft. Bowel sounds are normal. She exhibits no distension. There is no tenderness.   Musculoskeletal: She exhibits no edema or tenderness.   Lymphadenopathy:     She has no cervical adenopathy.   Neurological: She is alert.   Skin: Skin is warm and dry. No rash noted.         Lab Results   Component Value Date    WBC 10.08 12/06/2019    HGB 8.9 (L) 12/06/2019    HCT 27.7 (L) 12/06/2019    MCV 94 12/06/2019     (H) 12/06/2019         CMP  Sodium   Date Value Ref Range Status   12/06/2019 133 (L) 136 - 145 mmol/L Final     Potassium   Date Value Ref Range Status   12/06/2019 3.7 3.5 - 5.1 mmol/L Final     Chloride   Date Value Ref Range Status   12/06/2019 98 95 - 110 mmol/L Final     CO2   Date Value Ref Range Status   12/06/2019 20 (L) 23 - 29 mmol/L Final     Glucose   Date Value Ref Range Status   12/06/2019 97 70 - 110 mg/dL Final     BUN, Bld   Date Value Ref Range Status   12/06/2019 8 8 - 23 mg/dL Final     Creatinine   Date Value Ref Range Status   12/06/2019 0.5 0.5 - 1.4 mg/dL Final     Calcium   Date Value Ref Range Status   12/06/2019 8.9 8.7 - 10.5 mg/dL Final     Total Protein   Date Value Ref Range Status   10/02/2019 7.0 6.0 - 8.4 g/dL Final     Albumin   Date Value Ref Range Status   10/02/2019 4.1 3.5 - 5.2 g/dL Final     Total Bilirubin   Date Value Ref Range Status   10/02/2019 0.4 0.1 - 1.0 mg/dL Final     Comment:     For infants and newborns, interpretation of results should be based  on gestational age, weight and in agreement with clinical  observations.  Premature Infant recommended reference ranges:  Up to 24 hours.............<8.0 mg/dL  Up to 48 hours............<12.0 mg/dL  3-5 days..................<15.0 mg/dL  6-29 days.................<15.0 mg/dL       Alkaline Phosphatase   Date Value Ref Range Status   10/02/2019 93 55 - 135 U/L Final     AST   Date Value Ref Range Status   10/02/2019 25 10 - 40 U/L Final     ALT   Date  Value Ref Range Status   10/02/2019 18 10 - 44 U/L Final     Anion Gap   Date Value Ref Range Status   12/06/2019 15 8 - 16 mmol/L Final     eGFR if    Date Value Ref Range Status   12/06/2019 >60.0 >60 mL/min/1.73 m^2 Final     eGFR if non    Date Value Ref Range Status   12/06/2019 >60.0 >60 mL/min/1.73 m^2 Final     Comment:     Calculation used to obtain the estimated glomerular filtration  rate (eGFR) is the CKD-EPI equation.          Past MRCP was independently visualized and reviewed by me and showed resolution of cystic pancreatic lesion.    Old records from Dr. Alba reviewed and are as summarized above in the HPI.        Assessment:       1. 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 Pancreas cyst    3. Iron deficiency anemia, unspecified iron deficiency anemia type        Plan:       1.  Schedule colonoscopy.  Increased risk secondary to vascular disease on Effient.  Will need Effient held prior to procedure.  2.  May need to consider small bowel evaluation for MERON at some point but will defer until after above  3.  Further recommendations to follow after above.  4.  Communication will be sent to the referring provider, Dr. Alba regarding my assessment and plan on this patient via EPIC.

## 2020-01-21 ENCOUNTER — TELEPHONE (OUTPATIENT)
Dept: GASTROENTEROLOGY | Facility: CLINIC | Age: 65
End: 2020-01-21

## 2020-01-21 NOTE — TELEPHONE ENCOUNTER
----- Message from Esteban Yancey MD sent at 1/21/2020 11:41 AM CST -----  Contact: pt      ----- Message -----  From: Jamey Perez  Sent: 1/21/2020  11:07 AM CST  To: Esteban Yancey MD    Type: Needs Medical Advice    Who Called:  pt    Best Call Back Number: [phone]  Additional Information: pt needs to know where to go to check in for procedure this Thursday please call to advise. thanks

## 2020-01-23 ENCOUNTER — HOSPITAL ENCOUNTER (OUTPATIENT)
Facility: HOSPITAL | Age: 65
Discharge: HOME | End: 2020-01-23
Attending: INTERNAL MEDICINE | Admitting: INTERNAL MEDICINE
Payer: MEDICARE

## 2020-01-23 ENCOUNTER — ANESTHESIA (OUTPATIENT)
Dept: ENDOSCOPY | Facility: HOSPITAL | Age: 65
End: 2020-01-23
Payer: MEDICARE

## 2020-01-23 ENCOUNTER — ANESTHESIA EVENT (OUTPATIENT)
Dept: ENDOSCOPY | Facility: HOSPITAL | Age: 65
End: 2020-01-23
Payer: MEDICARE

## 2020-01-23 VITALS
DIASTOLIC BLOOD PRESSURE: 69 MMHG | BODY MASS INDEX: 19.25 KG/M2 | SYSTOLIC BLOOD PRESSURE: 127 MMHG | RESPIRATION RATE: 19 BRPM | OXYGEN SATURATION: 98 % | HEIGHT: 68 IN | WEIGHT: 127 LBS | TEMPERATURE: 98 F | HEART RATE: 79 BPM

## 2020-01-23 DIAGNOSIS — Z86.010 HX OF COLONIC POLYPS: ICD-10-CM

## 2020-01-23 DIAGNOSIS — K63.5 POLYP OF COLON, UNSPECIFIED PART OF COLON, UNSPECIFIED TYPE: Primary | ICD-10-CM

## 2020-01-23 PROBLEM — Z86.0100 HX OF COLONIC POLYPS: Status: ACTIVE | Noted: 2020-01-23

## 2020-01-23 PROCEDURE — 45381 COLONOSCOPY SUBMUCOUS NJX: CPT | Mod: 51,,, | Performed by: INTERNAL MEDICINE

## 2020-01-23 PROCEDURE — 27201012 HC FORCEPS, HOT/COLD, DISP: Performed by: INTERNAL MEDICINE

## 2020-01-23 PROCEDURE — 63600175 PHARM REV CODE 636 W HCPCS: Performed by: INTERNAL MEDICINE

## 2020-01-23 PROCEDURE — 37000008 HC ANESTHESIA 1ST 15 MINUTES: Performed by: INTERNAL MEDICINE

## 2020-01-23 PROCEDURE — 45380 PR COLONOSCOPY,BIOPSY: ICD-10-PCS | Mod: 22,59,, | Performed by: INTERNAL MEDICINE

## 2020-01-23 PROCEDURE — 45380 COLONOSCOPY AND BIOPSY: CPT | Performed by: INTERNAL MEDICINE

## 2020-01-23 PROCEDURE — D9220A PRA ANESTHESIA: ICD-10-PCS | Mod: CRNA,,, | Performed by: NURSE ANESTHETIST, CERTIFIED REGISTERED

## 2020-01-23 PROCEDURE — 63600175 PHARM REV CODE 636 W HCPCS: Performed by: NURSE ANESTHETIST, CERTIFIED REGISTERED

## 2020-01-23 PROCEDURE — D9220A PRA ANESTHESIA: Mod: CRNA,,, | Performed by: NURSE ANESTHETIST, CERTIFIED REGISTERED

## 2020-01-23 PROCEDURE — 88305 TISSUE EXAM BY PATHOLOGIST: ICD-10-PCS | Mod: 26,,, | Performed by: PATHOLOGY

## 2020-01-23 PROCEDURE — 45385 COLONOSCOPY W/LESION REMOVAL: CPT | Mod: 22,,, | Performed by: INTERNAL MEDICINE

## 2020-01-23 PROCEDURE — 88305 TISSUE EXAM BY PATHOLOGIST: CPT | Mod: 59 | Performed by: PATHOLOGY

## 2020-01-23 PROCEDURE — 45381 COLONOSCOPY SUBMUCOUS NJX: CPT | Performed by: INTERNAL MEDICINE

## 2020-01-23 PROCEDURE — 37000009 HC ANESTHESIA EA ADD 15 MINS: Performed by: INTERNAL MEDICINE

## 2020-01-23 PROCEDURE — 45380 COLONOSCOPY AND BIOPSY: CPT | Mod: 22,59,, | Performed by: INTERNAL MEDICINE

## 2020-01-23 PROCEDURE — 27201089 HC SNARE, DISP (ANY): Performed by: INTERNAL MEDICINE

## 2020-01-23 PROCEDURE — 45381 PR COLONOSCPY,FLEX,W/DIR SUBMUC INJECT: ICD-10-PCS | Mod: 51,,, | Performed by: INTERNAL MEDICINE

## 2020-01-23 PROCEDURE — 88305 TISSUE EXAM BY PATHOLOGIST: CPT | Mod: 26,,, | Performed by: PATHOLOGY

## 2020-01-23 PROCEDURE — 45385 COLONOSCOPY W/LESION REMOVAL: CPT | Performed by: INTERNAL MEDICINE

## 2020-01-23 PROCEDURE — D9220A PRA ANESTHESIA: ICD-10-PCS | Mod: ANES,,, | Performed by: ANESTHESIOLOGY

## 2020-01-23 PROCEDURE — 45385 PR COLONOSCOPY,REMV LESN,SNARE: ICD-10-PCS | Mod: 22,,, | Performed by: INTERNAL MEDICINE

## 2020-01-23 PROCEDURE — 27201028 HC NEEDLE, SCLERO: Performed by: INTERNAL MEDICINE

## 2020-01-23 PROCEDURE — D9220A PRA ANESTHESIA: Mod: ANES,,, | Performed by: ANESTHESIOLOGY

## 2020-01-23 RX ORDER — SODIUM CHLORIDE 9 MG/ML
INJECTION, SOLUTION INTRAVENOUS CONTINUOUS
Status: DISCONTINUED | OUTPATIENT
Start: 2020-01-23 | End: 2020-01-23 | Stop reason: HOSPADM

## 2020-01-23 RX ORDER — PROPOFOL 10 MG/ML
VIAL (ML) INTRAVENOUS
Status: DISCONTINUED | OUTPATIENT
Start: 2020-01-23 | End: 2020-01-23

## 2020-01-23 RX ORDER — PHENYLEPHRINE HYDROCHLORIDE 10 MG/ML
INJECTION INTRAVENOUS
Status: DISCONTINUED | OUTPATIENT
Start: 2020-01-23 | End: 2020-01-23

## 2020-01-23 RX ORDER — LIDOCAINE HCL/PF 100 MG/5ML
SYRINGE (ML) INTRAVENOUS
Status: DISCONTINUED | OUTPATIENT
Start: 2020-01-23 | End: 2020-01-23

## 2020-01-23 RX ADMIN — PROPOFOL 40 MG: 10 INJECTION, EMULSION INTRAVENOUS at 10:01

## 2020-01-23 RX ADMIN — SODIUM CHLORIDE: 0.9 INJECTION, SOLUTION INTRAVENOUS at 09:01

## 2020-01-23 RX ADMIN — Medication 75 MG: at 09:01

## 2020-01-23 RX ADMIN — PROPOFOL 40 MG: 10 INJECTION, EMULSION INTRAVENOUS at 09:01

## 2020-01-23 RX ADMIN — PROPOFOL 80 MG: 10 INJECTION, EMULSION INTRAVENOUS at 09:01

## 2020-01-23 RX ADMIN — PHENYLEPHRINE HYDROCHLORIDE 100 MCG: 10 INJECTION INTRAVENOUS at 10:01

## 2020-01-23 RX ADMIN — PHENYLEPHRINE HYDROCHLORIDE 100 MCG: 10 INJECTION INTRAVENOUS at 09:01

## 2020-01-23 RX ADMIN — SODIUM CHLORIDE: 0.9 INJECTION, SOLUTION INTRAVENOUS at 10:01

## 2020-01-23 NOTE — DISCHARGE INSTRUCTIONS
"Discharge Instructions: After Your Surgery/Procedure  Youve just had surgery. During surgery you were given medicine called anesthesia to keep you relaxed and free of pain. After surgery you may have some pain or nausea. This is common. Here are some tips for feeling better and getting well after surgery.     Stay on schedule with your medication.   Going home  Your doctor or nurse will show you how to take care of yourself when you go home. He or she will also answer your questions. Have an adult family member or friend drive you home.      For your safety we recommend these precaution for the first 24 hours after your procedure:  · Do not drive or use heavy equipment.  · Do not make important decisions or sign legal papers.  · Do not drink alcohol.  · Have someone stay with you, if needed. He or she can watch for problems and help keep you safe.  · Your concentration, balance, coordination, and judgement may be impaired for many hours after anesthesia.  Use caution when ambulating or standing up.     · You may feel weak and "washed out" after anesthesia and surgery.      Subtle residual effects of general anesthesia or sedation with regional / local anesthesia can last more than 24 hours.  Rest for the remainder of the day or longer if your Doctor/Surgeon has advised you to do so.  Although you may feel normal within the first 24 hours, your reflexes and mental ability may be impaired without you realizing it.  You may feel dizzy, lightheaded or sleepy for 24 hours or longer.      Be sure to go to all follow-up visits with your doctor. And rest after your surgery for as long as your doctor tells you to.  Coping with pain  If you have pain after surgery, pain medicine will help you feel better. Take it as told, before pain becomes severe. Also, ask your doctor or pharmacist about other ways to control pain. This might be with heat, ice, or relaxation. And follow any other instructions your surgeon or nurse gives " you.  Tips for taking pain medicine  To get the best relief possible, remember these points:  · Pain medicines can upset your stomach. Taking them with a little food may help.  · Most pain relievers taken by mouth need at least 20 to 30 minutes to start to work.  · Taking medicine on a schedule can help you remember to take it. Try to time your medicine so that you can take it before starting an activity. This might be before you get dressed, go for a walk, or sit down for dinner.  · Constipation is a common side effect of pain medicines. Call your doctor before taking any medicines such as laxatives or stool softeners to help ease constipation. Also ask if you should skip any foods. Drinking lots of fluids and eating foods such as fruits and vegetables that are high in fiber can also help. Remember, do not take laxatives unless your surgeon has prescribed them.  · Drinking alcohol and taking pain medicine can cause dizziness and slow your breathing. It can even be deadly. Do not drink alcohol while taking pain medicine.  · Pain medicine can make you react more slowly to things. Do not drive or run machinery while taking pain medicine.  Your health care provider may tell you to take acetaminophen to help ease your pain. Ask him or her how much you are supposed to take each day. Acetaminophen or other pain relievers may interact with your prescription medicines or other over-the-counter (OTC) drugs. Some prescription medicines have acetaminophen and other ingredients. Using both prescription and OTC acetaminophen for pain can cause you to overdose. Read the labels on your OTC medicines with care. This will help you to clearly know the list of ingredients, how much to take, and any warnings. It may also help you not take too much acetaminophen. If you have questions or do not understand the information, ask your pharmacist or health care provider to explain it to you before you take the OTC medicine.  Managing  nausea  Some people have an upset stomach after surgery. This is often because of anesthesia, pain, or pain medicine, or the stress of surgery. These tips will help you handle nausea and eat healthy foods as you get better. If you were on a special food plan before surgery, ask your doctor if you should follow it while you get better. These tips may help:  · Do not push yourself to eat. Your body will tell you when to eat and how much.  · Start off with clear liquids and soup. They are easier to digest.  · Next try semi-solid foods, such as mashed potatoes, applesauce, and gelatin, as you feel ready.  · Slowly move to solid foods. Dont eat fatty, rich, or spicy foods at first.  · Do not force yourself to have 3 large meals a day. Instead eat smaller amounts more often.  · Take pain medicines with a small amount of solid food, such as crackers or toast, to avoid nausea.     Call your surgeon if  · You still have pain an hour after taking medicine. The medicine may not be strong enough.  · You feel too sleepy, dizzy, or groggy. The medicine may be too strong.  · You have side effects like nausea, vomiting, or skin changes, such as rash, itching, or hives.       If you have obstructive sleep apnea  You were given anesthesia medicine during surgery to keep you comfortable and free of pain. After surgery, you may have more apnea spells because of this medicine and other medicines you were given. The spells may last longer than usual.   At home:  · Keep using the continuous positive airway pressure (CPAP) device when you sleep. Unless your health care provider tells you not to, use it when you sleep, day or night. CPAP is a common device used to treat obstructive sleep apnea.  · Talk with your provider before taking any pain medicine, muscle relaxants, or sedatives. Your provider will tell you about the possible dangers of taking these medicines.  © 8984-3281 [employer]. [address] [place] [address]  [place] [address]. All rights reserved. This information is not intended as a substitute for professional medical care. Always follow your healthcare professional's instructions.    Diverticulosis    Diverticulosis means that small pouches have formed in the wall of your large intestine (colon). Most often, this problem causes no symptoms and is common as people age. But the pouches in the colon are at risk of becoming infected. When this happens, the condition is called diverticulitis. Although most people with diverticulosis never develop diverticulitis, it is still not uncommon. Rectal bleeding can also occur and in less common situations, a type of colon inflammation called colitis.  While most people do not have symptoms, some people with diverticulosis may have:  · Abdominal cramps and pain  · Bloating  · Constipation  · Change in bowel habits  Causes  The exact cause of diverticulosis (and diverticulitis) has not been proved, but a few things are associated with the condition:  · Low-fiber diet  · Constipation  · Lack of exercise  Your healthcare provider will talk with you about how to manage your condition. Diet changes may be all that are needed to help control diverticulosis and prevent progression to diverticulitis. If you develop diverticulitis, you will likely need other treatments.  Home care  You may be told to take fiber supplements daily. Fiber adds bulk to the stool so that it passes through the colon more easily. Stool softeners may be recommended. You may also be given medications for pain relief. Be sure to take all medications as directed.  In the past, people were told to avoid corn, nuts, and seeds. This is no longer necessary.  Follow these guidelines when caring for yourself at home:  · Eat unprocessed foods that are high in fiber. Whole grains, fruits, and vegetables are good choices.  · Drink 6 to 8 glasses of water every day unless your healthcare provider has you limit how much fluid you should  have.  · Watch for changes in your bowel movements. Tell your provider if you notice any changes.  · Begin an exercise program. Ask your provider how to get started. Generally, walking is the best.  · Get plenty of rest and sleep.  Follow-up care  Follow up with your healthcare provider, or as advised. Regular visits may be needed to check on your health. Sometimes special procedures such as colonoscopy, are needed after an episode of diverticulitis or blooding. Be sure to keep all your appointments.  If a stool sample was taken, or cultures were done, you should be told if they are positive, or if your treatment needs to be changed. You can call as directed for the results.  If X-rays were done, a radiologist will look at them. You will be told if there is a change in your treatment.  If antibiotics were prescribed, be sure to finish them all.  When to seek medical advice  Call your healthcare provider right away if any of these occur:  · Fever of 100.4°F (38°C) or higher, or as directed by your healthcare provider  · Severe cramps in the lower left side of the abdomen or pain that is getting worse  · Tenderness in the lower left side of the abdomen or worsening pain throughout the abdomen  · Diarrhea or constipation that doesn't get better within 24 hours  · Nausea and vomiting  · Bleeding from the rectum  Call 911  Call emergency services if any of the following occur:  · Trouble breathing  · Confusion  · Very drowsy or trouble awakening  · Fainting or loss of consciousness  · Rapid heart rate  · Chest pain  Date Last Reviewed: 12/30/2015  © 3707-5076 [employer]. [address] [place] [address], [place] [address]. All rights reserved. This information is not intended as a substitute for professional medical care. Always follow your healthcare professional's instructions.        Eating a High-Fiber Diet  Fiber is what gives strength and structure to plants. Most grains, beans, vegetables, and fruits contain  fiber. Foods rich in fiber are often low in calories and fat, and they fill you up more. They may also reduce your risks for certain health problems. To find out the amount of fiber in canned, packaged, or frozen foods, read the Nutrition Facts label. It tells you how much fiber is in a serving.    Types of fiber and their benefits  There are two types of fiber: insoluble and soluble. They both aid digestion and help you maintain a healthy weight.  · Insoluble fiber. This is found in whole grains, cereals, certain fruits and vegetables such as apple skin, corn, and carrots. Insoluble fiber may prevent constipation and reduce the risk for certain types of cancer.  · Soluble fiber. This type of fiber is in oats, beans, and certain fruits and vegetables such as strawberries and peas. Soluble fiber can reduce cholesterol, which may help lower the risk for heart disease. It also helps control blood sugar levels.  Look for high-fiber foods  Try these foods to add fiber to your diet:  · Whole-grain breads and cereals. Try to eat 6 to 8 ounces a day. Include wheat and oat bran cereals, whole-wheat muffins or toast, and corn tortillas in your meals.  · Fruits. Try to eat 2 cups a day. Apples, oranges, strawberries, pears, and bananas are good sources. (Note: Fruit juice is low in fiber.)  · Vegetables. Try to eat at least 2.5 cups a day. Add asparagus, carrots, broccoli, peas, and corn to your meals.  · Beans. One cup of cooked lentils gives you over 15 grams of fiber. Try navy beans, lentils, and chickpeas.  · Seeds. A small handful of seeds gives you about 3 grams of fiber. Try sunflower seeds.  Keep track of your fiber  Keep track of how much fiber you eat. Start by reading food labels. Then eat a variety of foods high in fiber. As you begin to eat more fiber, ask your healthcare provider how much water you should be drinking to keep your digestive system working smoothly.  You should aim for a certain amount of fiber in  your diet each day. If you are a woman, that amount is between 25 and 28 grams per day. Men should aim for 30 to 33 grams per day. After age 50, your daily fiber needs drop to 22 grams for women and 28 grams for men.  Before you reach for the fiber supplements, think about this. Fiber is found naturally in healthy whole foods. It gives you that feeling of fullness after you eat. Taking fiber supplements or eating fiber-enriched foods will not give you this full feeling.  Your fiber intake is a good measure for the quality of your overall diet. If you are missing out on your daily amount of fiber, you may be lacking other important nutrients as well.  Date Last Reviewed: 5/11/2015  © 6169-4934 [employer]. [address] [place] [address], [place] [address]. All rights reserved. This information is not intended as a substitute for professional medical care. Always follow your healthcare professional's instructions.        Understanding Colon and Rectal Polyps    The colon (also called the large intestine) is a muscular tube that forms the last part of the digestive tract. It absorbs water and stores food waste. The colon is about 4 to 6 feet long. The rectum is the last 6 inches of the colon. The colon and rectum have a smooth lining composed of millions of cells. Changes in these cells can lead to growths in the colon that can become cancerous and should be removed. Multiple tests are available to screen for colon cancer, but the colonoscopy is the most recommended test. During colonoscopy, these polyps can be removed. How often you need this test depends on many things including your condition, your family history, symptoms, and what the findings were at the previous colonoscopy.   When the colon lining changes  Changes that happen in the cells that line the colon or rectum can lead to growths called polyps. Over a period of years, polyps can turn cancerous. Removing polyps early may prevent cancer from ever  forming.  Polyps  Polyps are fleshy clumps of tissue that form on the lining of the colon or rectum. Small polyps are usually benign (not cancerous). However, over time, cells in a polyp can change and become cancerous. Certain types of polyps known as adenomatous polyps are premalignant. The risk for invasive cancer increases with the size of the polyp and certain cell and gene features. This means that they can become cancerous if they're not removed. Hyperplastic polyps are benign. They can grow quite large and not turn cancerous.   Cancer  Almost all colorectal cancers start when polyp cells begin growing abnormally. As a cancerous tumor grows, it may involve more and more of the colon or rectum. In time, cancer can also grow beyond the colon or rectum and spread to nearby organs or to glands called lymph nodes. The cells can also travel to other parts of the body. This is known as metastasis. The earlier a cancerous tumor is removed, the better the chance of preventing its spread.    Date Last Reviewed: 8/1/2016  © 3304-8720 [employer]. [address] [place] [address], [place] [address]. All rights reserved. This information is not intended as a substitute for professional medical care. Always follow your healthcare professional's instructions.

## 2020-01-23 NOTE — TRANSFER OF CARE
"Anesthesia Transfer of Care Note    Patient: Comfort Cantu    Procedure(s) Performed: Procedure(s) (LRB):  COLONOSCOPY (unable to come earlier) (N/A)    Patient location: PACU    Anesthesia Type: general    Transport from OR: Transported from OR on room air with adequate spontaneous ventilation    Post pain: adequate analgesia    Post assessment: no apparent anesthetic complications and tolerated procedure well    Post vital signs: stable    Level of consciousness: awake, alert and oriented    Nausea/Vomiting: no nausea/vomiting    Complications: none    Transfer of care protocol was followed      Last vitals:   Visit Vitals  Temp 36.8 °C (98.2 °F) (Skin)   Ht 5' 8" (1.727 m)   Wt 57.6 kg (127 lb)   SpO2 (!) 93%   Breastfeeding? No   BMI 19.31 kg/m²     "

## 2020-01-23 NOTE — ANESTHESIA POSTPROCEDURE EVALUATION
Anesthesia Post Evaluation    Patient: Comfort Cantu    Procedure(s) Performed: Procedure(s) (LRB):  COLONOSCOPY (unable to come earlier) (N/A)    Final Anesthesia Type: general    Patient location during evaluation: PACU  Patient participation: Yes- Able to Participate  Level of consciousness: awake and alert  Post-procedure vital signs: reviewed and stable  Pain management: adequate  Airway patency: patent    PONV status at discharge: No PONV  Anesthetic complications: no      Cardiovascular status: blood pressure returned to baseline and hemodynamically stable  Respiratory status: unassisted  Hydration status: euvolemic  Follow-up not needed.          Vitals Value Taken Time   /73 1/23/2020 11:05 AM   Temp 36.8 °C (98.2 °F) 1/23/2020  9:27 AM   Pulse 81 1/23/2020 11:05 AM   Resp 18 1/23/2020 11:05 AM   SpO2 97 % 1/23/2020 11:05 AM         No case tracking events are documented in the log.      Pain/Deann Score: Deann Score: 9 (1/23/2020 11:05 AM)

## 2020-01-23 NOTE — [employer] PATIENT INSTRUCTIONS
Discharge Summary/Instructions after an Endoscopic Procedure  Patient Name: Comfort Cantu  Patient MRN: [id number]  Patient YOB: 1955 Thursday, January 23, 2020  Esteban Finch MD  RESTRICTIONS:  During your procedure today, you received medications for sedation.  These   medications may affect your judgment, balance and coordination.  Therefore,   for 24 hours, you have the following restrictions:   - DO NOT drive a car, operate machinery, make legal/financial decisions,   sign important papers or drink alcohol.    ACTIVITY:  Today: no heavy lifting, straining or running due to procedural   sedation/anesthesia.  The following day: return to full activity including work.  DIET:  Eat and drink normally unless instructed otherwise.     TREATMENT FOR COMMON SIDE EFFECTS:  - Mild abdominal pain, nausea, belching, bloating or excessive gas:  rest,   eat lightly and use a heating pad.  - Sore Throat: treat with throat lozenges and/or gargle with warm salt   water.  - Because air was used during the procedure, expelling large amounts of air   from your rectum or belching is normal.  - If a bowel prep was taken, you may not have a bowel movement for 1-3 days.    This is normal.  SYMPTOMS TO WATCH FOR AND REPORT TO YOUR PHYSICIAN:  1. Abdominal pain or bloating, other than gas cramps.  2. Chest pain.  3. Back pain.  4. Signs of infection such as: chills or fever occurring within 24 hours   after the procedure.  5. Rectal bleeding, which would show as bright red, maroon, or black stools.   (A tablespoon of blood from the rectum is not serious, especially if   hemorrhoids are present.)  6. Vomiting.  7. Weakness or dizziness.  GO DIRECTLY TO THE NEAREST EMERGENCY ROOM IF YOU HAVE ANY OF THE FOLLOWING:      Difficulty breathing              Chills and/or fever over 101 F   Persistent vomiting and/or vomiting blood   Severe abdominal pain   Severe chest pain   Black, tarry stools   Bleeding- more than one  tablespoon   Any other symptom or condition that you feel may need urgent attention  Your doctor recommends these additional instructions:  If any biopsies were taken, your doctors clinic will contact you in 1 to 2   weeks with any results.  - Patient has a contact number available for emergencies.  The signs and   symptoms of potential delayed complications were discussed with the   patient.  Return to normal activities tomorrow.  Written discharge   instructions were provided to the patient.   - Resume previous diet.   - Continue present medications.   - No aspirin, ibuprofen, naproxen, or other non-steroidal anti-inflammatory   drugs for 2 weeks after polyp removal.   - Resume Effient (prasugrel) at prior dose today.   - Await pathology results.   - Repeat colonoscopy (date not yet determined) for surveillance.   - Discharge patient to home (ambulatory).   - Refer to a colo-rectal surgeon at appointment to be scheduled as she will   need surgical resection of this polyp which is not amenable to endoscopic   removal.  For questions, problems or results please call your physician - Esteban Finch MD at Work:  [phone].  [facility], EMERGENCY ROOM PHONE NUMBER: [phone]  IF A COMPLICATION OR EMERGENCY SITUATION ARISES AND YOU ARE UNABLE TO REACH   YOUR PHYSICIAN - GO DIRECTLY TO THE EMERGENCY ROOM.  Esteban Finch MD  1/23/2020 10:54:31 AM  This report has been verified and signed electronically.  [employer]

## 2020-01-29 ENCOUNTER — TELEPHONE (OUTPATIENT)
Dept: GASTROENTEROLOGY | Facility: CLINIC | Age: 65
End: 2020-01-29

## 2020-01-29 ENCOUNTER — TELEPHONE (OUTPATIENT)
Dept: SURGERY | Facility: CLINIC | Age: 65
End: 2020-01-29

## 2020-01-29 NOTE — TELEPHONE ENCOUNTER
----- Message from Jordan Wood sent at 1/29/2020  1:06 PM CST -----  Contact: pt  Type: Needs Medical Advice    Who Called:  pt    Best Call Back Number: [phone]  Additional Information: pt states someone was to be calling her about scheduling an apt to see a colorectal surgeon. Please call to advise

## 2020-01-29 NOTE — TELEPHONE ENCOUNTER
I called patient to schedule her on Tuesday, 2/4/20 at 12:30pm in [place] per Dr. Candelaria.  Raul

## 2020-01-29 NOTE — OR NURSING
"Pt called endoscopy unit today and stated "missed some phone calls from [facility] but know that Dr Yancey wanted me to see a surgeon".  I called Dr Candelaria office and spoke to nurse and she noted that pt has appointment in [facility] with Dr Alba April 2, 1330.  Pt now requesting to see  in [place] area and pt given clinic phone # to get in to see DR Candelaria and I spoke to Viral-Dr Candelaria nurse and he will get pt in soon.  "

## 2020-02-02 ENCOUNTER — PATIENT OUTREACH (OUTPATIENT)
Dept: ADMINISTRATIVE | Facility: OTHER | Age: 65
End: 2020-02-02

## 2020-02-02 ENCOUNTER — HOSPITAL ENCOUNTER (EMERGENCY)
Facility: HOSPITAL | Age: 65
Discharge: HOME | End: 2020-02-02
Attending: EMERGENCY MEDICINE
Payer: MEDICARE

## 2020-02-02 VITALS
DIASTOLIC BLOOD PRESSURE: 83 MMHG | WEIGHT: 127 LBS | BODY MASS INDEX: 19.25 KG/M2 | TEMPERATURE: 98 F | HEIGHT: 68 IN | RESPIRATION RATE: 20 BRPM | SYSTOLIC BLOOD PRESSURE: 140 MMHG | HEART RATE: 78 BPM | OXYGEN SATURATION: 98 %

## 2020-02-02 DIAGNOSIS — S82.64XA CLOSED NONDISPLACED FRACTURE OF LATERAL MALLEOLUS OF RIGHT FIBULA, INITIAL ENCOUNTER: ICD-10-CM

## 2020-02-02 DIAGNOSIS — R52 PAIN: ICD-10-CM

## 2020-02-02 DIAGNOSIS — S82.244A CLOSED NONDISPLACED SPIRAL FRACTURE OF SHAFT OF RIGHT TIBIA, INITIAL ENCOUNTER: Primary | ICD-10-CM

## 2020-02-02 PROCEDURE — 73590 XR TIBIA FIBULA 2 VIEW RIGHT: ICD-10-PCS | Mod: 26,RT,, | Performed by: RADIOLOGY

## 2020-02-02 PROCEDURE — 73590 X-RAY EXAM OF LOWER LEG: CPT | Mod: 26,RT,, | Performed by: RADIOLOGY

## 2020-02-02 PROCEDURE — 73590 X-RAY EXAM OF LOWER LEG: CPT | Mod: TC,FY,RT

## 2020-02-02 PROCEDURE — 29505 APPLICATION LONG LEG SPLINT: CPT | Mod: RT

## 2020-02-02 PROCEDURE — 99283 EMERGENCY DEPT VISIT LOW MDM: CPT | Mod: 25

## 2020-02-02 PROCEDURE — 25000003 PHARM REV CODE 250: Performed by: EMERGENCY MEDICINE

## 2020-02-02 RX ORDER — OXYCODONE AND ACETAMINOPHEN 5; 325 MG/1; MG/1
1 TABLET ORAL
Status: COMPLETED | OUTPATIENT
Start: 2020-02-02 | End: 2020-02-02

## 2020-02-02 RX ORDER — TRAMADOL HYDROCHLORIDE 50 MG/1
50 TABLET ORAL EVERY 6 HOURS PRN
Qty: 12 TABLET | Refills: 0 | Status: SHIPPED | OUTPATIENT
Start: 2020-02-02 | End: 2020-02-05

## 2020-02-02 RX ADMIN — OXYCODONE HYDROCHLORIDE AND ACETAMINOPHEN 1 TABLET: 5; 325 TABLET ORAL at 10:02

## 2020-02-02 NOTE — ED PROVIDER NOTES
Encounter Date: 2/2/2020       History     Chief Complaint   Patient presents with    Fall     Patient fell last night and injured her right lower leg and right ankle     [age] past medical history of anxiety, depression, CAD, osteoarthritis, hypertension presents to the ED for evaluation of right lower extremity pain. States she tripped and fell yesterday evening landing on her her right lower leg and twisting her right ankle.  She has been and able to bear weight.  Pain is intermittent, aching, sharp nonradiating and is associated with swelling.        Review of patient's allergies indicates:   Allergen Reactions    Penicillins      Past Medical History:   Diagnosis Date    Anticoagulant long-term use     Anxiety     Arthritis     Atherosclerosis     Coronary artery disease     Depression     GERD (gastroesophageal reflux disease)     Hypertension      Past Surgical History:   Procedure Laterality Date    APPENDECTOMY      CAROTID STENT  2014    COLONOSCOPY N/A 10/7/2019    Procedure: COLONOSCOPY;  Surgeon: Olaf Alba MD;  Location: [facility];  Service: General;  Laterality: N/A;    COLONOSCOPY N/A 12/16/2019    Procedure: COLONOSCOPY;  Surgeon: Olaf Alba MD;  Location: [facility];  Service: General;  Laterality: N/A;    COLONOSCOPY N/A 1/23/2020    Procedure: COLONOSCOPY (unable to come earlier);  Surgeon: Esteban Yancey MD;  Location: [facility];  Service: Endoscopy;  Laterality: N/A;    ENDOSCOPIC ULTRASOUND OF UPPER GASTROINTESTINAL TRACT N/A 10/16/2018    Procedure: ULTRASOUND, UPPER GI TRACT, ENDOSCOPIC;  Surgeon: Kumar Wang MD;  Location: [facility] ([address] [place] [address]);  Service: Endoscopy;  Laterality: N/A;  5 day hold Effient, Dr Сергей Malagon    ESOPHAGOGASTRODUODENOSCOPY N/A 10/7/2019    Procedure: ESOPHAGOGASTRODUODENOSCOPY (EGD);  Surgeon: Olaf Alba MD;  Location: [facility];  Service: General;  Laterality: N/A;    FEMORAL ARTERY STENT        Family History   Problem Relation Age of Onset    Heart attack Brother     Diabetes Brother     Hyperlipidemia Brother      Social History     Tobacco Use    Smoking status: Current Every Day Smoker     Packs/day: 1.00     Years: 23.00     Pack years: 23.00     Types: Cigarettes     Start date: 10/1/1988    Smokeless tobacco: Never Used   Substance Use Topics    Alcohol use: Yes     Comment: occ    Drug use: No     Review of Systems   Constitutional: Negative for appetite change, chills, diaphoresis, fatigue and fever.   HENT: Negative for congestion, ear pain, rhinorrhea, sinus pressure, sinus pain, sore throat and tinnitus.    Eyes: Negative for photophobia and visual disturbance.   Respiratory: Negative for cough, chest tightness, shortness of breath and wheezing.    Cardiovascular: Negative for chest pain, palpitations and leg swelling.   Gastrointestinal: Negative for abdominal pain, constipation, diarrhea, nausea and vomiting.   Endocrine: Negative for cold intolerance, heat intolerance, polydipsia, polyphagia and polyuria.   Genitourinary: Negative for decreased urine volume, difficulty urinating, dysuria, flank pain, frequency, hematuria and urgency.   Musculoskeletal: Positive for gait problem and joint swelling. Negative for arthralgias, back pain, myalgias, neck pain and neck stiffness.        Right leg and ankle pain     Skin: Negative for color change, pallor, rash and wound.   Allergic/Immunologic: Negative for immunocompromised state.   Neurological: Negative for dizziness, syncope, weakness, light-headedness, numbness and headaches.   Hematological: Negative for adenopathy. Does not bruise/bleed easily.   Psychiatric/Behavioral: Negative for decreased concentration, dysphoric mood and sleep disturbance. The patient is not nervous/anxious.    All other systems reviewed and are negative.      Physical Exam     Initial Vitals [02/02/20 1007]   BP Pulse Resp Temp SpO2   (!) 140/83 78 20 98.3  °F (36.8 °C) 98 %      MAP       --         Physical Exam    Nursing note and vitals reviewed.  Constitutional: She appears well-developed and well-nourished. She is not diaphoretic. No distress.   HENT:   Head: Normocephalic and atraumatic.   Right Ear: External ear normal.   Left Ear: External ear normal.   Nose: Nose normal.   Mouth/Throat: Oropharynx is clear and moist.   Eyes: Conjunctivae are normal. Pupils are equal, round, and reactive to light. No scleral icterus.   Neck: Normal range of motion. Neck supple. No JVD present.   Cardiovascular: Normal rate, regular rhythm, normal heart sounds and intact distal pulses.   Pulmonary/Chest: Breath sounds normal. No respiratory distress. She has no wheezes. She has no rhonchi. She has no rales. She exhibits no tenderness.   Abdominal: Soft. Bowel sounds are normal. She exhibits no distension. There is no tenderness. There is no rebound and no guarding.   Musculoskeletal: Normal range of motion. She exhibits edema and tenderness.   Lymphadenopathy:     She has no cervical adenopathy.   Neurological: She is alert and oriented to person, place, and time. GCS score is 15. GCS eye subscore is 4. GCS verbal subscore is 5. GCS motor subscore is 6.   Skin: Skin is warm and dry. Capillary refill takes less than 2 seconds. No rash noted. No erythema. No pallor.   Psychiatric: She has a normal mood and affect. Her behavior is normal. Judgment and thought content normal.         ED Course   Procedures  Labs Reviewed - No data to display       Imaging Results          X-Ray Tibia Fibula 2 View Right (Final result)  Result time 02/02/20 10:31:25    Final result by Miguelito Pearson Jr., MD (02/02/20 10:31:25)                 Impression:      Spiral fracture of the distal shaft of the right tibia with slight medial angulation of the distal fragment.  Additional probably comminuted fracture of the lateral malleolus without displacement.      Electronically signed by: Miguelito  MD Lili  Date:    02/02/2020  Time:    10:31             Narrative:    EXAMINATION:  XR TIBIA FIBULA 2 VIEW RIGHT    CLINICAL HISTORY:  Pain, unspecified    TECHNIQUE:  AP and lateral views of the right tibia and fibula were performed.    COMPARISON:  None.    FINDINGS:  There is spiral fracture of the distal shaft of the right tibia with very slight medial angulation of the distal fragment.  There is additional probably comminuted fracture of the lateral malleolus without displacement.  The talus appears intact.  The proximal tibia and fibula are intact.                                 Medical Decision Making:   Differential Diagnosis:   Fracture  ED Management:  OCL applied, neurovascularly intact distal to injury pre and post splinting  Patient given a prescription for tramadol as well as a referral to Orthopedics for definitive care.                                 Clinical Impression:       ICD-10-CM ICD-9-CM   1. Closed nondisplaced spiral fracture of shaft of right tibia, initial encounter S82.244A 823.20   2. Pain R52 780.96   3. Closed nondisplaced fracture of lateral malleolus of right fibula, initial encounter S82.64XA 824.2         Disposition:   Disposition: Discharged  Condition: Stable                     Santa Sanabria MD  02/02/20 1314

## 2020-02-03 ENCOUNTER — TELEPHONE (OUTPATIENT)
Dept: SURGERY | Facility: CLINIC | Age: 65
End: 2020-02-03

## 2020-02-03 ENCOUNTER — TELEPHONE (OUTPATIENT)
Dept: INTERNAL MEDICINE | Facility: CLINIC | Age: 65
End: 2020-02-03

## 2020-02-03 DIAGNOSIS — S82.241A CLOSED DISPLACED SPIRAL FRACTURE OF SHAFT OF RIGHT TIBIA, INITIAL ENCOUNTER: Primary | ICD-10-CM

## 2020-02-03 NOTE — TELEPHONE ENCOUNTER
I called patient and she broke her leg on Saturday.  I rescheduled her at her request to [place], 2/18/20 at 2:30pm in [place]. Raul

## 2020-02-03 NOTE — TELEPHONE ENCOUNTER
----- Message from Leroy Shah sent at 2/3/2020  1:51 PM CST -----  Contact: Ptnt   [phone]  Type: Needs Medical Advice    Who Called:  Ptnt   [phone]    Symptoms (please be specific): Broken leg this weekend below the knee Saturday night went to ER on Sunday. Fell due to her BP fluctuating of late.    How long has patient had these symptoms:  Saturday night 02-01-20    Pharmacy name and phone #:      [facility] [address] [place] [address]  [phone]    Best Call Back Number: [phone]    Additional Information: Advised needs the Dr to order a wheel chair with leg extensions as she must keep her right leg fully extended can not bend her knee. Please call to advise.

## 2020-02-03 NOTE — TELEPHONE ENCOUNTER
----- Message from Rachel Lobato sent at 2/3/2020 11:28 AM CST -----  Contact: Patient   Patient would like a call back at [phone], Regards to she has broken her leg and is in a cast and would like to know if she can still be seen or if she needs to reschedule.    Thanks  Td

## 2020-02-05 NOTE — H&P (VIEW-ONLY)
CC:  64-year-old female presents for evaluation of a right distal tibia and fibular fracture.  The patient had a fall on 02/01/2020.  She was seen the following morning on 02/02/2020 in an emergency room in [place] where she was diagnosed with the fracture and told to follow-up with Orthopedics.  The patient states that she contacted our [facility] office in [place] but could not get an appointment until February 18th.  She was splinted by the emergency room and presents today for evaluation.    She reports that, in the interim between her fall and today, she had a fall at her home.  She states that her neighbor had created her a homemade potty chair out with planks and a toilet seat and that she fell transferring from the wheelchair to her home a potty chair and landed on the right leg.  She had a increase in pain at the time of the fall.    ROS:    Constitution: Denies chills, fever, and sweats.  HENT: Denies headaches or blurry vision.  Cardiovascular: Denies chest pain or irregular heart beat.  Positive for history of multiple stents.  The patient is currently on Effient as a blood thinner  Respiratory: Denies cough or shortness of breath.  Gastrointestinal: Denies abdominal pain, nausea, or vomiting.  Patient does have polyps and has a current consult in with general surgery for consult for colon surgery.  Genitourinary:  Denies urinary incontinence, bladder and kidney issues  Musculoskeletal:  Denies muscle cramps.  Positive for right leg pain  Neurological: Denies dizziness or focal weakness.  Psychiatric/Behavioral: Normal mental status.  Hematologic/Lymphatic: Denies bleeding problem or easy bruising/bleeding.  Skin: Denies rash or suspicious lesions.    Physical examination     Gen - No acute distress   Eyes - Extraoccular motions intact, pupils equally round and reactive to light and accommodation   ENT - normocephalic, atruamtic, oropharynx clear   Neck - Supple, no abnormal masses    Cardiovascular - regular rate and rhythm   Pulmonary - clear to auscultation bilaterally   Abdomen - soft, non-tender, non-distended, positive bowel sounds   Psych - The patient is alert and oriented x3 with normal mood and affect    Right Lower Extremity Examination     Skin is intact throughout   Motor is intact distally EHL, FHL, TA, and gastroc   +2 dorsalis pedis and posterior tibial pulses   Sensation to light touch is intact distally dorsal, plantar, and first web space     Examination of the Right ankle and foot:     Tenderness to palpation:  Diffusely over the distal tibia and fibula  Swelling is noted over the distal tibia and fibula  Provocative examination is deferred due to history of fracture    X-rays were examined and personally reviewed by me.  Three views of the right tibia and fibula dated 02/06/2020 are available for review and comparison to three views dated 02/02/2020.  Both x-rays show a spiral fracture of the right distal 3rd of the tibia along the comminuted fracture of the right distal fibula.  There has been displacement between the 2 x-rays.    Dx:  Displaced distal 3rd fracture right tibia comminuted fracture of the right distal fibula    Plan:  I had a long discussion with the patient about treatment.  She is already almost 1 week out from the initial injury.  She is also on a blood thinner we need to hold that for certain period of time before she can have surgery.  I would also like to get some clearance is on her because of her multiple comorbid medical conditions.  The patient is also a smoker and I counseled her on smoking cessation in the increased risk of wound breakdown, infection, nonunion, and malunion if she continues to smoke.  We applied a short-leg plaster splint today.  We gave her DME for wheelchair with a leg extension.  We will tentatively book her for surgery next week to give time for her to get off of her blood thinners and obtain medical clearance.

## 2020-02-05 NOTE — PROGRESS NOTES
CC:  64-year-old female presents for evaluation of a right distal tibia and fibular fracture.  The patient had a fall on 02/01/2020.  She was seen the following morning on 02/02/2020 in an emergency room in [place] where she was diagnosed with the fracture and told to follow-up with Orthopedics.  The patient states that she contacted our [facility] office in [place] but could not get an appointment until February 18th.  She was splinted by the emergency room and presents today for evaluation.    She reports that, in the interim between her fall and today, she had a fall at her home.  She states that her neighbor had created her a homemade potty chair out with planks and a toilet seat and that she fell transferring from the wheelchair to her home a potty chair and landed on the right leg.  She had a increase in pain at the time of the fall.    ROS:    Constitution: Denies chills, fever, and sweats.  HENT: Denies headaches or blurry vision.  Cardiovascular: Denies chest pain or irregular heart beat.  Positive for history of multiple stents.  The patient is currently on Effient as a blood thinner  Respiratory: Denies cough or shortness of breath.  Gastrointestinal: Denies abdominal pain, nausea, or vomiting.  Patient does have polyps and has a current consult in with general surgery for consult for colon surgery.  Genitourinary:  Denies urinary incontinence, bladder and kidney issues  Musculoskeletal:  Denies muscle cramps.  Positive for right leg pain  Neurological: Denies dizziness or focal weakness.  Psychiatric/Behavioral: Normal mental status.  Hematologic/Lymphatic: Denies bleeding problem or easy bruising/bleeding.  Skin: Denies rash or suspicious lesions.    Physical examination     Gen - No acute distress   Eyes - Extraoccular motions intact, pupils equally round and reactive to light and accommodation   ENT - normocephalic, atruamtic, oropharynx clear   Neck - Supple, no abnormal masses    Cardiovascular - regular rate and rhythm   Pulmonary - clear to auscultation bilaterally   Abdomen - soft, non-tender, non-distended, positive bowel sounds   Psych - The patient is alert and oriented x3 with normal mood and affect    Right Lower Extremity Examination     Skin is intact throughout   Motor is intact distally EHL, FHL, TA, and gastroc   +2 dorsalis pedis and posterior tibial pulses   Sensation to light touch is intact distally dorsal, plantar, and first web space     Examination of the Right ankle and foot:     Tenderness to palpation:  Diffusely over the distal tibia and fibula  Swelling is noted over the distal tibia and fibula  Provocative examination is deferred due to history of fracture    X-rays were examined and personally reviewed by me.  Three views of the right tibia and fibula dated 02/06/2020 are available for review and comparison to three views dated 02/02/2020.  Both x-rays show a spiral fracture of the right distal 3rd of the tibia along the comminuted fracture of the right distal fibula.  There has been displacement between the 2 x-rays.    Dx:  Displaced distal 3rd fracture right tibia comminuted fracture of the right distal fibula    Plan:  I had a long discussion with the patient about treatment.  She is already almost 1 week out from the initial injury.  She is also on a blood thinner we need to hold that for certain period of time before she can have surgery.  I would also like to get some clearance is on her because of her multiple comorbid medical conditions.  The patient is also a smoker and I counseled her on smoking cessation in the increased risk of wound breakdown, infection, nonunion, and malunion if she continues to smoke.  We applied a short-leg plaster splint today.  We gave her DME for wheelchair with a leg extension.  We will tentatively book her for surgery next week to give time for her to get off of her blood thinners and obtain medical clearance.

## 2020-02-06 ENCOUNTER — OFFICE VISIT (OUTPATIENT)
Dept: ORTHOPEDICS | Facility: CLINIC | Age: 65
End: 2020-02-06
Payer: MEDICARE

## 2020-02-06 ENCOUNTER — HOSPITAL ENCOUNTER (OUTPATIENT)
Dept: RADIOLOGY | Facility: HOSPITAL | Age: 65
Discharge: HOME | End: 2020-02-06
Attending: ORTHOPAEDIC SURGERY
Payer: MEDICARE

## 2020-02-06 VITALS
SYSTOLIC BLOOD PRESSURE: 120 MMHG | DIASTOLIC BLOOD PRESSURE: 65 MMHG | HEART RATE: 75 BPM | BODY MASS INDEX: 19.25 KG/M2 | HEIGHT: 68 IN | WEIGHT: 127 LBS

## 2020-02-06 DIAGNOSIS — S82.241A CLOSED DISPLACED SPIRAL FRACTURE OF SHAFT OF RIGHT TIBIA, INITIAL ENCOUNTER: ICD-10-CM

## 2020-02-06 DIAGNOSIS — S82.241A CLOSED DISPLACED SPIRAL FRACTURE OF SHAFT OF RIGHT TIBIA, INITIAL ENCOUNTER: Primary | ICD-10-CM

## 2020-02-06 DIAGNOSIS — S82.64XA CLOSED NONDISPLACED FRACTURE OF LATERAL MALLEOLUS OF RIGHT FIBULA, INITIAL ENCOUNTER: ICD-10-CM

## 2020-02-06 PROCEDURE — 73590 X-RAY EXAM OF LOWER LEG: CPT | Mod: TC,PN,RT

## 2020-02-06 PROCEDURE — 99204 OFFICE O/P NEW MOD 45 MIN: CPT | Mod: 25,S$GLB,, | Performed by: ORTHOPAEDIC SURGERY

## 2020-02-06 PROCEDURE — 99999 PR PBB SHADOW E&M-EST. PATIENT-LVL III: ICD-10-PCS | Mod: PBBFAC,,, | Performed by: ORTHOPAEDIC SURGERY

## 2020-02-06 PROCEDURE — 3078F PR MOST RECENT DIASTOLIC BLOOD PRESSURE < 80 MM HG: ICD-10-PCS | Mod: CPTII,S$GLB,, | Performed by: ORTHOPAEDIC SURGERY

## 2020-02-06 PROCEDURE — 29515 PR APPLY LOWER LEG SPLINT: ICD-10-PCS | Mod: RT,S$GLB,, | Performed by: ORTHOPAEDIC SURGERY

## 2020-02-06 PROCEDURE — 3074F SYST BP LT 130 MM HG: CPT | Mod: CPTII,S$GLB,, | Performed by: ORTHOPAEDIC SURGERY

## 2020-02-06 PROCEDURE — 29515 APPLICATION SHORT LEG SPLINT: CPT | Mod: RT,S$GLB,, | Performed by: ORTHOPAEDIC SURGERY

## 2020-02-06 PROCEDURE — 3078F DIAST BP <80 MM HG: CPT | Mod: CPTII,S$GLB,, | Performed by: ORTHOPAEDIC SURGERY

## 2020-02-06 PROCEDURE — 73590 XR TIBIA FIBULA 2 VIEW RIGHT: ICD-10-PCS | Mod: 26,RT,, | Performed by: RADIOLOGY

## 2020-02-06 PROCEDURE — 3008F BODY MASS INDEX DOCD: CPT | Mod: CPTII,S$GLB,, | Performed by: ORTHOPAEDIC SURGERY

## 2020-02-06 PROCEDURE — 73590 X-RAY EXAM OF LOWER LEG: CPT | Mod: 26,RT,, | Performed by: RADIOLOGY

## 2020-02-06 PROCEDURE — 99204 PR OFFICE/OUTPT VISIT, NEW, LEVL IV, 45-59 MIN: ICD-10-PCS | Mod: 25,S$GLB,, | Performed by: ORTHOPAEDIC SURGERY

## 2020-02-06 PROCEDURE — 3074F PR MOST RECENT SYSTOLIC BLOOD PRESSURE < 130 MM HG: ICD-10-PCS | Mod: CPTII,S$GLB,, | Performed by: ORTHOPAEDIC SURGERY

## 2020-02-06 PROCEDURE — 3008F PR BODY MASS INDEX (BMI) DOCUMENTED: ICD-10-PCS | Mod: CPTII,S$GLB,, | Performed by: ORTHOPAEDIC SURGERY

## 2020-02-06 PROCEDURE — 99999 PR PBB SHADOW E&M-EST. PATIENT-LVL III: CPT | Mod: PBBFAC,,, | Performed by: ORTHOPAEDIC SURGERY

## 2020-02-06 NOTE — LETTER
February 6, 2020      Santa Sanabria MD  [address] [place] [address]           [facility] SEBAST[place] BRANHAM [address] [place] [id number]  Phone: [phone]          Patient: Comfort Cantu   MR Number: [id number]   YOB: 1955   Date of Visit: 2/6/2020       Dear Dr. Santa Sanabria:    Thank you for referring Comfort Cantu to me for evaluation. Attached you will find relevant portions of my assessment and plan of care.    If you have questions, please do not hesitate to call me. I look forward to following Comfort Cantu along with you.    Sincerely,    Elbert Marrero II, MD    Enclosure  CC:  No Recipients    If you would like to receive this communication electronically, please contact [email or web address] or [phone] to request more information on [facility] access.    For providers and/or their staff who would like to refer a patient to [facility], please contact us through our one-stop-shop provider referral line, [facility], at [phone].    If you feel you have received this communication in error or would no longer like to receive these types of communications, please e-mail [email or web address]

## 2020-02-07 ENCOUNTER — TELEPHONE (OUTPATIENT)
Dept: FAMILY MEDICINE | Facility: CLINIC | Age: 65
End: 2020-02-07

## 2020-02-07 ENCOUNTER — TELEPHONE (OUTPATIENT)
Dept: ORTHOPEDICS | Facility: CLINIC | Age: 65
End: 2020-02-07

## 2020-02-07 DIAGNOSIS — S82.241A CLOSED DISPLACED SPIRAL FRACTURE OF SHAFT OF RIGHT TIBIA, INITIAL ENCOUNTER: Primary | ICD-10-CM

## 2020-02-07 NOTE — TELEPHONE ENCOUNTER
----- Message from Lorena Smith LPN sent at 2/7/2020  3:40 PM CST -----  Regarding: FW: Preoperative clearance      ----- Message -----  From: Jennifer Colon LPN  Sent: 2/7/2020  10:44 AM CST  To: Venkatesh Tena Staff  Subject: Preoperative clearance                           Good morning. This patient is being tentatively booked for an ORIF of a displaced distal 3rd fracture right tibia comminuted fracture of the right distal fibula, for 02/14/2020. She will need a medicine clearance. This fracture occurred on 02/01/2020. She has stopped her blood thinner, as of today, 02/07/2020. Are you able to accommodate this request?     Please advise on how we are to proceed.     Best regards,   Jennifer Colon LPN ROT on behalf of LISET Marrero II MD [facility]

## 2020-02-07 NOTE — TELEPHONE ENCOUNTER
Patient contacted with preop appointment. Please review/sign pended medication.  reviewed. L:ast dispense tramadol 50 02/02/20. Jennifer Colon LPN

## 2020-02-08 RX ORDER — HYDROCODONE BITARTRATE AND ACETAMINOPHEN 5; 325 MG/1; MG/1
1 TABLET ORAL EVERY 6 HOURS PRN
Qty: 28 TABLET | Refills: 0 | Status: SHIPPED | OUTPATIENT
Start: 2020-02-08 | End: 2020-07-09

## 2020-02-10 ENCOUNTER — HOSPITAL ENCOUNTER (OUTPATIENT)
Dept: CARDIOLOGY | Facility: HOSPITAL | Age: 65
Discharge: HOME | End: 2020-02-10
Attending: FAMILY MEDICINE
Payer: MEDICARE

## 2020-02-10 ENCOUNTER — OFFICE VISIT (OUTPATIENT)
Dept: FAMILY MEDICINE | Facility: CLINIC | Age: 65
End: 2020-02-10
Payer: MEDICARE

## 2020-02-10 ENCOUNTER — TELEPHONE (OUTPATIENT)
Dept: ORTHOPEDICS | Facility: CLINIC | Age: 65
End: 2020-02-10

## 2020-02-10 VITALS
DIASTOLIC BLOOD PRESSURE: 69 MMHG | RESPIRATION RATE: 18 BRPM | SYSTOLIC BLOOD PRESSURE: 121 MMHG | OXYGEN SATURATION: 95 % | HEART RATE: 76 BPM | HEIGHT: 68 IN | BODY MASS INDEX: 19.31 KG/M2

## 2020-02-10 DIAGNOSIS — S82.64XA CLOSED NONDISPLACED FRACTURE OF LATERAL MALLEOLUS OF RIGHT FIBULA, INITIAL ENCOUNTER: ICD-10-CM

## 2020-02-10 DIAGNOSIS — I10 BENIGN ESSENTIAL HYPERTENSION: ICD-10-CM

## 2020-02-10 DIAGNOSIS — E78.49 OTHER HYPERLIPIDEMIA: ICD-10-CM

## 2020-02-10 DIAGNOSIS — N20.0 NEPHROLITHIASIS: ICD-10-CM

## 2020-02-10 DIAGNOSIS — Z01.818 PREOP TESTING: ICD-10-CM

## 2020-02-10 DIAGNOSIS — Z01.810 PREOPERATIVE CARDIOVASCULAR EXAMINATION: ICD-10-CM

## 2020-02-10 DIAGNOSIS — S82.241A CLOSED DISPLACED SPIRAL FRACTURE OF SHAFT OF RIGHT TIBIA, INITIAL ENCOUNTER: Primary | ICD-10-CM

## 2020-02-10 DIAGNOSIS — R06.2 WHEEZING: Primary | ICD-10-CM

## 2020-02-10 DIAGNOSIS — Z79.01 CHRONIC ANTICOAGULATION: ICD-10-CM

## 2020-02-10 LAB
FINAL PATHOLOGIC DIAGNOSIS: NORMAL
GROSS: NORMAL

## 2020-02-10 PROCEDURE — 3074F SYST BP LT 130 MM HG: CPT | Mod: CPTII,S$GLB,, | Performed by: FAMILY MEDICINE

## 2020-02-10 PROCEDURE — 93005 ELECTROCARDIOGRAM TRACING: CPT

## 2020-02-10 PROCEDURE — 99214 PR OFFICE/OUTPT VISIT, EST, LEVL IV, 30-39 MIN: ICD-10-PCS | Mod: S$GLB,,, | Performed by: FAMILY MEDICINE

## 2020-02-10 PROCEDURE — 3008F PR BODY MASS INDEX (BMI) DOCUMENTED: ICD-10-PCS | Mod: CPTII,S$GLB,, | Performed by: FAMILY MEDICINE

## 2020-02-10 PROCEDURE — 3008F BODY MASS INDEX DOCD: CPT | Mod: CPTII,S$GLB,, | Performed by: FAMILY MEDICINE

## 2020-02-10 PROCEDURE — 3078F PR MOST RECENT DIASTOLIC BLOOD PRESSURE < 80 MM HG: ICD-10-PCS | Mod: CPTII,S$GLB,, | Performed by: FAMILY MEDICINE

## 2020-02-10 PROCEDURE — 99214 OFFICE O/P EST MOD 30 MIN: CPT | Mod: S$GLB,,, | Performed by: FAMILY MEDICINE

## 2020-02-10 PROCEDURE — 93005 ELECTROCARDIOGRAM TRACING: CPT | Performed by: INTERNAL MEDICINE

## 2020-02-10 PROCEDURE — 99999 PR PBB SHADOW E&M-EST. PATIENT-LVL III: CPT | Mod: PBBFAC,,, | Performed by: FAMILY MEDICINE

## 2020-02-10 PROCEDURE — 93010 EKG 12-LEAD: ICD-10-PCS | Mod: ,,, | Performed by: INTERNAL MEDICINE

## 2020-02-10 PROCEDURE — 93010 ELECTROCARDIOGRAM REPORT: CPT | Mod: ,,, | Performed by: INTERNAL MEDICINE

## 2020-02-10 PROCEDURE — 3078F DIAST BP <80 MM HG: CPT | Mod: CPTII,S$GLB,, | Performed by: FAMILY MEDICINE

## 2020-02-10 PROCEDURE — 3074F PR MOST RECENT SYSTOLIC BLOOD PRESSURE < 130 MM HG: ICD-10-PCS | Mod: CPTII,S$GLB,, | Performed by: FAMILY MEDICINE

## 2020-02-10 PROCEDURE — 99999 PR PBB SHADOW E&M-EST. PATIENT-LVL III: ICD-10-PCS | Mod: PBBFAC,,, | Performed by: FAMILY MEDICINE

## 2020-02-10 RX ORDER — CLINDAMYCIN PHOSPHATE 900 MG/50ML
900 INJECTION, SOLUTION INTRAVENOUS
Status: CANCELLED | OUTPATIENT
Start: 2020-02-10

## 2020-02-10 RX ORDER — HYDROCHLOROTHIAZIDE 25 MG/1
TABLET ORAL
COMMUNITY
Start: 2020-02-08 | End: 2020-06-23 | Stop reason: SDUPTHER

## 2020-02-10 RX ORDER — MUPIROCIN 20 MG/G
OINTMENT TOPICAL
Status: CANCELLED | OUTPATIENT
Start: 2020-02-10

## 2020-02-10 RX ORDER — DIAZEPAM 5 MG/1
5 TABLET ORAL EVERY 12 HOURS PRN
Qty: 60 TABLET | Refills: 2 | Status: SHIPPED | OUTPATIENT
Start: 2020-02-10 | End: 2020-03-30

## 2020-02-10 NOTE — TELEPHONE ENCOUNTER
----- Message from Danni Rodas LPN sent at 2/10/2020  9:14 AM CST -----  Contact: patient      ----- Message -----  From: Anne Sanderson  Sent: 2/7/2020  12:58 PM CST  To: Elida Arellano Staff    Type:  Patient Returning Call    Who Called:  patient  Who Left Message for Patient:  Jennifer  Does the patient know what this is regarding?:  Scheduling a procedure  Best Call Back Number:  [phone] ([place])   Additional Information:  Please call after 3pm she has to charge her phone

## 2020-02-10 NOTE — PROGRESS NOTES
"Subjective:       Patient ID: Comfort Cantu is a 64 y.o. female.    Chief Complaint: Follow-up (Surgery clearance for Dr. Marrero-scheduled for 2/14)    Follow up    Scheduled for surgery on 2/14  Needs clearance  No cp/sob  bp stable  No dyspnea  No previous reaction to anesthesia    Review of Systems   Constitutional: Positive for fatigue. Negative for chills, fever and unexpected weight change.   Respiratory: Negative for cough, chest tightness, shortness of breath and wheezing.    Cardiovascular: Negative for chest pain, palpitations and leg swelling.   Endocrine: Negative for cold intolerance, heat intolerance, polydipsia and polyuria.   Musculoskeletal: Positive for arthralgias, gait problem, joint swelling and myalgias.   Neurological: Positive for weakness. Negative for dizziness, tremors, seizures and headaches.   Psychiatric/Behavioral: Positive for dysphoric mood. Negative for decreased concentration, hallucinations and suicidal ideas.         Reviewed family, medical, surgical, and social history.    Objective:      /69 (BP Location: Left arm, Patient Position: Sitting, BP Method: Small (Automatic))   Pulse 76   Resp 18   Ht 5' 8" (1.727 m)   SpO2 95%   BMI 19.31 kg/m²   Physical Exam   Constitutional: She is oriented to person, place, and time. She appears well-developed and well-nourished. No distress.   Cardiovascular: Normal rate, regular rhythm and normal heart sounds.   No murmur heard.  Pulmonary/Chest: Effort normal and breath sounds normal. No stridor. No respiratory distress.   Musculoskeletal: She exhibits tenderness and deformity.   Neurological: She is alert and oriented to person, place, and time. No cranial nerve deficit.   Skin: She is not diaphoretic.   Psychiatric: She has a normal mood and affect. Her behavior is normal. Judgment and thought content normal.   Vitals reviewed.      Assessment:       1. Wheezing    2. Preoperative cardiovascular examination    3. Benign " essential hypertension    4. Other hyperlipidemia    5. Nephrolithiasis    6. Chronic anticoagulation        Plan:       Wheezing  -     Complete PFT with bronchodilator; Future  -     PULSE OXIMETRY WITH REST - PULM; Future  -     Protime-INR; Future; Expected date: 02/10/2020    Preoperative cardiovascular examination  -     SCHEDULED EKG 12-LEAD (to Muse); Future  -     Comprehensive metabolic panel; Future; Expected date: 02/10/2020  -     CBC auto differential; Future; Expected date: 02/10/2020  -     Lipid panel; Future; Expected date: 02/10/2020  -     TSH; Future; Expected date: 02/10/2020  -     T4, free; Future; Expected date: 02/10/2020  -     Urinalysis  -     Urine culture  -     Protime-INR; Future; Expected date: 02/10/2020  -     X-Ray Chest PA And Lateral; Future; Expected date: 02/10/2020    Benign essential hypertension  -     SCHEDULED EKG 12-LEAD (to Muse); Future  -     Comprehensive metabolic panel; Future; Expected date: 02/10/2020  -     CBC auto differential; Future; Expected date: 02/10/2020  -     Lipid panel; Future; Expected date: 02/10/2020  -     TSH; Future; Expected date: 02/10/2020  -     T4, free; Future; Expected date: 02/10/2020  -     Urinalysis  -     Urine culture  -     Protime-INR; Future; Expected date: 02/10/2020    Other hyperlipidemia  -     SCHEDULED EKG 12-LEAD (to Muse); Future  -     Comprehensive metabolic panel; Future; Expected date: 02/10/2020  -     CBC auto differential; Future; Expected date: 02/10/2020  -     Lipid panel; Future; Expected date: 02/10/2020  -     TSH; Future; Expected date: 02/10/2020  -     T4, free; Future; Expected date: 02/10/2020  -     Urinalysis  -     Urine culture  -     Protime-INR; Future; Expected date: 02/10/2020    Nephrolithiasis  -     SCHEDULED EKG 12-LEAD (to Muse); Future  -     Comprehensive metabolic panel; Future; Expected date: 02/10/2020  -     CBC auto differential; Future; Expected date: 02/10/2020  -     Lipid  panel; Future; Expected date: 02/10/2020  -     TSH; Future; Expected date: 02/10/2020  -     T4, free; Future; Expected date: 02/10/2020  -     Urinalysis  -     Urine culture  -     Protime-INR; Future; Expected date: 02/10/2020    Chronic anticoagulation  -     Protime-INR; Future; Expected date: 02/10/2020    Other orders  -     diazePAM (VALIUM) 5 MG tablet; Take 1 tablet (5 mg total) by mouth every 12 (twelve) hours as needed.  Dispense: 60 tablet; Refill: 2    Will obtain labs, clear for surgery        Risks, benefits, and side effects were discussed with the patient. All questions were answered to the fullest satisfaction of the patient, and pt verbalized understanding and agreement to treatment plan. Pt was to call with any new or worsening symptoms, or present to the ER.

## 2020-02-11 ENCOUNTER — TELEPHONE (OUTPATIENT)
Dept: FAMILY MEDICINE | Facility: CLINIC | Age: 65
End: 2020-02-11

## 2020-02-11 NOTE — TELEPHONE ENCOUNTER
Pt is requesting surgical clearance letter from yesterday's visit.  Please advise, thank you.             ----- Message from Nakita Leo sent at 2/11/2020 10:48 AM CST -----  Type: Needs Medical Advice    Who Called:  Sister - Sylvia Mccoy Symptoms (please be specific):    How long has patient had these symptoms: MARIXA   Pharmacy name and phone #:  NA   Best Call Back Number: [phone]  Additional Information: Patient had preOp at [facility] yesterday for clearance The sister states Dr Elbert Marrero office has not receive the clearance.

## 2020-02-12 ENCOUNTER — HOSPITAL ENCOUNTER (OUTPATIENT)
Dept: PREADMISSION TESTING | Facility: HOSPITAL | Age: 65
Discharge: HOME | End: 2020-02-12
Payer: MEDICARE

## 2020-02-12 VITALS — WEIGHT: 126 LBS | BODY MASS INDEX: 19.16 KG/M2

## 2020-02-12 DIAGNOSIS — Z01.818 PREOP TESTING: ICD-10-CM

## 2020-02-12 DIAGNOSIS — S82.241A CLOSED DISPLACED SPIRAL FRACTURE OF SHAFT OF RIGHT TIBIA, INITIAL ENCOUNTER: ICD-10-CM

## 2020-02-12 DIAGNOSIS — S82.64XA CLOSED NONDISPLACED FRACTURE OF LATERAL MALLEOLUS OF RIGHT FIBULA, INITIAL ENCOUNTER: ICD-10-CM

## 2020-02-13 ENCOUNTER — ANESTHESIA EVENT (OUTPATIENT)
Dept: SURGERY | Facility: HOSPITAL | Age: 65
End: 2020-02-13
Payer: MEDICARE

## 2020-02-13 ENCOUNTER — TELEPHONE (OUTPATIENT)
Dept: SURGERY | Facility: CLINIC | Age: 65
End: 2020-02-13

## 2020-02-13 ENCOUNTER — TELEPHONE (OUTPATIENT)
Dept: GASTROENTEROLOGY | Facility: CLINIC | Age: 65
End: 2020-02-13

## 2020-02-13 NOTE — TELEPHONE ENCOUNTER
----- Message from Jamie Corbett LPN sent at 2/13/2020  2:51 PM CST -----   Patient having foot surgery so she would like to schedule for a couple of weeks.   Jamie   ----- Message -----  From: Esteban Yancey MD  Sent: 2/12/2020   9:25 PM CST  To: [facility] Staff    Please advise patient that polyp pathology was reviewed and is benign and is the adenomatous type which is definitely precancerous/risk factor for cancer.  The large polyp also shows high grade dysplasia which means it is advanced and is at high risk for becoming cancerous.  Recommend referral to surgery for resection as the polyp could not be removed endoscopically.  Repeat colonoscopy recommended in 1 year after surgical resection.  Place reminder in system for repeat colonoscopy.

## 2020-02-13 NOTE — TELEPHONE ENCOUNTER
Patient notified and understands path report and repeat in 1 years. Recall placed. Referral to Dr. Candelaria and patient states she s having foot surgery tomorrow and will schedule with him in a couple weeks.

## 2020-02-13 NOTE — TELEPHONE ENCOUNTER
----- Message from Koko Telles sent at 2/13/2020  2:24 PM CST -----  Type:  Patient Returning Call    Who Called:  Patient  Who Left Message for Patient:  NA  Does the patient know what this is regarding?:  Colonoscopy  Best Call Back Number:  [phone]  Additional Information:

## 2020-02-13 NOTE — TELEPHONE ENCOUNTER
----- Message from Koko Telles sent at 2/13/2020 10:53 AM CST -----  Type:  Patient Returning Call    Who Called:  Patient  Who Left Message for Patient:  NA  Does the patient know what this is regarding?: Polyp on colon  Best Call Back Number:  [phone]  Additional Information:

## 2020-02-14 ENCOUNTER — HOSPITAL ENCOUNTER (OUTPATIENT)
Dept: RADIOLOGY | Facility: HOSPITAL | Age: 65
Discharge: HOME | End: 2020-02-14
Attending: ORTHOPAEDIC SURGERY | Admitting: ORTHOPAEDIC SURGERY
Payer: MEDICARE

## 2020-02-14 ENCOUNTER — HOSPITAL ENCOUNTER (OUTPATIENT)
Facility: HOSPITAL | Age: 65
Discharge: HOME | End: 2020-02-14
Attending: ORTHOPAEDIC SURGERY | Admitting: ORTHOPAEDIC SURGERY
Payer: MEDICARE

## 2020-02-14 ENCOUNTER — ANESTHESIA (OUTPATIENT)
Dept: SURGERY | Facility: HOSPITAL | Age: 65
End: 2020-02-14
Payer: MEDICARE

## 2020-02-14 ENCOUNTER — TELEPHONE (OUTPATIENT)
Dept: SURGERY | Facility: CLINIC | Age: 65
End: 2020-02-14

## 2020-02-14 DIAGNOSIS — Z01.818 PREOP TESTING: ICD-10-CM

## 2020-02-14 DIAGNOSIS — S82.64XA CLOSED NONDISPLACED FRACTURE OF LATERAL MALLEOLUS OF RIGHT FIBULA, INITIAL ENCOUNTER: ICD-10-CM

## 2020-02-14 DIAGNOSIS — S82.241A CLOSED DISPLACED SPIRAL FRACTURE OF SHAFT OF RIGHT TIBIA, INITIAL ENCOUNTER: Primary | ICD-10-CM

## 2020-02-14 PROCEDURE — D9220A PRA ANESTHESIA: Mod: CRNA,,, | Performed by: NURSE ANESTHETIST, CERTIFIED REGISTERED

## 2020-02-14 PROCEDURE — 63600175 PHARM REV CODE 636 W HCPCS: Performed by: NURSE ANESTHETIST, CERTIFIED REGISTERED

## 2020-02-14 PROCEDURE — 64450 NJX AA&/STRD OTHER PN/BRANCH: CPT | Mod: 59,RT,, | Performed by: ANESTHESIOLOGY

## 2020-02-14 PROCEDURE — S0020 INJECTION, BUPIVICAINE HYDRO: HCPCS | Performed by: ANESTHESIOLOGY

## 2020-02-14 PROCEDURE — 63600175 PHARM REV CODE 636 W HCPCS

## 2020-02-14 PROCEDURE — 76942 ECHO GUIDE FOR BIOPSY: CPT | Mod: 26,,, | Performed by: ANESTHESIOLOGY

## 2020-02-14 PROCEDURE — 71000039 HC RECOVERY, EACH ADD'L HOUR: Performed by: ORTHOPAEDIC SURGERY

## 2020-02-14 PROCEDURE — D9220A PRA ANESTHESIA: ICD-10-PCS | Mod: ANES,,, | Performed by: ANESTHESIOLOGY

## 2020-02-14 PROCEDURE — 25000003 PHARM REV CODE 250: Performed by: NURSE ANESTHETIST, CERTIFIED REGISTERED

## 2020-02-14 PROCEDURE — 37000008 HC ANESTHESIA 1ST 15 MINUTES: Performed by: ORTHOPAEDIC SURGERY

## 2020-02-14 PROCEDURE — 27758 TREATMENT OF TIBIA FRACTURE: CPT | Mod: RT,,, | Performed by: ORTHOPAEDIC SURGERY

## 2020-02-14 PROCEDURE — 63600175 PHARM REV CODE 636 W HCPCS: Performed by: ANESTHESIOLOGY

## 2020-02-14 PROCEDURE — 99900104 DSU ONLY-NO CHARGE-EA ADD'L HR (STAT): Performed by: ORTHOPAEDIC SURGERY

## 2020-02-14 PROCEDURE — D9220A PRA ANESTHESIA: ICD-10-PCS | Mod: CRNA,,, | Performed by: NURSE ANESTHETIST, CERTIFIED REGISTERED

## 2020-02-14 PROCEDURE — 25000003 PHARM REV CODE 250: Performed by: ORTHOPAEDIC SURGERY

## 2020-02-14 PROCEDURE — 64445 NJX AA&/STRD SCIATIC NRV IMG: CPT | Performed by: ANESTHESIOLOGY

## 2020-02-14 PROCEDURE — 37000009 HC ANESTHESIA EA ADD 15 MINS: Performed by: ORTHOPAEDIC SURGERY

## 2020-02-14 PROCEDURE — 27201423 OPTIME MED/SURG SUP & DEVICES STERILE SUPPLY: Performed by: ORTHOPAEDIC SURGERY

## 2020-02-14 PROCEDURE — 25000003 PHARM REV CODE 250: Performed by: ANESTHESIOLOGY

## 2020-02-14 PROCEDURE — D9220A PRA ANESTHESIA: Mod: ANES,,, | Performed by: ANESTHESIOLOGY

## 2020-02-14 PROCEDURE — S0077 INJECTION, CLINDAMYCIN PHOSP: HCPCS | Performed by: ORTHOPAEDIC SURGERY

## 2020-02-14 PROCEDURE — 71046 X-RAY EXAM CHEST 2 VIEWS: CPT | Mod: 26,,, | Performed by: RADIOLOGY

## 2020-02-14 PROCEDURE — 71000033 HC RECOVERY, INTIAL HOUR: Performed by: ORTHOPAEDIC SURGERY

## 2020-02-14 PROCEDURE — 99900103 DSU ONLY-NO CHARGE-INITIAL HR (STAT): Performed by: ORTHOPAEDIC SURGERY

## 2020-02-14 PROCEDURE — 27758 PR OPEN RX TIBIA SHAFT FX,SCREWS: ICD-10-PCS | Mod: RT,,, | Performed by: ORTHOPAEDIC SURGERY

## 2020-02-14 PROCEDURE — 71046 X-RAY EXAM CHEST 2 VIEWS: CPT | Mod: TC,FY

## 2020-02-14 PROCEDURE — C1713 ANCHOR/SCREW BN/BN,TIS/BN: HCPCS | Performed by: ORTHOPAEDIC SURGERY

## 2020-02-14 PROCEDURE — 36000710: Performed by: ORTHOPAEDIC SURGERY

## 2020-02-14 PROCEDURE — 36000711: Performed by: ORTHOPAEDIC SURGERY

## 2020-02-14 PROCEDURE — 76942 ECHO GUIDE FOR BIOPSY: CPT | Performed by: ANESTHESIOLOGY

## 2020-02-14 PROCEDURE — C9290 INJ, BUPIVACAINE LIPOSOME: HCPCS | Performed by: ANESTHESIOLOGY

## 2020-02-14 PROCEDURE — 76942 PR U/S GUIDANCE FOR NEEDLE GUIDANCE: ICD-10-PCS | Mod: 26,,, | Performed by: ANESTHESIOLOGY

## 2020-02-14 PROCEDURE — 71000016 HC POSTOP RECOV ADDL HR: Performed by: ORTHOPAEDIC SURGERY

## 2020-02-14 PROCEDURE — 71046 XR CHEST PA AND LATERAL: ICD-10-PCS | Mod: 26,,, | Performed by: RADIOLOGY

## 2020-02-14 PROCEDURE — 64450 PR NERVE BLOCK INJ, ANES/STEROID, OTHER PERIPHERAL: ICD-10-PCS | Mod: 59,RT,, | Performed by: ANESTHESIOLOGY

## 2020-02-14 PROCEDURE — 25000242 PHARM REV CODE 250 ALT 637 W/ HCPCS: Performed by: ANESTHESIOLOGY

## 2020-02-14 PROCEDURE — 71000015 HC POSTOP RECOV 1ST HR: Performed by: ORTHOPAEDIC SURGERY

## 2020-02-14 DEVICE — SCREW LOCK 3.5MM X 30MM
Type: IMPLANTABLE DEVICE | Site: TIBIA | Status: NON-FUNCTIONAL
Removed: 2020-06-12

## 2020-02-14 DEVICE — SCREW CORTEX 3.5 X 26
Type: IMPLANTABLE DEVICE | Site: TIBIA | Status: NON-FUNCTIONAL
Removed: 2020-06-12

## 2020-02-14 DEVICE — SCREW CORTEX 3.5 X 22
Type: IMPLANTABLE DEVICE | Site: TIBIA | Status: NON-FUNCTIONAL
Removed: 2020-06-12

## 2020-02-14 DEVICE — IMPLANTABLE DEVICE
Type: IMPLANTABLE DEVICE | Site: TIBIA | Status: NON-FUNCTIONAL
Removed: 2020-06-12

## 2020-02-14 DEVICE — SCREW LOCKING 3.5MM X 24MM
Type: IMPLANTABLE DEVICE | Site: TIBIA | Status: NON-FUNCTIONAL
Removed: 2020-06-12

## 2020-02-14 DEVICE — SCREW LOCK 3.5MM X 26MM
Type: IMPLANTABLE DEVICE | Site: TIBIA | Status: NON-FUNCTIONAL
Removed: 2020-06-12

## 2020-02-14 RX ORDER — SODIUM CHLORIDE 0.9 % (FLUSH) 0.9 %
3 SYRINGE (ML) INJECTION
Status: DISCONTINUED | OUTPATIENT
Start: 2020-02-14 | End: 2020-02-14 | Stop reason: HOSPADM

## 2020-02-14 RX ORDER — OXYCODONE HYDROCHLORIDE 5 MG/1
5 TABLET ORAL
Status: COMPLETED | OUTPATIENT
Start: 2020-02-14 | End: 2020-02-14

## 2020-02-14 RX ORDER — FENTANYL CITRATE 50 UG/ML
INJECTION, SOLUTION INTRAMUSCULAR; INTRAVENOUS
Status: DISCONTINUED | OUTPATIENT
Start: 2020-02-14 | End: 2020-02-14

## 2020-02-14 RX ORDER — ONDANSETRON 2 MG/ML
4 INJECTION INTRAMUSCULAR; INTRAVENOUS ONCE AS NEEDED
Status: DISCONTINUED | OUTPATIENT
Start: 2020-02-14 | End: 2020-02-14 | Stop reason: HOSPADM

## 2020-02-14 RX ORDER — DEXAMETHASONE SODIUM PHOSPHATE 4 MG/ML
INJECTION, SOLUTION INTRA-ARTICULAR; INTRALESIONAL; INTRAMUSCULAR; INTRAVENOUS; SOFT TISSUE
Status: DISCONTINUED | OUTPATIENT
Start: 2020-02-14 | End: 2020-02-14

## 2020-02-14 RX ORDER — HYDROCODONE BITARTRATE AND ACETAMINOPHEN 7.5; 325 MG/1; MG/1
1 TABLET ORAL EVERY 6 HOURS PRN
Qty: 28 TABLET | Refills: 0 | Status: SHIPPED | OUTPATIENT
Start: 2020-02-14 | End: 2020-02-21

## 2020-02-14 RX ORDER — KETOROLAC TROMETHAMINE 30 MG/ML
30 INJECTION, SOLUTION INTRAMUSCULAR; INTRAVENOUS EVERY 6 HOURS PRN
Status: DISCONTINUED | OUTPATIENT
Start: 2020-02-14 | End: 2020-02-14 | Stop reason: HOSPADM

## 2020-02-14 RX ORDER — LIDOCAINE HCL/PF 100 MG/5ML
SYRINGE (ML) INTRAVENOUS
Status: DISCONTINUED | OUTPATIENT
Start: 2020-02-14 | End: 2020-02-14

## 2020-02-14 RX ORDER — MIDAZOLAM HYDROCHLORIDE 1 MG/ML
INJECTION, SOLUTION INTRAMUSCULAR; INTRAVENOUS
Status: DISCONTINUED | OUTPATIENT
Start: 2020-02-14 | End: 2020-02-14

## 2020-02-14 RX ORDER — PROPOFOL 10 MG/ML
VIAL (ML) INTRAVENOUS
Status: DISCONTINUED | OUTPATIENT
Start: 2020-02-14 | End: 2020-02-14

## 2020-02-14 RX ORDER — MUPIROCIN 20 MG/G
OINTMENT TOPICAL
Status: DISCONTINUED | OUTPATIENT
Start: 2020-02-14 | End: 2020-02-14 | Stop reason: HOSPADM

## 2020-02-14 RX ORDER — HYDROMORPHONE HYDROCHLORIDE 2 MG/ML
0.2 INJECTION, SOLUTION INTRAMUSCULAR; INTRAVENOUS; SUBCUTANEOUS EVERY 5 MIN PRN
Status: DISCONTINUED | OUTPATIENT
Start: 2020-02-14 | End: 2020-02-14 | Stop reason: HOSPADM

## 2020-02-14 RX ORDER — ONDANSETRON HYDROCHLORIDE 2 MG/ML
INJECTION, SOLUTION INTRAMUSCULAR; INTRAVENOUS
Status: DISCONTINUED | OUTPATIENT
Start: 2020-02-14 | End: 2020-02-14

## 2020-02-14 RX ORDER — LEVALBUTEROL 1.25 MG/.5ML
1.25 SOLUTION, CONCENTRATE RESPIRATORY (INHALATION) ONCE
Status: COMPLETED | OUTPATIENT
Start: 2020-02-14 | End: 2020-02-14

## 2020-02-14 RX ORDER — EPHEDRINE SULFATE 50 MG/ML
INJECTION, SOLUTION INTRAVENOUS
Status: DISCONTINUED | OUTPATIENT
Start: 2020-02-14 | End: 2020-02-14

## 2020-02-14 RX ORDER — ACETAMINOPHEN 10 MG/ML
1000 INJECTION, SOLUTION INTRAVENOUS ONCE
Status: COMPLETED | OUTPATIENT
Start: 2020-02-14 | End: 2020-02-14

## 2020-02-14 RX ORDER — DIPHENHYDRAMINE HYDROCHLORIDE 50 MG/ML
25 INJECTION INTRAMUSCULAR; INTRAVENOUS EVERY 6 HOURS PRN
Status: DISCONTINUED | OUTPATIENT
Start: 2020-02-14 | End: 2020-02-14 | Stop reason: HOSPADM

## 2020-02-14 RX ORDER — CLINDAMYCIN PHOSPHATE 900 MG/50ML
900 INJECTION, SOLUTION INTRAVENOUS
Status: COMPLETED | OUTPATIENT
Start: 2020-02-14 | End: 2020-02-14

## 2020-02-14 RX ORDER — FENTANYL CITRATE 50 UG/ML
25 INJECTION, SOLUTION INTRAMUSCULAR; INTRAVENOUS EVERY 5 MIN PRN
Status: COMPLETED | OUTPATIENT
Start: 2020-02-14 | End: 2020-02-14

## 2020-02-14 RX ORDER — SODIUM CHLORIDE, SODIUM LACTATE, POTASSIUM CHLORIDE, CALCIUM CHLORIDE 600; 310; 30; 20 MG/100ML; MG/100ML; MG/100ML; MG/100ML
75 INJECTION, SOLUTION INTRAVENOUS CONTINUOUS
Status: DISCONTINUED | OUTPATIENT
Start: 2020-02-14 | End: 2020-02-14 | Stop reason: HOSPADM

## 2020-02-14 RX ORDER — LIDOCAINE HYDROCHLORIDE 10 MG/ML
1 INJECTION, SOLUTION EPIDURAL; INFILTRATION; INTRACAUDAL; PERINEURAL ONCE
Status: DISCONTINUED | OUTPATIENT
Start: 2020-02-14 | End: 2020-02-14 | Stop reason: HOSPADM

## 2020-02-14 RX ORDER — BUPIVACAINE HYDROCHLORIDE 5 MG/ML
INJECTION, SOLUTION EPIDURAL; INTRACAUDAL
Status: COMPLETED | OUTPATIENT
Start: 2020-02-14 | End: 2020-02-14

## 2020-02-14 RX ADMIN — HYDROMORPHONE HYDROCHLORIDE 0.2 MG: 2 INJECTION, SOLUTION INTRAMUSCULAR; INTRAVENOUS; SUBCUTANEOUS at 12:02

## 2020-02-14 RX ADMIN — OXYCODONE 5 MG: 5 TABLET ORAL at 12:02

## 2020-02-14 RX ADMIN — DEXAMETHASONE SODIUM PHOSPHATE 4 MG: 4 INJECTION, SOLUTION INTRAMUSCULAR; INTRAVENOUS at 10:02

## 2020-02-14 RX ADMIN — FENTANYL CITRATE 25 MCG: 50 INJECTION INTRAMUSCULAR; INTRAVENOUS at 12:02

## 2020-02-14 RX ADMIN — LIDOCAINE HYDROCHLORIDE 100 MG: 20 INJECTION, SOLUTION INTRAVENOUS at 10:02

## 2020-02-14 RX ADMIN — FENTANYL CITRATE 100 MCG: 50 INJECTION, SOLUTION INTRAMUSCULAR; INTRAVENOUS at 10:02

## 2020-02-14 RX ADMIN — MIDAZOLAM 2 MG: 1 INJECTION INTRAMUSCULAR; INTRAVENOUS at 10:02

## 2020-02-14 RX ADMIN — BUPIVACAINE HYDROCHLORIDE 10 ML: 5 INJECTION, SOLUTION EPIDURAL; INTRACAUDAL; PERINEURAL at 10:02

## 2020-02-14 RX ADMIN — LEVALBUTEROL HYDROCHLORIDE 1.25 MG: 1.25 SOLUTION, CONCENTRATE RESPIRATORY (INHALATION) at 01:02

## 2020-02-14 RX ADMIN — FENTANYL CITRATE 50 MCG: 50 INJECTION, SOLUTION INTRAMUSCULAR; INTRAVENOUS at 10:02

## 2020-02-14 RX ADMIN — OXYCODONE 5 MG: 5 TABLET ORAL at 11:02

## 2020-02-14 RX ADMIN — CLINDAMYCIN PHOSPHATE 900 MG: 18 INJECTION, SOLUTION INTRAVENOUS at 10:02

## 2020-02-14 RX ADMIN — SODIUM CHLORIDE, SODIUM GLUCONATE, SODIUM ACETATE, POTASSIUM CHLORIDE, MAGNESIUM CHLORIDE, SODIUM PHOSPHATE, DIBASIC, AND POTASSIUM PHOSPHATE: .53; .5; .37; .037; .03; .012; .00082 INJECTION, SOLUTION INTRAVENOUS at 09:02

## 2020-02-14 RX ADMIN — KETOROLAC TROMETHAMINE 30 MG: 30 INJECTION, SOLUTION INTRAMUSCULAR at 12:02

## 2020-02-14 RX ADMIN — FENTANYL CITRATE 50 MCG: 50 INJECTION, SOLUTION INTRAMUSCULAR; INTRAVENOUS at 11:02

## 2020-02-14 RX ADMIN — ACETAMINOPHEN 1000 MG: 10 INJECTION, SOLUTION INTRAVENOUS at 12:02

## 2020-02-14 RX ADMIN — PROPOFOL 130 MG: 10 INJECTION, EMULSION INTRAVENOUS at 10:02

## 2020-02-14 RX ADMIN — EPHEDRINE SULFATE 25 MG: 50 INJECTION, SOLUTION INTRAMUSCULAR; INTRAVENOUS; SUBCUTANEOUS at 10:02

## 2020-02-14 RX ADMIN — BUPIVACAINE 20 ML: 13.3 INJECTION, SUSPENSION, LIPOSOMAL INFILTRATION at 10:02

## 2020-02-14 RX ADMIN — HYDROMORPHONE HYDROCHLORIDE 0.2 MG: 2 INJECTION, SOLUTION INTRAMUSCULAR; INTRAVENOUS; SUBCUTANEOUS at 01:02

## 2020-02-14 RX ADMIN — ONDANSETRON 4 MG: 2 INJECTION, SOLUTION INTRAMUSCULAR; INTRAVENOUS at 10:02

## 2020-02-14 NOTE — TELEPHONE ENCOUNTER
I called patient to inform her that I had a cancellation on 3/3/20 at 2:15pm.  I cancelled her appointment on 3/10/20 at 2pm and r/s her to 3/3/20 at 2:15pm in [place].  Raul

## 2020-02-14 NOTE — DISCHARGE INSTRUCTIONS
"Discharge Instructions: After Your Surgery/Procedure  Youve just had surgery. During surgery you were given medicine called anesthesia to keep you relaxed and free of pain. After surgery you may have some pain or nausea. This is common. Here are some tips for feeling better and getting well after surgery.     Stay on schedule with your medication.   Going home  Your doctor or nurse will show you how to take care of yourself when you go home. He or she will also answer your questions. Have an adult family member or friend drive you home.      For your safety we recommend these precaution for the first 24 hours after your procedure:  · Do not drive or use heavy equipment.  · Do not make important decisions or sign legal papers.  · Do not drink alcohol.  · Have someone stay with you, if needed. He or she can watch for problems and help keep you safe.  · Your concentration, balance, coordination, and judgement may be impaired for many hours after anesthesia.  Use caution when ambulating or standing up.     · You may feel weak and "washed out" after anesthesia and surgery.      Subtle residual effects of general anesthesia or sedation with regional / local anesthesia can last more than 24 hours.  Rest for the remainder of the day or longer if your Doctor/Surgeon has advised you to do so.  Although you may feel normal within the first 24 hours, your reflexes and mental ability may be impaired without you realizing it.  You may feel dizzy, lightheaded or sleepy for 24 hours or longer.      Be sure to go to all follow-up visits with your doctor. And rest after your surgery for as long as your doctor tells you to.  Coping with pain  If you have pain after surgery, pain medicine will help you feel better. Take it as told, before pain becomes severe. Also, ask your doctor or pharmacist about other ways to control pain. This might be with heat, ice, or relaxation. And follow any other instructions your surgeon or nurse gives " you.  Tips for taking pain medicine  To get the best relief possible, remember these points:  · Pain medicines can upset your stomach. Taking them with a little food may help.  · Most pain relievers taken by mouth need at least 20 to 30 minutes to start to work.  · Taking medicine on a schedule can help you remember to take it. Try to time your medicine so that you can take it before starting an activity. This might be before you get dressed, go for a walk, or sit down for dinner.  · Constipation is a common side effect of pain medicines. Call your doctor before taking any medicines such as laxatives or stool softeners to help ease constipation. Also ask if you should skip any foods. Drinking lots of fluids and eating foods such as fruits and vegetables that are high in fiber can also help. Remember, do not take laxatives unless your surgeon has prescribed them.  · Drinking alcohol and taking pain medicine can cause dizziness and slow your breathing. It can even be deadly. Do not drink alcohol while taking pain medicine.  · Pain medicine can make you react more slowly to things. Do not drive or run machinery while taking pain medicine.  Your health care provider may tell you to take acetaminophen to help ease your pain. Ask him or her how much you are supposed to take each day. Acetaminophen or other pain relievers may interact with your prescription medicines or other over-the-counter (OTC) drugs. Some prescription medicines have acetaminophen and other ingredients. Using both prescription and OTC acetaminophen for pain can cause you to overdose. Read the labels on your OTC medicines with care. This will help you to clearly know the list of ingredients, how much to take, and any warnings. It may also help you not take too much acetaminophen. If you have questions or do not understand the information, ask your pharmacist or health care provider to explain it to you before you take the OTC medicine.  Managing  nausea  Some people have an upset stomach after surgery. This is often because of anesthesia, pain, or pain medicine, or the stress of surgery. These tips will help you handle nausea and eat healthy foods as you get better. If you were on a special food plan before surgery, ask your doctor if you should follow it while you get better. These tips may help:  · Do not push yourself to eat. Your body will tell you when to eat and how much.  · Start off with clear liquids and soup. They are easier to digest.  · Next try semi-solid foods, such as mashed potatoes, applesauce, and gelatin, as you feel ready.  · Slowly move to solid foods. Dont eat fatty, rich, or spicy foods at first.  · Do not force yourself to have 3 large meals a day. Instead eat smaller amounts more often.  · Take pain medicines with a small amount of solid food, such as crackers or toast, to avoid nausea.     Call your surgeon if  · You still have pain an hour after taking medicine. The medicine may not be strong enough.  · You feel too sleepy, dizzy, or groggy. The medicine may be too strong.  · You have side effects like nausea, vomiting, or skin changes, such as rash, itching, or hives.       If you have obstructive sleep apnea  You were given anesthesia medicine during surgery to keep you comfortable and free of pain. After surgery, you may have more apnea spells because of this medicine and other medicines you were given. The spells may last longer than usual.   At home:  · Keep using the continuous positive airway pressure (CPAP) device when you sleep. Unless your health care provider tells you not to, use it when you sleep, day or night. CPAP is a common device used to treat obstructive sleep apnea.  · Talk with your provider before taking any pain medicine, muscle relaxants, or sedatives. Your provider will tell you about the possible dangers of taking these medicines.  © 3169-5565 [employer]. [address] [place] [address]  [place] [address]. All rights reserved. This information is not intended as a substitute for professional medical care. Always follow your healthcare professional's instructions.        General Information:    1.  Do not drink alcoholic beverages including beer for 24 hours or as long as you are on pain medication..  2.  Do not drive a motor vehicle, operate machinery or power tools, or signs legal papers for 24 hours or as long as you are on pain medication.   3.  You may experience light-headedness, dizziness, and sleepiness following surgery. Please do not stay alone. A responsible adult should be with you for this 24 hour period.  4.  Go home and rest.    5. Progress slowly to a normal diet unless instructed.  Otherwise, begin with liquids such as soft drinks, then soup and crackers working up to solid foods. Drink plenty of nonalcoholic fluids.  6.  Certain anesthetics and pain medications produce nausea and vomiting in certain       individuals. If nausea becomes a problem at home, call you doctor.    7. A nurse will be calling you sometime after surgery. Do not be alarmed. This is our way of finding out how you are doing.    8. Several times every hour while you are awake, take 2-3 deep breaths and cough. If you had stomach surgery hold a pillow or rolled towel firmly against your stomach before you cough. This will help with any pain the cough might cause.  9. Several times every hour while you are awake, pump and flex your feet 5-6 times and do foot circles. This will help prevent blood clots.    10.Call your doctor for severe pain, bleeding, fever, or signs or symptoms of infection (pain, swelling, redness, foul odor, drainage).    Post op instructions for prevention of DVT  What is deep vein thrombosis?  Deep vein thrombosis (DVT) is the medical term for blood clots in the deep veins of the leg.  These blood clots can be dangerous.  A DVT can block a blood vessel and keep blood from getting where it needs to go.   Another problem is that the clot can travel to other parts of the body such as the lungs.  A clot that travels to the lungs is called a pulmonary embolus (PE) and can cause serious problems with breathing which can lead to death.  Am I at risk for DVT/PE?  If you are not very active, you are at risk of DVT.  Anyone confined to bed, sitting for long periods of time, recovering from surgery, etc. increases the risk of DVT.  Other risk factors are cancer diagnosis, certain medications, estrogen replacement in any form,older age, obesity, pregnancy, smoking, history of clotting disorders, and dehydration.  How will I know if I have a DVT?   Swelling in the lower leg   Pain   Warmth, redness, hardness or bulging of the vein  If you have any of these symptoms, call your doctors office right away.  Some people will not have any symptoms until the clot moves to the lungs.  What are the symptoms of a PE?   Panting, shortness of breath, or trouble breathing   Sharp, knife-like chest pain when you breathe   Coughing or coughing up blood   Rapid heartbeat  If you have any of these symptoms or get worse quickly, call 911 for emergency treatment.  How can I prevent a DVT?   Avoid long periods of inactivity and dont cross your legs--get up and walk around every hour or so.   Stay active--walking after surgery is highly encouraged.  This means you should get out of the house and walk in the neighborhood.  Going up and down stairs will not impair healing (unless advised against such activity by your doctor).     Drink plenty of noncaffeinated, nonalcoholic fluids each day to prevent dehydration.   Wear special support stockings, if they have been advised by your doctor.   If you travel, stop at least once an hour and walk around.   Avoid smoking (assistance with stopping is available through your healthcare provider)  Always notify your doctor if you are not able to follow the post operative instructions that are  given to you at the time of discharge.  It may be necessary to prescribe one of the medications available to prevent DVT.    We hope your stay was comfortable as you heal now, mend and rest.    For we have enjoyed taking care of you by giving your our best.    And as you get better, by regaining your health and strength;   We count it as a privilege to have served you and hope your time at [facility] was well spent.      Thank  You!!!

## 2020-02-14 NOTE — INTERVAL H&P NOTE
The patient has been examined and the H&P has been reviewed:    I concur with the findings and no changes have occurred since H&P was written.    Anesthesia/Surgery risks, benefits and alternative options discussed and understood by patient/family.          Active Hospital Problems    Diagnosis  POA    Closed displaced spiral fracture of shaft of right tibia [S82.915A]  Yes      Resolved Hospital Problems   No resolved problems to display.

## 2020-02-14 NOTE — PLAN OF CARE
"Patient, sibling, and friend all stated full understanding of all discharge instructions and sign and symptoms to report. All valuables were returned, IV removed,  Prescription e-scribed and post op appointment completed. Patient instructed to get any assistance with any OOB assistance. Also reviewed several times to all present, the orders for non-weightbearing at all times. Patient stated " I have a potty chair at home and a wheelchair". Sister stated understanding of all instructions and will get help to get the patient in the trailer.   "

## 2020-02-14 NOTE — PLAN OF CARE
arrived per wheelchair with sister in the waiting room, plan of care reviewed and warm blankets provided.

## 2020-02-14 NOTE — ANESTHESIA PREPROCEDURE EVALUATION
02/14/2020  Comfort Cantu is a 64 y.o., female.    Anesthesia Evaluation    I have reviewed the Patient Summary Reports.    I have reviewed the Nursing Notes.   I have reviewed the Medications.     Review of Systems  Anesthesia Hx:  No problems with previous Anesthesia Denies Hx of Anesthetic complications    Social:  Smoker Tobacco dependence syndrome   Cardiovascular:   Hypertension Denies MI. CAD    Denies CABG/stent.   Denies Angina.    Pulmonary:   Denies COPD.  Denies Asthma.  Denies Recent URI.    Renal/:   Chronic Renal Disease    Hepatic/GI:   GERD Denies Liver Disease.    Neurological:   Denies TIA. Denies CVA. Denies Seizures.    Endocrine:   Denies Diabetes. Denies Hypothyroidism.    Psych:   Psychiatric History          Physical Exam  General:  Well nourished    Airway/Jaw/Neck:  Airway Findings: Mouth Opening: Normal Tongue: Normal  General Airway Assessment: Adult, Good  Mallampati: II  Improves to II with phonation.  TM Distance: 4-6 cm      Dental:  Dental Findings: In tact   Chest/Lungs:  Chest/Lungs Findings: Clear to auscultation, Normal Respiratory Rate     Heart/Vascular:  Heart Findings: Rate: Normal  Rhythm: Regular Rhythm  Sounds: Normal  Heart murmur: negative       Mental Status:  Mental Status Findings:  Cooperative, Alert and Oriented         Anesthesia Plan  Type of Anesthesia, risks & benefits discussed:  Anesthesia Type:  general  Patient's Preference:   Intra-op Monitoring Plan: standard ASA monitors  Intra-op Monitoring Plan Comments:   Post Op Pain Control Plan:   Post Op Pain Control Plan Comments:   Induction:   IV  Beta Blocker:  Patient is not currently on a Beta-Blocker (No further documentation required).       Informed Consent: Patient understands risks and agrees with Anesthesia plan.  Questions answered. Anesthesia consent signed with patient.  ASA Score: 3      Day of Surgery Review of History & Physical: I have interviewed and examined the patient. I have reviewed the patient's H&P dated:  There are no significant changes.  H&P update referred to the surgeon.         Ready For Surgery From Anesthesia Perspective.

## 2020-02-14 NOTE — OP NOTE
[facility]  Orthopedic Surgery Department  Operative Note    SUMMARY     Date of Procedure: 2/14/2020     Procedure: Procedure(s) (LRB):  ORIF, FRACTURE, TIBIA (Right)     Surgeon(s) and Role:     * Elbert Marrero II, MD - Primary    Assisting Surgeon: None    First Assist:  RAHUL Hopkins    Pre-Operative Diagnosis: Closed displaced spiral fracture of shaft of right tibia, initial encounter [S82.241A]  Closed nondisplaced fracture of lateral malleolus of right fibula, initial encounter [S82.64XA]  Preop testing [Z01.818]    Post-Operative Diagnosis: Post-Op Diagnosis Codes:     * Closed displaced spiral fracture of shaft of right tibia, initial encounter [S82.241A]     * Closed nondisplaced fracture of lateral malleolus of right fibula, initial encounter [S82.64XA]     * Preop testing [Z01.818]    Anesthesia: General    Technical Procedures Used:  Open reduction with internal fixation of the right tibia    Description of the Findings of the Procedure:  Dictated    Significant Surgical Tasks Conducted by the Assistant(s), if Applicable:  Maintenance of fracture reduction, wound closure and splint application    Complications: No    Estimated Blood Loss (EBL): * No values recorded between 2/14/2020 10:44 AM and 2/14/2020 11:25 AM *           Implants:   Implant Name Type Inv. Item Serial No.  Lot No. LRB No. Used   SCREW CORTEX 3.5 X 22 - [id number]  SCREW CORTEX 3.5 X 22  [employer]  Right 1   SCREW CORTEX 3.5 X 26 - [id number]  SCREW CORTEX 3.5 X 26  [employer]  Right 1   SCREW LOCKING 3.5MM X 24MM - [id number]  SCREW LOCKING 3.5MM X 24MM  [employer]  Right 1   SCREW LOCK 3.5MM X 26MM - [id number]  SCREW LOCK 3.5MM X 26MM  [employer]  Right 3   SCREW LOCK 3.5MM X 30MM - [id number]  SCREW LOCK 3.5MM X 30MM  [employer]  Right 1   LCP LOW BEND MEDIAL DISTAL TIBIA PLATES    [employer]  Right 1       Specimens:   Specimen (12h ago, onward)    None                  Condition: Good    Disposition:  PACU - hemodynamically stable.    Attestation: I was present for the entire procedure.    Procedure In Detail:  The patient brought to the operating room and placed on the table in the supine position.  The patient underwent general endotracheal intubation without complication. A tourniquet was placed on the right lower extremity and was prepped and draped in the normal sterile fashion. An Esmarch was used exsanguinate the limb and the tourniquet was taken up to 250 mm of mercury.  An incision was made over the distal tibia at the level of the medial malleolus.  Dissection was taken through skin and subcutaneous tissue down to bone. A distal tibial locking plate was then slid through that incision up the shaft of the tibia. A incision was made proximally over the plate.  A point-to-point reduction clamp used to reduce the fracture. One nonlocking and then multiple locking screws were placed into the shaft proximally pulling the plate down the bone. We then placed multiple distal locking screws under fluoroscopic guidance.  Proper placement of all hardware and reduction of the fracture was verified with fluoroscopy and then the wounds were irrigated with normal saline and closed in layered fashion.  A sterile intraoperative dressing was placed.  A splint was fashioned out of plaster and once it hardened patient was awakened from anesthesia and taken recovery where she was noted be stable postoperatively.  Needle and lap counts were correct at the end the case.

## 2020-02-14 NOTE — ANESTHESIA PROCEDURE NOTES
Peripheral Block    Patient location during procedure: pre-op   Block not for primary anesthetic.  Reason for block: at surgeon's request and post-op pain management   Post-op Pain Location: DISTAL TIBIAL FRACTURE RIGHT, ORIF DISTAL TIBIAL FRACTURE  Start time: 2/14/2020 9:50 AM  Timeout: 2/14/2020 9:50 AM   End time: 2/14/2020 10:00 AM    Staffing  Authorizing Provider: Dale Spain MD  Performing Provider: Dale Spain MD    Preanesthetic Checklist  Completed: patient identified, site marked, surgical consent, pre-op evaluation, timeout performed, IV checked, risks and benefits discussed and monitors and equipment checked  Peripheral Block  Patient position: supine  Prep: ChloraPrep  Patient monitoring: cardiac monitor, heart rate, continuous pulse ox, continuous capnometry and frequent blood pressure checks  Block type: popliteal  Laterality: right  Injection technique: single shot  Needle  Needle type: Stimuplex   Needle gauge: 21 G  Needle length: 3.5 in  Needle localization: ultrasound guidance  Needle insertion depth: 3 cm   -ultrasound image captured on disc.  Assessment  Injection assessment: negative parasthesia, local visualized surrounding nerve and negative aspiration  Paresthesia pain: none  Heart rate change: no  Slow fractionated injection: yes  Additional Notes  EXPAREL 20ml

## 2020-02-14 NOTE — PLAN OF CARE
Pt aaox4, states pain is 6 out 10 and tolerable, denies  nausea, tolerating clear liquids, dressing cdi, family updated, anestheia states pt  meets anesthesia criteria to transfer to Phase II, report given to ELLIOTT Sol

## 2020-02-14 NOTE — TRANSFER OF CARE
"Anesthesia Transfer of Care Note    Patient: Comfort Cantu    Procedure(s) Performed: Procedure(s) (LRB):  ORIF, FRACTURE, TIBIA (Right)    Patient location: PACU    Anesthesia Type: general    Transport from OR: Transported from OR on 2-3 L/min O2 by NC with adequate spontaneous ventilation    Post pain: adequate analgesia    Post assessment: no apparent anesthetic complications and tolerated procedure well    Post vital signs: stable    Level of consciousness: awake, alert and oriented    Nausea/Vomiting: no nausea/vomiting    Complications: none    Transfer of care protocol was followed      Last vitals:   Visit Vitals  /65 (BP Location: Left arm, Patient Position: Lying)   Pulse 70   Temp 36.8 °C (98.2 °F) (Skin)   Resp 20   Ht 5' 8" (1.727 m)   Wt 57.2 kg (126 lb)   SpO2 (!) 93%   BMI 19.16 kg/m²     "

## 2020-02-14 NOTE — DISCHARGE SUMMARY
[facility]  Department of Orthopedic Surgery  Discharge Note  Short Stay    Admit Date: 2/14/2020    Discharge Date and Time: No discharge date for patient encounter.     Attending Physician: Elbert Marrero II, MD     Discharge Provider: Elbert Marrero II    Diagnoses:  Active Hospital Problems    Diagnosis  POA    *Closed displaced spiral fracture of shaft of right tibia [S82.241A]  Yes      Resolved Hospital Problems   No resolved problems to display.       Discharged Condition: good    Hospital Course: Patient was admitted for an outpatient procedure and tolerated the procedure well with no complications.    Final Diagnoses: Same as principal problem.    Disposition: Home or Self Care    Follow up/Patient Instructions:    Medications:  Reconciled Home Medications:      Medication List      CONTINUE taking these medications    atorvastatin 10 MG tablet  Commonly known as:  LIPITOR  Take 10 mg by mouth once daily.     busPIRone 15 MG tablet  Commonly known as:  BUSPAR  Take 1 tablet (15 mg total) by mouth 2 (two) times daily.     cloNIDine 0.1 MG tablet  Commonly known as:  CATAPRES  Take 1 tablet (0.1 mg total) by mouth 2 (two) times daily.     diazePAM 5 MG tablet  Commonly known as:  VALIUM  Take 1 tablet (5 mg total) by mouth every 12 (twelve) hours as needed.     DULoxetine 30 MG capsule  Commonly known as:  CYMBALTA  Take 1 capsule (30 mg total) by mouth once daily.     * polyethylene glycol 236-22.74-6.74 -5.86 gram suspension  Commonly known as:  GoLYTELY,NuLYTELY  Take by mouth once.     * GaviLyte-G 236-22.74-6.74 -5.86 gram suspension  Generic drug:  polyethylene glycol     hydroCHLOROthiazide 25 MG tablet  Commonly known as:  HYDRODIURIL     isosorbide mononitrate 30 MG 24 hr tablet  Commonly known as:  IMDUR  Take 30 mg by mouth once daily.     lisinopril 10 MG tablet  Take 1 tablet (10 mg total) by mouth once daily.     pantoprazole 40 MG tablet  Commonly known as:  PROTONIX  Take 1  tablet (40 mg total) by mouth once daily.     prasugrel 5 mg Tab  Commonly known as:  EFFIENT  once daily.         * This list has 2 medication(s) that are the same as other medications prescribed for you. Read the directions carefully, and ask your doctor or other care provider to review them with you.            ASK your doctor about these medications    * HYDROcodone-acetaminophen 5-325 mg per tablet  Commonly known as:  NORCO  Take 1 tablet by mouth every 6 (six) hours as needed for Pain.  Ask about: Which instructions should I use?     * HYDROcodone-acetaminophen 7.5-325 mg per tablet  Commonly known as:  NORCO  Take 1 tablet by mouth every 6 (six) hours as needed.  Ask about: Which instructions should I use?         * This list has 2 medication(s) that are the same as other medications prescribed for you. Read the directions carefully, and ask your doctor or other care provider to review them with you.              Discharge Procedure Orders   Diet Adult Regular     Ice to affected area     Notify your health care provider if you experience any of the following:  temperature >100.4     Notify your health care provider if you experience any of the following:  persistent nausea and vomiting or diarrhea     Notify your health care provider if you experience any of the following:  severe uncontrolled pain     Notify your health care provider if you experience any of the following:  redness, tenderness, or signs of infection (pain, swelling, redness, odor or green/yellow discharge around incision site)     Notify your health care provider if you experience any of the following:  difficulty breathing or increased cough     Notify your health care provider if you experience any of the following:  severe persistent headache     Notify your health care provider if you experience any of the following:  worsening rash     Notify your health care provider if you experience any of the following:  persistent dizziness,  light-headedness, or visual disturbances     Notify your health care provider if you experience any of the following:  increased confusion or weakness     Notify your health care provider if you experience any of the following:     Follow-up Information     Elbert LEONCIO Marrero II, MD In 2 weeks.    Specialty:  Orthopedic Surgery  Contact information:  [address] [place] [address] [address] [address]  [phone]                   Discharge Procedure Orders (must include Diet, Follow-up, Activity):   Discharge Procedure Orders (must include Diet, Follow-up, Activity)   Diet Adult Regular     Ice to affected area     Notify your health care provider if you experience any of the following:  temperature >100.4     Notify your health care provider if you experience any of the following:  persistent nausea and vomiting or diarrhea     Notify your health care provider if you experience any of the following:  severe uncontrolled pain     Notify your health care provider if you experience any of the following:  redness, tenderness, or signs of infection (pain, swelling, redness, odor or green/yellow discharge around incision site)     Notify your health care provider if you experience any of the following:  difficulty breathing or increased cough     Notify your health care provider if you experience any of the following:  severe persistent headache     Notify your health care provider if you experience any of the following:  worsening rash     Notify your health care provider if you experience any of the following:  persistent dizziness, light-headedness, or visual disturbances     Notify your health care provider if you experience any of the following:  increased confusion or weakness     Notify your health care provider if you experience any of the following:

## 2020-02-14 NOTE — ANESTHESIA POSTPROCEDURE EVALUATION
Anesthesia Post Evaluation    Patient: Comfort Cantu    Procedure(s) Performed: Procedure(s) (LRB):  ORIF, FRACTURE, TIBIA (Right)    Final Anesthesia Type: general    Patient location during evaluation: PACU  Patient participation: Yes- Able to Participate  Level of consciousness: awake and alert and oriented  Post-procedure vital signs: reviewed and stable  Pain management: adequate  Airway patency: patent    PONV status at discharge: No PONV  Anesthetic complications: no      Cardiovascular status: blood pressure returned to baseline  Respiratory status: unassisted, spontaneous ventilation and room air  Hydration status: euvolemic  Follow-up not needed.          Vitals Value Taken Time   /76 2/14/2020 11:52 AM   Temp 37 °C (98.6 °F) 2/14/2020 11:35 AM   Pulse 82 2/14/2020 11:55 AM   Resp 13 2/14/2020 11:55 AM   SpO2 97 % 2/14/2020 11:55 AM   Vitals shown include unvalidated device data.      No case tracking events are documented in the log.      Pain/Deann Score: Deann Score: 5 (2/14/2020 11:45 AM)

## 2020-02-17 VITALS
HEIGHT: 68 IN | SYSTOLIC BLOOD PRESSURE: 134 MMHG | WEIGHT: 126 LBS | HEART RATE: 80 BPM | TEMPERATURE: 98 F | RESPIRATION RATE: 18 BRPM | BODY MASS INDEX: 19.1 KG/M2 | DIASTOLIC BLOOD PRESSURE: 73 MMHG | OXYGEN SATURATION: 94 %

## 2020-02-20 DIAGNOSIS — S82.241A CLOSED DISPLACED SPIRAL FRACTURE OF SHAFT OF RIGHT TIBIA, INITIAL ENCOUNTER: Primary | ICD-10-CM

## 2020-02-28 NOTE — PROGRESS NOTES
CC:    64-year-old female follows up status post ORIF of the right tibia.  Date of surgery was 02/14/2020.  This is her 2 week follow-up.    Right Lower Extremity Examination     Skin is intact throughout.  Incisions are all clean, dry, intact with no signs of infection   Motor is intact distally EHL, FHL, TA, and gastroc   +2 dorsalis pedis and posterior tibial pulses   Sensation to light touch is intact distally dorsal, plantar, and first web space     X-ray images were examined and personally interpreted by me.  There is plate and screw fixation transfixing a distal 3rd tibia fracture in good alignment with no evidence of loosening.    Dx:  Right distal tibia fracture, stable    Plan:  Sutures removed and Steri-Strips applied.  The patient is to continue nonweightbearing.  Follow-up in 4 weeks for re-evaluation.

## 2020-03-02 ENCOUNTER — OFFICE VISIT (OUTPATIENT)
Dept: ORTHOPEDICS | Facility: CLINIC | Age: 65
End: 2020-03-02
Payer: MEDICARE

## 2020-03-02 ENCOUNTER — PATIENT OUTREACH (OUTPATIENT)
Dept: ADMINISTRATIVE | Facility: OTHER | Age: 65
End: 2020-03-02

## 2020-03-02 ENCOUNTER — HOSPITAL ENCOUNTER (OUTPATIENT)
Dept: RADIOLOGY | Facility: HOSPITAL | Age: 65
Discharge: HOME | End: 2020-03-02
Attending: ORTHOPAEDIC SURGERY
Payer: MEDICARE

## 2020-03-02 VITALS
HEART RATE: 71 BPM | BODY MASS INDEX: 19.1 KG/M2 | WEIGHT: 126 LBS | SYSTOLIC BLOOD PRESSURE: 179 MMHG | HEIGHT: 68 IN | DIASTOLIC BLOOD PRESSURE: 77 MMHG

## 2020-03-02 DIAGNOSIS — S82.241D CLOSED DISPLACED SPIRAL FRACTURE OF SHAFT OF RIGHT TIBIA WITH ROUTINE HEALING, SUBSEQUENT ENCOUNTER: Primary | ICD-10-CM

## 2020-03-02 DIAGNOSIS — S82.241A CLOSED DISPLACED SPIRAL FRACTURE OF SHAFT OF RIGHT TIBIA, INITIAL ENCOUNTER: ICD-10-CM

## 2020-03-02 PROCEDURE — 99999 PR PBB SHADOW E&M-EST. PATIENT-LVL III: CPT | Mod: PBBFAC,,, | Performed by: ORTHOPAEDIC SURGERY

## 2020-03-02 PROCEDURE — 99999 PR PBB SHADOW E&M-EST. PATIENT-LVL III: ICD-10-PCS | Mod: PBBFAC,,, | Performed by: ORTHOPAEDIC SURGERY

## 2020-03-02 PROCEDURE — 73590 XR TIBIA FIBULA 2 VIEW RIGHT: ICD-10-PCS | Mod: 26,RT,, | Performed by: RADIOLOGY

## 2020-03-02 PROCEDURE — 73590 X-RAY EXAM OF LOWER LEG: CPT | Mod: 26,RT,, | Performed by: RADIOLOGY

## 2020-03-02 PROCEDURE — 99024 POSTOP FOLLOW-UP VISIT: CPT | Mod: S$GLB,,, | Performed by: ORTHOPAEDIC SURGERY

## 2020-03-02 PROCEDURE — 73590 X-RAY EXAM OF LOWER LEG: CPT | Mod: TC,PN,RT

## 2020-03-02 PROCEDURE — 99024 PR POST-OP FOLLOW-UP VISIT: ICD-10-PCS | Mod: S$GLB,,, | Performed by: ORTHOPAEDIC SURGERY

## 2020-03-02 RX ORDER — HYDROCODONE BITARTRATE AND ACETAMINOPHEN 7.5; 325 MG/1; MG/1
1 TABLET ORAL EVERY 6 HOURS PRN
Qty: 28 TABLET | Refills: 0 | Status: SHIPPED | OUTPATIENT
Start: 2020-03-02 | End: 2020-03-09

## 2020-03-03 ENCOUNTER — OFFICE VISIT (OUTPATIENT)
Dept: SURGERY | Facility: CLINIC | Age: 65
End: 2020-03-03
Payer: MEDICARE

## 2020-03-03 VITALS
HEART RATE: 75 BPM | TEMPERATURE: 98 F | BODY MASS INDEX: 19.1 KG/M2 | WEIGHT: 126 LBS | SYSTOLIC BLOOD PRESSURE: 152 MMHG | HEIGHT: 68 IN | DIASTOLIC BLOOD PRESSURE: 90 MMHG

## 2020-03-03 DIAGNOSIS — K63.89 COLONIC MASS: Primary | ICD-10-CM

## 2020-03-03 PROCEDURE — 99999 PR PBB SHADOW E&M-EST. PATIENT-LVL III: ICD-10-PCS | Mod: PBBFAC,,, | Performed by: SURGERY

## 2020-03-03 PROCEDURE — 3080F PR MOST RECENT DIASTOLIC BLOOD PRESSURE >= 90 MM HG: ICD-10-PCS | Mod: CPTII,S$GLB,, | Performed by: SURGERY

## 2020-03-03 PROCEDURE — 3077F PR MOST RECENT SYSTOLIC BLOOD PRESSURE >= 140 MM HG: ICD-10-PCS | Mod: CPTII,S$GLB,, | Performed by: SURGERY

## 2020-03-03 PROCEDURE — 99205 PR OFFICE/OUTPT VISIT, NEW, LEVL V, 60-74 MIN: ICD-10-PCS | Mod: S$GLB,,, | Performed by: SURGERY

## 2020-03-03 PROCEDURE — 99205 OFFICE O/P NEW HI 60 MIN: CPT | Mod: S$GLB,,, | Performed by: SURGERY

## 2020-03-03 PROCEDURE — 3077F SYST BP >= 140 MM HG: CPT | Mod: CPTII,S$GLB,, | Performed by: SURGERY

## 2020-03-03 PROCEDURE — 3008F PR BODY MASS INDEX (BMI) DOCUMENTED: ICD-10-PCS | Mod: CPTII,S$GLB,, | Performed by: SURGERY

## 2020-03-03 PROCEDURE — 3080F DIAST BP >= 90 MM HG: CPT | Mod: CPTII,S$GLB,, | Performed by: SURGERY

## 2020-03-03 PROCEDURE — 3008F BODY MASS INDEX DOCD: CPT | Mod: CPTII,S$GLB,, | Performed by: SURGERY

## 2020-03-03 PROCEDURE — 99999 PR PBB SHADOW E&M-EST. PATIENT-LVL III: CPT | Mod: PBBFAC,,, | Performed by: SURGERY

## 2020-03-03 NOTE — PATIENT INSTRUCTIONS
Surgery is scheduled for 03//09/20 arrival time will be given by the the preop nurse.  The preop nurse will call you from [phone]  Nothing to eat or drink after midnight.  Someone to drive you home.    THE PREOP NURSE WILL CALL, SOMETIMES AS LATE AS 4 or 5 PM IN THE AFTERNOON THE DAY BEFORE SURGERY.    Bathe the night before and the morning of your procedure with a Chlorhexidine wash such as Hibiclens, can be purchased at most Pharmacy's no prescription needed.    Special Instruction: Here is instruction for your colon cleanse the day before your procedure.    Medication to purchase at your pharmacy no need for Prescription:  2 bottles of Magnesium Citrate 10 or 12 ounce bottle will do.  Dulcolax tablets you will need 4 tablets in total.    On the day before the procedure you are on clear liquids all day, no solid food.   You can take your morning medications if you are allowed by your Doctor.  Clear liquid means any nonalcoholic  liquids including Jello and popcicles, juice with no pulp, soft drinks, tea, coffee no creamer, water Gatorade, chicken and/or beef broth.    You can start taking your first laxative starting as early as 8am but try not to start later than 12:00 noon.  First dose will be two Dulcolax tabs followed by 8 ounces clear liquid.  Take 2 more Dulcolax 2 hours after the first dose followed by 8 ounces of clear liquids.  Remember that the laxatives work best when you drink plenty of fluids.    Drink your first bottle of Magnesium Citrate at 5:00pm followed by 5-8ounce glasses of liquid over a 3 hour period.  At 9:00 pm take your 2nd bottle magnesium citrate followed by 3 more 8ounce glasses of clear liquid.  After Midnight nothing else to eat or drink.    Contact the office if you have questions.  Your surgery is scheduled at the [facility] in [address] Martinez.

## 2020-03-03 NOTE — Clinical Note
I saw your patient, Comfort Cantu in the office.  Attached are my findings and plan.  Thank you for referring her to my office and if you have any questions please do not hesitate to call my cell [phone].Kobi Candelaria

## 2020-03-03 NOTE — LETTER
March 4, 2020      Esteban Yancey MD  [address]  [address]  [place] [address]           [facility]  [address]  [place] [address]  Phone: [phone]          Patient: Comfort Cantu   MR Number: [id number]   YOB: 1955   Date of Visit: 3/3/2020       Dear Dr. Esteban Yancey:    Thank you for referring Comfort Cantu to me for evaluation. Attached you will find relevant portions of my assessment and plan of care.    If you have questions, please do not hesitate to call me. I look forward to following Comfort Cantu along with you.    Sincerely,    Terrance Candelaria MD    Enclosure  CC:  No Recipients    If you would like to receive this communication electronically, please contact [email or web address] or [phone] to request more information on [facility] access.    For providers and/or their staff who would like to refer a patient to [facility], please contact us through our one-stop-shop provider referral line, [facility], at [phone].    If you feel you have received this communication in error or would no longer like to receive these types of communications, please e-mail [email or web address]

## 2020-03-04 RX ORDER — LIDOCAINE HYDROCHLORIDE 10 MG/ML
1 INJECTION, SOLUTION EPIDURAL; INFILTRATION; INTRACAUDAL; PERINEURAL ONCE
Status: CANCELLED | OUTPATIENT
Start: 2020-03-04 | End: 2020-03-04

## 2020-03-04 RX ORDER — METRONIDAZOLE 500 MG/100ML
500 INJECTION, SOLUTION INTRAVENOUS
Status: CANCELLED | OUTPATIENT
Start: 2020-03-04

## 2020-03-04 RX ORDER — SODIUM CHLORIDE 9 MG/ML
INJECTION, SOLUTION INTRAVENOUS CONTINUOUS
Status: CANCELLED | OUTPATIENT
Start: 2020-03-04

## 2020-03-04 NOTE — PROGRESS NOTES
Subjective:       Patient ID: Comfort Cantu is a 64 y.o. female.    Chief Complaint: Consult (Colon lesion)    HPI  this is a pleasant 64-year-old female referred to me in consultation by Dr. Yancey for evaluation of colon mass.  Patient 1st had a colonoscopy performed in [facility].  She was noted to have a sigmoid colon mass.  This was biopsied confirmed to be tubulovillous adenoma with high-grade dysplasia.  This was done in October of 2019.  Patient later had a follow-up colonoscopy with attempts to endoscopically resected lesion this was done with the snare and piecemeal technique. A large portion of the lesion was removed but not the entirety.  Again this showed tubulovillous adenoma.  In January of this year patient had another attempt at endoscopic removal.  A portion of the again was removed in a piecemeal technique but given location and position behind a fold it was under unable to be fully removed.  Biopsies again showed tubulovillous adenoma with high-grade dysplasia.  Given 3 previous attempts at endoscopic removal she has been referred to me for surgical resection.  Patient denies any abdominal pain.  She denies any changes in her bowel habits.  She has had no blood per rectum.  She does have past medical significant for hypertension, peripheral vascular disease, and anemia.  Patient does have stents in her legs and is on Effient for this.  She has no significant past abdominal surgical history.  Review of Systems   Constitutional: Negative for activity change, appetite change, fever and unexpected weight change.   HENT: Negative for congestion and facial swelling.    Respiratory: Negative for chest tightness, shortness of breath and wheezing.    Cardiovascular: Negative for chest pain.   Gastrointestinal: Negative for abdominal distention, abdominal pain, anal bleeding, blood in stool, constipation, diarrhea, nausea, rectal pain and vomiting.   Genitourinary: Negative for difficulty  urinating, dysuria and frequency.   Musculoskeletal: Positive for gait problem.        Pt unable to walk secondary to recent R leg fracture   Skin: Positive for wound.   Neurological: Negative for dizziness.   Hematological: Negative for adenopathy.   Psychiatric/Behavioral: Negative for agitation and decreased concentration.       Objective:      Physical Exam   Constitutional: She is oriented to person, place, and time. She appears well-developed and well-nourished.   HENT:   Head: Normocephalic and atraumatic.   Right Ear: External ear normal.   Eyes: Pupils are equal, round, and reactive to light.   Neck: Normal range of motion. Neck supple. No tracheal deviation present. No thyromegaly present.   Cardiovascular: Normal rate, regular rhythm and normal heart sounds. Exam reveals no gallop.   No murmur heard.  Pulmonary/Chest: Effort normal and breath sounds normal. She exhibits no tenderness.   Abdominal: Soft. Bowel sounds are normal. She exhibits no distension, no abdominal bruit, no pulsatile midline mass and no mass. There is no hepatosplenomegaly. There is no tenderness. There is no rigidity, no rebound, no guarding, no tenderness at McBurney's point and negative De Santiago's sign. No hernia. Hernia confirmed negative in the ventral area.   Genitourinary: Rectum normal.   Musculoskeletal: She exhibits no edema or deformity.   Lymphadenopathy:     She has no cervical adenopathy.   Neurological: She is alert and oriented to person, place, and time. No cranial nerve deficit. Coordination normal.   Skin: Skin is warm. No rash noted. No erythema. No pallor.   Psychiatric: She has a normal mood and affect. Her behavior is normal.   Vitals reviewed.      Cscope:  10/19              1/23/20          Path:    1. SIGMOID COLON POLYP BIOPSIES, 30 CM:  - Tubular adenoma.  - No evidence of high-grade dysplasia or malignancy.  2. SIGMOID COLON MASS BIOPSIES:  - Tubular adenoma with high-grade dysplasia (see comment).  -  Hyperplastic polyp.  Assessment:     Tubular adenoma high grade dysplasia  Sigmoid colon mass  No diagnosis found.    Plan:       D/w pt.   I have recommended surgical resection.  I have had lengthy d/w pt regarding risks and benefits of procedure.  Informed consent has been obtained.  Surgery is scheduled for 3/10.

## 2020-03-05 ENCOUNTER — TELEPHONE (OUTPATIENT)
Dept: SURGERY | Facility: CLINIC | Age: 65
End: 2020-03-05

## 2020-03-05 NOTE — TELEPHONE ENCOUNTER
----- Message from Ginger Richter sent at 3/5/2020 10:09 AM CST -----  Contact: Patient  Type: Needs Medical Advice    Who Called:  Patient  Best Call Back Number:   Additional Information: Calling to reschedule upcoming procedure.

## 2020-03-17 NOTE — TELEPHONE ENCOUNTER
Patient contacted. She stated that there is one area that is oozing. She has been putting hydrogen peroxide and antibiotic ointment on it. Advised to d/c use of both; keep area clean and dry. Please review/sign pended medication if agreeable. Jennifer Colon LPN

## 2020-03-17 NOTE — TELEPHONE ENCOUNTER
----- Message from Hazel Whitehead sent at 3/17/2020 12:25 PM CDT -----  Type: Needs Medical Advice    Who Called:  Comfort   Symptoms (please be specific):  Yellow drainage from the hole in her ankle.  It is red around it.  She thinks she may be infected.  How long has patient had these symptoms:  3 weeks  Pharmacy name and phone #:    [id number] [place] [id number] [address] [facility] [address] [address]  Phone: [phone] Fax: [phone]  Best Call Back Number: [phone]  Additional Information: thank you!

## 2020-03-18 RX ORDER — CLINDAMYCIN HYDROCHLORIDE 300 MG/1
300 CAPSULE ORAL EVERY 6 HOURS
Qty: 40 CAPSULE | Refills: 0 | Status: SHIPPED | OUTPATIENT
Start: 2020-03-18 | End: 2020-03-28

## 2020-03-23 ENCOUNTER — TELEPHONE (OUTPATIENT)
Dept: ORTHOPEDICS | Facility: CLINIC | Age: 65
End: 2020-03-23

## 2020-03-23 ENCOUNTER — PATIENT OUTREACH (OUTPATIENT)
Dept: ADMINISTRATIVE | Facility: OTHER | Age: 65
End: 2020-03-23

## 2020-03-23 NOTE — TELEPHONE ENCOUNTER
----- Message from Eve Georges sent at 3/23/2020  1:27 PM CDT -----  Contact: Comfort pt  Type: Needs Medical Advice    Who Called:  Comfort  Marcos Call Back Number: [phone]  Additional Information: Pls call pt regarding her post op appt on the 30th. She said it's not safe to come to [place]. She also adv'd that she does not have access to internet for a virtual visit. Pls call for further

## 2020-03-24 DIAGNOSIS — I10 HYPERTENSION: ICD-10-CM

## 2020-03-24 DIAGNOSIS — N30.00 ACUTE CYSTITIS WITHOUT HEMATURIA: ICD-10-CM

## 2020-03-24 DIAGNOSIS — S82.241D CLOSED DISPLACED SPIRAL FRACTURE OF SHAFT OF RIGHT TIBIA WITH ROUTINE HEALING, SUBSEQUENT ENCOUNTER: Primary | ICD-10-CM

## 2020-03-24 NOTE — TELEPHONE ENCOUNTER
+Refill request(s). Please advise. Thank you.       ----- Message from Ori Contreras sent at 3/24/2020  4:38 PM CDT -----  Contact: patient  Type:  RX Refill Request    Who Called:  patient  Refill or New Rx:  refills  RX Name and Strength:  cloNIDine (CATAPRES) 0.1 MG tablet,   How is the patient currently taking it? (ex. 1XDay):    Is this a 30 day or 90 day RX:  90 days  Preferred Pharmacy with phone number:  [facility]  Local or Mail Order:  Mail order  Ordering Provider:  Dr.Adams Rodríguez Call Back Number:    Additional Information:

## 2020-03-25 RX ORDER — CLONIDINE HYDROCHLORIDE 0.1 MG/1
0.1 TABLET ORAL 2 TIMES DAILY
Qty: 60 TABLET | Refills: 11 | OUTPATIENT
Start: 2020-03-25 | End: 2020-04-14 | Stop reason: SDUPTHER

## 2020-03-30 ENCOUNTER — TELEPHONE (OUTPATIENT)
Dept: SURGERY | Facility: CLINIC | Age: 65
End: 2020-03-30

## 2020-03-30 ENCOUNTER — PATIENT OUTREACH (OUTPATIENT)
Dept: ADMINISTRATIVE | Facility: OTHER | Age: 65
End: 2020-03-30

## 2020-03-30 ENCOUNTER — OFFICE VISIT (OUTPATIENT)
Dept: ORTHOPEDICS | Facility: CLINIC | Age: 65
End: 2020-03-30
Payer: MEDICARE

## 2020-03-30 ENCOUNTER — HOSPITAL ENCOUNTER (OUTPATIENT)
Dept: RADIOLOGY | Facility: HOSPITAL | Age: 65
Discharge: HOME | End: 2020-03-30
Attending: ORTHOPAEDIC SURGERY
Payer: MEDICARE

## 2020-03-30 VITALS — RESPIRATION RATE: 18 BRPM | HEIGHT: 68 IN | BODY MASS INDEX: 19.1 KG/M2 | WEIGHT: 126 LBS

## 2020-03-30 DIAGNOSIS — S82.241D CLOSED DISPLACED SPIRAL FRACTURE OF SHAFT OF RIGHT TIBIA WITH ROUTINE HEALING, SUBSEQUENT ENCOUNTER: Primary | ICD-10-CM

## 2020-03-30 DIAGNOSIS — S82.241D CLOSED DISPLACED SPIRAL FRACTURE OF SHAFT OF RIGHT TIBIA WITH ROUTINE HEALING, SUBSEQUENT ENCOUNTER: ICD-10-CM

## 2020-03-30 PROCEDURE — 73590 XR TIBIA FIBULA 2 VIEW RIGHT: ICD-10-PCS | Mod: 26,RT,, | Performed by: RADIOLOGY

## 2020-03-30 PROCEDURE — 73590 X-RAY EXAM OF LOWER LEG: CPT | Mod: TC,PN,RT

## 2020-03-30 PROCEDURE — 99999 PR PBB SHADOW E&M-EST. PATIENT-LVL III: CPT | Mod: PBBFAC,,, | Performed by: ORTHOPAEDIC SURGERY

## 2020-03-30 PROCEDURE — 99024 POSTOP FOLLOW-UP VISIT: CPT | Mod: S$GLB,,, | Performed by: ORTHOPAEDIC SURGERY

## 2020-03-30 PROCEDURE — 99024 PR POST-OP FOLLOW-UP VISIT: ICD-10-PCS | Mod: S$GLB,,, | Performed by: ORTHOPAEDIC SURGERY

## 2020-03-30 PROCEDURE — 99999 PR PBB SHADOW E&M-EST. PATIENT-LVL III: ICD-10-PCS | Mod: PBBFAC,,, | Performed by: ORTHOPAEDIC SURGERY

## 2020-03-30 PROCEDURE — 73590 X-RAY EXAM OF LOWER LEG: CPT | Mod: 26,RT,, | Performed by: RADIOLOGY

## 2020-03-30 RX ORDER — DIAZEPAM 5 MG/1
TABLET ORAL
Qty: 60 TABLET | Refills: 2 | Status: SHIPPED | OUTPATIENT
Start: 2020-03-30 | End: 2020-06-29

## 2020-03-30 NOTE — TELEPHONE ENCOUNTER
----- Message from Catherine John sent at 3/30/2020  3:46 PM CDT -----  Contact: self [phone]   Patient called to cancel procedure that is scheduled for 4/6/2020.  Please call patient back to reschedule procedure upon the patient's request.    Patient may be reached at [phone].

## 2020-03-30 NOTE — PROGRESS NOTES
CC:  64-year-old female follows up status post ORIF of her right distal tibia.  Date of surgery was 02/14/2020.  She has had some erythema on the inferior incision.  The patient reports that she has been picking at her scab.  She reports that when she does not pick at it there is no drainage but then she will pick at it and it will begin draining.    Right Lower Extremity Examination     Skin is intact throughout.  The patient's inferior incision has a scab in the center that appears to be healing.  There is no surrounding erythema.    Motor is intact distally EHL, FHL, TA, and gastroc   +2 dorsalis pedis and posterior tibial pulses   Sensation to light touch is intact distally dorsal, plantar, and first web space     X-ray images were examined and personally interpreted by me.  Three views of the right tibia dated 03/30/2020 are available for review.  There are plate and screws transfixing a fracture of the distal 3rd of the right tibia.    Dx:  Fracture of the distal 3rd of the right tibia, stable    Plan:  I had a long discussion with the patient about not picking at her incision.  She verbalized understanding.  She needs to continue to be nonweightbearing.  Follow-up in 1 month with x-rays.

## 2020-04-08 NOTE — TELEPHONE ENCOUNTER
Patient contacted. She feels like she needs more antibiotics. Patient states that she is not picking on the incision, she would just feel better if she had more to take. Last dose 2 days ago.     Last fill 03/18/20 #40.     Please advise. Jennifer Colon LPN

## 2020-04-08 NOTE — TELEPHONE ENCOUNTER
----- Message from Noemi Dennis sent at 4/8/2020  2:41 PM CDT -----  Contact: patient  Type: Needs Medical Advice  Who Called:  patient  Best Call Back Number: [phone]  Additional Information: Patient states she needs a refill of antibiotic 300 mg tablet not sure of the name.  Please call to advise.  Thanks!

## 2020-04-09 RX ORDER — CLINDAMYCIN HYDROCHLORIDE 300 MG/1
300 CAPSULE ORAL EVERY 6 HOURS
Qty: 28 CAPSULE | Refills: 0 | Status: SHIPPED | OUTPATIENT
Start: 2020-04-09 | End: 2020-04-16

## 2020-04-11 RX ORDER — PANTOPRAZOLE SODIUM 40 MG/1
TABLET, DELAYED RELEASE ORAL
Qty: 90 TABLET | Refills: 3 | Status: SHIPPED | OUTPATIENT
Start: 2020-04-11 | End: 2020-04-14 | Stop reason: SDUPTHER

## 2020-04-11 RX ORDER — BUSPIRONE HYDROCHLORIDE 15 MG/1
TABLET ORAL
Qty: 180 TABLET | Refills: 3 | Status: SHIPPED | OUTPATIENT
Start: 2020-04-11 | End: 2020-04-14 | Stop reason: SDUPTHER

## 2020-04-14 DIAGNOSIS — I10 HYPERTENSION: ICD-10-CM

## 2020-04-14 DIAGNOSIS — N30.00 ACUTE CYSTITIS WITHOUT HEMATURIA: ICD-10-CM

## 2020-04-14 DIAGNOSIS — I10 BENIGN ESSENTIAL HYPERTENSION: ICD-10-CM

## 2020-04-14 RX ORDER — CLONIDINE HYDROCHLORIDE 0.1 MG/1
0.1 TABLET ORAL 2 TIMES DAILY
Qty: 180 TABLET | Refills: 3 | Status: SHIPPED | OUTPATIENT
Start: 2020-04-14 | End: 2020-11-05

## 2020-04-14 RX ORDER — BUSPIRONE HYDROCHLORIDE 15 MG/1
15 TABLET ORAL 2 TIMES DAILY
Qty: 180 TABLET | Refills: 3 | Status: SHIPPED | OUTPATIENT
Start: 2020-04-14 | End: 2021-01-26

## 2020-04-14 RX ORDER — ATORVASTATIN CALCIUM 10 MG/1
10 TABLET, FILM COATED ORAL DAILY
Qty: 90 TABLET | Refills: 3 | Status: SHIPPED | OUTPATIENT
Start: 2020-04-14 | End: 2022-10-18

## 2020-04-14 RX ORDER — PANTOPRAZOLE SODIUM 40 MG/1
40 TABLET, DELAYED RELEASE ORAL DAILY
Qty: 90 TABLET | Refills: 3 | Status: SHIPPED | OUTPATIENT
Start: 2020-04-14 | End: 2021-01-26

## 2020-04-14 RX ORDER — DULOXETIN HYDROCHLORIDE 30 MG/1
30 CAPSULE, DELAYED RELEASE ORAL DAILY
Qty: 90 CAPSULE | Refills: 3 | Status: SHIPPED | OUTPATIENT
Start: 2020-04-14 | End: 2020-08-03 | Stop reason: SDUPTHER

## 2020-04-14 NOTE — TELEPHONE ENCOUNTER
----- Message from Polina Contreras sent at 4/14/2020  3:33 PM CDT -----  Contact: pt  Type: Needs Medical Advice    Who Called:      Best Call Back Number:   Additional Information: Requesting a call back from Nurse, anurag Bartholomew has been requesting refill for all of pt's med and they will not fill them until office call ,please call in Rx today

## 2020-04-21 ENCOUNTER — TELEPHONE (OUTPATIENT)
Dept: SURGERY | Facility: CLINIC | Age: 65
End: 2020-04-21

## 2020-04-22 ENCOUNTER — TELEPHONE (OUTPATIENT)
Dept: ORTHOPEDICS | Facility: CLINIC | Age: 65
End: 2020-04-22

## 2020-04-22 DIAGNOSIS — S82.241D CLOSED DISPLACED SPIRAL FRACTURE OF SHAFT OF RIGHT TIBIA WITH ROUTINE HEALING, SUBSEQUENT ENCOUNTER: Primary | ICD-10-CM

## 2020-04-28 ENCOUNTER — HOSPITAL ENCOUNTER (OUTPATIENT)
Dept: RADIOLOGY | Facility: HOSPITAL | Age: 65
Discharge: HOME | End: 2020-04-28
Attending: ORTHOPAEDIC SURGERY
Payer: MEDICARE

## 2020-04-28 ENCOUNTER — OFFICE VISIT (OUTPATIENT)
Dept: ORTHOPEDICS | Facility: CLINIC | Age: 65
End: 2020-04-28
Payer: MEDICARE

## 2020-04-28 VITALS — HEIGHT: 68 IN | RESPIRATION RATE: 16 BRPM | WEIGHT: 126 LBS | BODY MASS INDEX: 19.1 KG/M2

## 2020-04-28 DIAGNOSIS — S82.241D CLOSED DISPLACED SPIRAL FRACTURE OF SHAFT OF RIGHT TIBIA WITH ROUTINE HEALING, SUBSEQUENT ENCOUNTER: Primary | ICD-10-CM

## 2020-04-28 DIAGNOSIS — S82.241D CLOSED DISPLACED SPIRAL FRACTURE OF SHAFT OF RIGHT TIBIA WITH ROUTINE HEALING, SUBSEQUENT ENCOUNTER: ICD-10-CM

## 2020-04-28 PROCEDURE — 73590 X-RAY EXAM OF LOWER LEG: CPT | Mod: 26,RT,, | Performed by: RADIOLOGY

## 2020-04-28 PROCEDURE — 99024 POSTOP FOLLOW-UP VISIT: CPT | Mod: S$GLB,,, | Performed by: ORTHOPAEDIC SURGERY

## 2020-04-28 PROCEDURE — 99024 PR POST-OP FOLLOW-UP VISIT: ICD-10-PCS | Mod: S$GLB,,, | Performed by: ORTHOPAEDIC SURGERY

## 2020-04-28 PROCEDURE — 73590 XR TIBIA FIBULA 2 VIEW RIGHT: ICD-10-PCS | Mod: 26,RT,, | Performed by: RADIOLOGY

## 2020-04-28 PROCEDURE — 99999 PR PBB SHADOW E&M-EST. PATIENT-LVL III: CPT | Mod: PBBFAC,,, | Performed by: ORTHOPAEDIC SURGERY

## 2020-04-28 PROCEDURE — 73590 X-RAY EXAM OF LOWER LEG: CPT | Mod: TC,PN,RT

## 2020-04-28 PROCEDURE — 99999 PR PBB SHADOW E&M-EST. PATIENT-LVL III: ICD-10-PCS | Mod: PBBFAC,,, | Performed by: ORTHOPAEDIC SURGERY

## 2020-04-28 NOTE — PROGRESS NOTES
CC:  64-year-old female follows up status post ORIF of the right tibia.  Date of surgery was 02/14/2020.  Overall she is doing well.  She reports her pain is 0/10.  She admits that she has been putting a little bit weight on her leg and walking with a walker around her trailer.  She states she walked down her front steps today with no pain.    Right Lower Extremity Examination     Skin is intact throughout, incisions are well healed.    Motor is intact distally EHL, FHL, TA, and gastroc   +2 dorsalis pedis and posterior tibial pulses   Sensation to light touch is intact distally dorsal, plantar, and first web space   No tenderness to palpation noted    X-ray images were examined and personally interpreted by me.  Three views of the right tibia dated 04/28/2020 are available for interpretation.  There is plate screw fixation of a distal 3rd fracture.  There is interval healing at the fracture but the fracture line is still visible.    Dx:  Distal 3rd fracture right tibia, stable    Plan:  The patient can begin progressive weight-bearing with a walker.  Follow-up in 1 month with an x-ray.

## 2020-05-06 ENCOUNTER — TELEPHONE (OUTPATIENT)
Dept: FAMILY MEDICINE | Facility: CLINIC | Age: 65
End: 2020-05-06

## 2020-05-06 NOTE — TELEPHONE ENCOUNTER
Pt requesting an increase in dosage of Valium. Very focused on recent medical problems, such as leg fracture in February. Speech somewhat slurred. Note additional meds pt is currently prescribed. Please advise. Thanks. Pt uses [facility] for Valium.

## 2020-05-06 NOTE — TELEPHONE ENCOUNTER
----- Message from Catina Kohler sent at 5/6/2020 11:44 AM CDT -----  Contact: patient  Type: Needs Medical Advice  Who Called:  Patient  Best Call Back Number: [phone] (home)   Additional Information: the pt said she wants to go up on the MG for the Valium, would like a nurse to call her back asap in regards to the change. Also she needs a new Rx sent to the pharm she said she cant stop crying. She cant stay calm.  thanks

## 2020-05-28 DIAGNOSIS — S82.241D CLOSED DISPLACED SPIRAL FRACTURE OF SHAFT OF RIGHT TIBIA WITH ROUTINE HEALING, SUBSEQUENT ENCOUNTER: Primary | ICD-10-CM

## 2020-06-03 DIAGNOSIS — S82.241D CLOSED DISPLACED SPIRAL FRACTURE OF SHAFT OF RIGHT TIBIA WITH ROUTINE HEALING, SUBSEQUENT ENCOUNTER: Primary | ICD-10-CM

## 2020-06-04 ENCOUNTER — LAB VISIT (OUTPATIENT)
Dept: LAB | Facility: HOSPITAL | Age: 65
End: 2020-06-04
Attending: ORTHOPAEDIC SURGERY
Payer: MEDICARE

## 2020-06-04 ENCOUNTER — HOSPITAL ENCOUNTER (OUTPATIENT)
Dept: RADIOLOGY | Facility: HOSPITAL | Age: 65
Discharge: HOME | End: 2020-06-04
Attending: ORTHOPAEDIC SURGERY
Payer: MEDICARE

## 2020-06-04 ENCOUNTER — OFFICE VISIT (OUTPATIENT)
Dept: ORTHOPEDICS | Facility: CLINIC | Age: 65
End: 2020-06-04
Payer: MEDICARE

## 2020-06-04 VITALS — BODY MASS INDEX: 19.1 KG/M2 | TEMPERATURE: 99 F | WEIGHT: 126 LBS | RESPIRATION RATE: 16 BRPM | HEIGHT: 68 IN

## 2020-06-04 DIAGNOSIS — T84.7XXA INFECTION OF LOWER EXTREMITY ASSOCIATED WITH HARDWARE: ICD-10-CM

## 2020-06-04 DIAGNOSIS — T84.7XXA INFECTION OF LOWER EXTREMITY ASSOCIATED WITH HARDWARE: Primary | ICD-10-CM

## 2020-06-04 DIAGNOSIS — Z01.818 PREOP TESTING: Primary | ICD-10-CM

## 2020-06-04 DIAGNOSIS — Z01.818 PREOP TESTING: ICD-10-CM

## 2020-06-04 DIAGNOSIS — S82.241D CLOSED DISPLACED SPIRAL FRACTURE OF SHAFT OF RIGHT TIBIA WITH ROUTINE HEALING, SUBSEQUENT ENCOUNTER: ICD-10-CM

## 2020-06-04 LAB
BASOPHILS # BLD AUTO: 0.09 K/UL (ref 0–0.2)
BASOPHILS NFR BLD: 1 % (ref 0–1.9)
DIFFERENTIAL METHOD: ABNORMAL
EOSINOPHIL # BLD AUTO: 0.2 K/UL (ref 0–0.5)
EOSINOPHIL NFR BLD: 2.1 % (ref 0–8)
ERYTHROCYTE [DISTWIDTH] IN BLOOD BY AUTOMATED COUNT: 19.3 % (ref 11.5–14.5)
HCT VFR BLD AUTO: 35.6 % (ref 37–48.5)
HGB BLD-MCNC: 10.7 G/DL (ref 12–16)
IMM GRANULOCYTES # BLD AUTO: 0.02 K/UL (ref 0–0.04)
IMM GRANULOCYTES NFR BLD AUTO: 0.2 % (ref 0–0.5)
LYMPHOCYTES # BLD AUTO: 1.7 K/UL (ref 1–4.8)
LYMPHOCYTES NFR BLD: 18.1 % (ref 18–48)
MCH RBC QN AUTO: 28.9 PG (ref 27–31)
MCHC RBC AUTO-ENTMCNC: 30.1 G/DL (ref 32–36)
MCV RBC AUTO: 96 FL (ref 82–98)
MONOCYTES # BLD AUTO: 0.9 K/UL (ref 0.3–1)
MONOCYTES NFR BLD: 9.3 % (ref 4–15)
NEUTROPHILS # BLD AUTO: 6.5 K/UL (ref 1.8–7.7)
NEUTROPHILS NFR BLD: 69.3 % (ref 38–73)
NRBC BLD-RTO: 0 /100 WBC
PLATELET # BLD AUTO: 366 K/UL (ref 150–350)
PMV BLD AUTO: 9.2 FL (ref 9.2–12.9)
RBC # BLD AUTO: 3.7 M/UL (ref 4–5.4)
WBC # BLD AUTO: 9.39 K/UL (ref 3.9–12.7)

## 2020-06-04 PROCEDURE — 99999 PR PBB SHADOW E&M-EST. PATIENT-LVL III: ICD-10-PCS | Mod: PBBFAC,,, | Performed by: ORTHOPAEDIC SURGERY

## 2020-06-04 PROCEDURE — 36415 COLL VENOUS BLD VENIPUNCTURE: CPT

## 2020-06-04 PROCEDURE — 99214 OFFICE O/P EST MOD 30 MIN: CPT | Mod: S$GLB,,, | Performed by: ORTHOPAEDIC SURGERY

## 2020-06-04 PROCEDURE — 73590 X-RAY EXAM OF LOWER LEG: CPT | Mod: TC,PN,RT

## 2020-06-04 PROCEDURE — 99214 PR OFFICE/OUTPT VISIT, EST, LEVL IV, 30-39 MIN: ICD-10-PCS | Mod: S$GLB,,, | Performed by: ORTHOPAEDIC SURGERY

## 2020-06-04 PROCEDURE — 3008F BODY MASS INDEX DOCD: CPT | Mod: CPTII,S$GLB,, | Performed by: ORTHOPAEDIC SURGERY

## 2020-06-04 PROCEDURE — 85025 COMPLETE CBC W/AUTO DIFF WBC: CPT

## 2020-06-04 PROCEDURE — 99999 PR PBB SHADOW E&M-EST. PATIENT-LVL III: CPT | Mod: PBBFAC,,, | Performed by: ORTHOPAEDIC SURGERY

## 2020-06-04 PROCEDURE — 3008F PR BODY MASS INDEX (BMI) DOCUMENTED: ICD-10-PCS | Mod: CPTII,S$GLB,, | Performed by: ORTHOPAEDIC SURGERY

## 2020-06-04 PROCEDURE — 73590 X-RAY EXAM OF LOWER LEG: CPT | Mod: 26,RT,, | Performed by: RADIOLOGY

## 2020-06-04 PROCEDURE — 73590 XR TIBIA FIBULA 2 VIEW RIGHT: ICD-10-PCS | Mod: 26,RT,, | Performed by: RADIOLOGY

## 2020-06-04 RX ORDER — CLINDAMYCIN PHOSPHATE 900 MG/50ML
900 INJECTION, SOLUTION INTRAVENOUS
Status: CANCELLED | OUTPATIENT
Start: 2020-06-04

## 2020-06-04 RX ORDER — MUPIROCIN 20 MG/G
OINTMENT TOPICAL
Status: CANCELLED | OUTPATIENT
Start: 2020-06-04

## 2020-06-04 NOTE — H&P (VIEW-ONLY)
CC:  64-year-old female follows up status post ORIF of the right tibia.  Date of surgery was 02/14/2020.  Reports her pain is 0/10.  She does have a new problem.  The end of her incision has opened up and there is some purulence drainage coming from the very distal end of her incision.  There is not really any surrounding erythema and it is not painful.    ROS:    Constitution: Denies chills, fever, and sweats.  HENT: Denies headaches or blurry vision.  Cardiovascular: Denies chest pain or irregular heart beat.  Respiratory: Denies cough or shortness of breath.  Gastrointestinal: Denies abdominal pain, nausea, or vomiting.  Genitourinary:  Denies urinary incontinence, bladder and kidney issues  Musculoskeletal:  Denies muscle cramps.  Neurological: Denies dizziness or focal weakness.  Psychiatric/Behavioral: Normal mental status.  Hematologic/Lymphatic: Denies bleeding problem or easy bruising/bleeding.  Skin: Denies rash or suspicious lesions.    Physical examination     Gen - No acute distress, well nourished, well groomed   Eyes - Extraoccular motions intact, pupils equally round and reactive to light and accommodation   ENT - normocephalic, atruamtic, oropharynx clear   Neck - Supple, no abnormal masses   Cardiovascular - regular rate and rhythm   Pulmonary - clear to auscultation bilaterally, no wheezes, ronchi, or rales   Abdomen - soft, non-tender, non-distended, positive bowel sounds   Psych - The patient is alert and oriented x3 with normal mood and affect    Right Lower Extremity Examination     Skin shows a well-healed incision except for the very distal end where there has now developed a open wound with some purulent drainage.    Motor is intact distally EHL, FHL, TA, and gastroc   +2 dorsalis pedis and posterior tibial pulses   Sensation to light touch is intact distally dorsal, plantar, and first web space     Examination of the Right ankle and foot:   Hindfoot alignment in neutral   Good subtalar  motion   Anterior drawer - negative  Posterior drawer - negative  Talar tilt - negative    Tenderness to palpation:   ATFL - negative  CFL - negative  Deltoid Ligament - negative  Tibialis anterior - negative  Tibialis posterior - negative  Peroneus longus - negative  Peroneus brevis - negative  Gastroc - negative  Plantar fascia - negative  Subtalar joint - negative  Tibiotalar joint - negative  Midfoot - negative  MTP joints - negative  Hallux valgus present - negative  Hammertoes present -negative    X-ray images were examined and personally interpreted by me.  Multiple views of the tibia and fibula dated 06/04/2020 show plate and screw fixation of a oblique distal tibia fracture that appears to have bridging callus and healing at this point.  The plate and screws appear well fixed with no evidence of loosening.    Dx:  Status post ORIF of a distal tibia fracture on the right.  The patient has new onset of opening and wound drainage at the end of her incision indicative of an infection.    Plan:  At this point the hardware needs to come out.  I discussed with the patient that she will not clear the infection until the hardware is removed.  She verbalized understanding.  We will schedule that at the next available date that is convenient for her.  We will plan on taking cultures, getting an ID consult.

## 2020-06-08 ENCOUNTER — LAB VISIT (OUTPATIENT)
Dept: LAB | Facility: HOSPITAL | Age: 65
End: 2020-06-08
Attending: INTERNAL MEDICINE
Payer: MEDICARE

## 2020-06-08 ENCOUNTER — TELEPHONE (OUTPATIENT)
Dept: SURGERY | Facility: CLINIC | Age: 65
End: 2020-06-08

## 2020-06-08 ENCOUNTER — INITIAL CONSULT (OUTPATIENT)
Dept: INFECTIOUS DISEASES | Facility: CLINIC | Age: 65
End: 2020-06-08
Payer: MEDICARE

## 2020-06-08 ENCOUNTER — TELEPHONE (OUTPATIENT)
Dept: ORTHOPEDICS | Facility: CLINIC | Age: 65
End: 2020-06-08

## 2020-06-08 VITALS
HEIGHT: 68 IN | SYSTOLIC BLOOD PRESSURE: 142 MMHG | BODY MASS INDEX: 19.25 KG/M2 | DIASTOLIC BLOOD PRESSURE: 92 MMHG | TEMPERATURE: 99 F | WEIGHT: 127 LBS

## 2020-06-08 DIAGNOSIS — T81.49XA WOUND INFECTION AFTER SURGERY: Primary | ICD-10-CM

## 2020-06-08 DIAGNOSIS — I73.9 PERIPHERAL VASCULAR DISEASE: ICD-10-CM

## 2020-06-08 DIAGNOSIS — Z79.01 ANTICOAGULANT LONG-TERM USE: ICD-10-CM

## 2020-06-08 DIAGNOSIS — F17.200 TOBACCO DEPENDENCE SYNDROME: ICD-10-CM

## 2020-06-08 DIAGNOSIS — T81.43XA POSTOPERATIVE INTRA-ABDOMINAL ABSCESS: Primary | ICD-10-CM

## 2020-06-08 DIAGNOSIS — T84.7XXA INFECTION OF LOWER EXTREMITY ASSOCIATED WITH HARDWARE: ICD-10-CM

## 2020-06-08 PROCEDURE — 99204 OFFICE O/P NEW MOD 45 MIN: CPT | Mod: S$GLB,,, | Performed by: INTERNAL MEDICINE

## 2020-06-08 PROCEDURE — 3077F PR MOST RECENT SYSTOLIC BLOOD PRESSURE >= 140 MM HG: ICD-10-PCS | Mod: S$GLB,,, | Performed by: INTERNAL MEDICINE

## 2020-06-08 PROCEDURE — 87077 CULTURE AEROBIC IDENTIFY: CPT

## 2020-06-08 PROCEDURE — 3077F SYST BP >= 140 MM HG: CPT | Mod: S$GLB,,, | Performed by: INTERNAL MEDICINE

## 2020-06-08 PROCEDURE — 87186 SC STD MICRODIL/AGAR DIL: CPT

## 2020-06-08 PROCEDURE — 87070 CULTURE OTHR SPECIMN AEROBIC: CPT

## 2020-06-08 PROCEDURE — 3080F PR MOST RECENT DIASTOLIC BLOOD PRESSURE >= 90 MM HG: ICD-10-PCS | Mod: S$GLB,,, | Performed by: INTERNAL MEDICINE

## 2020-06-08 PROCEDURE — 3080F DIAST BP >= 90 MM HG: CPT | Mod: S$GLB,,, | Performed by: INTERNAL MEDICINE

## 2020-06-08 PROCEDURE — 99204 PR OFFICE/OUTPT VISIT, NEW, LEVL IV, 45-59 MIN: ICD-10-PCS | Mod: S$GLB,,, | Performed by: INTERNAL MEDICINE

## 2020-06-08 NOTE — TELEPHONE ENCOUNTER
Spoke to pt, having surgery Friday to remove plates in her ankle. Will call when released from Ortho to schedule her colon resection.

## 2020-06-08 NOTE — PROGRESS NOTES
"Subjective:      Reason for consult: wound infection right ankle    HPI: Comfort Cantu is a 64 y.o. female    Had a fall 2/1 resulting in a spiral fracture of her right tibia and then had a second fall, requiring an ORIF of the right tibia, done at the [facility] on 2/14. She started ambulating with a walker in late April and on her follow up on 6/4 was described to have a distal incision that had opened up and starting draining purulent fluid. She tells me that the distal incision never really stopped draining.  She has had no constitutional symptoms, cellulitis, fever.  She has not received any antibiotics yet.  Dr. Marrero intense to remove the hardware on June 12th.  She has no prior history of wound infections or staph infections.  Her preoperative laboratory was reviewed.    Review of patient's allergies indicates:   Allergen Reactions    Penicillins      Age 23, "crippled me"     Past Medical History:   Diagnosis Date    Anticoagulant long-term use     Anxiety     Arthritis     Atherosclerosis     Coronary artery disease     Depression     GERD (gastroesophageal reflux disease)     Hearing loss     Hypertension     Long term (current) use of anticoagulants     Peripheral arterial disease     Tubulovillous adenoma of colon 2019    Wound infection after surgery 06/2020     Past Surgical History:   Procedure Laterality Date    APPENDECTOMY      CAROTID STENT  2014    COLONOSCOPY N/A 10/7/2019    Procedure: COLONOSCOPY;  Surgeon: Olaf Alba MD;  Location: [facility] ENDO;  Service: General;  Laterality: N/A;    COLONOSCOPY N/A 12/16/2019    Procedure: COLONOSCOPY;  Surgeon: Olaf Alba MD;  Location[facility];  Service: General;  Laterality: N/A;    COLONOSCOPY N/A 1/23/2020    Procedure: COLONOSCOPY (unable to come earlier);  Surgeon: Esteban Yancey MD;  Location: [facility];  Service: Endoscopy;  Laterality: N/A;    ENDOSCOPIC ULTRASOUND OF UPPER GASTROINTESTINAL " "TRACT N/A 10/16/2018    Procedure: ULTRASOUND, UPPER GI TRACT, ENDOSCOPIC;  Surgeon: Kumar Wang MD;  Location: [facility] ENDO ([address] [place] [address]);  Service: Endoscopy;  Laterality: N/A;  5 day hold Effient, Dr Сергей Malagon    ESOPHAGOGASTRODUODENOSCOPY N/A 10/7/2019    Procedure: ESOPHAGOGASTRODUODENOSCOPY (EGD);  Surgeon: Olaf Alba MD;  Location: [facility] ENDO;  Service: General;  Laterality: N/A;    FEMORAL ARTERY STENT      ORIF TIBIA FRACTURE Right 2/14/2020    Procedure: ORIF, FRACTURE, TIBIA;  Surgeon: Elbert Marrero II, MD;  Location: [facility] OR;  Service: Orthopedics;  Laterality: Right;      Social History     Tobacco Use    Smoking status: Current Every Day Smoker     Packs/day: 1.00     Years: 23.00     Pack years: 23.00     Types: Cigarettes     Start date: 10/1/1988    Smokeless tobacco: Never Used   Substance Use Topics    Alcohol use: Yes     Comment: occ     Family History   Problem Relation Age of Onset    Heart attack Brother     Diabetes Brother     Hyperlipidemia Brother        Pertinent medications noted:     Review of Systems    Ability to give history is:  Good  No chills, fever, sweats,     No  shortness of breath,    She takes Effient because stents in her carotid artery and lower extremity arteries  Outside of the right ankle, there is No swelling of joints, redness of joints, injuries, or new focal pain   She has been in the bathtub as well as shower without intentionally protect in the wound  No diabetes,  No bleeding, lymphadenopathy,  unusual bruising  No new rashes, lesions, or wounds   Antibiotic History:  None      Objective:      Blood pressure (!) 142/92, temperature 98.5 °F (36.9 °C), height 5' 8" (1.727 m), weight 57.6 kg (127 lb). Body mass index is 19.31 kg/m².  Physical Exam      General: Alert and attentive, cooperative and in no distress  Eyes: , anicteric, EOMI  Neck: Supple,   ENT: Bilateral hearing aids nares patent, no oral lesions,  no " thrush  Cardiovascular: Regular rate and rhythm, no murmurs, rubs, or gallop  Respiratory: Lungs clear without wheezes, rales, rubs or rhonci  Gastrointestinal:     Genitourinary:    Integumentary: Skin without rashes,    Vascular:  Minimal edema along the right tibia associated with hardware with some dependent swelling as well.  Right foot is warm and well perfused but the dorsalis pedis pulses only 1+.  Musculoskeletal: Ambulates with a cane with difficulty, and instability.  no acute arthritis, synovitis or myositis .  She did not come with her walker because it was raining.  She did drive herself.  Lymphatic:    Neurological: Normal LOC  speech,  though she does have a speech impediment that is likely related to her hearing loss.  Psychiatric: Normal mood, speech,  Demeanor    Wound:   4 mm opening.  It is full thickness.  I was able to express a drop serous, slightly cloudy fluid.  There is no cellulitis.  There is some tenderness.  I covered it with a small bandage.      General Labs reviewed:  Lab Results   Component Value Date    WBC 9.39 06/04/2020    RBC 3.70 (L) 06/04/2020    HGB 10.7 (L) 06/04/2020    HCT 35.6 (L) 06/04/2020    MCV 96 06/04/2020    MCH 28.9 06/04/2020    MCHC 30.1 (L) 06/04/2020    RDW 19.3 (H) 06/04/2020     (H) 06/04/2020    MPV 9.2 06/04/2020    GRAN 6.5 06/04/2020    GRAN 69.3 06/04/2020    LYMPH 1.7 06/04/2020    LYMPH 18.1 06/04/2020    MONO 0.9 06/04/2020    MONO 9.3 06/04/2020    EOS 0.2 06/04/2020    BASO 0.09 06/04/2020    EOSINOPHIL 2.1 06/04/2020    BASOPHIL 1.0 06/04/2020     CMP  Sodium   Date Value Ref Range Status   02/10/2020 137 136 - 145 mmol/L Final     Potassium   Date Value Ref Range Status   02/10/2020 3.8 3.5 - 5.1 mmol/L Final     Chloride   Date Value Ref Range Status   02/10/2020 100 95 - 110 mmol/L Final     CO2   Date Value Ref Range Status   02/10/2020 28 23 - 29 mmol/L Final     Glucose   Date Value Ref Range Status   02/10/2020 106 70 - 110 mg/dL  Final     BUN, Bld   Date Value Ref Range Status   02/10/2020 6 (L) 8 - 23 mg/dL Final     Creatinine   Date Value Ref Range Status   02/10/2020 0.5 0.5 - 1.4 mg/dL Final     Calcium   Date Value Ref Range Status   02/10/2020 9.1 8.7 - 10.5 mg/dL Final     Total Protein   Date Value Ref Range Status   02/10/2020 7.3 6.0 - 8.4 g/dL Final     Albumin   Date Value Ref Range Status   02/10/2020 3.9 3.5 - 5.2 g/dL Final     Total Bilirubin   Date Value Ref Range Status   02/10/2020 0.4 0.1 - 1.0 mg/dL Final     Comment:     For infants and newborns, interpretation of results should be based  on gestational age, weight and in agreement with clinical  observations.  Premature Infant recommended reference ranges:  Up to 24 hours.............<8.0 mg/dL  Up to 48 hours............<12.0 mg/dL  3-5 days..................<15.0 mg/dL  6-29 days.................<15.0 mg/dL       Alkaline Phosphatase   Date Value Ref Range Status   02/10/2020 90 55 - 135 U/L Final     AST   Date Value Ref Range Status   02/10/2020 21 10 - 40 U/L Final     ALT   Date Value Ref Range Status   02/10/2020 12 10 - 44 U/L Final     Anion Gap   Date Value Ref Range Status   02/10/2020 9 8 - 16 mmol/L Final     eGFR if    Date Value Ref Range Status   02/10/2020 >60.0 >60 mL/min/1.73 m^2 Final     eGFR if non    Date Value Ref Range Status   02/10/2020 >60.0 >60 mL/min/1.73 m^2 Final     Comment:     Calculation used to obtain the estimated glomerular filtration  rate (eGFR) is the CKD-EPI equation.              Micro:  Microbiology Results (last 7 days)     Procedure Component Value Units Date/Time    Aerobic culture [[id number]]     Order Status:  No result Specimen:  Wound from Ankle, Right         Imaging Reviewed:   Tibia/fibula x-rays       Assessment:       Problem List Items Addressed This Visit     Tobacco dependence syndrome    Peripheral vascular disease    Infection of lower extremity associated with hardware       Other Visit Diagnoses     Wound infection after surgery    -  Primary    Relevant Orders    Aerobic culture    Anticoagulant long-term use               Plan:           Wound infection after surgery  -     Aerobic culture    Infection of lower extremity associated with hardware    Peripheral vascular disease    Tobacco dependence syndrome    Anticoagulant long-term use      I believe we can wait until the day of your surgery to start antibiotics  This will allow Dr. Marrero to take cultures from deep inside, and of the bone, when he removes the hardware.  You may require intravenous antibiotics for a little while after surgery, but we will try to use oral antibiotics.   Keep your ankle wound covered.   Please call Dr. Marrero and find out when he wants you to stop taking your blood thinner medication    Please drop off the culture to the lab downstairs.     Please return in 3 weeks    Now is a great time to stop smoking    If your ankle becomes, red, swollen or you start to run a fever between now and the surgery, call me  This note was created using Dragon voice recognition software that occasionally misinterpreted phrases or words.

## 2020-06-08 NOTE — PATIENT INSTRUCTIONS
I believe we can wait until the day of your surgery to start antibiotics  This will allow Dr. Marrero to take cultures from deep inside, and of the bone, when he removes the hardware.  You may require intravenous antibiotics for a little while after surgery, but we will try to use oral antibiotics.   Keep your ankle wound covered.   Please call Dr. Marrero and find out when he wants you to stop taking your blood thinner medication    Please drop off the culture to the lab downstairs.     Please return in 3 weeks    Now is a great time to stop smoking    If your ankle becomes, red, swollen or you start to run a fever between now and the surgery, call me

## 2020-06-09 NOTE — PRE-PROCEDURE INSTRUCTIONS
Pre-op phone call made to pt.  All medications reviewed and  pre-procedure  instructions given to pt.  Pt verbalized understanding.Pt knows to go for [facility] @ [place] 6/10/20.  Pt stated she forgot to stop her effient and took a dose today at noon.  Jennifer at dr. Marrero's office notified.  Per Jennifer, OK as long she  Does not take any more.

## 2020-06-10 ENCOUNTER — LAB VISIT (OUTPATIENT)
Dept: FAMILY MEDICINE | Facility: CLINIC | Age: 65
End: 2020-06-10
Payer: MEDICARE

## 2020-06-10 DIAGNOSIS — Z01.818 PREOP TESTING: ICD-10-CM

## 2020-06-10 LAB — BACTERIA SPEC AEROBE CULT: ABNORMAL

## 2020-06-10 PROCEDURE — U0003 INFECTIOUS AGENT DETECTION BY NUCLEIC ACID (DNA OR RNA); SEVERE ACUTE RESPIRATORY SYNDROME CORONAVIRUS 2 (SARS-COV-2) (CORONAVIRUS DISEASE [COVID-19]), AMPLIFIED PROBE TECHNIQUE, MAKING USE OF HIGH THROUGHPUT TECHNOLOGIES AS DESCRIBED BY CMS-2020-01-R: HCPCS

## 2020-06-11 ENCOUNTER — ANESTHESIA EVENT (OUTPATIENT)
Dept: SURGERY | Facility: HOSPITAL | Age: 65
End: 2020-06-11
Payer: MEDICARE

## 2020-06-12 ENCOUNTER — HOSPITAL ENCOUNTER (OUTPATIENT)
Facility: HOSPITAL | Age: 65
Discharge: HOME | End: 2020-06-12
Attending: ORTHOPAEDIC SURGERY | Admitting: ORTHOPAEDIC SURGERY
Payer: MEDICARE

## 2020-06-12 ENCOUNTER — ANESTHESIA (OUTPATIENT)
Dept: SURGERY | Facility: HOSPITAL | Age: 65
End: 2020-06-12
Payer: MEDICARE

## 2020-06-12 DIAGNOSIS — T84.7XXA INFECTION OF LOWER EXTREMITY ASSOCIATED WITH HARDWARE: Primary | ICD-10-CM

## 2020-06-12 DIAGNOSIS — Z01.818 PREOP TESTING: ICD-10-CM

## 2020-06-12 LAB
ANION GAP SERPL CALC-SCNC: 9 MMOL/L (ref 8–16)
BUN SERPL-MCNC: 7 MG/DL (ref 8–23)
CALCIUM SERPL-MCNC: 9.1 MG/DL (ref 8.7–10.5)
CHLORIDE SERPL-SCNC: 102 MMOL/L (ref 95–110)
CO2 SERPL-SCNC: 30 MMOL/L (ref 23–29)
CREAT SERPL-MCNC: 0.8 MG/DL (ref 0.5–1.4)
EST. GFR  (AFRICAN AMERICAN): >60 ML/MIN/1.73 M^2
EST. GFR  (NON AFRICAN AMERICAN): >60 ML/MIN/1.73 M^2
GLUCOSE SERPL-MCNC: 94 MG/DL (ref 70–110)
POTASSIUM SERPL-SCNC: 3.8 MMOL/L (ref 3.5–5.1)
SARS-COV-2 RNA RESP QL NAA+PROBE: NOT DETECTED
SODIUM SERPL-SCNC: 141 MMOL/L (ref 136–145)

## 2020-06-12 PROCEDURE — D9220A PRA ANESTHESIA: Mod: CRNA,,, | Performed by: REGISTERED NURSE

## 2020-06-12 PROCEDURE — 87116 MYCOBACTERIA CULTURE: CPT

## 2020-06-12 PROCEDURE — 36000707: Performed by: ORTHOPAEDIC SURGERY

## 2020-06-12 PROCEDURE — 99900104 DSU ONLY-NO CHARGE-EA ADD'L HR (STAT): Performed by: ORTHOPAEDIC SURGERY

## 2020-06-12 PROCEDURE — 87075 CULTR BACTERIA EXCEPT BLOOD: CPT

## 2020-06-12 PROCEDURE — 27200651 HC AIRWAY, LMA: Performed by: REGISTERED NURSE

## 2020-06-12 PROCEDURE — 71000033 HC RECOVERY, INTIAL HOUR: Performed by: ORTHOPAEDIC SURGERY

## 2020-06-12 PROCEDURE — 71000039 HC RECOVERY, EACH ADD'L HOUR: Performed by: ORTHOPAEDIC SURGERY

## 2020-06-12 PROCEDURE — 25000003 PHARM REV CODE 250: Performed by: ORTHOPAEDIC SURGERY

## 2020-06-12 PROCEDURE — 25000003 PHARM REV CODE 250: Performed by: ANESTHESIOLOGY

## 2020-06-12 PROCEDURE — 36000706: Performed by: ORTHOPAEDIC SURGERY

## 2020-06-12 PROCEDURE — 87015 SPECIMEN INFECT AGNT CONCNTJ: CPT

## 2020-06-12 PROCEDURE — 87206 SMEAR FLUORESCENT/ACID STAI: CPT

## 2020-06-12 PROCEDURE — 99900103 DSU ONLY-NO CHARGE-INITIAL HR (STAT): Performed by: ORTHOPAEDIC SURGERY

## 2020-06-12 PROCEDURE — 37000009 HC ANESTHESIA EA ADD 15 MINS: Performed by: ORTHOPAEDIC SURGERY

## 2020-06-12 PROCEDURE — D9220A PRA ANESTHESIA: ICD-10-PCS | Mod: CRNA,,, | Performed by: REGISTERED NURSE

## 2020-06-12 PROCEDURE — 36415 COLL VENOUS BLD VENIPUNCTURE: CPT

## 2020-06-12 PROCEDURE — D9220A PRA ANESTHESIA: Mod: ANES,,, | Performed by: ANESTHESIOLOGY

## 2020-06-12 PROCEDURE — D9220A PRA ANESTHESIA: ICD-10-PCS | Mod: ANES,,, | Performed by: ANESTHESIOLOGY

## 2020-06-12 PROCEDURE — 20680 PR REMOVAL DEEP IMPLANT: ICD-10-PCS | Mod: RT,,, | Performed by: ORTHOPAEDIC SURGERY

## 2020-06-12 PROCEDURE — 87076 CULTURE ANAEROBE IDENT EACH: CPT

## 2020-06-12 PROCEDURE — 20680 REMOVAL OF IMPLANT DEEP: CPT | Mod: RT,,, | Performed by: ORTHOPAEDIC SURGERY

## 2020-06-12 PROCEDURE — 63600175 PHARM REV CODE 636 W HCPCS: Performed by: REGISTERED NURSE

## 2020-06-12 PROCEDURE — 37000008 HC ANESTHESIA 1ST 15 MINUTES: Performed by: ORTHOPAEDIC SURGERY

## 2020-06-12 PROCEDURE — 63600175 PHARM REV CODE 636 W HCPCS: Performed by: ANESTHESIOLOGY

## 2020-06-12 PROCEDURE — 71000015 HC POSTOP RECOV 1ST HR: Performed by: ORTHOPAEDIC SURGERY

## 2020-06-12 PROCEDURE — 71000016 HC POSTOP RECOV ADDL HR: Performed by: ORTHOPAEDIC SURGERY

## 2020-06-12 PROCEDURE — 87102 FUNGUS ISOLATION CULTURE: CPT

## 2020-06-12 PROCEDURE — 25000003 PHARM REV CODE 250: Performed by: REGISTERED NURSE

## 2020-06-12 PROCEDURE — 80048 BASIC METABOLIC PNL TOTAL CA: CPT

## 2020-06-12 PROCEDURE — 87070 CULTURE OTHR SPECIMN AEROBIC: CPT

## 2020-06-12 RX ORDER — CLINDAMYCIN PHOSPHATE 900 MG/50ML
900 INJECTION, SOLUTION INTRAVENOUS
Status: COMPLETED | OUTPATIENT
Start: 2020-06-12 | End: 2020-06-12

## 2020-06-12 RX ORDER — HYDROCODONE BITARTRATE AND ACETAMINOPHEN 7.5; 325 MG/1; MG/1
1 TABLET ORAL EVERY 6 HOURS PRN
Qty: 28 TABLET | Refills: 0 | Status: SHIPPED | OUTPATIENT
Start: 2020-06-12 | End: 2020-06-19

## 2020-06-12 RX ORDER — GLYCOPYRROLATE 0.2 MG/ML
INJECTION INTRAMUSCULAR; INTRAVENOUS
Status: DISCONTINUED | OUTPATIENT
Start: 2020-06-12 | End: 2020-06-12

## 2020-06-12 RX ORDER — MUPIROCIN 20 MG/G
OINTMENT TOPICAL
Status: DISCONTINUED | OUTPATIENT
Start: 2020-06-12 | End: 2020-06-12 | Stop reason: HOSPADM

## 2020-06-12 RX ORDER — PROPOFOL 10 MG/ML
VIAL (ML) INTRAVENOUS
Status: DISCONTINUED | OUTPATIENT
Start: 2020-06-12 | End: 2020-06-12

## 2020-06-12 RX ORDER — SODIUM CHLORIDE, SODIUM LACTATE, POTASSIUM CHLORIDE, CALCIUM CHLORIDE 600; 310; 30; 20 MG/100ML; MG/100ML; MG/100ML; MG/100ML
10 INJECTION, SOLUTION INTRAVENOUS CONTINUOUS
Status: DISCONTINUED | OUTPATIENT
Start: 2020-06-12 | End: 2020-06-12 | Stop reason: HOSPADM

## 2020-06-12 RX ORDER — ONDANSETRON 2 MG/ML
4 INJECTION INTRAMUSCULAR; INTRAVENOUS ONCE AS NEEDED
Status: DISCONTINUED | OUTPATIENT
Start: 2020-06-12 | End: 2020-06-12 | Stop reason: HOSPADM

## 2020-06-12 RX ORDER — DEXAMETHASONE SODIUM PHOSPHATE 4 MG/ML
INJECTION, SOLUTION INTRA-ARTICULAR; INTRALESIONAL; INTRAMUSCULAR; INTRAVENOUS; SOFT TISSUE
Status: DISCONTINUED | OUTPATIENT
Start: 2020-06-12 | End: 2020-06-12

## 2020-06-12 RX ORDER — ACETAMINOPHEN 10 MG/ML
INJECTION, SOLUTION INTRAVENOUS
Status: DISCONTINUED | OUTPATIENT
Start: 2020-06-12 | End: 2020-06-12

## 2020-06-12 RX ORDER — SODIUM CHLORIDE 0.9 % (FLUSH) 0.9 %
3 SYRINGE (ML) INJECTION EVERY 8 HOURS
Status: DISCONTINUED | OUTPATIENT
Start: 2020-06-12 | End: 2020-06-12 | Stop reason: HOSPADM

## 2020-06-12 RX ORDER — CIPROFLOXACIN 500 MG/1
500 TABLET ORAL EVERY 12 HOURS
Qty: 84 TABLET | Refills: 0 | Status: SHIPPED | OUTPATIENT
Start: 2020-06-12 | End: 2020-07-24

## 2020-06-12 RX ORDER — HYDROMORPHONE HYDROCHLORIDE 2 MG/ML
INJECTION, SOLUTION INTRAMUSCULAR; INTRAVENOUS; SUBCUTANEOUS
Status: DISCONTINUED | OUTPATIENT
Start: 2020-06-12 | End: 2020-06-12

## 2020-06-12 RX ORDER — LIDOCAINE HYDROCHLORIDE 20 MG/ML
INJECTION INTRAVENOUS
Status: DISCONTINUED | OUTPATIENT
Start: 2020-06-12 | End: 2020-06-12

## 2020-06-12 RX ORDER — LABETALOL HYDROCHLORIDE 5 MG/ML
15 INJECTION, SOLUTION INTRAVENOUS ONCE
Status: COMPLETED | OUTPATIENT
Start: 2020-06-12 | End: 2020-06-12

## 2020-06-12 RX ORDER — MIDAZOLAM HYDROCHLORIDE 1 MG/ML
INJECTION, SOLUTION INTRAMUSCULAR; INTRAVENOUS
Status: DISCONTINUED | OUTPATIENT
Start: 2020-06-12 | End: 2020-06-12

## 2020-06-12 RX ORDER — FENTANYL CITRATE 50 UG/ML
25 INJECTION, SOLUTION INTRAMUSCULAR; INTRAVENOUS EVERY 5 MIN PRN
Status: COMPLETED | OUTPATIENT
Start: 2020-06-12 | End: 2020-06-12

## 2020-06-12 RX ORDER — OXYCODONE HYDROCHLORIDE 5 MG/1
5 TABLET ORAL ONCE AS NEEDED
Status: COMPLETED | OUTPATIENT
Start: 2020-06-12 | End: 2020-06-12

## 2020-06-12 RX ORDER — LIDOCAINE HYDROCHLORIDE 10 MG/ML
1 INJECTION, SOLUTION EPIDURAL; INFILTRATION; INTRACAUDAL; PERINEURAL ONCE
Status: COMPLETED | OUTPATIENT
Start: 2020-06-12 | End: 2020-06-12

## 2020-06-12 RX ORDER — KETAMINE HYDROCHLORIDE 100 MG/ML
INJECTION, SOLUTION INTRAMUSCULAR; INTRAVENOUS
Status: DISCONTINUED | OUTPATIENT
Start: 2020-06-12 | End: 2020-06-12

## 2020-06-12 RX ADMIN — LIDOCAINE HYDROCHLORIDE 20 MG: 20 INJECTION, SOLUTION INTRAVENOUS at 09:06

## 2020-06-12 RX ADMIN — LABETALOL HYDROCHLORIDE 15 MG: 5 INJECTION, SOLUTION INTRAVENOUS at 10:06

## 2020-06-12 RX ADMIN — PROPOFOL 180 MG: 10 INJECTION, EMULSION INTRAVENOUS at 09:06

## 2020-06-12 RX ADMIN — GLYCOPYRROLATE 0.2 MG: 0.2 INJECTION, SOLUTION INTRAMUSCULAR; INTRAVENOUS at 08:06

## 2020-06-12 RX ADMIN — LIDOCAINE HYDROCHLORIDE: 10 INJECTION, SOLUTION EPIDURAL; INFILTRATION; INTRACAUDAL; PERINEURAL at 07:06

## 2020-06-12 RX ADMIN — DEXAMETHASONE SODIUM PHOSPHATE 4 MG: 4 INJECTION, SOLUTION INTRAMUSCULAR; INTRAVENOUS at 09:06

## 2020-06-12 RX ADMIN — PROPOFOL 30 MG: 10 INJECTION, EMULSION INTRAVENOUS at 09:06

## 2020-06-12 RX ADMIN — FENTANYL CITRATE 25 MCG: 50 INJECTION INTRAMUSCULAR; INTRAVENOUS at 10:06

## 2020-06-12 RX ADMIN — KETAMINE HYDROCHLORIDE 40 MG: 100 INJECTION, SOLUTION, CONCENTRATE INTRAMUSCULAR; INTRAVENOUS at 09:06

## 2020-06-12 RX ADMIN — MIDAZOLAM 2 MG: 1 INJECTION INTRAMUSCULAR; INTRAVENOUS at 08:06

## 2020-06-12 RX ADMIN — LIDOCAINE HYDROCHLORIDE 100 MG: 20 INJECTION, SOLUTION INTRAVENOUS at 09:06

## 2020-06-12 RX ADMIN — ACETAMINOPHEN 1000 MG: 10 INJECTION, SOLUTION INTRAVENOUS at 09:06

## 2020-06-12 RX ADMIN — HYDROMORPHONE HYDROCHLORIDE 0.2 MG: 2 INJECTION, SOLUTION INTRAMUSCULAR; INTRAVENOUS; SUBCUTANEOUS at 09:06

## 2020-06-12 RX ADMIN — OXYCODONE 5 MG: 5 TABLET ORAL at 10:06

## 2020-06-12 RX ADMIN — CLINDAMYCIN PHOSPHATE 900 MG: 18 INJECTION, SOLUTION INTRAVENOUS at 09:06

## 2020-06-12 RX ADMIN — SODIUM CHLORIDE, SODIUM GLUCONATE, SODIUM ACETATE, POTASSIUM CHLORIDE, MAGNESIUM CHLORIDE, SODIUM PHOSPHATE, DIBASIC, AND POTASSIUM PHOSPHATE: .53; .5; .37; .037; .03; .012; .00082 INJECTION, SOLUTION INTRAVENOUS at 07:06

## 2020-06-12 RX ADMIN — MUPIROCIN: 20 OINTMENT TOPICAL at 08:06

## 2020-06-12 NOTE — ANESTHESIA PREPROCEDURE EVALUATION
06/12/2020  Comfort Cantu is a 64 y.o., female.    Pre-op Assessment    I have reviewed the Patient Summary Reports.     I have reviewed the Nursing Notes. I have reviewed the NPO Status.   I have reviewed the Medications.     Review of Systems  Anesthesia Hx:  No problems with previous Anesthesia Denies Hx of Anesthetic complications  Denies Family Hx of Anesthesia complications.   Denies Personal Hx of Anesthesia complications.   Social:  Smoker Tobacco dependence syndrome   Cardiovascular:   Hypertension Denies MI. CAD    Denies CABG/stent.   Denies Angina. PVD hyperlipidemia    Pulmonary:   COPD    Renal/:   Chronic Renal Disease    Hepatic/GI:   GERD    Endocrine:   Denies Diabetes. Denies Hypothyroidism.    Psych:   Psychiatric History          Physical Exam  General:  Well nourished    Airway/Jaw/Neck:  Airway Findings: Mouth Opening: Normal Tongue: Normal  General Airway Assessment: Adult, Good  Mallampati: II  Improves to II with phonation.  TM Distance: 4-6 cm      Dental:  Dental Findings: In tact   Chest/Lungs:  Chest/Lungs Findings: Normal Respiratory Rate     Heart/Vascular:  Heart Findings: Rate: Normal  Rhythm: Regular Rhythm  Sounds: Normal  Heart murmur: negative       Mental Status:  Mental Status Findings:  Cooperative, Alert and Oriented         Anesthesia Plan  Type of Anesthesia, risks & benefits discussed:  Anesthesia Type:  general  Patient's Preference:   Intra-op Monitoring Plan: standard ASA monitors  Intra-op Monitoring Plan Comments:   Post Op Pain Control Plan: multimodal analgesia  Post Op Pain Control Plan Comments:   Induction:   IV  Beta Blocker:  Patient is not currently on a Beta-Blocker (No further documentation required).       Informed Consent: Patient understands risks and agrees with Anesthesia plan.  Questions answered. Anesthesia consent signed with  patient.  ASA Score: 3     Day of Surgery Review of History & Physical:    H&P update referred to the surgeon.         Ready For Surgery From Anesthesia Perspective.

## 2020-06-12 NOTE — PLAN OF CARE
0805- Preop complete. Pt states that she does not need the safe. Belongings to pt cabinet. Text notifications initiated.

## 2020-06-12 NOTE — TRANSFER OF CARE
"Anesthesia Transfer of Care Note    Patient: Comfort Cantu    Procedure(s) Performed: Procedure(s) (LRB):  REMOVAL, HARDWARE (Right)    Patient location: PACU    Anesthesia Type: general    Transport from OR: Transported from OR on 6-10 L/min O2 by face mask with adequate spontaneous ventilation    Post pain: adequate analgesia    Post assessment: tolerated procedure well and no apparent anesthetic complications    Post vital signs: stable    Level of consciousness: sedated    Nausea/Vomiting: no nausea/vomiting    Complications: none    Transfer of care protocol was followed      Last vitals:   Visit Vitals  BP (!) 211/98   Pulse 90   Temp 36.6 °C (97.9 °F) (Temporal)   Resp 14   Ht 5' 8" (1.727 m)   Wt 57.2 kg (126 lb)   SpO2 100%   Breastfeeding? No   BMI 19.16 kg/m²     "

## 2020-06-12 NOTE — INTERVAL H&P NOTE
The patient has been examined and the H&P has been reviewed:    I concur with the findings and no changes have occurred since H&P was written.    Anesthesia/Surgery risks, benefits and alternative options discussed and understood by patient/family.          Active Hospital Problems    Diagnosis  POA    Infection of lower extremity associated with hardware [T84.7XXA]  Yes      Resolved Hospital Problems   No resolved problems to display.

## 2020-06-12 NOTE — OP NOTE
[facility]  Orthopedic Surgery Department  Operative Note    SUMMARY     Date of Procedure: 6/12/2020     Procedure: Procedure(s) (LRB):  REMOVAL, HARDWARE (Right)     Surgeon(s) and Role:     * Elbert Marrero II, MD - Primary    Assisting Surgeon: None    First Assist:  RAHUL Hopkins    Pre-Operative Diagnosis: Preop testing [Z01.818]  Infection of lower extremity associated with hardware [T84.7XXA]    Post-Operative Diagnosis: Post-Op Diagnosis Codes:     * Preop testing [Z01.818]     * Infection of lower extremity associated with hardware [T84.7XXA]    Anesthesia: General    Technical Procedures Used:  Removal of plate and screws from right distal tibia    Description of the Findings of the Procedure:  Dictated    Significant Surgical Tasks Conducted by the Assistant(s), if Applicable:  Assistance with removal of hardware, wound closure and bandage application    Complications: No    Estimated Blood Loss (EBL): * No values recorded between 6/12/2020  9:16 AM and 6/12/2020  9:34 AM *           Implants:   Implant Name Type Inv. Item Serial No.  Lot No. LRB No. Used   LCP LOW BEND MEDIAL DISTAL TIBIA PLATES    SYNTHES  Right 1   SCREW CORTEX 3.5 X 22 - [id number]  SCREW CORTEX 3.5 X 22  [employer]  Right 1   SCREW CORTEX 3.5 X 26 - [id number]  SCREW CORTEX 3.5 X 26  [employer]ES  Right 1   SCREW LOCK 3.5MM X 26MM - [id number]  SCREW LOCK 3.5MM X 26MM  [employer]ES  Right 3   SCREW LOCK 3.5MM X 30MM - [id number]  SCREW LOCK 3.5MM X 30MM  SYNTHES  Right 1   SCREW LOCKING 3.5MM X 24MM - [id number]  SCREW LOCKING 3.5MM X 24MM  SYNTHES  Right 1       Specimens:   Specimen (12h ago, onward)    None                  Condition: Good    Disposition: PACU - guarded condition.    Attestation: I was present for the entire procedure.    Procedure In Detail:  The patient was brought to the operating room and placed on the table in the supine position.  The patient underwent anesthesia with the  anesthesia service.  A tourniquet was placed on the right lower extremity and was prepped and draped in the normal sterile fashion.  An Esmarch was used to exsanguinate the limb and the tourniquet was taken up to 250 mm of mercury.  The patient's previous incisions were utilized.  The distal incision was addressed 1st.  Dissection was taken through skin and subcutaneous tissue down to the plate over the distal tibia.  The distal plate was exposed and all screw heads were exposed.  The screws were then removed sequentially.  Fluoroscopy was used to verify removal of all screws distally.    Attention was then turned proximally.  The patient's previous incision was utilized and dissection was taken through skin and subcutaneous tissue down to the plate.  The plate was exposed along with the screw heads.  The screws were then sequentially removed.  The plate was then leave it off of the bone and the plate was removed.  Fluoroscopy was used to verify removal of all hardware.  The wounds were then irrigated and closed in layered fashion after cultures were taken.  A sterile intraoperative dressing was placed and the patient was awakened anesthesia and taken recovery where she was noted to be stable postoperatively.  Needle and lap counts were correct at the end of the case.

## 2020-06-12 NOTE — PLAN OF CARE
Meets criteria for discharge. Discharged to home with sister. Denies nausea. Tolerating fluids. Tolerable pain. Steady gait. Voiding without difficulty. Discharge instructions reviewed and printed handout given. Verbalized understanding. Ice pack given.

## 2020-06-12 NOTE — DISCHARGE INSTRUCTIONS
"Discharge Instructions: After Your Surgery/Procedure  Youve just had surgery. During surgery you were given medicine called anesthesia to keep you relaxed and free of pain. After surgery you may have some pain or nausea. This is common. Here are some tips for feeling better and getting well after surgery.     Stay on schedule with your medication.   Going home  Your doctor or nurse will show you how to take care of yourself when you go home. He or she will also answer your questions. Have an adult family member or friend drive you home.      For your safety we recommend these precaution for the first 24 hours after your procedure:  · Do not drive or use heavy equipment.  · Do not make important decisions or sign legal papers.  · Do not drink alcohol.  · Have someone stay with you, if needed. He or she can watch for problems and help keep you safe.  · Your concentration, balance, coordination, and judgement may be impaired for many hours after anesthesia.  Use caution when ambulating or standing up.     · You may feel weak and "washed out" after anesthesia and surgery.      Subtle residual effects of general anesthesia or sedation with regional / local anesthesia can last more than 24 hours.  Rest for the remainder of the day or longer if your Doctor/Surgeon has advised you to do so.  Although you may feel normal within the first 24 hours, your reflexes and mental ability may be impaired without you realizing it.  You may feel dizzy, lightheaded or sleepy for 24 hours or longer.      Be sure to go to all follow-up visits with your doctor. And rest after your surgery for as long as your doctor tells you to.  Coping with pain  If you have pain after surgery, pain medicine will help you feel better. Take it as told, before pain becomes severe. Also, ask your doctor or pharmacist about other ways to control pain. This might be with heat, ice, or relaxation. And follow any other instructions your surgeon or nurse gives " you.  Tips for taking pain medicine  To get the best relief possible, remember these points:  · Pain medicines can upset your stomach. Taking them with a little food may help.  · Most pain relievers taken by mouth need at least 20 to 30 minutes to start to work.  · Taking medicine on a schedule can help you remember to take it. Try to time your medicine so that you can take it before starting an activity. This might be before you get dressed, go for a walk, or sit down for dinner.  · Constipation is a common side effect of pain medicines. Call your doctor before taking any medicines such as laxatives or stool softeners to help ease constipation. Also ask if you should skip any foods. Drinking lots of fluids and eating foods such as fruits and vegetables that are high in fiber can also help. Remember, do not take laxatives unless your surgeon has prescribed them.  · Drinking alcohol and taking pain medicine can cause dizziness and slow your breathing. It can even be deadly. Do not drink alcohol while taking pain medicine.  · Pain medicine can make you react more slowly to things. Do not drive or run machinery while taking pain medicine.  Your health care provider may tell you to take acetaminophen to help ease your pain. Ask him or her how much you are supposed to take each day. Acetaminophen or other pain relievers may interact with your prescription medicines or other over-the-counter (OTC) drugs. Some prescription medicines have acetaminophen and other ingredients. Using both prescription and OTC acetaminophen for pain can cause you to overdose. Read the labels on your OTC medicines with care. This will help you to clearly know the list of ingredients, how much to take, and any warnings. It may also help you not take too much acetaminophen. If you have questions or do not understand the information, ask your pharmacist or health care provider to explain it to you before you take the OTC medicine.  Managing  nausea  Some people have an upset stomach after surgery. This is often because of anesthesia, pain, or pain medicine, or the stress of surgery. These tips will help you handle nausea and eat healthy foods as you get better. If you were on a special food plan before surgery, ask your doctor if you should follow it while you get better. These tips may help:  · Do not push yourself to eat. Your body will tell you when to eat and how much.  · Start off with clear liquids and soup. They are easier to digest.  · Next try semi-solid foods, such as mashed potatoes, applesauce, and gelatin, as you feel ready.  · Slowly move to solid foods. Dont eat fatty, rich, or spicy foods at first.  · Do not force yourself to have 3 large meals a day. Instead eat smaller amounts more often.  · Take pain medicines with a small amount of solid food, such as crackers or toast, to avoid nausea.     Call your surgeon if  · You still have pain an hour after taking medicine. The medicine may not be strong enough.  · You feel too sleepy, dizzy, or groggy. The medicine may be too strong.  · You have side effects like nausea, vomiting, or skin changes, such as rash, itching, or hives.       If you have obstructive sleep apnea  You were given anesthesia medicine during surgery to keep you comfortable and free of pain. After surgery, you may have more apnea spells because of this medicine and other medicines you were given. The spells may last longer than usual.   At home:  · Keep using the continuous positive airway pressure (CPAP) device when you sleep. Unless your health care provider tells you not to, use it when you sleep, day or night. CPAP is a common device used to treat obstructive sleep apnea.  · Talk with your provider before taking any pain medicine, muscle relaxants, or sedatives. Your provider will tell you about the possible dangers of taking these medicines.  © 4265-4072 [employer]. [address] [place] [address]  [place] [address]. All rights reserved. This information is not intended as a substitute for professional medical care. Always follow your healthcare professional's instructions.        General Information:    1.  Do not drink alcoholic beverages including beer for 24 hours or as long as you are on pain medication..  2.  Do not drive a motor vehicle, operate machinery or power tools, or signs legal papers for 24 hours or as long as you are on pain medication.   3.  You may experience light-headedness, dizziness, and sleepiness following surgery. Please do not stay alone. A responsible adult should be with you for this 24 hour period.  4.  Go home and rest.  5. Progress slowly to a normal diet unless instructed.  Otherwise, begin with liquids such as soft drinks, then soup and crackers working up to solid foods. Drink plenty of nonalcoholic fluids.  6.  Certain anesthetics and pain medications produce nausea and vomiting in certain individuals. If nausea becomes a problem at home, call you doctor.  7. A nurse will be calling you sometime after surgery. Do not be alarmed. This is our way of finding out how you are doing.  8. Several times every hour while you are awake, take 2-3 deep breaths and cough. If you had stomach surgery hold a pillow or rolled towel firmly against your stomach before you cough. This will help with any pain the cough might cause.  9. Several times every hour while you are awake, pump and flex your feet 5-6 times and do foot circles. This will help prevent blood clots.  10.Call your doctor for severe pain, bleeding, fever, or signs or symptoms of infection (pain, swelling, redness, foul odor, drainage).    Post op instructions for prevention of DVT  What is deep vein thrombosis?  Deep vein thrombosis (DVT) is the medical term for blood clots in the deep veins of the leg.  These blood clots can be dangerous.  A DVT can block a blood vessel and keep blood from getting where it needs to go.  Another  problem is that the clot can travel to other parts of the body such as the lungs.  A clot that travels to the lungs is called a pulmonary embolus (PE) and can cause serious problems with breathing which can lead to death.  Am I at risk for DVT/PE?  If you are not very active, you are at risk of DVT.  Anyone confined to bed, sitting for long periods of time, recovering from surgery, etc. increases the risk of DVT.  Other risk factors are cancer diagnosis, certain medications, estrogen replacement in any form,older age, obesity, pregnancy, smoking, history of clotting disorders, and dehydration.  How will I know if I have a DVT?   Swelling in the lower leg   Pain   Warmth, redness, hardness or bulging of the vein  If you have any of these symptoms, call your doctors office right away.  Some people will not have any symptoms until the clot moves to the lungs.  What are the symptoms of a PE?   Panting, shortness of breath, or trouble breathing   Sharp, knife-like chest pain when you breathe   Coughing or coughing up blood   Rapid heartbeat  If you have any of these symptoms or get worse quickly, call 911 for emergency treatment.  How can I prevent a DVT?   Avoid long periods of inactivity and dont cross your legs--get up and walk around every hour or so.   Stay active--walking after surgery is highly encouraged.  This means you should get out of the house and walk in the neighborhood.  Going up and down stairs will not impair healing (unless advised against such activity by your doctor).     Drink plenty of noncaffeinated, nonalcoholic fluids each day to prevent dehydration.   Wear special support stockings, if they have been advised by your doctor.   If you travel, stop at least once an hour and walk around.   Avoid smoking (assistance with stopping is available through your healthcare provider)  Always notify your doctor if you are not able to follow the post operative instructions that are given to you  at the time of discharge.  It may be necessary to prescribe one of the medications available to prevent DVT.    We hope your stay was comfortable as you heal now, mend and rest.    For we have enjoyed taking care of you by giving your our best.    And as you get better, by regaining your health and strength;   We count it as a privilege to have served you and hope your time at [facility] was well spent.      Thank  You!!!

## 2020-06-12 NOTE — DISCHARGE SUMMARY
[facility]  Department of Orthopedic Surgery  Discharge Note  Short Stay    Admit Date: 6/12/2020    Discharge Date and Time: No discharge date for patient encounter.     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       Discharged Condition: good    Hospital Course: Patient was admitted for an outpatient procedure and tolerated the procedure well with no complications.    Final Diagnoses: Same as principal problem.    Disposition: Home or Self Care    Follow up/Patient Instructions:    Medications:  Reconciled Home Medications:      Medication List      CHANGE how you take these medications    * HYDROcodone-acetaminophen 5-325 mg per tablet  Commonly known as:  NORCO  Take 1 tablet by mouth every 6 (six) hours as needed for Pain.  What changed:  Another medication with the same name was added. Make sure you understand how and when to take each.     * HYDROcodone-acetaminophen 7.5-325 mg per tablet  Commonly known as:  NORCO  Take 1 tablet by mouth every 6 (six) hours as needed.  What changed:  You were already taking a medication with the same name, and this prescription was added. Make sure you understand how and when to take each.         * This list has 2 medication(s) that are the same as other medications prescribed for you. Read the directions carefully, and ask your doctor or other care provider to review them with you.            CONTINUE taking these medications    atorvastatin 10 MG tablet  Commonly known as:  LIPITOR  Take 1 tablet (10 mg total) by mouth once daily.     busPIRone 15 MG tablet  Commonly known as:  BUSPAR  Take 1 tablet (15 mg total) by mouth 2 (two) times daily.     cloNIDine 0.1 MG tablet  Commonly known as:  CATAPRES  Take 1 tablet (0.1  mg total) by mouth 2 (two) times daily.     diazePAM 5 MG tablet  Commonly known as:  VALIUM  TAKE 1 TABLET EVERY 12 HOURS AS NEEDED FOR ANXIETY     DULoxetine 30 MG capsule  Commonly known as:  CYMBALTA  Take 1 capsule (30 mg total) by mouth once daily.     * polyethylene glycol 236-22.74-6.74 -5.86 gram suspension  Commonly known as:  GoLYTELY,NuLYTELY  Take by mouth once.     * GAVILYTE-G 236-22.74-6.74 -5.86 gram suspension  Generic drug:  polyethylene glycol     hydroCHLOROthiazide 25 MG tablet  Commonly known as:  HYDRODIURIL     isosorbide mononitrate 30 MG 24 hr tablet  Commonly known as:  IMDUR  Take 30 mg by mouth once daily.     pantoprazole 40 MG tablet  Commonly known as:  PROTONIX  Take 1 tablet (40 mg total) by mouth once daily.     prasugreL 5 mg Tab  Commonly known as:  EFFIENT  once daily.         * This list has 2 medication(s) that are the same as other medications prescribed for you. Read the directions carefully, and ask your doctor or other care provider to review them with you.              Discharge Procedure Orders   Diet Adult Regular     Ice to affected area     Notify your health care provider if you experience any of the following:  temperature >100.4     Notify your health care provider if you experience any of the following:  persistent nausea and vomiting or diarrhea     Notify your health care provider if you experience any of the following:  severe uncontrolled pain     Notify your health care provider if you experience any of the following:  redness, tenderness, or signs of infection (pain, swelling, redness, odor or green/yellow discharge around incision site)     Notify your health care provider if you experience any of the following:  difficulty breathing or increased cough     Notify your health care provider if you experience any of the following:  severe persistent headache     Notify your health care provider if you experience any of the following:  worsening rash      Notify your health care provider if you experience any of the following:  persistent dizziness, light-headedness, or visual disturbances     Notify your health care provider if you experience any of the following:  increased confusion or weakness     Notify your health care provider if you experience any of the following:     Change dressing (specify)   Order Comments: Dressing change: One time per day beginning 72 hours post op.     Follow-up Information     Elbert Marrero II, MD In 2 weeks.    Specialty:  Orthopedic Surgery  Contact information:  [address] [place] [address] DR Michelle LAW [phone]                   Discharge Procedure Orders (must include Diet, Follow-up, Activity):   Discharge Procedure Orders (must include Diet, Follow-up, Activity)   Diet Adult Regular     Ice to affected area     Notify your health care provider if you experience any of the following:  temperature >100.4     Notify your health care provider if you experience any of the following:  persistent nausea and vomiting or diarrhea     Notify your health care provider if you experience any of the following:  severe uncontrolled pain     Notify your health care provider if you experience any of the following:  redness, tenderness, or signs of infection (pain, swelling, redness, odor or green/yellow discharge around incision site)     Notify your health care provider if you experience any of the following:  difficulty breathing or increased cough     Notify your health care provider if you experience any of the following:  severe persistent headache     Notify your health care provider if you experience any of the following:  worsening rash     Notify your health care provider if you experience any of the following:  persistent dizziness, light-headedness, or visual disturbances     Notify your health care provider if you experience any of the following:  increased confusion or weakness     Notify your health care provider if you  experience any of the following:     Change dressing (specify)   Order Comments: Dressing change: One time per day beginning 72 hours post op.

## 2020-06-12 NOTE — ANESTHESIA POSTPROCEDURE EVALUATION
Anesthesia Post Evaluation    Patient: Comfort Cantu    Procedure(s) Performed: Procedure(s) (LRB):  REMOVAL, HARDWARE (Right)    Final Anesthesia Type: general    Patient location during evaluation: PACU  Patient participation: Yes- Able to Participate  Level of consciousness: awake and alert  Post-procedure vital signs: reviewed and stable  Pain management: adequate  Airway patency: patent    PONV status at discharge: No PONV  Anesthetic complications: no      Cardiovascular status: blood pressure returned to baseline  Respiratory status: unassisted  Hydration status: euvolemic  Follow-up not needed.          Vitals Value Taken Time   /73 6/12/2020 12:00 PM   Temp 36.3 °C (97.3 °F) 6/12/2020 11:13 AM   Pulse 68 6/12/2020 12:00 PM   Resp 16 6/12/2020 12:00 PM   SpO2 99 % 6/12/2020 12:00 PM         Event Time     Out of Recovery 11:05:07          Pain/Deann Score: Pain Rating Prior to Med Admin: 7 (6/12/2020 10:21 AM)  Deann Score: 10 (6/12/2020 12:05 PM)

## 2020-06-12 NOTE — OR NURSING
1115-Tremors visualized upon arrival from pacu that were not present during preop. When head lifted, coarse tremor noted with uneven speech. Dr. Feliciano notified.    1125- Pt evaluated by Dr. Feliciano. Discussed with pt. Ok to discharge.

## 2020-06-15 ENCOUNTER — TELEPHONE (OUTPATIENT)
Dept: FAMILY MEDICINE | Facility: CLINIC | Age: 65
End: 2020-06-15

## 2020-06-15 VITALS
WEIGHT: 126 LBS | SYSTOLIC BLOOD PRESSURE: 141 MMHG | RESPIRATION RATE: 16 BRPM | HEIGHT: 68 IN | OXYGEN SATURATION: 99 % | DIASTOLIC BLOOD PRESSURE: 73 MMHG | BODY MASS INDEX: 19.1 KG/M2 | TEMPERATURE: 97 F | HEART RATE: 68 BPM

## 2020-06-15 LAB — BACTERIA SPEC AEROBE CULT: NO GROWTH

## 2020-06-15 NOTE — TELEPHONE ENCOUNTER
Pt is going to check medication bottles when she gets home and call to let me know what refills need to be sent to [facility]

## 2020-06-15 NOTE — TELEPHONE ENCOUNTER
----- Message from Catherine John sent at 6/15/2020  1:00 PM CDT -----  Regarding: refill  Contact: LEONIE FLETCHER [[id number]]  Patient requesting a refill on medications, patient stated Dr Riddle know which ones she need and [facility] mail order sent refill request, no response.      Patient will be using  [facility] mail order.      Please call patient at [phone].     Thanks!

## 2020-06-16 LAB — BACTERIA SPEC ANAEROBE CULT: ABNORMAL

## 2020-06-22 ENCOUNTER — TELEPHONE (OUTPATIENT)
Dept: ORTHOPEDICS | Facility: CLINIC | Age: 65
End: 2020-06-22

## 2020-06-22 ENCOUNTER — TELEPHONE (OUTPATIENT)
Dept: INFECTIOUS DISEASES | Facility: CLINIC | Age: 65
End: 2020-06-22

## 2020-06-22 DIAGNOSIS — S82.241D CLOSED DISPLACED SPIRAL FRACTURE OF SHAFT OF RIGHT TIBIA WITH ROUTINE HEALING, SUBSEQUENT ENCOUNTER: ICD-10-CM

## 2020-06-22 DIAGNOSIS — T84.7XXA INFECTION OF LOWER EXTREMITY ASSOCIATED WITH HARDWARE: Primary | ICD-10-CM

## 2020-06-22 NOTE — TELEPHONE ENCOUNTER
----- Message from Lizet Penny sent at 6/22/2020  1:20 PM CDT -----  Type:  Test Results    Who Called:  Patient  Name of Test (Lab/Mammo/Etc):  biopsy  Date of Test:  6/12/20  Ordering Provider:  same  Where the test was performed:  surgery  Best Call Back Number:  [phone] (home)

## 2020-06-22 NOTE — TELEPHONE ENCOUNTER
Patient contacted. Incision is red, but no drainage. Will discuss results at appt 06/25/20. Patient agreeable. Jennifer Colon LPN

## 2020-06-23 ENCOUNTER — TELEPHONE (OUTPATIENT)
Dept: FAMILY MEDICINE | Facility: CLINIC | Age: 65
End: 2020-06-23

## 2020-06-23 RX ORDER — ISOSORBIDE MONONITRATE 30 MG/1
30 TABLET, EXTENDED RELEASE ORAL DAILY
Qty: 90 TABLET | Refills: 3 | Status: SHIPPED | OUTPATIENT
Start: 2020-06-23 | End: 2021-09-29

## 2020-06-23 RX ORDER — HYDROCHLOROTHIAZIDE 25 MG/1
25 TABLET ORAL DAILY
Qty: 90 TABLET | Refills: 3 | Status: SHIPPED | OUTPATIENT
Start: 2020-06-23 | End: 2021-10-29

## 2020-06-23 NOTE — TELEPHONE ENCOUNTER
----- Message from Viviana Khoury sent at 6/23/2020 10:30 AM CDT -----  Type:  Pharmacy Calling to Clarify an RX    Name of Caller:  lynette  Pharmacy Name:  [facility]  Prescription Name:    1. isosorbide mononitrate (IMDUR) 30 MG 24 hr tablet  2. hydroCHLOROthiazide (HYDRODIURIL) 25 MG tablet  What do they need to clarify?:  refill request  Best Call Back Number: [phone]  Fax number: [phone]  Additional Information:

## 2020-06-25 ENCOUNTER — HOSPITAL ENCOUNTER (OUTPATIENT)
Dept: RADIOLOGY | Facility: HOSPITAL | Age: 65
Discharge: HOME | End: 2020-06-25
Attending: ORTHOPAEDIC SURGERY
Payer: MEDICARE

## 2020-06-25 ENCOUNTER — OFFICE VISIT (OUTPATIENT)
Dept: ORTHOPEDICS | Facility: CLINIC | Age: 65
End: 2020-06-25
Payer: MEDICARE

## 2020-06-25 VITALS — HEIGHT: 68 IN | TEMPERATURE: 98 F | RESPIRATION RATE: 16 BRPM | BODY MASS INDEX: 19.1 KG/M2 | WEIGHT: 126 LBS

## 2020-06-25 DIAGNOSIS — S82.241D CLOSED DISPLACED SPIRAL FRACTURE OF SHAFT OF RIGHT TIBIA WITH ROUTINE HEALING, SUBSEQUENT ENCOUNTER: ICD-10-CM

## 2020-06-25 DIAGNOSIS — T84.7XXA INFECTION OF LOWER EXTREMITY ASSOCIATED WITH HARDWARE: Primary | ICD-10-CM

## 2020-06-25 PROCEDURE — 99024 PR POST-OP FOLLOW-UP VISIT: ICD-10-PCS | Mod: S$GLB,,, | Performed by: ORTHOPAEDIC SURGERY

## 2020-06-25 PROCEDURE — 99999 PR PBB SHADOW E&M-EST. PATIENT-LVL III: CPT | Mod: PBBFAC,,, | Performed by: ORTHOPAEDIC SURGERY

## 2020-06-25 PROCEDURE — 99999 PR PBB SHADOW E&M-EST. PATIENT-LVL III: ICD-10-PCS | Mod: PBBFAC,,, | Performed by: ORTHOPAEDIC SURGERY

## 2020-06-25 PROCEDURE — 99024 POSTOP FOLLOW-UP VISIT: CPT | Mod: S$GLB,,, | Performed by: ORTHOPAEDIC SURGERY

## 2020-06-25 NOTE — PROGRESS NOTES
CC:  64-year-old female follows up status post hardware removal from the right tibia.  Date of surgery was 06/12/2020.  This is her 2 week follow-up.  Her cultures grew out PEPTONIPHILUS HAREI that his pain and sensitive.  She is currently on Cipro b.i.d..    Right Lower Extremity Examination     Skin shows breakdown in the middle of both the patient's incisions.  There is a small area where the incision is broken down that is about the size of a pencil eraser.  There is no surrounding erythema.  There is serosanguineous drainage from the 2 areas of wound breakdown.    Motor is intact distally EHL, FHL, TA, and gastroc   +2 dorsalis pedis and posterior tibial pulses   Sensation to light touch is intact distally dorsal, plantar, and first web space     Dx:  Status post removal of hardware for infection, stable    Plan:  Sutures removed.  We instructed the patient on how to do wet-to-dry dressing changes.  She has continued to take the Cipro b.i.d..  Follow-up in 2 weeks.

## 2020-06-29 RX ORDER — DIAZEPAM 5 MG/1
TABLET ORAL
Qty: 60 TABLET | Refills: 2 | Status: SHIPPED | OUTPATIENT
Start: 2020-06-29 | End: 2020-08-03 | Stop reason: SDUPTHER

## 2020-07-09 ENCOUNTER — OFFICE VISIT (OUTPATIENT)
Dept: ORTHOPEDICS | Facility: CLINIC | Age: 65
End: 2020-07-09
Payer: MEDICARE

## 2020-07-09 ENCOUNTER — OFFICE VISIT (OUTPATIENT)
Dept: INFECTIOUS DISEASES | Facility: CLINIC | Age: 65
End: 2020-07-09
Payer: MEDICARE

## 2020-07-09 VITALS
SYSTOLIC BLOOD PRESSURE: 139 MMHG | HEART RATE: 97 BPM | WEIGHT: 122 LBS | HEIGHT: 68 IN | BODY MASS INDEX: 18.49 KG/M2 | DIASTOLIC BLOOD PRESSURE: 84 MMHG | TEMPERATURE: 98 F | OXYGEN SATURATION: 100 %

## 2020-07-09 VITALS — HEIGHT: 68 IN | BODY MASS INDEX: 18.49 KG/M2 | WEIGHT: 122 LBS

## 2020-07-09 DIAGNOSIS — Z71.89 EDUCATED ABOUT COVID-19 VIRUS INFECTION: ICD-10-CM

## 2020-07-09 DIAGNOSIS — Z79.2 ENCOUNTER FOR LONG-TERM (CURRENT) USE OF ANTIBIOTICS: ICD-10-CM

## 2020-07-09 DIAGNOSIS — F17.200 TOBACCO DEPENDENCE SYNDROME: ICD-10-CM

## 2020-07-09 DIAGNOSIS — T81.49XA WOUND INFECTION AFTER SURGERY: ICD-10-CM

## 2020-07-09 DIAGNOSIS — T84.7XXA INFECTION OF LOWER EXTREMITY ASSOCIATED WITH HARDWARE: Primary | ICD-10-CM

## 2020-07-09 DIAGNOSIS — A49.8 INFECTION CAUSED BY ENTEROBACTER CLOACAE: ICD-10-CM

## 2020-07-09 DIAGNOSIS — I73.9 PERIPHERAL VASCULAR DISEASE: ICD-10-CM

## 2020-07-09 PROCEDURE — 99024 POSTOP FOLLOW-UP VISIT: CPT | Mod: S$GLB,,, | Performed by: ORTHOPAEDIC SURGERY

## 2020-07-09 PROCEDURE — 99213 OFFICE O/P EST LOW 20 MIN: CPT | Mod: S$GLB,,, | Performed by: INTERNAL MEDICINE

## 2020-07-09 PROCEDURE — 3079F PR MOST RECENT DIASTOLIC BLOOD PRESSURE 80-89 MM HG: ICD-10-PCS | Mod: S$GLB,,, | Performed by: INTERNAL MEDICINE

## 2020-07-09 PROCEDURE — 3075F PR MOST RECENT SYSTOLIC BLOOD PRESS GE 130-139MM HG: ICD-10-PCS | Mod: S$GLB,,, | Performed by: INTERNAL MEDICINE

## 2020-07-09 PROCEDURE — 99999 PR PBB SHADOW E&M-EST. PATIENT-LVL III: ICD-10-PCS | Mod: PBBFAC,,, | Performed by: ORTHOPAEDIC SURGERY

## 2020-07-09 PROCEDURE — 99213 PR OFFICE/OUTPT VISIT, EST, LEVL III, 20-29 MIN: ICD-10-PCS | Mod: S$GLB,,, | Performed by: INTERNAL MEDICINE

## 2020-07-09 PROCEDURE — 99999 PR PBB SHADOW E&M-EST. PATIENT-LVL III: CPT | Mod: PBBFAC,,, | Performed by: ORTHOPAEDIC SURGERY

## 2020-07-09 PROCEDURE — 3079F DIAST BP 80-89 MM HG: CPT | Mod: S$GLB,,, | Performed by: INTERNAL MEDICINE

## 2020-07-09 PROCEDURE — 3075F SYST BP GE 130 - 139MM HG: CPT | Mod: S$GLB,,, | Performed by: INTERNAL MEDICINE

## 2020-07-09 PROCEDURE — 99024 PR POST-OP FOLLOW-UP VISIT: ICD-10-PCS | Mod: S$GLB,,, | Performed by: ORTHOPAEDIC SURGERY

## 2020-07-09 NOTE — PROGRESS NOTES
CC:  64-year-old female follows up status post hardware removal from the right tibia.  Date of surgery was 06/12/2020.  Overall she is doing well.  She has got 1 small pinhole on her distal incision that is got a little bit of serosanguineous drainage.  She had an appointment with infectious disease earlier today.    Right Lower Extremity Examination     Skin is intact throughout, the proximal incision is completely healed.  The distal incision has 1 small pinhole with some serosanguineous drainage.    Motor is intact distally EHL, FHL, TA, and gastroc   +2 dorsalis pedis and posterior tibial pulses   Sensation to light touch is intact distally dorsal, plantar, and first web space     Examination of the Right ankle and foot:   Hindfoot alignment in neutral   Good subtalar motion   Anterior drawer - negative  Posterior drawer - negative  Talar tilt - negative    Tenderness to palpation:   ATFL - negative  CFL - negative  Deltoid Ligament - negative  Tibialis anterior - negative  Tibialis posterior - negative  Peroneus longus - negative  Peroneus brevis - negative  Gastroc - negative  Plantar fascia - negative  Subtalar joint - negative  Tibiotalar joint - negative  Midfoot - negative  MTP joints - negative  Hallux valgus present - negative  Hammertoes present -negative    Dx:  Status post removal of hardware from right tibia.  Overall she is doing well.    Plan:  If she continues to have drainage she will follow up with us otherwise she can follow up p.r.n..  She is going to finish 2 more weeks of antibiotics per Infectious Disease.

## 2020-07-09 NOTE — PROGRESS NOTES
"Subjective:      Reason for consult: wound infection right ankle    HPI: Comfort Cantu is a 64 y.o. female    Had a fall 2/1 resulting in a spiral fracture of her right tibia and then had a second fall, requiring an ORIF of the right tibia, done at the [facility] on 2/14. She started ambulating with a walker in late April and on her follow up on 6/4 was described to have a distal incision that had opened up and starting draining purulent fluid. She tells me that the distal incision never really stopped draining.  She has had no constitutional symptoms, cellulitis, fever.  She has not received any antibiotics yet.  Dr. Marrero intense to remove the hardware on June 12th.  She has no prior history of wound infections or staph infections.  Her preoperative laboratory was reviewed.    7820: s/p removal of hardware from ankle on 6/19. Cultures from the wound grew only peptoniphilus. Her wound was slow to heal, but almost closed now. She is tolerating the cipro well. No diarrhea or thrush, rash or tendonitis.     Review of patient's allergies indicates:   Allergen Reactions    Penicillins      Age 23, "crippled me"     Past Medical History:   Diagnosis Date    Anticoagulant long-term use     Anxiety     Arthritis     Atherosclerosis     Coronary artery disease     Depression     GERD (gastroesophageal reflux disease)     Hearing loss     Hypertension     Long term (current) use of anticoagulants     Peripheral arterial disease     Tubulovillous adenoma of colon 2019    Wound infection after surgery 06/2020     Past Surgical History:   Procedure Laterality Date    APPENDECTOMY      CAROTID STENT  2014    COLONOSCOPY N/A 10/7/2019    Procedure: COLONOSCOPY;  Surgeon: Olaf Alba MD;  Location: [facility] ENDO;  Service: General;  Laterality: N/A;    COLONOSCOPY N/A 12/16/2019    Procedure: COLONOSCOPY;  Surgeon: Olaf Alba MD;  Location: [facility] ENDO;  Service: General;  Laterality: N/A; " "   COLONOSCOPY N/A 1/23/2020    Procedure: COLONOSCOPY (unable to come earlier);  Surgeon: Esteban Yancey MD;  Location: [facility] ENDO;  Service: Endoscopy;  Laterality: N/A;    ENDOSCOPIC ULTRASOUND OF UPPER GASTROINTESTINAL TRACT N/A 10/16/2018    Procedure: ULTRASOUND, UPPER GI TRACT, ENDOSCOPIC;  Surgeon: Kumar Wang MD;  Location: [facility] ENDO (2ND FLR);  Service: Endoscopy;  Laterality: N/A;  5 day hold Effient, Dr Сергей Malagon    ESOPHAGOGASTRODUODENOSCOPY N/A 10/7/2019    Procedure: ESOPHAGOGASTRODUODENOSCOPY (EGD);  Surgeon: Olaf Alba MD;  Location: [facility] ENDO;  Service: General;  Laterality: N/A;    FEMORAL ARTERY STENT      HARDWARE REMOVAL Right 6/12/2020    Procedure: REMOVAL, HARDWARE;  Surgeon: Elbert Marrero II, MD;  Location: [facility] OR;  Service: Orthopedics;  Laterality: Right;  right distal tibia    ORIF TIBIA FRACTURE Right 2/14/2020    Procedure: ORIF, FRACTURE, TIBIA;  Surgeon: Elbert Marrero II, MD;  Location: [facility] OR;  Service: Orthopedics;  Laterality: Right;      Social History     Tobacco Use    Smoking status: Current Every Day Smoker     Packs/day: 1.00     Years: 23.00     Pack years: 23.00     Types: Cigarettes     Start date: 10/1/1988    Smokeless tobacco: Never Used   Substance Use Topics    Alcohol use: Yes     Comment: occ     Family History   Problem Relation Age of Onset    Heart attack Brother     Diabetes Brother     Hyperlipidemia Brother        Pertinent medications noted:     Review of Systems    Ability to give history is:  fair  No chills, fever, sweats,        She has been in   shower without intentionally protect in the wound  No diabetes,  No bleeding,   No new rashes, lesions, or wounds   Antibiotic History:  None      Objective:      Blood pressure 139/84, pulse 97, temperature 98.4 °F (36.9 °C), temperature source Temporal, height 5' 8" (1.727 m), weight 55.3 kg (122 lb), SpO2 100 %. Body mass index is 18.55 kg/m².  Physical Exam  "     General: Alert and attentive, cooperative and in no distress  Eyes: , anicteric, EOMI  Neck: Supple,   ENT: Bilateral hearing aids nares patent, no oral lesions,  no thrush  Cardiovascular:    Respiratory:   Gastrointestinal:     Genitourinary:    Integumentary: Skin without rashes, very tan   Vascular:  no edema  l.  Right foot is warm and well perfused but the dorsalis pedis pulses only 1+.  Musculoskeletal: Ambulates with a cane with difficulty, and instability.  no acute arthritis, synovitis or myositis .  S .  She did drive herself.  Lymphatic:    Neurological: Normal LOC  speech,  though she does have a speech impediment that is likely related to her hearing loss.  Psychiatric: Normal mood, speech,  Demeanor    Wound:   6/8:4 mm opening.  It is full thickness.  I was able to express a drop serous, slightly cloudy fluid.  There is no cellulitis.  There is some tenderness.  I covered it with a small bandage.    7/8: the incisions are healed except for a tiny opening distal ankle with proximal soft eschar. No purulence, no cellulitis    General Labs reviewed:  Lab Results   Component Value Date    WBC 9.39 06/04/2020    RBC 3.70 (L) 06/04/2020    HGB 10.7 (L) 06/04/2020    HCT 35.6 (L) 06/04/2020    MCV 96 06/04/2020    MCH 28.9 06/04/2020    MCHC 30.1 (L) 06/04/2020    RDW 19.3 (H) 06/04/2020     (H) 06/04/2020    MPV 9.2 06/04/2020    GRAN 6.5 06/04/2020    GRAN 69.3 06/04/2020    LYMPH 1.7 06/04/2020    LYMPH 18.1 06/04/2020    MONO 0.9 06/04/2020    MONO 9.3 06/04/2020    EOS 0.2 06/04/2020    BASO 0.09 06/04/2020    EOSINOPHIL 2.1 06/04/2020    BASOPHIL 1.0 06/04/2020     CMP  Sodium   Date Value Ref Range Status   06/12/2020 141 136 - 145 mmol/L Final     Potassium   Date Value Ref Range Status   06/12/2020 3.8 3.5 - 5.1 mmol/L Final     Chloride   Date Value Ref Range Status   06/12/2020 102 95 - 110 mmol/L Final     CO2   Date Value Ref Range Status   06/12/2020 30 (H) 23 - 29 mmol/L Final      Glucose   Date Value Ref Range Status   06/12/2020 94 70 - 110 mg/dL Final     BUN, Bld   Date Value Ref Range Status   06/12/2020 7 (L) 8 - 23 mg/dL Final     Creatinine   Date Value Ref Range Status   06/12/2020 0.8 0.5 - 1.4 mg/dL Final     Calcium   Date Value Ref Range Status   06/12/2020 9.1 8.7 - 10.5 mg/dL Final     Total Protein   Date Value Ref Range Status   02/10/2020 7.3 6.0 - 8.4 g/dL Final     Albumin   Date Value Ref Range Status   02/10/2020 3.9 3.5 - 5.2 g/dL Final     Total Bilirubin   Date Value Ref Range Status   02/10/2020 0.4 0.1 - 1.0 mg/dL Final     Comment:     For infants and newborns, interpretation of results should be based  on gestational age, weight and in agreement with clinical  observations.  Premature Infant recommended reference ranges:  Up to 24 hours.............<8.0 mg/dL  Up to 48 hours............<12.0 mg/dL  3-5 days..................<15.0 mg/dL  6-29 days.................<15.0 mg/dL       Alkaline Phosphatase   Date Value Ref Range Status   02/10/2020 90 55 - 135 U/L Final     AST   Date Value Ref Range Status   02/10/2020 21 10 - 40 U/L Final     ALT   Date Value Ref Range Status   02/10/2020 12 10 - 44 U/L Final     Anion Gap   Date Value Ref Range Status   06/12/2020 9 8 - 16 mmol/L Final     eGFR if    Date Value Ref Range Status   06/12/2020 >60 >60 mL/min/1.73 m^2 Final     eGFR if non    Date Value Ref Range Status   06/12/2020 >60 >60 mL/min/1.73 m^2 Final     Comment:     Calculation used to obtain the estimated glomerular filtration  rate (eGFR) is the CKD-EPI equation.              Micro:  Microbiology Results (last 7 days)     ** No results found for the last 168 hours. **        Imaging Reviewed:   Tibia/fibula x-rays       Assessment:       Problem List Items Addressed This Visit     Tobacco dependence syndrome    Peripheral vascular disease    Infection of lower extremity associated with hardware - Primary      Other Visit  Diagnoses     Infection caused by Enterobacter cloacae        Wound infection after surgery        Encounter for long-term (current) use of antibiotics        Educated About Covid-19 Virus Infection               Plan:           Infection of lower extremity associated with hardware    Infection caused by Enterobacter cloacae    Wound infection after surgery    Peripheral vascular disease    Tobacco dependence syndrome    Encounter for long-term (current) use of antibiotics    Educated About Covid-19 Virus Infection      Continue the ciprofloxacin antibiotic for 2 more weeks.  Wear sunscreen if you are going to be outside.   Keep the wound covered in the shower and clean the wound after your shower  Wear a sock over the ankle to provide padding and protection from pressure and friction    Please stop smoking  If you get diarrhea, please call me  Take a probiotic for at least 2 weeks after the cipro antibiotic is finished  This note was created using Dragon voice recognition software that occasionally misinterpreted phrases or words.

## 2020-07-13 LAB — FUNGUS SPEC CULT: NORMAL

## 2020-07-29 ENCOUNTER — TELEPHONE (OUTPATIENT)
Dept: SURGERY | Facility: CLINIC | Age: 65
End: 2020-07-29

## 2020-07-29 NOTE — TELEPHONE ENCOUNTER
----- Message from Koko Telles sent at 7/29/2020  2:29 PM CDT -----  Type: Needs Medical Advice  Who Called:  Patient    Best Call Back Number: [phone]  Additional Information: Patient states that she would like a callback regarding questions about her colon surgery.

## 2020-07-29 NOTE — TELEPHONE ENCOUNTER
----- Message from Noemi Dennis sent at 7/29/2020  4:18 PM CDT -----  Regarding: returning call  Contact: patient  Type:  Patient Returning Call  Who Called:  patient  Who Left Message for Patient:  juwan  Does the patient know what this is regarding?:  no  Best Call Back Number:  [phone]  Additional Information:  please call to advise.  Thanks!

## 2020-07-29 NOTE — TELEPHONE ENCOUNTER
Spoke to pt, wants to schedule surgery but is unsure, scheduled f/u with Dr Candelaria on 8/11/20 in [place].

## 2020-08-03 RX ORDER — DULOXETIN HYDROCHLORIDE 30 MG/1
30 CAPSULE, DELAYED RELEASE ORAL DAILY
Qty: 90 CAPSULE | Refills: 3 | Status: SHIPPED | OUTPATIENT
Start: 2020-08-03 | End: 2021-07-29

## 2020-08-03 RX ORDER — DIAZEPAM 5 MG/1
5 TABLET ORAL EVERY 12 HOURS PRN
Qty: 60 TABLET | Refills: 2 | Status: SHIPPED | OUTPATIENT
Start: 2020-08-03 | End: 2020-09-28

## 2020-08-03 NOTE — TELEPHONE ENCOUNTER
Patient is currently out of CYMBALTA she is requesting that one week of CYMBALTA be sent in to the local pharmacy and then a 3 month supply be sent to [facility].     ----- Message from Ginger Richter sent at 8/3/2020  1:01 PM CDT -----  Type:  RX Refill Request    Who Called:  Patient  Refill or New Rx:  Refill  RX Name and Strength:  diazePAM (VALIUM) 5 MG tablet  How is the patient currently taking it? (ex. 1XDay):   TAKE 1 TABLET EVERY 12 HOURS AS NEEDED FOR ANXIETY  Is this a 30 day or 90 day RX:  60 tablet  Preferred Pharmacy with phone number:    [facility] - [place], [place] - [address]  [address]  [place] [address]  Phone: [phone] Fax: [phone]  Local or Mail Order:  local  Ordering Provider:  Darinel Riddle MD  Best Call Back Number:  [phone]  Additional Information: Patient says she is out of both medications      Type:  RX Refill Request    Who Called:  Patient  Refill or New Rx: Refill  RX Name and Strength:  DULoxetine (CYMBALTA) 30 MG capsule  How is the patient currently taking it? (ex. 1XDay):  Take 1 capsule (30 mg total) by mouth once daily. - Oral  Is this a 30 day or 90 day RX:  90 tablet  Preferred Pharmacy with phone number:    [facility]  Local or Mail Order:  local  Ordering Provider:  Darinel Riddle MD  Best Call Back Number:  [phone]

## 2020-08-05 ENCOUNTER — PATIENT OUTREACH (OUTPATIENT)
Dept: ADMINISTRATIVE | Facility: OTHER | Age: 65
End: 2020-08-05

## 2020-08-05 NOTE — PROGRESS NOTES
Patient's chart was reviewed for overdue PHIL ( Proactive [facility] Encounters) Topics  Requested updates within Care Everywhere.  Patient was not found in LINKS.   Health Maintenance was updated.

## 2020-08-11 ENCOUNTER — OFFICE VISIT (OUTPATIENT)
Dept: SURGERY | Facility: CLINIC | Age: 65
End: 2020-08-11
Payer: MEDICARE

## 2020-08-11 VITALS
TEMPERATURE: 98 F | WEIGHT: 125.88 LBS | HEART RATE: 94 BPM | SYSTOLIC BLOOD PRESSURE: 157 MMHG | DIASTOLIC BLOOD PRESSURE: 82 MMHG | BODY MASS INDEX: 19.08 KG/M2 | HEIGHT: 68 IN

## 2020-08-11 DIAGNOSIS — Z01.818 PREOP EXAMINATION: Primary | ICD-10-CM

## 2020-08-11 DIAGNOSIS — K63.5 POLYP OF COLON, UNSPECIFIED PART OF COLON, UNSPECIFIED TYPE: ICD-10-CM

## 2020-08-11 PROCEDURE — 3008F PR BODY MASS INDEX (BMI) DOCUMENTED: ICD-10-PCS | Mod: CPTII,S$GLB,, | Performed by: SURGERY

## 2020-08-11 PROCEDURE — 3008F BODY MASS INDEX DOCD: CPT | Mod: CPTII,S$GLB,, | Performed by: SURGERY

## 2020-08-11 PROCEDURE — 99999 PR PBB SHADOW E&M-EST. PATIENT-LVL IV: ICD-10-PCS | Mod: PBBFAC,,, | Performed by: SURGERY

## 2020-08-11 PROCEDURE — 3077F SYST BP >= 140 MM HG: CPT | Mod: CPTII,S$GLB,, | Performed by: SURGERY

## 2020-08-11 PROCEDURE — 3079F PR MOST RECENT DIASTOLIC BLOOD PRESSURE 80-89 MM HG: ICD-10-PCS | Mod: CPTII,S$GLB,, | Performed by: SURGERY

## 2020-08-11 PROCEDURE — 3077F PR MOST RECENT SYSTOLIC BLOOD PRESSURE >= 140 MM HG: ICD-10-PCS | Mod: CPTII,S$GLB,, | Performed by: SURGERY

## 2020-08-11 PROCEDURE — 99999 PR PBB SHADOW E&M-EST. PATIENT-LVL IV: CPT | Mod: PBBFAC,,, | Performed by: SURGERY

## 2020-08-11 PROCEDURE — 99213 PR OFFICE/OUTPT VISIT, EST, LEVL III, 20-29 MIN: ICD-10-PCS | Mod: S$GLB,,, | Performed by: SURGERY

## 2020-08-11 PROCEDURE — 3079F DIAST BP 80-89 MM HG: CPT | Mod: CPTII,S$GLB,, | Performed by: SURGERY

## 2020-08-11 PROCEDURE — 99213 OFFICE O/P EST LOW 20 MIN: CPT | Mod: S$GLB,,, | Performed by: SURGERY

## 2020-08-11 NOTE — PROGRESS NOTES
Subjective:       Patient ID: Comfort Cantu is a 64 y.o. female.    Chief Complaint: Follow-up (Discuss surgery)    HPI   this is a 64-year-old female who returns to discuss colectomy.  Patient has a adenomatous polyp within the sigmoid colon that has been unable to be resected endoscopically.  It is shown to have at least high-grade dysplasia.  It has been recommended that she of surgical resection.  She was scheduled for resection in April of this year but this was canceled secondary to COVID.  She returns today to discuss planning surgical intervention.  .  She has had multiple orthopedic surgeries the last few months.  This was secondary to a fractured which was then complicated by infection.  She has healed well now.  She states that she is feeling well otherwise.  No other significant changes in her overall health condition.     Review of Systems   Constitutional: Negative for activity change, appetite change, fever and unexpected weight change.   Respiratory: Negative for chest tightness, shortness of breath and wheezing.    Cardiovascular: Negative for chest pain.   Gastrointestinal: Negative for abdominal distention, abdominal pain, anal bleeding, blood in stool, constipation, diarrhea, nausea, rectal pain and vomiting.   Genitourinary: Negative for difficulty urinating, dysuria and frequency.   Integumentary:  Negative for wound.   Neurological: Negative for dizziness.   Hematological: Negative for adenopathy.   Psychiatric/Behavioral: Negative for agitation.         Objective:      Physical Exam  Vitals signs reviewed.   Constitutional:       Appearance: She is well-developed.   HENT:      Head: Normocephalic and atraumatic.   Eyes:      Pupils: Pupils are equal, round, and reactive to light.   Neck:      Musculoskeletal: Normal range of motion and neck supple.      Thyroid: No thyromegaly.      Trachea: No tracheal deviation.   Cardiovascular:      Rate and Rhythm: Normal rate and regular rhythm.       Heart sounds: Normal heart sounds. No murmur.   Pulmonary:      Effort: Pulmonary effort is normal.      Breath sounds: Normal breath sounds.   Chest:      Chest wall: No tenderness.   Abdominal:      General: Bowel sounds are normal. There is no distension or abdominal bruit.      Palpations: Abdomen is soft. Abdomen is not rigid. There is no mass or pulsatile mass.      Tenderness: There is no abdominal tenderness. There is no guarding or rebound. Negative signs include De Santiago's sign and McBurney's sign.      Hernia: No hernia is present. There is no hernia in the ventral area.   Genitourinary:     Rectum: Normal.   Musculoskeletal: Normal range of motion.   Skin:     General: Skin is warm.      Findings: No erythema or rash.   Neurological:      Mental Status: She is alert and oriented to person, place, and time.       see cscope for images  Assessment:    Colon polyp     Plan:        D/w pt. I have again offered and recommended surgical resection.  She desires to proceed.  Surgery scheduled for 8/24.  Pt will stop effient prior to surgery and will need clearance

## 2020-08-13 RX ORDER — SODIUM CHLORIDE 9 MG/ML
INJECTION, SOLUTION INTRAVENOUS CONTINUOUS
Status: CANCELLED | OUTPATIENT
Start: 2020-08-13

## 2020-08-13 RX ORDER — METRONIDAZOLE 500 MG/100ML
500 INJECTION, SOLUTION INTRAVENOUS
Status: CANCELLED | OUTPATIENT
Start: 2020-08-13

## 2020-08-14 ENCOUNTER — TELEPHONE (OUTPATIENT)
Dept: SURGERY | Facility: CLINIC | Age: 65
End: 2020-08-14

## 2020-08-14 LAB
ACID FAST MOD KINY STN SPEC: NORMAL
MYCOBACTERIUM SPEC QL CULT: NORMAL

## 2020-08-14 NOTE — TELEPHONE ENCOUNTER
----- Message from Alesia Lion MA sent at 8/14/2020  1:42 PM CDT -----  Regarding: FW: Clearance prior to surgery    ----- Message -----  From: Darinel Riddle MD  Sent: 8/14/2020   1:23 PM CDT  To: Alesia Lion MA  Subject: RE: Clearance prior to surgery                   Cleared, she should stop effient 7 days before surgery.    Darinel Riddle  ----- Message -----  From: Alesia Lion MA  Sent: 8/14/2020   1:16 PM CDT  To: Darinel Riddle MD  Subject: FW: Clearance prior to surgery                     ----- Message -----  From: Viral Duarte LPN  Sent: 8/13/2020   9:39 AM CDT  To: [facility] Staff  Subject: Clearance prior to surgery                       Request clearance prior to surgery on 8/24/20 also recommendations for Effient prior to surgery.  Dr Candelaria' note:        MD Terrance Pro MD   Physician   Specialty:  Surgery        Progress Notes   The patient can view the shared note after they get an active Patient Portal account This note has been shared with the patient   Signed        Encounter Date:  8/11/2020   Creation Time:  8/11/2020  3:39 PM        Subjective:             Patient ID: Comfort Cantu is a 64 y.o. female.         Chief Complaint: Follow-up (Discuss surgery)        HPI   this is a 64-year-old female who returns to discuss colectomy.  Patient has a adenomatous polyp within the sigmoid colon that has been unable to be resected endoscopically.  It is shown to have at least high-grade dysplasia.  It has been recommended that she of surgical resection.  She was scheduled for resection in April of this year but this was canceled secondary to COVID.  She returns today to discuss planning surgical intervention.  .  She has had multiple orthopedic surgeries the last few months.  This was secondary to a fractured which was then complicated by infection.  She has healed well now.  She states that she is feeling well otherwise.  No other significant changes in her  overall health condition.          Review of Systems     Constitutional: Negative for activity change, appetite change, fever and unexpected weight change.     Respiratory: Negative for chest tightness, shortness of breath and wheezing.      Cardiovascular: Negative for chest pain.     Gastrointestinal: Negative for abdominal distention, abdominal pain, anal bleeding, blood in stool, constipation, diarrhea, nausea, rectal pain and vomiting.     Genitourinary: Negative for difficulty urinating, dysuria and frequency.     Integumentary:  Negative for wound.     Neurological: Negative for dizziness.     Hematological: Negative for adenopathy.     Psychiatric/Behavioral: Negative for agitation.                  Objective:            Physical Exam    Vitals signs reviewed.   Constitutional:         Appearance: She is well-developed.   HENT:        Head: Normocephalic and atraumatic.   Eyes:        Pupils: Pupils are equal, round, and reactive to light.   Neck:        Musculoskeletal: Normal range of motion and neck supple.      Thyroid: No thyromegaly.      Trachea: No tracheal deviation.   Cardiovascular:        Rate and Rhythm: Normal rate and regular rhythm.      Heart sounds: Normal heart sounds. No murmur.   Pulmonary:        Effort: Pulmonary effort is normal.      Breath sounds: Normal breath sounds.   Chest:        Chest wall: No tenderness.   Abdominal:      General: Bowel sounds are normal. There is no distension or abdominal bruit.      Palpations: Abdomen is soft. Abdomen is not rigid. There is no mass or pulsatile mass.      Tenderness: There is no abdominal tenderness. There is no guarding or rebound. Negative signs include De Santiago's sign and McBurney's sign.      Hernia: No hernia is present. There is no hernia in the ventral area.     Genitourinary:       Rectum: Normal.   Musculoskeletal: Normal range of motion.     Skin:       General: Skin is warm.      Findings: No erythema or rash.   Neurological:         Mental Status: She is alert and oriented to person, place, and time.         see cscope for images      Assessment:        Colon polyp            Plan:              D/w pt. I have again offered and recommended surgical resection.  She desires to proceed.  Surgery scheduled for 8/24.  Pt will stop effient prior to surgery and will need clearance                  Electronically signed by Terrance Candelaria MD at 8/11/2020  3:43 PM                          Office Visit on 8/11/2020                            Detailed Report

## 2020-08-21 ENCOUNTER — LAB VISIT (OUTPATIENT)
Dept: PRIMARY CARE CLINIC | Facility: CLINIC | Age: 65
End: 2020-08-21
Payer: MEDICARE

## 2020-08-21 ENCOUNTER — ANESTHESIA EVENT (OUTPATIENT)
Dept: SURGERY | Facility: HOSPITAL | Age: 65
DRG: 331 | End: 2020-08-21
Payer: MEDICARE

## 2020-08-21 ENCOUNTER — HOSPITAL ENCOUNTER (OUTPATIENT)
Dept: PREADMISSION TESTING | Facility: HOSPITAL | Age: 65
Discharge: HOME | End: 2020-08-21
Attending: SURGERY
Payer: MEDICARE

## 2020-08-21 VITALS — WEIGHT: 126 LBS | BODY MASS INDEX: 19.1 KG/M2 | HEIGHT: 68 IN

## 2020-08-21 DIAGNOSIS — K63.5 POLYP OF COLON, UNSPECIFIED PART OF COLON, UNSPECIFIED TYPE: ICD-10-CM

## 2020-08-21 DIAGNOSIS — Z01.818 PREOP EXAMINATION: ICD-10-CM

## 2020-08-21 LAB
ABO + RH BLD: NORMAL
ALBUMIN SERPL BCP-MCNC: 4.4 G/DL (ref 3.5–5.2)
ALP SERPL-CCNC: 114 U/L (ref 55–135)
ALT SERPL W/O P-5'-P-CCNC: 20 U/L (ref 10–44)
ANION GAP SERPL CALC-SCNC: 13 MMOL/L (ref 8–16)
AST SERPL-CCNC: 31 U/L (ref 10–40)
BASOPHILS # BLD AUTO: 0.07 K/UL (ref 0–0.2)
BASOPHILS NFR BLD: 0.9 % (ref 0–1.9)
BILIRUB SERPL-MCNC: 0.5 MG/DL (ref 0.1–1)
BLD GP AB SCN CELLS X3 SERPL QL: NORMAL
BUN SERPL-MCNC: 4 MG/DL (ref 8–23)
CALCIUM SERPL-MCNC: 9.5 MG/DL (ref 8.7–10.5)
CHLORIDE SERPL-SCNC: 98 MMOL/L (ref 95–110)
CO2 SERPL-SCNC: 27 MMOL/L (ref 23–29)
CREAT SERPL-MCNC: 0.7 MG/DL (ref 0.5–1.4)
DIFFERENTIAL METHOD: ABNORMAL
EOSINOPHIL # BLD AUTO: 0.1 K/UL (ref 0–0.5)
EOSINOPHIL NFR BLD: 1.8 % (ref 0–8)
ERYTHROCYTE [DISTWIDTH] IN BLOOD BY AUTOMATED COUNT: 16.2 % (ref 11.5–14.5)
EST. GFR  (AFRICAN AMERICAN): >60 ML/MIN/1.73 M^2
EST. GFR  (NON AFRICAN AMERICAN): >60 ML/MIN/1.73 M^2
GLUCOSE SERPL-MCNC: 103 MG/DL (ref 70–110)
HCT VFR BLD AUTO: 38.8 % (ref 37–48.5)
HGB BLD-MCNC: 12.6 G/DL (ref 12–16)
IMM GRANULOCYTES # BLD AUTO: 0.03 K/UL (ref 0–0.04)
IMM GRANULOCYTES NFR BLD AUTO: 0.4 % (ref 0–0.5)
LYMPHOCYTES # BLD AUTO: 1.4 K/UL (ref 1–4.8)
LYMPHOCYTES NFR BLD: 18.1 % (ref 18–48)
MCH RBC QN AUTO: 33.4 PG (ref 27–31)
MCHC RBC AUTO-ENTMCNC: 32.5 G/DL (ref 32–36)
MCV RBC AUTO: 103 FL (ref 82–98)
MONOCYTES # BLD AUTO: 0.6 K/UL (ref 0.3–1)
MONOCYTES NFR BLD: 7.4 % (ref 4–15)
NEUTROPHILS # BLD AUTO: 5.5 K/UL (ref 1.8–7.7)
NEUTROPHILS NFR BLD: 71.4 % (ref 38–73)
NRBC BLD-RTO: 0 /100 WBC
PLATELET # BLD AUTO: 314 K/UL (ref 150–350)
PMV BLD AUTO: 9.2 FL (ref 9.2–12.9)
POTASSIUM SERPL-SCNC: 3.9 MMOL/L (ref 3.5–5.1)
PROT SERPL-MCNC: 7.7 G/DL (ref 6–8.4)
RBC # BLD AUTO: 3.77 M/UL (ref 4–5.4)
SODIUM SERPL-SCNC: 138 MMOL/L (ref 136–145)
WBC # BLD AUTO: 7.68 K/UL (ref 3.9–12.7)

## 2020-08-21 PROCEDURE — 36415 COLL VENOUS BLD VENIPUNCTURE: CPT

## 2020-08-21 PROCEDURE — 99900104 DSU ONLY-NO CHARGE-EA ADD'L HR (STAT)

## 2020-08-21 PROCEDURE — 99900103 DSU ONLY-NO CHARGE-INITIAL HR (STAT)

## 2020-08-21 PROCEDURE — 80053 COMPREHEN METABOLIC PANEL: CPT

## 2020-08-21 PROCEDURE — 85025 COMPLETE CBC W/AUTO DIFF WBC: CPT

## 2020-08-21 PROCEDURE — 86850 RBC ANTIBODY SCREEN: CPT

## 2020-08-21 PROCEDURE — U0003 INFECTIOUS AGENT DETECTION BY NUCLEIC ACID (DNA OR RNA); SEVERE ACUTE RESPIRATORY SYNDROME CORONAVIRUS 2 (SARS-COV-2) (CORONAVIRUS DISEASE [COVID-19]), AMPLIFIED PROBE TECHNIQUE, MAKING USE OF HIGH THROUGHPUT TECHNOLOGIES AS DESCRIBED BY CMS-2020-01-R: HCPCS

## 2020-08-21 RX ORDER — SODIUM CHLORIDE, SODIUM LACTATE, POTASSIUM CHLORIDE, CALCIUM CHLORIDE 600; 310; 30; 20 MG/100ML; MG/100ML; MG/100ML; MG/100ML
INJECTION, SOLUTION INTRAVENOUS CONTINUOUS
Status: CANCELLED | OUTPATIENT
Start: 2020-08-21

## 2020-08-21 RX ORDER — LIDOCAINE HYDROCHLORIDE 10 MG/ML
1 INJECTION, SOLUTION EPIDURAL; INFILTRATION; INTRACAUDAL; PERINEURAL ONCE
Status: CANCELLED | OUTPATIENT
Start: 2020-08-21 | End: 2020-08-21

## 2020-08-21 NOTE — DISCHARGE INSTRUCTIONS
To confirm, Your doctor has instructed you that surgery is scheduled for: 8/24/20      Please report to [facility], Registration the morning of surgery. You must check-in and receive a wristband before going to your procedure.    Pre-Op will call the afternoon prior to surgery between 1:00 and 6:00 PM with the final arrival time.  Phone number: [phone]    PLEASE NOTE:  The surgery schedule has many variables which may affect the time of your surgery case.  Family members should be available if your surgery time changes.  Plan to be here the day of your procedure between 4-6 hours.    MEDICATIONS:  TAKE ONLY THESE MEDICATIONS WITH A SMALL SIP OF WATER THE MORNING OF YOUR PROCEDURE:    Buspar, Clonidine, Valium, Duloxetine, Isosorbide, Protonix    DO NOT TAKE THESE MEDICATIONS 5-7 DAYS PRIOR to your procedure or per your surgeon's request: ASPIRIN, ALEVE, ADVIL, IBUPROFEN, FISH OIL VITAMIN E, HERBALS  (May take Tylenol)                                           NO Fluid pill(hydrochlorothiazide)- AM of surgery                                  EFFIENT - LAST DOSE 8/16/20         ONLY if you are prescribed any types of blood thinners such as:  Aspirin, Coumadin, Plavix, Pradaxa, Xarelto, Aggrenox, Effient, Eliquis, Savasya, Brilinta, or any other, ask your surgeon whether you should stop taking them and how long before surgery you should stop.  You may also need to verify with the prescribing physician if it is ok to stop your medication.      INSTRUCTIONS IMPORTANT!!  · Do not eat or drink anything between midnight and the time of your procedure- this includes gum, mints, and candy.  · Do not smoke or drink alcoholic beverages 24 hours prior to your procedure.  · Shower the night before AND the morning of your procedure with a Chlorhexidine wash such as Hibiclens or Dial antibacterial soap from the neck down.  Do not get it on your face or in your eyes.  You may use your own shampoo and face  wash. This helps your skin to be as bacteria free as possible.    · If you wear contact lenses, dentures, hearing aids or glasses, bring a container to put them in during surgery and give to a family member for safe keeping.  Please leave all jewelry, piercing's and valuables at home.   · DO NOT remove hair from the surgery site.  Do not shave the incision site unless you are given specific instructions to do so.    · ONLY if you have been diagnosed with sleep apnea please bring your C-PAP machine.  · ONLY if you wear home oxygen please bring your portable oxygen tank the day of your procedure.  · ONLY if you have a history of OPEN HEART SURGERY you will need a clearance from your Cardiologist per Anesthesia.      · ONLY for patients requiring bowel prep, written instructions will be given by your doctor's office.  · ONLY if you have a neuro stimulator, please bring the controller with you the morning of surgery  · ONLY if a type and screen test is needed before surgery, please return:  · If your doctor has scheduled you for an overnight stay, bring a small overnight bag with any personal items you need.  · Make arrangements in advance for transportation home by a responsible adult.  It is not safe to drive a vehicle during the 24 hours after anesthesia.     · ONLY ONE VISITOR PER PATIENT IS ALLOWED TO COME IN THE HOSPITAL THE DAY OF PROCEDURE.   · Visiting hours are 8:00AM-6:00PM. For the safety of all patients, visitors under the age of 12 are not allowed above the first floor of the hospital.    · All [facility] facilities and properties are tobacco free.  Smoking is NOT allowed.       If you have any questions about these instructions, call Pre-Op Admit  Nursing at [phone] or the Pre-Op Day Surgery Unit at [phone].

## 2020-08-22 LAB — SARS-COV-2 RNA RESP QL NAA+PROBE: NOT DETECTED

## 2020-08-24 ENCOUNTER — ANESTHESIA (OUTPATIENT)
Dept: SURGERY | Facility: HOSPITAL | Age: 65
DRG: 331 | End: 2020-08-24
Payer: MEDICARE

## 2020-08-24 ENCOUNTER — TELEPHONE (OUTPATIENT)
Dept: SURGERY | Facility: CLINIC | Age: 65
End: 2020-08-24

## 2020-08-24 DIAGNOSIS — Z01.818 PREOP EXAMINATION: Primary | ICD-10-CM

## 2020-08-24 NOTE — TELEPHONE ENCOUNTER
----- Message from Ginger Richter sent at 8/24/2020  9:03 AM CDT -----  Type: Needs Medical Advice  Who Called:  Patient  Best Call Back Number:   Additional Information: Patient calling to reschedule upcoming procedure for later date.

## 2020-08-24 NOTE — TELEPHONE ENCOUNTER
----- Message from Nakita Leo sent at 8/24/2020  9:14 AM CDT -----  Regarding: appointment  Contact: self  Type: Needs Medical Advice  Who Called:  self  Symptoms (please be specific):  Na  How long has patient had these symptoms:  MARIXA  Pharmacy name and phone #:  [facility]  Best Call Back Number: [phone]  Additional Information: Patient requesting to reschedule surgery.

## 2020-08-26 ENCOUNTER — TELEPHONE (OUTPATIENT)
Dept: SURGERY | Facility: CLINIC | Age: 65
End: 2020-08-26

## 2020-08-26 DIAGNOSIS — K63.5 POLYP OF COLON, UNSPECIFIED PART OF COLON, UNSPECIFIED TYPE: Primary | ICD-10-CM

## 2020-08-26 DIAGNOSIS — K63.5 COLON POLYP: ICD-10-CM

## 2020-08-26 NOTE — TELEPHONE ENCOUNTER
----- Message from Devante Ruiz sent at 8/26/2020  2:02 PM CDT -----  Contact: patient  Unsuccessful IM sent to office.  Patient called in and stated she thinks that office was trying to reschedule her Covid19 Pre Procedural test & her Pre Admit appt on 9/11/20?    Patient call back number is [phone]

## 2020-09-11 ENCOUNTER — HOSPITAL ENCOUNTER (OUTPATIENT)
Dept: PREADMISSION TESTING | Facility: HOSPITAL | Age: 65
Discharge: HOME | DRG: 331 | End: 2020-09-11
Attending: SURGERY
Payer: MEDICARE

## 2020-09-11 ENCOUNTER — LAB VISIT (OUTPATIENT)
Dept: PRIMARY CARE CLINIC | Facility: CLINIC | Age: 65
End: 2020-09-11
Payer: MEDICARE

## 2020-09-11 DIAGNOSIS — Z01.818 PREOP EXAMINATION: ICD-10-CM

## 2020-09-11 DIAGNOSIS — K63.5 POLYP OF COLON, UNSPECIFIED PART OF COLON, UNSPECIFIED TYPE: ICD-10-CM

## 2020-09-11 LAB
ABO + RH BLD: NORMAL
BLD GP AB SCN CELLS X3 SERPL QL: NORMAL

## 2020-09-11 PROCEDURE — 86901 BLOOD TYPING SEROLOGIC RH(D): CPT

## 2020-09-11 PROCEDURE — U0003 INFECTIOUS AGENT DETECTION BY NUCLEIC ACID (DNA OR RNA); SEVERE ACUTE RESPIRATORY SYNDROME CORONAVIRUS 2 (SARS-COV-2) (CORONAVIRUS DISEASE [COVID-19]), AMPLIFIED PROBE TECHNIQUE, MAKING USE OF HIGH THROUGHPUT TECHNOLOGIES AS DESCRIBED BY CMS-2020-01-R: HCPCS

## 2020-09-11 PROCEDURE — 36415 COLL VENOUS BLD VENIPUNCTURE: CPT

## 2020-09-12 LAB — SARS-COV-2 RNA RESP QL NAA+PROBE: NOT DETECTED

## 2020-09-14 ENCOUNTER — HOSPITAL ENCOUNTER (INPATIENT)
Facility: HOSPITAL | Age: 65
LOS: 2 days | Discharge: HOME | DRG: 331 | End: 2020-09-16
Attending: SURGERY | Admitting: SURGERY
Payer: MEDICARE

## 2020-09-14 DIAGNOSIS — D64.9 ANEMIA, UNSPECIFIED TYPE: ICD-10-CM

## 2020-09-14 DIAGNOSIS — Z01.818 PREOP TESTING: ICD-10-CM

## 2020-09-14 DIAGNOSIS — K63.5 COLON POLYP: ICD-10-CM

## 2020-09-14 DIAGNOSIS — K63.5 POLYP OF COLON, UNSPECIFIED PART OF COLON, UNSPECIFIED TYPE: ICD-10-CM

## 2020-09-14 DIAGNOSIS — D12.6 HIGH GRADE DYSPLASIA IN COLONIC ADENOMA: Primary | ICD-10-CM

## 2020-09-14 PROBLEM — I25.10 CAD (CORONARY ARTERY DISEASE): Status: ACTIVE | Noted: 2020-09-14

## 2020-09-14 PROCEDURE — 99900104 DSU ONLY-NO CHARGE-EA ADD'L HR (STAT): Performed by: SURGERY

## 2020-09-14 PROCEDURE — S0030 INJECTION, METRONIDAZOLE: HCPCS | Performed by: SURGERY

## 2020-09-14 PROCEDURE — 25000003 PHARM REV CODE 250: Performed by: SURGERY

## 2020-09-14 PROCEDURE — 88307 TISSUE EXAM BY PATHOLOGIST: CPT | Performed by: PATHOLOGY

## 2020-09-14 PROCEDURE — 88302 TISSUE EXAM BY PATHOLOGIST: CPT | Mod: 26,,, | Performed by: PATHOLOGY

## 2020-09-14 PROCEDURE — 88307 PR  SURG PATH,LEVEL V: ICD-10-PCS | Mod: 26,,, | Performed by: PATHOLOGY

## 2020-09-14 PROCEDURE — 63600175 PHARM REV CODE 636 W HCPCS: Performed by: SURGERY

## 2020-09-14 PROCEDURE — 63600175 PHARM REV CODE 636 W HCPCS: Performed by: NURSE ANESTHETIST, CERTIFIED REGISTERED

## 2020-09-14 PROCEDURE — 25000003 PHARM REV CODE 250: Performed by: ANESTHESIOLOGY

## 2020-09-14 PROCEDURE — 36000713 HC OR TIME LEV V EA ADD 15 MIN: Performed by: SURGERY

## 2020-09-14 PROCEDURE — D9220A PRA ANESTHESIA: ICD-10-PCS | Mod: ANES,,, | Performed by: ANESTHESIOLOGY

## 2020-09-14 PROCEDURE — 36000712 HC OR TIME LEV V 1ST 15 MIN: Performed by: SURGERY

## 2020-09-14 PROCEDURE — C1894 INTRO/SHEATH, NON-LASER: HCPCS | Performed by: SURGERY

## 2020-09-14 PROCEDURE — 37000008 HC ANESTHESIA 1ST 15 MINUTES: Performed by: SURGERY

## 2020-09-14 PROCEDURE — D9220A PRA ANESTHESIA: Mod: CRNA,,, | Performed by: NURSE ANESTHETIST, CERTIFIED REGISTERED

## 2020-09-14 PROCEDURE — 99900103 DSU ONLY-NO CHARGE-INITIAL HR (STAT): Performed by: SURGERY

## 2020-09-14 PROCEDURE — 12000002 HC ACUTE/MED SURGE SEMI-PRIVATE ROOM

## 2020-09-14 PROCEDURE — 27000221 HC OXYGEN, UP TO 24 HOURS

## 2020-09-14 PROCEDURE — 94799 UNLISTED PULMONARY SVC/PX: CPT

## 2020-09-14 PROCEDURE — 25000003 PHARM REV CODE 250: Performed by: NURSE PRACTITIONER

## 2020-09-14 PROCEDURE — 88302 TISSUE EXAM BY PATHOLOGIST: CPT | Performed by: PATHOLOGY

## 2020-09-14 PROCEDURE — 93010 ELECTROCARDIOGRAM REPORT: CPT | Mod: ,,, | Performed by: INTERNAL MEDICINE

## 2020-09-14 PROCEDURE — D9220A PRA ANESTHESIA: Mod: ANES,,, | Performed by: ANESTHESIOLOGY

## 2020-09-14 PROCEDURE — 93005 ELECTROCARDIOGRAM TRACING: CPT

## 2020-09-14 PROCEDURE — 93010 EKG 12-LEAD: ICD-10-PCS | Mod: ,,, | Performed by: INTERNAL MEDICINE

## 2020-09-14 PROCEDURE — 94761 N-INVAS EAR/PLS OXIMETRY MLT: CPT

## 2020-09-14 PROCEDURE — C9290 INJ, BUPIVACAINE LIPOSOME: HCPCS | Performed by: SURGERY

## 2020-09-14 PROCEDURE — 44207 PR LAP,SURG,COLECTOMY,W/ANAST: ICD-10-PCS | Mod: ,,, | Performed by: SURGERY

## 2020-09-14 PROCEDURE — 27201423 OPTIME MED/SURG SUP & DEVICES STERILE SUPPLY: Performed by: SURGERY

## 2020-09-14 PROCEDURE — 37000009 HC ANESTHESIA EA ADD 15 MINS: Performed by: SURGERY

## 2020-09-14 PROCEDURE — 71000033 HC RECOVERY, INTIAL HOUR: Performed by: SURGERY

## 2020-09-14 PROCEDURE — 25000003 PHARM REV CODE 250: Performed by: NURSE ANESTHETIST, CERTIFIED REGISTERED

## 2020-09-14 PROCEDURE — 71000039 HC RECOVERY, EACH ADD'L HOUR: Performed by: SURGERY

## 2020-09-14 PROCEDURE — 44207 L COLECTOMY/COLOPROCTOSTOMY: CPT | Mod: ,,, | Performed by: SURGERY

## 2020-09-14 PROCEDURE — 88307 TISSUE EXAM BY PATHOLOGIST: CPT | Mod: 26,,, | Performed by: PATHOLOGY

## 2020-09-14 PROCEDURE — D9220A PRA ANESTHESIA: ICD-10-PCS | Mod: CRNA,,, | Performed by: NURSE ANESTHETIST, CERTIFIED REGISTERED

## 2020-09-14 PROCEDURE — 63600175 PHARM REV CODE 636 W HCPCS: Performed by: ANESTHESIOLOGY

## 2020-09-14 PROCEDURE — 88302 PR  SURG PATH,LEVEL II: ICD-10-PCS | Mod: 26,,, | Performed by: PATHOLOGY

## 2020-09-14 RX ORDER — BISACODYL 5 MG
5 TABLET, DELAYED RELEASE (ENTERIC COATED) ORAL NIGHTLY
Status: DISCONTINUED | OUTPATIENT
Start: 2020-09-14 | End: 2020-09-16 | Stop reason: HOSPADM

## 2020-09-14 RX ORDER — TRAMADOL HYDROCHLORIDE 50 MG/1
50 TABLET ORAL EVERY 6 HOURS PRN
Status: DISCONTINUED | OUTPATIENT
Start: 2020-09-14 | End: 2020-09-16 | Stop reason: HOSPADM

## 2020-09-14 RX ORDER — ONDANSETRON 2 MG/ML
4 INJECTION INTRAMUSCULAR; INTRAVENOUS ONCE AS NEEDED
Status: DISCONTINUED | OUTPATIENT
Start: 2020-09-14 | End: 2020-09-14 | Stop reason: HOSPADM

## 2020-09-14 RX ORDER — DEXAMETHASONE SODIUM PHOSPHATE 4 MG/ML
INJECTION, SOLUTION INTRA-ARTICULAR; INTRALESIONAL; INTRAMUSCULAR; INTRAVENOUS; SOFT TISSUE
Status: DISCONTINUED | OUTPATIENT
Start: 2020-09-14 | End: 2020-09-14

## 2020-09-14 RX ORDER — ACETAMINOPHEN 10 MG/ML
1000 INJECTION, SOLUTION INTRAVENOUS EVERY 6 HOURS
Status: DISPENSED | OUTPATIENT
Start: 2020-09-14 | End: 2020-09-15

## 2020-09-14 RX ORDER — PHENYLEPHRINE HYDROCHLORIDE 10 MG/ML
INJECTION INTRAVENOUS
Status: DISCONTINUED | OUTPATIENT
Start: 2020-09-14 | End: 2020-09-14

## 2020-09-14 RX ORDER — LIDOCAINE HYDROCHLORIDE 10 MG/ML
1 INJECTION, SOLUTION EPIDURAL; INFILTRATION; INTRACAUDAL; PERINEURAL ONCE
Status: DISCONTINUED | OUTPATIENT
Start: 2020-09-14 | End: 2020-09-14 | Stop reason: HOSPADM

## 2020-09-14 RX ORDER — LORAZEPAM 2 MG/ML
0.25 INJECTION INTRAMUSCULAR EVERY 4 HOURS PRN
Status: DISCONTINUED | OUTPATIENT
Start: 2020-09-14 | End: 2020-09-16 | Stop reason: HOSPADM

## 2020-09-14 RX ORDER — NEOSTIGMINE METHYLSULFATE 1 MG/ML
INJECTION, SOLUTION INTRAVENOUS
Status: DISCONTINUED | OUTPATIENT
Start: 2020-09-14 | End: 2020-09-14

## 2020-09-14 RX ORDER — CALCIUM CARBONATE 200(500)MG
1000 TABLET,CHEWABLE ORAL EVERY 8 HOURS PRN
Status: DISCONTINUED | OUTPATIENT
Start: 2020-09-14 | End: 2020-09-16 | Stop reason: HOSPADM

## 2020-09-14 RX ORDER — OXYCODONE HYDROCHLORIDE 5 MG/1
5 TABLET ORAL ONCE AS NEEDED
Status: DISCONTINUED | OUTPATIENT
Start: 2020-09-14 | End: 2020-09-14 | Stop reason: HOSPADM

## 2020-09-14 RX ORDER — ONDANSETRON 2 MG/ML
4 INJECTION INTRAMUSCULAR; INTRAVENOUS ONCE AS NEEDED
Status: DISCONTINUED | OUTPATIENT
Start: 2020-09-14 | End: 2020-09-16 | Stop reason: HOSPADM

## 2020-09-14 RX ORDER — FENTANYL CITRATE 50 UG/ML
25 INJECTION, SOLUTION INTRAMUSCULAR; INTRAVENOUS EVERY 5 MIN PRN
Status: DISCONTINUED | OUTPATIENT
Start: 2020-09-14 | End: 2020-09-14 | Stop reason: HOSPADM

## 2020-09-14 RX ORDER — LIDOCAINE HCL/PF 100 MG/5ML
SYRINGE (ML) INTRAVENOUS
Status: DISCONTINUED | OUTPATIENT
Start: 2020-09-14 | End: 2020-09-14

## 2020-09-14 RX ORDER — IBUPROFEN 600 MG/1
600 TABLET ORAL 4 TIMES DAILY
Status: DISCONTINUED | OUTPATIENT
Start: 2020-09-14 | End: 2020-09-16 | Stop reason: HOSPADM

## 2020-09-14 RX ORDER — HYDROMORPHONE HYDROCHLORIDE 2 MG/ML
1 INJECTION, SOLUTION INTRAMUSCULAR; INTRAVENOUS; SUBCUTANEOUS EVERY 4 HOURS PRN
Status: DISCONTINUED | OUTPATIENT
Start: 2020-09-14 | End: 2020-09-16 | Stop reason: HOSPADM

## 2020-09-14 RX ORDER — ONDANSETRON HYDROCHLORIDE 2 MG/ML
INJECTION, SOLUTION INTRAMUSCULAR; INTRAVENOUS
Status: DISCONTINUED | OUTPATIENT
Start: 2020-09-14 | End: 2020-09-14

## 2020-09-14 RX ORDER — CLONIDINE HYDROCHLORIDE 0.1 MG/1
0.1 TABLET ORAL 2 TIMES DAILY
Status: DISCONTINUED | OUTPATIENT
Start: 2020-09-14 | End: 2020-09-16 | Stop reason: HOSPADM

## 2020-09-14 RX ORDER — PROPOFOL 10 MG/ML
VIAL (ML) INTRAVENOUS
Status: DISCONTINUED | OUTPATIENT
Start: 2020-09-14 | End: 2020-09-14

## 2020-09-14 RX ORDER — FENTANYL CITRATE 50 UG/ML
INJECTION, SOLUTION INTRAMUSCULAR; INTRAVENOUS
Status: DISCONTINUED | OUTPATIENT
Start: 2020-09-14 | End: 2020-09-14

## 2020-09-14 RX ORDER — MUPIROCIN 20 MG/G
OINTMENT TOPICAL 2 TIMES DAILY
Status: DISCONTINUED | OUTPATIENT
Start: 2020-09-14 | End: 2020-09-16 | Stop reason: HOSPADM

## 2020-09-14 RX ORDER — SODIUM CHLORIDE 9 MG/ML
INJECTION, SOLUTION INTRAVENOUS CONTINUOUS
Status: DISCONTINUED | OUTPATIENT
Start: 2020-09-14 | End: 2020-09-14

## 2020-09-14 RX ORDER — METRONIDAZOLE 500 MG/100ML
500 INJECTION, SOLUTION INTRAVENOUS
Status: COMPLETED | OUTPATIENT
Start: 2020-09-14 | End: 2020-09-14

## 2020-09-14 RX ORDER — MIDAZOLAM HYDROCHLORIDE 1 MG/ML
INJECTION, SOLUTION INTRAMUSCULAR; INTRAVENOUS
Status: DISCONTINUED | OUTPATIENT
Start: 2020-09-14 | End: 2020-09-14

## 2020-09-14 RX ORDER — DEXTROSE, SODIUM CHLORIDE, SODIUM LACTATE, POTASSIUM CHLORIDE, AND CALCIUM CHLORIDE 5; .6; .31; .03; .02 G/100ML; G/100ML; G/100ML; G/100ML; G/100ML
INJECTION, SOLUTION INTRAVENOUS CONTINUOUS
Status: DISCONTINUED | OUTPATIENT
Start: 2020-09-14 | End: 2020-09-16 | Stop reason: HOSPADM

## 2020-09-14 RX ORDER — BUPIVACAINE HYDROCHLORIDE AND EPINEPHRINE 2.5; 5 MG/ML; UG/ML
INJECTION, SOLUTION EPIDURAL; INFILTRATION; INTRACAUDAL; PERINEURAL
Status: DISCONTINUED | OUTPATIENT
Start: 2020-09-14 | End: 2020-09-14 | Stop reason: HOSPADM

## 2020-09-14 RX ORDER — METRONIDAZOLE 500 MG/100ML
500 INJECTION, SOLUTION INTRAVENOUS
Status: COMPLETED | OUTPATIENT
Start: 2020-09-14 | End: 2020-09-15

## 2020-09-14 RX ORDER — GABAPENTIN 100 MG/1
200 CAPSULE ORAL 2 TIMES DAILY
Status: DISCONTINUED | OUTPATIENT
Start: 2020-09-14 | End: 2020-09-16 | Stop reason: HOSPADM

## 2020-09-14 RX ORDER — ACETAMINOPHEN 10 MG/ML
INJECTION, SOLUTION INTRAVENOUS
Status: DISCONTINUED | OUTPATIENT
Start: 2020-09-14 | End: 2020-09-14

## 2020-09-14 RX ORDER — EPHEDRINE SULFATE 50 MG/ML
INJECTION, SOLUTION INTRAVENOUS
Status: DISCONTINUED | OUTPATIENT
Start: 2020-09-14 | End: 2020-09-14

## 2020-09-14 RX ORDER — LABETALOL HYDROCHLORIDE 5 MG/ML
INJECTION, SOLUTION INTRAVENOUS
Status: DISCONTINUED | OUTPATIENT
Start: 2020-09-14 | End: 2020-09-14

## 2020-09-14 RX ORDER — CIPROFLOXACIN 2 MG/ML
400 INJECTION, SOLUTION INTRAVENOUS
Status: COMPLETED | OUTPATIENT
Start: 2020-09-14 | End: 2020-09-14

## 2020-09-14 RX ORDER — ONDANSETRON 2 MG/ML
4 INJECTION INTRAMUSCULAR; INTRAVENOUS EVERY 12 HOURS PRN
Status: DISCONTINUED | OUTPATIENT
Start: 2020-09-14 | End: 2020-09-16 | Stop reason: HOSPADM

## 2020-09-14 RX ORDER — ISOSORBIDE MONONITRATE 30 MG/1
30 TABLET, EXTENDED RELEASE ORAL DAILY
Status: DISCONTINUED | OUTPATIENT
Start: 2020-09-14 | End: 2020-09-16 | Stop reason: HOSPADM

## 2020-09-14 RX ORDER — ROCURONIUM BROMIDE 10 MG/ML
INJECTION, SOLUTION INTRAVENOUS
Status: DISCONTINUED | OUTPATIENT
Start: 2020-09-14 | End: 2020-09-14

## 2020-09-14 RX ORDER — DIPHENHYDRAMINE HYDROCHLORIDE 50 MG/ML
12.5 INJECTION INTRAMUSCULAR; INTRAVENOUS EVERY 6 HOURS PRN
Status: DISCONTINUED | OUTPATIENT
Start: 2020-09-14 | End: 2020-09-16 | Stop reason: HOSPADM

## 2020-09-14 RX ADMIN — TRAMADOL HYDROCHLORIDE 50 MG: 50 TABLET, COATED ORAL at 09:09

## 2020-09-14 RX ADMIN — METRONIDAZOLE 500 MG: 500 INJECTION, SOLUTION INTRAVENOUS at 07:09

## 2020-09-14 RX ADMIN — BISACODYL 5 MG: 5 TABLET, COATED ORAL at 09:09

## 2020-09-14 RX ADMIN — SODIUM CHLORIDE, SODIUM GLUCONATE, SODIUM ACETATE, POTASSIUM CHLORIDE, MAGNESIUM CHLORIDE, SODIUM PHOSPHATE, DIBASIC, AND POTASSIUM PHOSPHATE: .53; .5; .37; .037; .03; .012; .00082 INJECTION, SOLUTION INTRAVENOUS at 07:09

## 2020-09-14 RX ADMIN — SODIUM CHLORIDE, SODIUM GLUCONATE, SODIUM ACETATE, POTASSIUM CHLORIDE, MAGNESIUM CHLORIDE, SODIUM PHOSPHATE, DIBASIC, AND POTASSIUM PHOSPHATE: .53; .5; .37; .037; .03; .012; .00082 INJECTION, SOLUTION INTRAVENOUS at 09:09

## 2020-09-14 RX ADMIN — LIDOCAINE HYDROCHLORIDE 75 MG: 20 INJECTION, SOLUTION INTRAVENOUS at 07:09

## 2020-09-14 RX ADMIN — FENTANYL CITRATE 25 MCG: 50 INJECTION, SOLUTION INTRAMUSCULAR; INTRAVENOUS at 11:09

## 2020-09-14 RX ADMIN — EPHEDRINE SULFATE 10 MG: 50 INJECTION, SOLUTION INTRAMUSCULAR; INTRAVENOUS; SUBCUTANEOUS at 07:09

## 2020-09-14 RX ADMIN — CLONIDINE HYDROCHLORIDE 0.1 MG: 0.1 TABLET ORAL at 09:09

## 2020-09-14 RX ADMIN — GLYCOPYRROLATE 0.4 MG: 0.2 INJECTION, SOLUTION INTRAMUSCULAR; INTRAVITREAL at 09:09

## 2020-09-14 RX ADMIN — IBUPROFEN 600 MG: 600 TABLET, FILM COATED ORAL at 04:09

## 2020-09-14 RX ADMIN — ROCURONIUM BROMIDE 40 MG: 10 INJECTION, SOLUTION INTRAVENOUS at 07:09

## 2020-09-14 RX ADMIN — METRONIDAZOLE 500 MG: 500 INJECTION, SOLUTION INTRAVENOUS at 04:09

## 2020-09-14 RX ADMIN — ONDANSETRON 4 MG: 2 INJECTION, SOLUTION INTRAMUSCULAR; INTRAVENOUS at 07:09

## 2020-09-14 RX ADMIN — GABAPENTIN 200 MG: 100 CAPSULE ORAL at 09:09

## 2020-09-14 RX ADMIN — EPHEDRINE SULFATE 20 MG: 50 INJECTION, SOLUTION INTRAMUSCULAR; INTRAVENOUS; SUBCUTANEOUS at 07:09

## 2020-09-14 RX ADMIN — FENTANYL CITRATE 50 MCG: 50 INJECTION, SOLUTION INTRAMUSCULAR; INTRAVENOUS at 09:09

## 2020-09-14 RX ADMIN — MUPIROCIN: 20 OINTMENT TOPICAL at 09:09

## 2020-09-14 RX ADMIN — PHENYLEPHRINE HYDROCHLORIDE 200 MCG: 10 INJECTION INTRAVENOUS at 10:09

## 2020-09-14 RX ADMIN — ACETAMINOPHEN 1000 MG: 10 INJECTION, SOLUTION INTRAVENOUS at 08:09

## 2020-09-14 RX ADMIN — ROCURONIUM BROMIDE 10 MG: 10 INJECTION, SOLUTION INTRAVENOUS at 08:09

## 2020-09-14 RX ADMIN — FENTANYL CITRATE 50 MCG: 50 INJECTION, SOLUTION INTRAMUSCULAR; INTRAVENOUS at 07:09

## 2020-09-14 RX ADMIN — ISOSORBIDE MONONITRATE 30 MG: 30 TABLET, EXTENDED RELEASE ORAL at 01:09

## 2020-09-14 RX ADMIN — LABETALOL HYDROCHLORIDE 5 MG: 5 INJECTION, SOLUTION INTRAVENOUS at 08:09

## 2020-09-14 RX ADMIN — ACETAMINOPHEN 1000 MG: 10 INJECTION, SOLUTION INTRAVENOUS at 06:09

## 2020-09-14 RX ADMIN — IBUPROFEN 600 MG: 600 TABLET, FILM COATED ORAL at 09:09

## 2020-09-14 RX ADMIN — PROPOFOL 150 MG: 10 INJECTION, EMULSION INTRAVENOUS at 07:09

## 2020-09-14 RX ADMIN — FENTANYL CITRATE 50 MCG: 50 INJECTION, SOLUTION INTRAMUSCULAR; INTRAVENOUS at 08:09

## 2020-09-14 RX ADMIN — MIDAZOLAM 2 MG: 1 INJECTION INTRAMUSCULAR; INTRAVENOUS at 07:09

## 2020-09-14 RX ADMIN — NEOSTIGMINE METHYLSULFATE 3.5 MG: 1 INJECTION INTRAVENOUS at 09:09

## 2020-09-14 RX ADMIN — EPHEDRINE SULFATE 10 MG: 50 INJECTION, SOLUTION INTRAMUSCULAR; INTRAVENOUS; SUBCUTANEOUS at 09:09

## 2020-09-14 RX ADMIN — GLYCOPYRROLATE 0.2 MG: 0.2 INJECTION, SOLUTION INTRAMUSCULAR; INTRAVITREAL at 10:09

## 2020-09-14 RX ADMIN — GABAPENTIN 200 MG: 100 CAPSULE ORAL at 01:09

## 2020-09-14 RX ADMIN — IBUPROFEN 600 MG: 600 TABLET, FILM COATED ORAL at 01:09

## 2020-09-14 RX ADMIN — CIPROFLOXACIN 400 MG: 2 INJECTION INTRAVENOUS at 07:09

## 2020-09-14 RX ADMIN — ACETAMINOPHEN 1000 MG: 10 INJECTION, SOLUTION INTRAVENOUS at 01:09

## 2020-09-14 RX ADMIN — DEXTROSE, SODIUM CHLORIDE, SODIUM LACTATE, POTASSIUM CHLORIDE, AND CALCIUM CHLORIDE: 5; .6; .31; .03; .02 INJECTION, SOLUTION INTRAVENOUS at 11:09

## 2020-09-14 RX ADMIN — DEXAMETHASONE SODIUM PHOSPHATE 4 MG: 4 INJECTION, SOLUTION INTRA-ARTICULAR; INTRALESIONAL; INTRAMUSCULAR; INTRAVENOUS; SOFT TISSUE at 07:09

## 2020-09-14 RX ADMIN — MUPIROCIN: 20 OINTMENT TOPICAL at 01:09

## 2020-09-14 NOTE — ANESTHESIA PREPROCEDURE EVALUATION
09/14/2020  Comfort Cantu is a 64 y.o., female.    Pre-op Assessment    I have reviewed the Patient Summary Reports.     I have reviewed the Nursing Notes. I have reviewed the NPO Status.   I have reviewed the Medications.     Review of Systems  Anesthesia Hx:  No problems with previous Anesthesia Denies Hx of Anesthetic complications  Denies Family Hx of Anesthesia complications.   Denies Personal Hx of Anesthesia complications.   Social:  Smoker Tobacco dependence syndrome   Cardiovascular:   Hypertension Denies MI. CAD    Denies CABG/stent.   Denies Angina. PVD hyperlipidemia    Pulmonary:   COPD    Renal/:   Chronic Renal Disease    Hepatic/GI:   GERD    Endocrine:   Denies Diabetes. Denies Hypothyroidism.    Psych:   Psychiatric History          Physical Exam  General:  Well nourished    Airway/Jaw/Neck:  Airway Findings: Mouth Opening: Normal Tongue: Normal  General Airway Assessment: Adult, Good  Mallampati: II  Improves to II with phonation.  TM Distance: 4-6 cm      Dental:  Dental Findings: In tact   Chest/Lungs:  Chest/Lungs Findings: Normal Respiratory Rate     Heart/Vascular:  Heart Findings: Rate: Normal  Rhythm: Regular Rhythm  Sounds: Normal  Heart murmur: negative       Mental Status:  Mental Status Findings:  Cooperative, Alert and Oriented         Anesthesia Plan  Type of Anesthesia, risks & benefits discussed:  Anesthesia Type:  general  Patient's Preference:   Intra-op Monitoring Plan: standard ASA monitors  Intra-op Monitoring Plan Comments:   Post Op Pain Control Plan: multimodal analgesia  Post Op Pain Control Plan Comments:   Induction:   IV  Beta Blocker:  Patient is not currently on a Beta-Blocker (No further documentation required).       Informed Consent: Patient understands risks and agrees with Anesthesia plan.  Questions answered. Anesthesia consent signed with  patient.  ASA Score: 3     Day of Surgery Review of History & Physical:    H&P update referred to the surgeon.         Ready For Surgery From Anesthesia Perspective.

## 2020-09-14 NOTE — H&P
[facility] Hospital Medicine  History & Physical    Patient Name: Comfort Cantu  MRN: [id number]  Admission Date: 9/14/2020  Attending Physician: Simran Blackburn   Primary Care Provider: Darinel Riddle MD         Patient information was obtained from patient and past medical records.     Subjective:     Principal Problem:Colon polyp    Chief Complaint: No chief complaint on file.       HPI: Comfort Cantu is a 64-year-old  female with past medical history significant for CAD, depression, anxiety, hearing loss, CAD and hypertension presenting today status post sigmoid colectomy by Dr. Candelaria.  Per chart review and patient she reports she was found to have an adenomatous polyp within the sigmoid colon that has been unable to be resected endoscopically.  It is shown to have at least high-grade dysplasia.  It was recommended that she needed surgical resection.  She was scheduled for resection in April of this year but this was canceled secondary to COVID.  Patient was seen postoperatively and doing well.  She reports pain to her abdomen which she describes as generalized abdomen location, mild to moderate severity, aching quality, no aggravating or alleviating factors.  She denies nausea, vomiting, chest pain or shortness of breath.  She is resting comfortably.      Past Medical History:   Diagnosis Date    Anticoagulant long-term use     Anxiety     Arthritis     Atherosclerosis     Coronary artery disease     Depression     GERD (gastroesophageal reflux disease)     Hearing loss     [place] (hard of hearing)     Hearing aids    Hypertension     Long term (current) use of anticoagulants     Peripheral arterial disease     Tubulovillous adenoma of colon 2019    Wears dentures     Full-Upper; Partial- lower    Wound infection after surgery 06/2020       Past Surgical History:   Procedure Laterality Date    APPENDECTOMY      CAROTID STENT  2014    COLONOSCOPY N/A 10/7/2019     "Procedure: COLONOSCOPY;  Surgeon: Olaf Alba MD;  Location: [facility] ENDO;  Service: General;  Laterality: N/A;    COLONOSCOPY N/A 12/16/2019    Procedure: COLONOSCOPY;  Surgeon: lOaf Alba MD;  Location: [facility] ENDO;  Service: General;  Laterality: N/A;    COLONOSCOPY N/A 1/23/2020    Procedure: COLONOSCOPY (unable to come earlier);  Surgeon: Esteban Yancey MD;  Location: [facility] ENDO;  Service: Endoscopy;  Laterality: N/A;    ENDOSCOPIC ULTRASOUND OF UPPER GASTROINTESTINAL TRACT N/A 10/16/2018    Procedure: ULTRASOUND, UPPER GI TRACT, ENDOSCOPIC;  Surgeon: Kumar Wang MD;  Location: [facility] ENDO ([facility] FLR);  Service: Endoscopy;  Laterality: N/A;  5 day hold Effient, Dr Сергей Malagon    ESOPHAGOGASTRODUODENOSCOPY N/A 10/7/2019    Procedure: ESOPHAGOGASTRODUODENOSCOPY (EGD);  Surgeon: Olaf Alba MD;  Location: [facility] ENDO;  Service: General;  Laterality: N/A;    FEMORAL ARTERY STENT      HARDWARE REMOVAL Right 6/12/2020    Procedure: REMOVAL, HARDWARE;  Surgeon: Elbert Marrero II, MD;  Location: [facility] OR;  Service: Orthopedics;  Laterality: Right;  right distal tibia    ORIF TIBIA FRACTURE Right 2/14/2020    Procedure: ORIF, FRACTURE, TIBIA;  Surgeon: Elbert Marrero II, MD;  Location: [facility] OR;  Service: Orthopedics;  Laterality: Right;       Review of patient's allergies indicates:   Allergen Reactions    Penicillins      Age 23, "crippled me"       No current facility-administered medications on file prior to encounter.      Current Outpatient Medications on File Prior to Encounter   Medication Sig    atorvastatin (LIPITOR) 10 MG tablet Take 1 tablet (10 mg total) by mouth once daily.    busPIRone (BUSPAR) 15 MG tablet Take 1 tablet (15 mg total) by mouth 2 (two) times daily.    cloNIDine (CATAPRES) 0.1 MG tablet Take 1 tablet (0.1 mg total) by mouth 2 (two) times daily.    diazePAM (VALIUM) 5 MG tablet Take 1 tablet (5 mg total) by mouth every 12 (twelve) hours as " needed.    DULoxetine (CYMBALTA) 30 MG capsule Take 1 capsule (30 mg total) by mouth once daily.    GAVILYTE-G 236-22.74-6.74 -5.86 gram suspension     hydroCHLOROthiazide (HYDRODIURIL) 25 MG tablet Take 1 tablet (25 mg total) by mouth once daily.    isosorbide mononitrate (IMDUR) 30 MG 24 hr tablet Take 1 tablet (30 mg total) by mouth once daily.    pantoprazole (PROTONIX) 40 MG tablet Take 1 tablet (40 mg total) by mouth once daily.    polyethylene glycol (GOLYTELY,NULYTELY) 236-22.74-6.74 -5.86 gram suspension Take by mouth once.    prasugrel (EFFIENT) 5 mg Tab Take 5 mg by mouth once daily. Last dose 8/16/20 for surgery 8/24     Family History     Problem Relation (Age of Onset)    Diabetes Brother    Heart attack Brother    Hyperlipidemia Brother        Tobacco Use    Smoking status: Current Every Day Smoker     Packs/day: 0.75     Years: 23.00     Pack years: 17.25     Types: Cigarettes     Start date: 10/1/1988    Smokeless tobacco: Never Used   Substance and Sexual Activity    Alcohol use: Yes     Comment: occ    Drug use: No    Sexual activity: Yes     Review of Systems   Constitutional: Negative for chills, fatigue and fever.   HENT: Negative for congestion, sore throat and trouble swallowing.    Eyes: Negative for photophobia and visual disturbance.   Respiratory: Negative for cough, chest tightness and shortness of breath.    Cardiovascular: Negative for chest pain, palpitations and leg swelling.   Gastrointestinal: Positive for abdominal pain. Negative for diarrhea, nausea and vomiting.   Musculoskeletal: Negative for arthralgias, back pain and gait problem.   Skin: Negative for color change, pallor, rash and wound.   Neurological: Negative for dizziness, weakness and light-headedness.   Hematological: Negative for adenopathy.   Psychiatric/Behavioral: Negative for agitation, behavioral problems and confusion.   All other systems reviewed and are negative.    Objective:     Vital Signs (Most  Recent):  Temp: 98.1 °F (36.7 °C) (09/14/20 1203)  Pulse: 78 (09/14/20 1203)  Resp: 12 (09/14/20 1203)  BP: (!) 144/68 (09/14/20 1203)  SpO2: 96 % (09/14/20 1229) Vital Signs (24h Range):  Temp:  [97.5 °F (36.4 °C)-98.1 °F (36.7 °C)] 98.1 °F (36.7 °C)  Pulse:  [63-82] 78  Resp:  [11-26] 12  SpO2:  [96 %-100 %] 96 %  BP: ()/(49-73) 144/68        There is no height or weight on file to calculate BMI.    Physical Exam  Vitals signs and nursing note reviewed.   Constitutional:       General: She is not in acute distress.     Appearance: She is well-developed. She is not ill-appearing or diaphoretic.   HENT:      Head: Normocephalic and atraumatic.      Right Ear: External ear normal.      Left Ear: External ear normal.      Nose: Nose normal. No congestion or rhinorrhea.      Mouth/Throat:      Mouth: Mucous membranes are moist.      Pharynx: Oropharynx is clear. No oropharyngeal exudate or posterior oropharyngeal erythema.   Eyes:      General: No scleral icterus.     Conjunctiva/sclera: Conjunctivae normal.      Pupils: Pupils are equal, round, and reactive to light.   Neck:      Musculoskeletal: Normal range of motion and neck supple.      Vascular: No JVD.   Cardiovascular:      Rate and Rhythm: Normal rate and regular rhythm.      Pulses: Normal pulses.      Heart sounds: Normal heart sounds. No murmur.   Pulmonary:      Effort: Pulmonary effort is normal. No respiratory distress.      Breath sounds: Normal breath sounds. No stridor. No wheezing, rhonchi or rales.   Abdominal:      General: There is no distension.      Palpations: Abdomen is soft.      Tenderness: There is abdominal tenderness.      Comments: Hypoactive bowel sounds, mild generalized abdominal tenderness.  Laparoscopic abdominal surgical incisions covered with dressing clean dry and intact.   Musculoskeletal: Normal range of motion.         General: No swelling or tenderness.   Skin:     General: Skin is warm and dry.      Capillary Refill:  Capillary refill takes 2 to 3 seconds.      Coloration: Skin is not jaundiced or pale.      Findings: No erythema.   Neurological:      General: No focal deficit present.      Mental Status: She is alert and oriented to person, place, and time.      Cranial Nerves: No cranial nerve deficit.      Sensory: No sensory deficit.   Psychiatric:         Mood and Affect: Mood normal.         Behavior: Behavior normal.         Thought Content: Thought content normal.           CRANIAL NERVES     CN III, IV, VI   Pupils are equal, round, and reactive to light.       Significant Labs: Preop labs reviewed and within normal limits    Significant Imaging: I have reviewed all pertinent imaging results/findings within the past 24 hours.    Assessment/Plan:     * Colon polyp  POD 0 s/p sigmoid colectomy with Dr. Candelaria  Continue to follow surgery recommendations.  Needs aggressive incentive spirometry.  Follow hemoglobin and hematocrit closely.  Pain control with IV narcotics and antiemetics as needed.  SCDs for DVT prophylaxis per surgery recommendations.      CAD (coronary artery disease)  Chronic, stable  Telemetry monitoring  Resume home medications      Peripheral vascular disease  Chronic, stable  Resume home medication when appropriate by surgeon      Hyperlipidemia  Chronic, stable  Resume statin      Benign essential hypertension  Chronic, controlled.  Latest blood pressure and vitals reviewed-   Temp:  [97.5 °F (36.4 °C)-98.1 °F (36.7 °C)]   Pulse:  [63-82]   Resp:  [11-26]   BP: ()/(49-73)   SpO2:  [96 %-100 %] .   Home meds for hypertension were reviewed and noted below. Hospital anti-hypertensive changes were made as shown below.  Hypertension Medications             cloNIDine (CATAPRES) 0.1 MG tablet Take 1 tablet (0.1 mg total) by mouth 2 (two) times daily.    hydroCHLOROthiazide (HYDRODIURIL) 25 MG tablet Take 1 tablet (25 mg total) by mouth once daily.    isosorbide mononitrate (IMDUR) 30 MG 24 hr tablet Take 1  tablet (30 mg total) by mouth once daily.      Hospital Medications             cloNIDine tablet 0.1 mg 0.1 mg, Oral, 2 times daily    isosorbide mononitrate 24 hr tablet 30 mg 30 mg, Oral, Daily, DO NOT CRUSH OR CHEW; SWALLOW WHOLE.        Will utilize p.r.n. blood pressure medication only if patient's blood pressure greater than  180/110 and she develops symptoms such as worsening chest pain or shortness of breath.        VTE Risk Mitigation (From admission, onward)    SCDs for VTE prophylaxis - hold anticoagulation for now due to recent surgery risk for postop bleeding             Bernadine Barriga NP  Department of Hospital Medicine   [facility]

## 2020-09-14 NOTE — TRANSFER OF CARE
Anesthesia Transfer of Care Note    Patient: Comfort Cantu    Procedure(s) Performed: Procedure(s) (LRB):  ROBOTIC COLECTOMY, SIGMOID (N/A)    Patient location: PACU    Anesthesia Type: general    Transport from OR: Transported from OR on 2-3 L/min O2 by NC with adequate spontaneous ventilation    Post pain: adequate analgesia    Post assessment: no apparent anesthetic complications    Post vital signs: stable    Level of consciousness: awake    Nausea/Vomiting: no nausea/vomiting    Complications: none    Transfer of care protocol was followed      Last vitals:   Visit Vitals  /67 (BP Location: Left arm, Patient Position: Lying)   Pulse 63   Temp 36.4 °C (97.5 °F) (Skin)   Resp 16   SpO2 98%   Breastfeeding No

## 2020-09-14 NOTE — ANESTHESIA PROCEDURE NOTES
Intubation  Performed by: Abad Hoffman CRNA  Authorized by: Erasmo Souza MD     Intubation:     Induction:  Intravenous    Intubated:  Postinduction    Mask Ventilation:  Easy mask    Attempts:  1    Attempted By:  CRNA    Blade:  Khoury 2    Laryngeal View Grade: Grade I - full view of chords      Difficult Airway Encountered?: No      Complications:  None    Airway Device:  Oral endotracheal tube    Airway Device Size:  7.5    Style/Cuff Inflation:  Cuffed    Tube secured:  21.5    Secured at:  The lips    Placement Verified By:  Capnometry    Complicating Factors:  None (loose teeth dentition unchanged)    Findings Post-Intubation:  BS equal bilateral

## 2020-09-14 NOTE — PLAN OF CARE
VSS. C/O pain. Admin. Fentanyl IV as ordered for pain. BP dropped after pain med. But resolved shortly after. Criteria met for transfer per Anesthesia. Transferred via bed to room . Report to Rosalie GALLAGHER. ALETHA noted.

## 2020-09-14 NOTE — HPI
Comfort Cantu is a 64-year-old  female with past medical history significant for CAD, depression, anxiety, hearing loss, CAD and hypertension presenting today status post sigmoid colectomy by Dr. Candelaria.  Per chart review and patient she reports she was found to have an adenomatous polyp within the sigmoid colon that has been unable to be resected endoscopically.  It is shown to have at least high-grade dysplasia.  It was recommended that she needed surgical resection.  She was scheduled for resection in April of this year but this was canceled secondary to COVID.  Patient was seen postoperatively and doing well.  She reports pain to her abdomen which she describes as generalized abdomen location, mild to moderate severity, aching quality, no aggravating or alleviating factors.  She denies nausea, vomiting, chest pain or shortness of breath.  She is resting comfortably.

## 2020-09-14 NOTE — OP NOTE
Surgeon:  Dr. Terrance Candelaria    Assistant:  Jenniffer JAQUEZA    Preop diagnosis:  Rectosigmoid polyp    Postoperative diagnosis:  The same     Procedure:  Robotic rectosigmoid resection with colorectal anastomosis  Colonoscopy    Anesthesia:  General endotracheal anesthesia    Indication for procedure:  64-year-old female referred to me for evaluation of colon resection.  Patient had had colonoscopy demonstrating large polyp in the sigmoid colon.  Attempts to remove this were unsuccessful as it was right behind a fold.  It was biopsied and confirmed to be adenomatous and also have high-grade dysplasia.  Patient was therefore arrange to have resection.  Surgery scheduled for the above-mentioned date.    Description of procedure:  Following signing informed consent she received preoperative IV antibiotics and was taken to the operating room and placed in supine position.  General endotracheal anesthesia was administered without event.  Carrera catheter was inserted.  She was placed in lithotomy position.  The abdomen perineal area were prepped and draped standard fashion.  An appropriate time-out procedure was then performed.  Above the umbilicus and to the right of midline make a small transverse incision.  I entered the abdominal cavity with a Veress needle establish pneumoperitoneum to 15 mm of mercury.  Optiview trocar was then inserted under direct visualization.  I then placed a 12 mm robotic stapling trocar in the right lower quadrant under direct visualization.  There are significant omental adhesions in the upper midline and into the left upper quadrant.  These were sharply taken down laparoscopically with the assistance of scissors.  I placed 2 more 8 mm robotic trocars in the left upper abdomen  by 10 cm.  Both placed under direct visualization.  Lastly in 5 mm assistant trocar was placed in the right upper quadrant.  All trocars placed under direct visualization and after injection with 0.25%  Marcaine mixed with Exparel.  Patient is placed in Trendelenburg tilted towards the right side.  The robot is docked the procedure commenced.  Patient has redundant sigmoid colon.  Inferior mesenteric vessels grasped and held upright.  Mesentery was dissected just distal to the takeoff.  Ureter was identified and swept posteriorly.  Sweeping the ureter posteriorly isolate the inferior mesenteric artery uneventfully.  High ligation is performed of the inferior mesenteric artery with the assistance of a EnSeal device.  Proceed proximally and ligate the IMV as well.  Dissect the retroperitoneum fully mobilizing the sigmoid and descending colon.  Medial to lateral dissection was completed scoring and removing the white line of Toldt.  Next I dissected into water hours fascia and laterally in the mesorectum.  I continued my dissection distally to a point in the proximal rectum below the tattoo present in the rectosigmoid junction.  I next cauterized the mesorectum is staple across the proximal rectum with 2 firings of robotic stapler.  Point of proximal resection was chosen in the distal descending colon.  Mesentery to this point was ligated with the vessel sealer.  I next make a small transverse incision above the pubic bone and Pfannenstiel type fashion.  Small wound retractor was placed.  I externalized the sigmoid colon descending colon.  Kocher was placed across the chosen point of proximal resection.  This was sharply divided without event.  Specimen was sent off the field.  Descending colon was held open and pursestring suture placed without event.  Laparoscopically fashion create a and end anastomosis with 29 EEA stapler.  Next fluid was placed around the anastomosis submerging the anastomosis in the fluid.  Colonoscopy was then performed to the cecum.  Anastomosis was clearly visualized.  Widely patent no evidence of bleeding or ischemic changes.  No evidence of a leak was appreciated.  Scope was then withdrawn.   All trocars removed under direct visualization.  I then closed the peritoneum from the Pfannenstiel incision.  Fascia was then closed.  Skin incisions were closed for Monocryl.  Patient was extubated taken recovery room stable condition.  There no immediate complications.  Blood loss was 75 cc

## 2020-09-14 NOTE — NURSING TRANSFER
Nursing Transfer Note      9/14/2020     Transfer To: [facility]    Transfer via stretcher    Transfer with  to O2    Transported by PACU    Medicines sent: n/a    Chart send with patient: Yes    Notified: friend    Patient reassessed at: 9/14/2020 @ 1205    Upon arrival to floor: patient oriented to room, call bell in reach and bed in lowest position

## 2020-09-14 NOTE — ASSESSMENT & PLAN NOTE
POD 0 s/p sigmoid colectomy with Dr. Candelaria  Continue to follow surgery recommendations.  Needs aggressive incentive spirometry.  Follow hemoglobin and hematocrit closely.  Pain control with IV narcotics and antiemetics as needed.  SCDs for DVT prophylaxis per surgery recommendations.

## 2020-09-14 NOTE — RESPIRATORY THERAPY
09/14/20 1229   Patient Assessment/Suction   Level of Consciousness (AVPU) alert   PRE-TX-O2   O2 Device (Oxygen Therapy) nasal cannula   $ Is the patient on Low Flow Oxygen? Yes   Flow (L/min) 2   Oxygen Concentration (%) 28   SpO2 96 %   Pulse Oximetry Type Intermittent   $ Pulse Oximetry - Multiple Charge Pulse Oximetry - Multiple   Ready to Wean/Extubation Screen   FIO2<=50 (chart decimal) 0.28

## 2020-09-14 NOTE — ANESTHESIA POSTPROCEDURE EVALUATION
Anesthesia Post Evaluation    Patient: Comfort Cantu    Procedure(s) Performed: Procedure(s) (LRB):  ROBOTIC COLECTOMY, SIGMOID (N/A)    Final Anesthesia Type: general    Patient location during evaluation: PACU  Patient participation: Yes- Able to Participate  Level of consciousness: awake and alert  Post-procedure vital signs: reviewed and stable  Pain management: adequate  Airway patency: patent    PONV status at discharge: No PONV  Anesthetic complications: no      Cardiovascular status: hemodynamically stable  Respiratory status: unassisted and room air  Hydration status: euvolemic  Follow-up not needed.          Vitals Value Taken Time   /68 09/14/20 1203   Temp 36.7 °C (98.1 °F) 09/14/20 1203   Pulse 78 09/14/20 1203   Resp 12 09/14/20 1203   SpO2 96 % 09/14/20 1229         Event Time   Out of Recovery 11:51:00         Pain/Deann Score: Pain Rating Prior to Med Admin: 9 (9/14/2020  6:26 PM)  Pain Rating Post Med Admin: 2 (9/14/2020  5:00 PM)  Deann Score: 8 (9/14/2020 11:25 AM)

## 2020-09-14 NOTE — H&P
Surgery H&P    Subjective:     HPI   64-year-old female who returns to discuss colectomy.  Patient has a adenomatous polyp within the sigmoid colon that has been unable to be resected endoscopically.  It is shown to have at least high-grade dysplasia.  It has been recommended that she of surgical resection.  She was scheduled for resection in April of this year but this was canceled secondary to COVID.  She returns today to discuss planning surgical intervention.  .  She has had multiple orthopedic surgeries the last few months.  This was secondary to a fractured which was then complicated by infection.  She has healed well now.  She states that she is feeling well otherwise.  No other significant changes in her overall health condition.      Review of Systems   Constitutional: Negative for activity change, appetite change, fever and unexpected weight change.   Respiratory: Negative for chest tightness, shortness of breath and wheezing.    Cardiovascular: Negative for chest pain.   Gastrointestinal: Negative for abdominal distention, abdominal pain, anal bleeding, blood in stool, constipation, diarrhea, nausea, rectal pain and vomiting.   Genitourinary: Negative for difficulty urinating, dysuria and frequency.   Integumentary:  Negative for wound.   Neurological: Negative for dizziness.   Hematological: Negative for adenopathy.   Psychiatric/Behavioral: Negative for agitation.     Past Medical History:   Diagnosis Date    Anticoagulant long-term use     Anxiety     Arthritis     Atherosclerosis     Coronary artery disease     Depression     GERD (gastroesophageal reflux disease)     Hearing loss     [place] (hard of hearing[place]     Hearing aids    Hypertension     Long term (current) use of anticoagulants     Peripheral arterial disease     Tubulovillous adenoma of colon 2019    Wears dentures     Full-Upper; Partial- lower    Wound infection after surgery 06/2020     Past Surgical History:   Procedure  Laterality Date    APPENDECTOMY      CAROTID STENT  2014    COLONOSCOPY N/A 10/7/2019    Procedure: COLONOSCOPY;  Surgeon: Olaf Alba MD;  Location: [facility] ENDO;  Service: General;  Laterality: N/A;    COLONOSCOPY N/A 12/16/2019    Procedure: COLONOSCOPY;  Surgeon: Olaf Alba MD;  Location: [facility] ENDO;  Service: General;  Laterality: N/A;    COLONOSCOPY N/A 1/23/2020    Procedure: COLONOSCOPY (unable to come earlier);  Surgeon: Esteban Yancey MD;  Location: [facility] ENDO;  Service: Endoscopy;  Laterality: N/A;    ENDOSCOPIC ULTRASOUND OF UPPER GASTROINTESTINAL TRACT N/A 10/16/2018    Procedure: ULTRASOUND, UPPER GI TRACT, ENDOSCOPIC;  Surgeon: Kumar Wang MD;  [facility]  Service: Endoscopy;  Laterality: N/A;  5 day hold Effient, Dr Сергей Malagon    ESOPHAGOGASTRODUODENOSCOPY N/A 10/7/2019    Procedure: ESOPHAGOGASTRODUODENOSCOPY (EGD);  Surgeon: Olaf Alba MD;  Location: [facility] ENDO;  Service: General;  Laterality: N/A;    FEMORAL ARTERY STENT      HARDWARE REMOVAL Right 6/12/2020    Procedure: REMOVAL, HARDWARE;  Surgeon: Elbert Marrero II, MD;  Location: [facility] OR;  Service: Orthopedics;  Laterality: Right;  right distal tibia    ORIF TIBIA FRACTURE Right 2/14/2020    Procedure: ORIF, FRACTURE, TIBIA;  Surgeon: Elbert Marrero II, MD;  Location: [facility] OR;  Service: Orthopedics;  Laterality: Right;     No current facility-administered medications on file prior to encounter.      Current Outpatient Medications on File Prior to Encounter   Medication Sig Dispense Refill    atorvastatin (LIPITOR) 10 MG tablet Take 1 tablet (10 mg total) by mouth once daily. 90 tablet 3    busPIRone (BUSPAR) 15 MG tablet Take 1 tablet (15 mg total) by mouth 2 (two) times daily. 180 tablet 3    cloNIDine (CATAPRES) 0.1 MG tablet Take 1 tablet (0.1 mg total) by mouth 2 (two) times daily. 180 tablet 3    diazePAM (VALIUM) 5 MG tablet Take 1 tablet (5 mg total) by  "mouth every 12 (twelve) hours as needed. 60 tablet 2    DULoxetine (CYMBALTA) 30 MG capsule Take 1 capsule (30 mg total) by mouth once daily. 90 capsule 3    GAVILYTE-G 236-22.74-6.74 -5.86 gram suspension       hydroCHLOROthiazide (HYDRODIURIL) 25 MG tablet Take 1 tablet (25 mg total) by mouth once daily. 90 tablet 3    isosorbide mononitrate (IMDUR) 30 MG 24 hr tablet Take 1 tablet (30 mg total) by mouth once daily. 90 tablet 3    pantoprazole (PROTONIX) 40 MG tablet Take 1 tablet (40 mg total) by mouth once daily. 90 tablet 3    polyethylene glycol (GOLYTELY,NULYTELY) 236-22.74-6.74 -5.86 gram suspension Take by mouth once.      prasugrel (EFFIENT) 5 mg Tab Take 5 mg by mouth once daily. Last dose 8/16/20 for surgery 8/24       Review of patient's allergies indicates:   Allergen Reactions    Penicillins      Age 23, "crippled me"            Objective:   Physical Exam  Vitals signs reviewed.   Constitutional:       Appearance: She is well-developed.   HENT:      Head: Normocephalic and atraumatic.   Eyes:      Pupils: Pupils are equal, round, and reactive to light.   Neck:      Musculoskeletal: Normal range of motion and neck supple.      Thyroid: No thyromegaly.      Trachea: No tracheal deviation.   Cardiovascular:      Rate and Rhythm: Normal rate and regular rhythm.      Heart sounds: Normal heart sounds. No murmur.   Pulmonary:      Effort: Pulmonary effort is normal.      Breath sounds: Normal breath sounds.   Chest:      Chest wall: No tenderness.   Abdominal:      General: Bowel sounds are normal. There is no distension or abdominal bruit.      Palpations: Abdomen is soft. Abdomen is not rigid. There is no mass or pulsatile mass.      Tenderness: There is no abdominal tenderness. There is no guarding or rebound. Negative signs include De Santiago's sign and McBurney's sign.      Hernia: No hernia is present. There is no hernia in the ventral area.   Genitourinary:     Rectum: Normal.   Musculoskeletal: " Normal range of motion.   Skin:     General: Skin is warm.      Findings: No erythema or rash.   Neurological:      Mental Status: She is alert and oriented to person, place, and time.      see cscope for images  Assessment:    Colonic polyp      Plan:       To OR for robotic sigmoidectomy    Miguelito Whitmore M.D.  PGY 5

## 2020-09-14 NOTE — OR NURSING
0710 late entry   Abdomen wiped with hibiclens by RN.no need to clip  site - no hair in surgical field.

## 2020-09-14 NOTE — BRIEF OP NOTE
[facility]  Brief Operative Note    SUMMARY     Surgery Date: 9/14/2020     Surgeon(s) and Role:     * Terrance Candelaria MD - Primary     * Miguelito Whitmore MD - Resident - Assisting      Assistant: Tricia BUENROSTRO      Pre-op Diagnosis:  Polyp of colon, unspecified part of colon, unspecified type [K63.5]; sigmoid colon    Post-op Diagnosis:  Post-Op Diagnosis Codes:     * Polyp of colon, unspecified part of colon, unspecified type [K63.5]; sigmoid colon      Procedure:   robotic colectomy with colorectal anastomosis.    colonoscopy    Anesthesia: General    Description of Procedure: Robotic rectosigmoid resection with colorectal anastomosis.  Colonoscopy demonstrates good anastomosis with no evidence of bleeding.      Description of the findings of the procedure: see above.     Estimated Blood Loss:75 cc's    Estimated Blood Loss has been documented.         Specimens:   Specimen (12h ago, onward)    None          [id number]

## 2020-09-14 NOTE — PLAN OF CARE
SW met with patient at bedside to complete discharge planning assessment.  Patient alert and oriented xs 4.  Patient verified all demographic information on facesheet is correct.  Patient verified PCP is Dr. Darinel Riddle.  Patient verified primary health insurance is [employer].  Patient with NO home health or DME.  Patient with NO POA or Living Will.  Patient not on dialysis or medication coumadin.  Patient with no 30 day admission.  Patient with no financial issues at this time.  Patient family will provide transportation upon discharge from facility.  Patient independent with ADLs, live alone, drives self.  Patient uses [facility] - [place], [place] - [address] [facility].       09/14/20 1341   Discharge Assessment   Assessment Type Discharge Planning Assessment   Confirmed/corrected address and phone number on facesheet? Yes   Assessment information obtained from? Patient   Expected Length of Stay (days) 4   Communicated expected length of stay with patient/caregiver yes   Prior to hospitilization cognitive status: Alert/Oriented   Prior to hospitalization functional status: Independent   Current cognitive status: Alert/Oriented   Current Functional Status: Independent   Facility Arrived From: home   Lives With alone   Able to Return to Prior Arrangements yes   Is patient able to care for self after discharge? Yes   Patient's perception of discharge disposition home or selfcare   Readmission Within the Last 30 Days no previous admission in last 30 days   Patient currently being followed by outpatient case management? No   Patient currently receives any other outside agency services? No   Equipment Currently Used at Home walker, rolling;cane, straight;wheelchair   Do you have any problems affording any of your prescribed medications? No   Is the patient taking medications as prescribed? yes   Does the patient have transportation home? Yes   Transportation Anticipated family or friend will provide   Does the  patient receive services at the [facility]? No   Discharge Plan A Home   Discharge Plan B Home   DME Needed Upon Discharge  none   Patient/Family in Agreement with Plan yes

## 2020-09-14 NOTE — OR NURSING
Pt. Is AAOx4, NAD, denies pain or nausea.  Is very [place] and wears hearing aids - left ear stronger per pt.  Her sister is Sylvia [phone] and in waiting area.  Dentures, hearing aids, cell phone and all valuable given to sister.  Prepared for surgery.

## 2020-09-14 NOTE — ASSESSMENT & PLAN NOTE
Chronic, controlled.  Latest blood pressure and vitals reviewed-   Temp:  [97.5 °F (36.4 °C)-98.1 °F (36.7 °C)]   Pulse:  [63-82]   Resp:  [11-26]   BP: ()/(49-73)   SpO2:  [96 %-100 %] .   Home meds for hypertension were reviewed and noted below. Hospital anti-hypertensive changes were made as shown below.  Hypertension Medications             cloNIDine (CATAPRES) 0.1 MG tablet Take 1 tablet (0.1 mg total) by mouth 2 (two) times daily.    hydroCHLOROthiazide (HYDRODIURIL) 25 MG tablet Take 1 tablet (25 mg total) by mouth once daily.    isosorbide mononitrate (IMDUR) 30 MG 24 hr tablet Take 1 tablet (30 mg total) by mouth once daily.      Hospital Medications             cloNIDine tablet 0.1 mg 0.1 mg, Oral, 2 times daily    isosorbide mononitrate 24 hr tablet 30 mg 30 mg, Oral, Daily, DO NOT CRUSH OR CHEW; SWALLOW WHOLE.        Will utilize p.r.n. blood pressure medication only if patient's blood pressure greater than  180/110 and she develops symptoms such as worsening chest pain or shortness of breath.

## 2020-09-14 NOTE — SUBJECTIVE & OBJECTIVE
Past Medical History:   Diagnosis Date    Anticoagulant long-term use     Anxiety     Arthritis     Atherosclerosis     Coronary artery disease     Depression     GERD (gastroesophageal reflux disease)     Hearing loss     [place] (hard of hearing)     Hearing aids    Hypertension     Long term (current) use of anticoagulants     Peripheral arterial disease     Tubulovillous adenoma of colon 2019    Wears dentures     Full-Upper; Partial- lower    Wound infection after surgery 06/2020       Past Surgical History:   Procedure Laterality Date    APPENDECTOMY      CAROTID STENT  2014    COLONOSCOPY N/A 10/7/2019    Procedure: COLONOSCOPY;  Surgeon: Olaf Alba MD;  Location: [facility] ENDO;  Service: General;  Laterality: N/A;    COLONOSCOPY N/A 12/16/2019    Procedure: COLONOSCOPY;  Surgeon: Olaf Alba MD;  Location: [facility] ENDO;  Service: General;  Laterality: N/A;    COLONOSCOPY N/A 1/23/2020    Procedure: COLONOSCOPY (unable to come earlier);  Surgeon: Esteban Yancey MD;  Location: [facility] ENDO;  Service: Endoscopy;  Laterality: N/A;    ENDOSCOPIC ULTRASOUND OF UPPER GASTROINTESTINAL TRACT N/A 10/16/2018    Procedure: ULTRASOUND, UPPER GI TRACT, ENDOSCOPIC;  Surgeon: Kumar Wang MD;  Location: [facility] ENDO (2ND FLR);  Service: Endoscopy;  Laterality: N/A;  5 day hold Effient, Dr Сергей Malagon    ESOPHAGOGASTRODUODENOSCOPY N/A 10/7/2019    Procedure: ESOPHAGOGASTRODUODENOSCOPY (EGD);  Surgeon: Olaf Alba MD;  Location: [facility];  Service: General;  Laterality: N/A;    FEMORAL ARTERY STENT      HARDWARE REMOVAL Right 6/12/2020    Procedure: REMOVAL, HARDWARE;  Surgeon: Elbert Marrero II, MD;  Location: [facility] OR;  Service: Orthopedics;  Laterality: Right;  right distal tibia    ORIF TIBIA FRACTURE Right 2/14/2020    Procedure: ORIF, FRACTURE, TIBIA;  Surgeon: Elbert Marrero II, MD;  Location: [facility] OR;  Service: Orthopedics;  Laterality: Right;       Review of  "patient's allergies indicates:   Allergen Reactions    Penicillins      Age 23, "crippled me"       No current facility-administered medications on file prior to encounter.      Current Outpatient Medications on File Prior to Encounter   Medication Sig    atorvastatin (LIPITOR) 10 MG tablet Take 1 tablet (10 mg total) by mouth once daily.    busPIRone (BUSPAR) 15 MG tablet Take 1 tablet (15 mg total) by mouth 2 (two) times daily.    cloNIDine (CATAPRES) 0.1 MG tablet Take 1 tablet (0.1 mg total) by mouth 2 (two) times daily.    diazePAM (VALIUM) 5 MG tablet Take 1 tablet (5 mg total) by mouth every 12 (twelve) hours as needed.    DULoxetine (CYMBALTA) 30 MG capsule Take 1 capsule (30 mg total) by mouth once daily.    GAVILYTE-G 236-22.74-6.74 -5.86 gram suspension     hydroCHLOROthiazide (HYDRODIURIL) 25 MG tablet Take 1 tablet (25 mg total) by mouth once daily.    isosorbide mononitrate (IMDUR) 30 MG 24 hr tablet Take 1 tablet (30 mg total) by mouth once daily.    pantoprazole (PROTONIX) 40 MG tablet Take 1 tablet (40 mg total) by mouth once daily.    polyethylene glycol (GOLYTELY,NULYTELY) 236-22.74-6.74 -5.86 gram suspension Take by mouth once.    prasugrel (EFFIENT) 5 mg Tab Take 5 mg by mouth once daily. Last dose 8/16/20 for surgery 8/24     Family History     Problem Relation (Age of Onset)    Diabetes Brother    Heart attack Brother    Hyperlipidemia Brother        Tobacco Use    Smoking status: Current Every Day Smoker     Packs/day: 0.75     Years: 23.00     Pack years: 17.25     Types: Cigarettes     Start date: 10/1/1988    Smokeless tobacco: Never Used   Substance and Sexual Activity    Alcohol use: Yes     Comment: occ    Drug use: No    Sexual activity: Yes     Review of Systems   Constitutional: Negative for chills, fatigue and fever.   HENT: Negative for congestion, sore throat and trouble swallowing.    Eyes: Negative for photophobia and visual disturbance.   Respiratory: Negative " for cough, chest tightness and shortness of breath.    Cardiovascular: Negative for chest pain, palpitations and leg swelling.   Gastrointestinal: Positive for abdominal pain. Negative for diarrhea, nausea and vomiting.   Musculoskeletal: Negative for arthralgias, back pain and gait problem.   Skin: Negative for color change, pallor, rash and wound.   Neurological: Negative for dizziness, weakness and light-headedness.   Hematological: Negative for adenopathy.   Psychiatric/Behavioral: Negative for agitation, behavioral problems and confusion.   All other systems reviewed and are negative.    Objective:     Vital Signs (Most Recent):  Temp: 98.1 °F (36.7 °C) (09/14/20 1203)  Pulse: 78 (09/14/20 1203)  Resp: 12 (09/14/20 1203)  BP: (!) 144/68 (09/14/20 1203)  SpO2: 96 % (09/14/20 1229) Vital Signs (24h Range):  Temp:  [97.5 °F (36.4 °C)-98.1 °F (36.7 °C)] 98.1 °F (36.7 °C)  Pulse:  [63-82] 78  Resp:  [11-26] 12  SpO2:  [96 %-100 %] 96 %  BP: ()/(49-73) 144/68        There is no height or weight on file to calculate BMI.    Physical Exam  Vitals signs and nursing note reviewed.   Constitutional:       General: She is not in acute distress.     Appearance: She is well-developed. She is not ill-appearing or diaphoretic.   HENT:      Head: Normocephalic and atraumatic.      Right Ear: External ear normal.      Left Ear: External ear normal.      Nose: Nose normal. No congestion or rhinorrhea.      Mouth/Throat:      Mouth: Mucous membranes are moist.      Pharynx: Oropharynx is clear. No oropharyngeal exudate or posterior oropharyngeal erythema.   Eyes:      General: No scleral icterus.     Conjunctiva/sclera: Conjunctivae normal.      Pupils: Pupils are equal, round, and reactive to light.   Neck:      Musculoskeletal: Normal range of motion and neck supple.      Vascular: No JVD.   Cardiovascular:      Rate and Rhythm: Normal rate and regular rhythm.      Pulses: Normal pulses.      Heart sounds: Normal heart  sounds. No murmur.   Pulmonary:      Effort: Pulmonary effort is normal. No respiratory distress.      Breath sounds: Normal breath sounds. No stridor. No wheezing, rhonchi or rales.   Abdominal:      General: There is no distension.      Palpations: Abdomen is soft.      Tenderness: There is abdominal tenderness.      Comments: Hypoactive bowel sounds, mild generalized abdominal tenderness.  Laparoscopic abdominal surgical incisions covered with dressing clean dry and intact.   Musculoskeletal: Normal range of motion.         General: No swelling or tenderness.   Skin:     General: Skin is warm and dry.      Capillary Refill: Capillary refill takes 2 to 3 seconds.      Coloration: Skin is not jaundiced or pale.      Findings: No erythema.   Neurological:      General: No focal deficit present.      Mental Status: She is alert and oriented to person, place, and time.      Cranial Nerves: No cranial nerve deficit.      Sensory: No sensory deficit.   Psychiatric:         Mood and Affect: Mood normal.         Behavior: Behavior normal.         Thought Content: Thought content normal.           CRANIAL NERVES     CN III, IV, VI   Pupils are equal, round, and reactive to light.       Significant Labs: Preop labs reviewed and within normal limits    Significant Imaging: I have reviewed all pertinent imaging results/findings within the past 24 hours.

## 2020-09-15 LAB
ANION GAP SERPL CALC-SCNC: 10 MMOL/L (ref 8–16)
BASOPHILS # BLD AUTO: 0.01 K/UL (ref 0–0.2)
BASOPHILS NFR BLD: 0.1 % (ref 0–1.9)
BUN SERPL-MCNC: 3 MG/DL (ref 8–23)
CALCIUM SERPL-MCNC: 7.9 MG/DL (ref 8.7–10.5)
CHLORIDE SERPL-SCNC: 106 MMOL/L (ref 95–110)
CO2 SERPL-SCNC: 24 MMOL/L (ref 23–29)
CREAT SERPL-MCNC: 0.6 MG/DL (ref 0.5–1.4)
DIFFERENTIAL METHOD: ABNORMAL
EOSINOPHIL # BLD AUTO: 0.1 K/UL (ref 0–0.5)
EOSINOPHIL NFR BLD: 0.8 % (ref 0–8)
ERYTHROCYTE [DISTWIDTH] IN BLOOD BY AUTOMATED COUNT: 15.9 % (ref 11.5–14.5)
EST. GFR  (AFRICAN AMERICAN): >60 ML/MIN/1.73 M^2
EST. GFR  (NON AFRICAN AMERICAN): >60 ML/MIN/1.73 M^2
GLUCOSE SERPL-MCNC: 118 MG/DL (ref 70–110)
HCT VFR BLD AUTO: 24.5 % (ref 37–48.5)
HGB BLD-MCNC: 7.9 G/DL (ref 12–16)
IMM GRANULOCYTES # BLD AUTO: 0.03 K/UL (ref 0–0.04)
IMM GRANULOCYTES NFR BLD AUTO: 0.4 % (ref 0–0.5)
LYMPHOCYTES # BLD AUTO: 1.2 K/UL (ref 1–4.8)
LYMPHOCYTES NFR BLD: 15.6 % (ref 18–48)
MCH RBC QN AUTO: 35 PG (ref 27–31)
MCHC RBC AUTO-ENTMCNC: 32.2 G/DL (ref 32–36)
MCV RBC AUTO: 108 FL (ref 82–98)
MONOCYTES # BLD AUTO: 0.6 K/UL (ref 0.3–1)
MONOCYTES NFR BLD: 8.3 % (ref 4–15)
NEUTROPHILS # BLD AUTO: 5.7 K/UL (ref 1.8–7.7)
NEUTROPHILS NFR BLD: 74.8 % (ref 38–73)
NRBC BLD-RTO: 0 /100 WBC
PLATELET # BLD AUTO: 184 K/UL (ref 150–350)
PMV BLD AUTO: 9.5 FL (ref 9.2–12.9)
POTASSIUM SERPL-SCNC: 3.3 MMOL/L (ref 3.5–5.1)
RBC # BLD AUTO: 2.26 M/UL (ref 4–5.4)
SODIUM SERPL-SCNC: 140 MMOL/L (ref 136–145)
WBC # BLD AUTO: 7.61 K/UL (ref 3.9–12.7)

## 2020-09-15 PROCEDURE — 99900035 HC TECH TIME PER 15 MIN (STAT)

## 2020-09-15 PROCEDURE — 25000003 PHARM REV CODE 250: Performed by: SURGERY

## 2020-09-15 PROCEDURE — 12000002 HC ACUTE/MED SURGE SEMI-PRIVATE ROOM

## 2020-09-15 PROCEDURE — 25000003 PHARM REV CODE 250: Performed by: NURSE PRACTITIONER

## 2020-09-15 PROCEDURE — 94799 UNLISTED PULMONARY SVC/PX: CPT

## 2020-09-15 PROCEDURE — 94761 N-INVAS EAR/PLS OXIMETRY MLT: CPT

## 2020-09-15 PROCEDURE — 80048 BASIC METABOLIC PNL TOTAL CA: CPT

## 2020-09-15 PROCEDURE — 85025 COMPLETE CBC W/AUTO DIFF WBC: CPT

## 2020-09-15 PROCEDURE — S0030 INJECTION, METRONIDAZOLE: HCPCS | Performed by: SURGERY

## 2020-09-15 PROCEDURE — 36415 COLL VENOUS BLD VENIPUNCTURE: CPT

## 2020-09-15 PROCEDURE — 63600175 PHARM REV CODE 636 W HCPCS: Performed by: SURGERY

## 2020-09-15 RX ORDER — POTASSIUM CHLORIDE 1.5 G/1.58G
40 POWDER, FOR SOLUTION ORAL
Status: DISCONTINUED | OUTPATIENT
Start: 2020-09-15 | End: 2020-09-16 | Stop reason: HOSPADM

## 2020-09-15 RX ORDER — SODIUM,POTASSIUM PHOSPHATES 280-250MG
2 POWDER IN PACKET (EA) ORAL
Status: DISCONTINUED | OUTPATIENT
Start: 2020-09-15 | End: 2020-09-16 | Stop reason: HOSPADM

## 2020-09-15 RX ORDER — SODIUM CHLORIDE 9 MG/ML
INJECTION, SOLUTION INTRAVENOUS ONCE
Status: COMPLETED | OUTPATIENT
Start: 2020-09-15 | End: 2020-09-15

## 2020-09-15 RX ORDER — ACETAMINOPHEN 10 MG/ML
1000 INJECTION, SOLUTION INTRAVENOUS EVERY 6 HOURS
Status: COMPLETED | OUTPATIENT
Start: 2020-09-15 | End: 2020-09-16

## 2020-09-15 RX ORDER — LANOLIN ALCOHOL/MO/W.PET/CERES
800 CREAM (GRAM) TOPICAL
Status: DISCONTINUED | OUTPATIENT
Start: 2020-09-15 | End: 2020-09-16 | Stop reason: HOSPADM

## 2020-09-15 RX ORDER — POTASSIUM CHLORIDE 1.5 G/1.58G
60 POWDER, FOR SOLUTION ORAL
Status: DISCONTINUED | OUTPATIENT
Start: 2020-09-15 | End: 2020-09-16 | Stop reason: HOSPADM

## 2020-09-15 RX ADMIN — GABAPENTIN 200 MG: 100 CAPSULE ORAL at 09:09

## 2020-09-15 RX ADMIN — DEXTROSE, SODIUM CHLORIDE, SODIUM LACTATE, POTASSIUM CHLORIDE, AND CALCIUM CHLORIDE: 5; .6; .31; .03; .02 INJECTION, SOLUTION INTRAVENOUS at 12:09

## 2020-09-15 RX ADMIN — SODIUM CHLORIDE: 0.9 INJECTION, SOLUTION INTRAVENOUS at 04:09

## 2020-09-15 RX ADMIN — IBUPROFEN 600 MG: 600 TABLET, FILM COATED ORAL at 09:09

## 2020-09-15 RX ADMIN — MUPIROCIN: 20 OINTMENT TOPICAL at 09:09

## 2020-09-15 RX ADMIN — CLONIDINE HYDROCHLORIDE 0.1 MG: 0.1 TABLET ORAL at 09:09

## 2020-09-15 RX ADMIN — ACETAMINOPHEN 1000 MG: 10 INJECTION, SOLUTION INTRAVENOUS at 08:09

## 2020-09-15 RX ADMIN — IBUPROFEN 600 MG: 600 TABLET, FILM COATED ORAL at 12:09

## 2020-09-15 RX ADMIN — ACETAMINOPHEN 1000 MG: 10 INJECTION, SOLUTION INTRAVENOUS at 05:09

## 2020-09-15 RX ADMIN — ISOSORBIDE MONONITRATE 30 MG: 30 TABLET, EXTENDED RELEASE ORAL at 09:09

## 2020-09-15 RX ADMIN — POTASSIUM CHLORIDE 40 MEQ: 1.5 POWDER, FOR SOLUTION ORAL at 12:09

## 2020-09-15 RX ADMIN — POTASSIUM CHLORIDE 40 MEQ: 1.5 POWDER, FOR SOLUTION ORAL at 09:09

## 2020-09-15 RX ADMIN — METRONIDAZOLE 500 MG: 500 INJECTION, SOLUTION INTRAVENOUS at 01:09

## 2020-09-15 NOTE — NURSING
POC reviewed with pt, pt verbalized understanding. IV fluids infusing per order. IV abx administered. x5 lap sites to abdomen, x1 incision to abdomen. 2L 02 for comfort. Tele-NSR. Carrera in place- to be d/c'd in the morning. Dentures and hearing aide in place. Tolerating clear liquid diet. Encouraged and educated to splint-pillow provided. Call light in place.

## 2020-09-15 NOTE — PLAN OF CARE
Patient oriented to room, call light within reach, wheels locked, bed in low position, side rails x 2, instructed to call for assistance prn. POC reviewed with patient, all questions answered, verbalized understanding. BP low during shift, NP notified, 500 mL NS bolus given. Patient BS audible and active, passed flatus x 3. Patient refused to ambulate. Patient remained free of fall/injury. Comfort level established, c/o pain controlled with prn medication. IVF and abx infusing per order. Telemetry on and monitored, patient NSR. Debi DC'd @ 0625, pt due to void. Surgical laps sites and incision CDI. Patient repositioned independently, no new breakdown noted. Will continue to monitor.

## 2020-09-15 NOTE — RESPIRATORY THERAPY
09/15/20 0714   Patient Assessment/Suction   Level of Consciousness (AVPU) alert   All Lung Fields Breath Sounds diminished   PRE-TX-O2   O2 Device (Oxygen Therapy) room air   SpO2 95 %   Pulse Oximetry Type Intermittent   $ Pulse Oximetry - Multiple Charge Pulse Oximetry - Multiple   Pulse 67   Resp 16   Incentive Spirometer   $ Incentive Spirometer Charges done with encouragement   Incentive Spirometer Predicted Level (mL) 2400   Administration (IS) instruction provided, follow-up   Number of Repetitions (IS) 10   Level Incentive Spirometer (mL) 1500   Patient Tolerance (IS) good

## 2020-09-15 NOTE — HOSPITAL COURSE
Patient admitted for laparoscopic sigmoid colectomy on 9/14 with Dr. Candelaria to remove a colon polyp that could not be resected during colonoscopy due to location. Polyp was shown to have at least high grade dysplasia. Patient was monitored closely during hospital stay and did well postoperatively. Abdominal pain was well controlled with narcotics. POD 1 pt experienced loose BM that was somewhat bloody. H/H remained stable with no recurrence in bleeding thereafter with normal BMs throughout rest of stay. Electrolyte repletion ordered as needed. Pt was seen by Dr. Candelaria 9/15 who advanced her diet. She tolerated diet advancement without issue. She was discharged home on oral narcotics once cleared by general surgery.  She will follow up with him in clinic as directed next week.  Return precautions were discussed.    Physical Exam:   RRR. Normal heart sounds. Lungs clear to ascultation.   Laparoscopic incision dressings CDI. No abdominal tenderness. Normoactive bowel sounds.

## 2020-09-15 NOTE — SUBJECTIVE & OBJECTIVE
Interval History:  Notes reviewed, no acute events overnight.  Patient seen examined this morning and resting comfortably in bed.  Patient is experiencing mild abdominal cramping that is controlled with p.o. medication.  She reports passing flatulence.  She denies nausea or vomiting.  She is tolerating clear liquids well.    Review of Systems   Constitutional: Negative for chills, fatigue and fever.   HENT: Negative for congestion, sinus pressure and sore throat.    Respiratory: Negative for cough, chest tightness and shortness of breath.    Cardiovascular: Negative for chest pain, palpitations and leg swelling.   Gastrointestinal: Positive for abdominal pain. Negative for constipation, diarrhea, nausea and vomiting.   Genitourinary: Negative for difficulty urinating, dysuria, frequency and urgency.   Musculoskeletal: Negative for arthralgias, back pain and gait problem.   Skin: Negative for color change, pallor, rash and wound.   Neurological: Negative for dizziness, weakness and light-headedness.   Hematological: Negative for adenopathy.   Psychiatric/Behavioral: Negative for agitation, behavioral problems and confusion.   All other systems reviewed and are negative.    Objective:     Vital Signs (Most Recent):  Temp: 97.2 °F (36.2 °C) (09/15/20 1113)  Pulse: 69 (09/15/20 1113)  Resp: 18 (09/15/20 1113)  BP: 126/64 (09/15/20 1113)  SpO2: (!) 93 % (09/15/20 1113) Vital Signs (24h Range):  Temp:  [97.2 °F (36.2 °C)-99 °F (37.2 °C)] 97.2 °F (36.2 °C)  Pulse:  [57-75] 69  Resp:  [16-18] 18  SpO2:  [9 %-100 %] 93 %  BP: ()/(50-64) 126/64        There is no height or weight on file to calculate BMI.    Intake/Output Summary (Last 24 hours) at 9/15/2020 1342  Last data filed at 9/15/2020 0700  Gross per 24 hour   Intake 3053.33 ml   Output 1225 ml   Net 1828.33 ml      Physical Exam  Vitals signs and nursing note reviewed.   Constitutional:       General: She is not in acute distress.     Appearance: She is  well-developed. She is not ill-appearing or diaphoretic.   HENT:      Head: Normocephalic and atraumatic.      Right Ear: External ear normal.      Left Ear: External ear normal.      Nose: Nose normal. No congestion or rhinorrhea.      Mouth/Throat:      Mouth: Mucous membranes are moist.      Pharynx: Oropharynx is clear. No oropharyngeal exudate or posterior oropharyngeal erythema.   Eyes:      General: No scleral icterus.     Conjunctiva/sclera: Conjunctivae normal.      Pupils: Pupils are equal, round, and reactive to light.   Neck:      Musculoskeletal: Normal range of motion and neck supple.      Vascular: No JVD.   Cardiovascular:      Rate and Rhythm: Normal rate and regular rhythm.      Pulses: Normal pulses.      Heart sounds: Normal heart sounds. No murmur.   Pulmonary:      Effort: Pulmonary effort is normal. No respiratory distress.      Breath sounds: Normal breath sounds. No stridor. No wheezing, rhonchi or rales.   Abdominal:      General: Bowel sounds are normal. There is no distension.      Palpations: Abdomen is soft.      Tenderness: There is no abdominal tenderness.      Comments: Surgical incisions clean dry intact   Musculoskeletal: Normal range of motion.         General: No swelling or tenderness.   Skin:     General: Skin is warm and dry.      Capillary Refill: Capillary refill takes 2 to 3 seconds.      Coloration: Skin is not jaundiced or pale.   Neurological:      General: No focal deficit present.      Mental Status: She is alert and oriented to person, place, and time.      Cranial Nerves: No cranial nerve deficit.      Sensory: No sensory deficit.   Psychiatric:         Mood and Affect: Mood normal.         Behavior: Behavior normal.         Thought Content: Thought content normal.         Significant Labs:   Recent Lab Results       09/15/20  0535        Anion Gap 10     Baso # 0.01     Basophil% 0.1     BUN, Bld 3     Calcium 7.9     Chloride 106     CO2 24     Creatinine 0.6      Differential Method Automated     eGFR if  >60     eGFR if non  >60  Comment:  Calculation used to obtain the estimated glomerular filtration  rate (eGFR) is the CKD-EPI equation.        Eos # 0.1     Eosinophil% 0.8     Glucose 118     Gran # (ANC) 5.7     Gran% 74.8     Hematocrit 24.5     Hemoglobin 7.9     Immature Grans (Abs) 0.03  Comment:  Mild elevation in immature granulocytes is non specific and   can be seen in a variety of conditions including stress response,   acute inflammation, trauma and pregnancy. Correlation with other   laboratory and clinical findings is essential.       Immature Granulocytes 0.4     Lymph # 1.2     Lymph% 15.6     MCH 35.0     MCHC 32.2          Mono # 0.6     Mono% 8.3     MPV 9.5     nRBC 0     Platelets 184     Potassium 3.3     RBC 2.26     RDW 15.9     Sodium 140     WBC 7.61           Significant Imaging: I have reviewed all pertinent imaging results/findings within the past 24 hours.

## 2020-09-15 NOTE — PLAN OF CARE
POC/Medications reviewed with Patient verbalizes understanding VSS afebrile Denies pain tolerating clear and full liquid diet well.Band aid dressings clean dry and intact,Anbulated 5 times bed to toilet bms noted loose and runny Dr Candelaria notified,BS audible in all quadrants. Voiding urine with out difficulty no acute distress noted this shift.

## 2020-09-15 NOTE — ASSESSMENT & PLAN NOTE
POD 1 s/p sigmoid colectomy with Dr. Candelaria  Continue to follow surgery recommendations.  Needs aggressive incentive spirometry.  Follow hemoglobin and hematocrit closely.  Pain control with IV narcotics and antiemetics as needed.  SCDs for DVT prophylaxis per surgery recommendations.

## 2020-09-15 NOTE — RESPIRATORY THERAPY
09/14/20 2000   Patient Assessment/Suction   Level of Consciousness (AVPU) alert   Respiratory Effort Normal;Unlabored   Expansion/Accessory Muscles/Retractions no use of accessory muscles   All Lung Fields Breath Sounds clear   Rhythm/Pattern, Respiratory depth regular;pattern regular;unlabored   Cough Frequency infrequent   PRE-TX-O2   O2 Device (Oxygen Therapy) nasal cannula   $ Is the patient on Low Flow Oxygen? Yes   Flow (L/min) 2   SpO2 (!) 9 %   Pulse Oximetry Type Intermittent   $ Pulse Oximetry - Multiple Charge Pulse Oximetry - Multiple   Incentive Spirometer   $ Incentive Spirometer Charges done with encouragement   Incentive Spirometer Predicted Level (mL) 2400   Administration (IS) instruction provided, initial;mouthpiece;proper technique demonstrated   Number of Repetitions (IS) 10   Level Incentive Spirometer (mL) 1500   Patient Tolerance (IS) good

## 2020-09-15 NOTE — PROGRESS NOTES
[facility] Hospital Medicine  Progress Note    Patient Name: Comfort Cantu  MRN: [id number]  Patient Class: IP- Inpatient   Admission Date: 9/14/2020  Length of Stay: 1 days  Attending Physician: Simran Blackburn MD  Primary Care Provider: Darinel Riddle MD        Subjective:     Principal Problem:Colon polyp        HPI:  Comfort Cantu is a 64-year-old  female with past medical history significant for CAD, depression, anxiety, hearing loss, CAD and hypertension presenting today status post sigmoid colectomy by Dr. Candelaria.  Per chart review and patient she reports she was found to have an adenomatous polyp within the sigmoid colon that has been unable to be resected endoscopically.  It is shown to have at least high-grade dysplasia.  It was recommended that she needed surgical resection.  She was scheduled for resection in April of this year but this was canceled secondary to COVID.  Patient was seen postoperatively and doing well.  She reports pain to her abdomen which she describes as generalized abdomen location, mild to moderate severity, aching quality, no aggravating or alleviating factors.  She denies nausea, vomiting, chest pain or shortness of breath.  She is resting comfortably.      Overview/Hospital Course:  No notes on file    Interval History:  Notes reviewed, no acute events overnight.  Patient seen examined this morning and resting comfortably in bed.  Patient is experiencing mild abdominal cramping that is controlled with p.o. medication.  She reports passing flatulence.  She denies nausea or vomiting.  She is tolerating clear liquids well.    Review of Systems   Constitutional: Negative for chills, fatigue and fever.   HENT: Negative for congestion, sinus pressure and sore throat.    Respiratory: Negative for cough, chest tightness and shortness of breath.    Cardiovascular: Negative for chest pain, palpitations and leg swelling.   Gastrointestinal: Positive for  abdominal pain. Negative for constipation, diarrhea, nausea and vomiting.   Genitourinary: Negative for difficulty urinating, dysuria, frequency and urgency.   Musculoskeletal: Negative for arthralgias, back pain and gait problem.   Skin: Negative for color change, pallor, rash and wound.   Neurological: Negative for dizziness, weakness and light-headedness.   Hematological: Negative for adenopathy.   Psychiatric/Behavioral: Negative for agitation, behavioral problems and confusion.   All other systems reviewed and are negative.    Objective:     Vital Signs (Most Recent):  Temp: 97.2 °F (36.2 °C) (09/15/20 1113)  Pulse: 69 (09/15/20 1113)  Resp: 18 (09/15/20 1113)  BP: 126/64 (09/15/20 1113)  SpO2: (!) 93 % (09/15/20 1113) Vital Signs (24h Range):  Temp:  [97.2 °F (36.2 °C)-99 °F (37.2 °C)] 97.2 °F (36.2 °C)  Pulse:  [57-75] 69  Resp:  [16-18] 18  SpO2:  [9 %-100 %] 93 %  BP: ()/(50-64) 126/64        There is no height or weight on file to calculate BMI.    Intake/Output Summary (Last 24 hours) at 9/15/2020 1342  Last data filed at 9/15/2020 0700  Gross per 24 hour   Intake 3053.33 ml   Output 1225 ml   Net 1828.33 ml      Physical Exam  Vitals signs and nursing note reviewed.   Constitutional:       General: She is not in acute distress.     Appearance: She is well-developed. She is not ill-appearing or diaphoretic.   HENT:      Head: Normocephalic and atraumatic.      Right Ear: External ear normal.      Left Ear: External ear normal.      Nose: Nose normal. No congestion or rhinorrhea.      Mouth/Throat:      Mouth: Mucous membranes are moist.      Pharynx: Oropharynx is clear. No oropharyngeal exudate or posterior oropharyngeal erythema.   Eyes:      General: No scleral icterus.     Conjunctiva/sclera: Conjunctivae normal.      Pupils: Pupils are equal, round, and reactive to light.   Neck:      Musculoskeletal: Normal range of motion and neck supple.      Vascular: No JVD.   Cardiovascular:      Rate and  Rhythm: Normal rate and regular rhythm.      Pulses: Normal pulses.      Heart sounds: Normal heart sounds. No murmur.   Pulmonary:      Effort: Pulmonary effort is normal. No respiratory distress.      Breath sounds: Normal breath sounds. No stridor. No wheezing, rhonchi or rales.   Abdominal:      General: Bowel sounds are normal. There is no distension.      Palpations: Abdomen is soft.      Tenderness: There is no abdominal tenderness.      Comments: Surgical incisions clean dry intact   Musculoskeletal: Normal range of motion.         General: No swelling or tenderness.   Skin:     General: Skin is warm and dry.      Capillary Refill: Capillary refill takes 2 to 3 seconds.      Coloration: Skin is not jaundiced or pale.   Neurological:      General: No focal deficit present.      Mental Status: She is alert and oriented to person, place, and time.      Cranial Nerves: No cranial nerve deficit.      Sensory: No sensory deficit.   Psychiatric:         Mood and Affect: Mood normal.         Behavior: Behavior normal.         Thought Content: Thought content normal.         Significant Labs:   Recent Lab Results       09/15/20  0535        Anion Gap 10     Baso # 0.01     Basophil% 0.1     BUN, Bld 3     Calcium 7.9     Chloride 106     CO2 24     Creatinine 0.6     Differential Method Automated     eGFR if  >60     eGFR if non  >60  Comment:  Calculation used to obtain the estimated glomerular filtration  rate (eGFR) is the CKD-EPI equation.        Eos # 0.1     Eosinophil% 0.8     Glucose 118     Gran # (ANC) 5.7     Gran% 74.8     Hematocrit 24.5     Hemoglobin 7.9     Immature Grans (Abs) 0.03  Comment:  Mild elevation in immature granulocytes is non specific and   can be seen in a variety of conditions including stress response,   acute inflammation, trauma and pregnancy. Correlation with other   laboratory and clinical findings is essential.       Immature Granulocytes 0.4      Lymph # 1.2     Lymph% 15.6     MCH 35.0     MCHC 32.2          Mono # 0.6     Mono% 8.3     MPV 9.5     nRBC 0     Platelets 184     Potassium 3.3     RBC 2.26     RDW 15.9     Sodium 140     WBC 7.61           Significant Imaging: I have reviewed all pertinent imaging results/findings within the past 24 hours.      Assessment/Plan:      * Colon polyp  POD 1 s/p sigmoid colectomy with Dr. Candelaria  Continue to follow surgery recommendations.  Needs aggressive incentive spirometry.  Follow hemoglobin and hematocrit closely.  Pain control with IV narcotics and antiemetics as needed.  SCDs for DVT prophylaxis per surgery recommendations.      CAD (coronary artery disease)  Chronic, stable  Telemetry monitoring  Resume home medications      Peripheral vascular disease  Chronic, stable  Resume home medication when appropriate by surgeon      Hyperlipidemia  Chronic, stable  Resume statin      Benign essential hypertension  Chronic, controlled.  Latest blood pressure and vitals reviewed-   Temp:  [97.5 °F (36.4 °C)-98.1 °F (36.7 °C)]   Pulse:  [63-82]   Resp:  [11-26]   BP: ()/(49-73)   SpO2:  [96 %-100 %] .   Home meds for hypertension were reviewed and noted below. Hospital anti-hypertensive changes were made as shown below.  Hypertension Medications             cloNIDine (CATAPRES) 0.1 MG tablet Take 1 tablet (0.1 mg total) by mouth 2 (two) times daily.    hydroCHLOROthiazide (HYDRODIURIL) 25 MG tablet Take 1 tablet (25 mg total) by mouth once daily.    isosorbide mononitrate (IMDUR) 30 MG 24 hr tablet Take 1 tablet (30 mg total) by mouth once daily.      Hospital Medications             cloNIDine tablet 0.1 mg 0.1 mg, Oral, 2 times daily    isosorbide mononitrate 24 hr tablet 30 mg 30 mg, Oral, Daily, DO NOT CRUSH OR CHEW; SWALLOW WHOLE.        Will utilize p.r.n. blood pressure medication only if patient's blood pressure greater than  180/110 and she develops symptoms such as worsening chest pain or  shortness of breath.        VTE Risk Mitigation (From admission, onward)    None          Discharge Planning   MEJIA:      Code Status: Prior   Is the patient medically ready for discharge?:     Reason for patient still in hospital (select all that apply): Treatment  Discharge Plan A: Home                  Bernadine Barriga NP  Department of Hospital Medicine   [facility]

## 2020-09-16 ENCOUNTER — TELEPHONE (OUTPATIENT)
Dept: SURGERY | Facility: CLINIC | Age: 65
End: 2020-09-16

## 2020-09-16 VITALS
HEART RATE: 64 BPM | DIASTOLIC BLOOD PRESSURE: 55 MMHG | SYSTOLIC BLOOD PRESSURE: 114 MMHG | TEMPERATURE: 97 F | RESPIRATION RATE: 16 BRPM | OXYGEN SATURATION: 95 %

## 2020-09-16 LAB
ANION GAP SERPL CALC-SCNC: 7 MMOL/L (ref 8–16)
BASOPHILS # BLD AUTO: 0.05 K/UL (ref 0–0.2)
BASOPHILS NFR BLD: 1.1 % (ref 0–1.9)
BUN SERPL-MCNC: 2 MG/DL (ref 8–23)
CALCIUM SERPL-MCNC: 8.5 MG/DL (ref 8.7–10.5)
CHLORIDE SERPL-SCNC: 107 MMOL/L (ref 95–110)
CO2 SERPL-SCNC: 26 MMOL/L (ref 23–29)
CREAT SERPL-MCNC: 0.6 MG/DL (ref 0.5–1.4)
CRP SERPL-MCNC: 33.8 MG/L (ref 0–8.2)
DIFFERENTIAL METHOD: ABNORMAL
EOSINOPHIL # BLD AUTO: 0.2 K/UL (ref 0–0.5)
EOSINOPHIL NFR BLD: 4.9 % (ref 0–8)
ERYTHROCYTE [DISTWIDTH] IN BLOOD BY AUTOMATED COUNT: 16.1 % (ref 11.5–14.5)
EST. GFR  (AFRICAN AMERICAN): >60 ML/MIN/1.73 M^2
EST. GFR  (NON AFRICAN AMERICAN): >60 ML/MIN/1.73 M^2
GLUCOSE SERPL-MCNC: 91 MG/DL (ref 70–110)
HCT VFR BLD AUTO: 24.6 % (ref 37–48.5)
HGB BLD-MCNC: 8 G/DL (ref 12–16)
IMM GRANULOCYTES # BLD AUTO: 0.01 K/UL (ref 0–0.04)
IMM GRANULOCYTES NFR BLD AUTO: 0.2 % (ref 0–0.5)
LYMPHOCYTES # BLD AUTO: 1.3 K/UL (ref 1–4.8)
LYMPHOCYTES NFR BLD: 27 % (ref 18–48)
MCH RBC QN AUTO: 35.7 PG (ref 27–31)
MCHC RBC AUTO-ENTMCNC: 32.5 G/DL (ref 32–36)
MCV RBC AUTO: 110 FL (ref 82–98)
MONOCYTES # BLD AUTO: 0.4 K/UL (ref 0.3–1)
MONOCYTES NFR BLD: 9.4 % (ref 4–15)
NEUTROPHILS # BLD AUTO: 2.7 K/UL (ref 1.8–7.7)
NEUTROPHILS NFR BLD: 57.4 % (ref 38–73)
NRBC BLD-RTO: 0 /100 WBC
PLATELET # BLD AUTO: 195 K/UL (ref 150–350)
PMV BLD AUTO: 9.7 FL (ref 9.2–12.9)
POTASSIUM SERPL-SCNC: 4.6 MMOL/L (ref 3.5–5.1)
RBC # BLD AUTO: 2.24 M/UL (ref 4–5.4)
SODIUM SERPL-SCNC: 140 MMOL/L (ref 136–145)
WBC # BLD AUTO: 4.7 K/UL (ref 3.9–12.7)

## 2020-09-16 PROCEDURE — 25000003 PHARM REV CODE 250: Performed by: SURGERY

## 2020-09-16 PROCEDURE — 36415 COLL VENOUS BLD VENIPUNCTURE: CPT

## 2020-09-16 PROCEDURE — 94799 UNLISTED PULMONARY SVC/PX: CPT

## 2020-09-16 PROCEDURE — 94761 N-INVAS EAR/PLS OXIMETRY MLT: CPT

## 2020-09-16 PROCEDURE — 63600175 PHARM REV CODE 636 W HCPCS: Performed by: SURGERY

## 2020-09-16 PROCEDURE — 80048 BASIC METABOLIC PNL TOTAL CA: CPT

## 2020-09-16 PROCEDURE — 25000003 PHARM REV CODE 250: Performed by: NURSE PRACTITIONER

## 2020-09-16 PROCEDURE — 94760 N-INVAS EAR/PLS OXIMETRY 1: CPT

## 2020-09-16 PROCEDURE — 85025 COMPLETE CBC W/AUTO DIFF WBC: CPT

## 2020-09-16 PROCEDURE — 86140 C-REACTIVE PROTEIN: CPT

## 2020-09-16 RX ORDER — ACETAMINOPHEN 500 MG
500 TABLET ORAL EVERY 6 HOURS PRN
Refills: 0
Start: 2020-09-16 | End: 2020-11-05

## 2020-09-16 RX ORDER — IBUPROFEN 600 MG/1
600 TABLET ORAL EVERY 6 HOURS PRN
Start: 2020-09-16 | End: 2020-11-05

## 2020-09-16 RX ORDER — HYDROCODONE BITARTRATE AND ACETAMINOPHEN 5; 325 MG/1; MG/1
1 TABLET ORAL EVERY 6 HOURS PRN
Qty: 14 TABLET | Refills: 0 | Status: SHIPPED | OUTPATIENT
Start: 2020-09-16 | End: 2020-11-05

## 2020-09-16 RX ADMIN — ACETAMINOPHEN 1000 MG: 10 INJECTION, SOLUTION INTRAVENOUS at 05:09

## 2020-09-16 RX ADMIN — GABAPENTIN 200 MG: 100 CAPSULE ORAL at 08:09

## 2020-09-16 RX ADMIN — ISOSORBIDE MONONITRATE 30 MG: 30 TABLET, EXTENDED RELEASE ORAL at 08:09

## 2020-09-16 RX ADMIN — ACETAMINOPHEN 1000 MG: 10 INJECTION, SOLUTION INTRAVENOUS at 01:09

## 2020-09-16 RX ADMIN — MUPIROCIN: 20 OINTMENT TOPICAL at 08:09

## 2020-09-16 RX ADMIN — IBUPROFEN 600 MG: 600 TABLET, FILM COATED ORAL at 08:09

## 2020-09-16 RX ADMIN — ACETAMINOPHEN 1000 MG: 10 INJECTION, SOLUTION INTRAVENOUS at 12:09

## 2020-09-16 RX ADMIN — CLONIDINE HYDROCHLORIDE 0.1 MG: 0.1 TABLET ORAL at 08:09

## 2020-09-16 NOTE — NURSING
Pts sister arrived. PIV removed. Tele removed, monitor room notified. AVS discussed. Medications reviewed. Follow up appnts discussed. All questions answered. Pt verbalized understanding. No redness, no swelling noted to surgical sites. Steri strips in place. Pt reports pain 2/10 at this time. No distress noted. Pt left floor via wheelchair with Yenifer baltazar.

## 2020-09-16 NOTE — DISCHARGE INSTRUCTIONS
Please continue to use your Incentive Spirometer aggressively after you are discharged.    Thank you for choosing [facility] for your medical care. The primary doctor who is taking care of you at the time of your discharge is Simran Blackburn MD.     You were admitted to the hospital with Colon polyp.     Please note your discharge instructions, including diet/activity restrictions, follow-up appointments, and medication changes.  If you have any questions about your medical issues, prescriptions, or any other questions, please feel free to contact the [facility] Medicine Dept at [phone] and we will help.    If you are previously with Home health, outpatient PT/OT or under a therapy program, you are cleared to return to those programs.    Please direct all long term medication refills and follow up to your primary care provider, Darinel Riddle MD. Thank you again for letting us take care of your health care needs.    Please note the following discharge instructions per your discharging physician-  Polina Lock NP

## 2020-09-16 NOTE — PLAN OF CARE
Pt is cleared from  for D/C.       09/16/20 9279   Final Note   Assessment Type Final Discharge Note   Anticipated Discharge Disposition Home   Hospital Follow Up  Appt(s) scheduled? Yes

## 2020-09-16 NOTE — TELEPHONE ENCOUNTER
----- Message from Ginger Richter sent at 9/16/2020  1:22 PM CDT -----  Type:  Sooner Apoointment Request    Caller is requesting a sooner appointment.  Caller declined first available appointment listed below.  Caller will not accept being placed on the waitlist and is requesting a message be sent to doctor.    Name of Caller:  Jonny with [facility]  When is the first available appointment?  9/29  Best Call Back Number:  [phone]  Additional Information:  Calling to reschedule post op for sometime next week per Dr Canedlaria.

## 2020-09-16 NOTE — RESPIRATORY THERAPY
09/16/20 0715   Patient Assessment/Suction   Level of Consciousness (AVPU) alert   All Lung Fields Breath Sounds diminished   PRE-TX-O2   O2 Device (Oxygen Therapy) room air   SpO2 96 %   Pulse Oximetry Type Intermittent   $ Pulse Oximetry - Multiple Charge Pulse Oximetry - Multiple   Pulse 80   Resp 16   Incentive Spirometer   $ Incentive Spirometer Charges done with encouragement   Incentive Spirometer Predicted Level (mL) 2400   Administration (IS) instruction provided, follow-up   Number of Repetitions (IS) 10   Level Incentive Spirometer (mL) 1250   Patient Tolerance (IS) good

## 2020-09-16 NOTE — DISCHARGE SUMMARY
[facility] Hospital Medicine  Discharge Summary      Patient Name: Comfort Cantu  MRN: [id number]  Admission Date: 9/14/2020  Hospital Length of Stay: 2 days  Discharge Date and Time:  09/16/2020 1:21 PM  Attending Physician: Simran Blackburn MD   Discharging Provider: Polina Lock NP  Primary Care Provider: Darinel Riddle MD      HPI:   Comfort Cantu is a 64-year-old  female with past medical history significant for CAD, depression, anxiety, hearing loss, CAD and hypertension presenting today status post sigmoid colectomy by Dr. Candelaria.  Per chart review and patient she reports she was found to have an adenomatous polyp within the sigmoid colon that has been unable to be resected endoscopically.  It is shown to have at least high-grade dysplasia.  It was recommended that she needed surgical resection.  She was scheduled for resection in April of this year but this was canceled secondary to COVID.  Patient was seen postoperatively and doing well.  She reports pain to her abdomen which she describes as generalized abdomen location, mild to moderate severity, aching quality, no aggravating or alleviating factors.  She denies nausea, vomiting, chest pain or shortness of breath.  She is resting comfortably.      Procedure(s) (LRB):  ROBOTIC COLECTOMY, SIGMOID (N/A)      Hospital Course:   Patient admitted for laparoscopic sigmoid colectomy on 9/14 with Dr. Candelaria to remove a colon polyp that could not be resected during colonoscopy due to location. Polyp was shown to have at least high grade dysplasia. Patient was monitored closely during hospital stay and did well postoperatively. Abdominal pain was well controlled with narcotics. POD 1 pt experienced loose BM that was somewhat bloody. H/H remained stable with no recurrence in bleeding thereafter with normal BMs throughout rest of stay. Electrolyte repletion ordered as needed. Pt was seen by Dr. Candelaria 9/15 who advanced her diet.  She tolerated diet advancement without issue. She was discharged home on oral narcotics once cleared by general surgery.  She will follow up with him in clinic as directed next week.  Return precautions were discussed.    Physical Exam:   RRR. Normal heart sounds. Lungs clear to ascultation.   Laparoscopic incision dressings CDI. No abdominal tenderness. Normoactive bowel sounds.          Consults:   Consults (From admission, onward)        Status Ordering Provider     Inpatient consult to Hospitalist  Once     Provider:  Roslyn Ravi MD    Acknowledged TERRANCE CANDELARIA          No new Assessment & Plan notes have been filed under this hospital service since the last note was generated.  Service: Hospital Medicine    Final Active Diagnoses:    Diagnosis Date Noted POA    PRINCIPAL PROBLEM:  Colon polyp [K63.5] 09/14/2020 Yes    CAD (coronary artery disease) [I25.10] 09/14/2020 Yes    Anemia [D64.9] 10/07/2019 Yes    Peripheral vascular disease [I73.9] 10/04/2019 Yes    Benign essential hypertension [I10] 05/15/2018 Yes    Hyperlipidemia [E78.5] 05/15/2018 Yes      Problems Resolved During this Admission:       Discharged Condition: good    Disposition: Home or Self Care    Follow Up:  Follow-up Information     Natasha Pedraza On 9/24/2020.    Why: hosp f/u appt in avs  Contact information:  [address] [facility] [address]  [facility] [address] [phone]             Terrance Candelaria MD On 9/29/2020.    Specialties: General Surgery, Colon and Rectal Surgery, Surgery  Why: Post-op f/u apt in avs  Contact information:  [address] [address]  [phone]                 Patient Instructions:      CBC W/ AUTO DIFFERENTIAL   Standing Status: Future Standing Exp. Date: 11/15/21     Diet Adult Regular   Order Comments: [place], gi soft.     Lifting restrictions   Order Comments: No heavy lifting     Notify your health care provider if you experience any of the following:  temperature >100.4      Notify your health care provider if you experience any of the following:  persistent nausea and vomiting or diarrhea     Notify your health care provider if you experience any of the following:  severe uncontrolled pain     Notify your health care provider if you experience any of the following:  increased confusion or weakness     Notify your health care provider if you experience any of the following:  redness, tenderness, or signs of infection (pain, swelling, redness, odor or green/yellow discharge around incision site)     Notify your health care provider if you experience any of the following:  difficulty breathing or increased cough     Activity as tolerated       Significant Diagnostic Studies: Labs:   CMP   Recent Labs   Lab 09/15/20  0535 09/16/20  0500    140   K 3.3* 4.6    107   CO2 24 26   * 91   BUN 3* 2*   CREATININE 0.6 0.6   CALCIUM 7.9* 8.5*   ANIONGAP 10 7*   ESTGFRAFRICA >60 >60   EGFRNONAA >60 >60    and CBC   Recent Labs   Lab 09/15/20  0535 09/16/20  0500   WBC 7.61 4.70   HGB 7.9* 8.0*   HCT 24.5* 24.6*    195       Pending Diagnostic Studies:     Procedure Component Value Units Date/Time    Specimen to Pathology, Surgery General Surgery [[id number]] Collected: 09/14/20 0953    Order Status: Sent Lab Status: In process Updated: 09/14/20 9257         Medications:  Reconciled Home Medications:      Medication List      START taking these medications    acetaminophen 500 MG tablet  Commonly known as: TYLENOL  Take 1 tablet (500 mg total) by mouth every 6 (six) hours as needed for Pain.     HYDROcodone-acetaminophen 5-325 mg per tablet  Commonly known as: NORCO  Take 1 tablet by mouth every 6 (six) hours as needed for Pain.     ibuprofen 600 MG tablet  Commonly known as: ADVIL,MOTRIN  Take 1 tablet (600 mg total) by mouth every 6 (six) hours as needed for Pain.        CONTINUE taking these medications    atorvastatin 10 MG tablet  Commonly known as: LIPITOR  Take 1  tablet (10 mg total) by mouth once daily.     busPIRone 15 MG tablet  Commonly known as: BUSPAR  Take 1 tablet (15 mg total) by mouth 2 (two) times daily.     cloNIDine 0.1 MG tablet  Commonly known as: CATAPRES  Take 1 tablet (0.1 mg total) by mouth 2 (two) times daily.     diazePAM 5 MG tablet  Commonly known as: VALIUM  Take 1 tablet (5 mg total) by mouth every 12 (twelve) hours as needed.     DULoxetine 30 MG capsule  Commonly known as: CYMBALTA  Take 1 capsule (30 mg total) by mouth once daily.     * polyethylene glycol 236-22.74-6.74 -5.86 gram suspension  Commonly known as: GoLYTELY,NuLYTELY  Take by mouth once.     * GAVILYTE-G 236-22.74-6.74 -5.86 gram suspension  Generic drug: polyethylene glycol     hydroCHLOROthiazide 25 MG tablet  Commonly known as: HYDRODIURIL  Take 1 tablet (25 mg total) by mouth once daily.     isosorbide mononitrate 30 MG 24 hr tablet  Commonly known as: IMDUR  Take 1 tablet (30 mg total) by mouth once daily.     pantoprazole 40 MG tablet  Commonly known as: PROTONIX  Take 1 tablet (40 mg total) by mouth once daily.     prasugreL 5 mg Tab  Commonly known as: EFFIENT  Take 5 mg by mouth once daily. Last dose 8/16/20 for surgery 8/24         * This list has 2 medication(s) that are the same as other medications prescribed for you. Read the directions carefully, and ask your doctor or other care provider to review them with you.                Indwelling Lines/Drains at time of discharge:   Lines/Drains/Airways     None                 Time spent on the discharge of patient: 36 minutes  Patient was seen and examined on the date of discharge and determined to be suitable for discharge.         Polina Lock NP  Department of Hospital Medicine  [facility]

## 2020-09-16 NOTE — NURSING
Pts ride is coming from [place]. Pt stated that she will let us know when patients ride is on the way.

## 2020-09-16 NOTE — TELEPHONE ENCOUNTER
I called patient and she she states that she Dr. Candelaria wants to see her in 1wk.  I had scheduled her on Tuesday, 9/29/20 at 1:30pm.  I'll check with Dr. Candelaria and call her back.  Raul

## 2020-09-16 NOTE — PLAN OF CARE
Patient AAOx4.  VSS, afebrile.  Tele # [id number] monitored.  Lap sites x 4 and dressing to lower abd CDI.  IVF infusing.  Order for PRN O2 placed per RT.  I.S. use encouraged q hour while awake.  Ambulatory to BR with 1-person assist.  Some diarrhea/loose stools still reported.  Diet advanced to bland.  Moderate pain control with scheduled medications.  Needs addressed, safety maintained.  Monitoring.

## 2020-09-16 NOTE — PROGRESS NOTES
POD 2 s/p robotic LAR  Pt resting comofrtably.  Denies n/v.  Reports flatus and BM.  Tolerating diet.  Pain controlled    .lastvi  Wt Readings from Last 3 Encounters:   08/21/20 57.2 kg (126 lb)   08/11/20 57.1 kg (125 lb 14.1 oz)   07/09/20 55.3 kg (122 lb)     Temp Readings from Last 3 Encounters:   09/16/20 96.9 °F (36.1 °C) (Oral)   08/11/20 98.1 °F (36.7 °C) (Oral)   07/09/20 98.4 °F (36.9 °C) (Temporal)     BP Readings from Last 3 Encounters:   09/16/20 (!) 114/55   08/11/20 (!) 157/82   07/09/20 139/84     Pulse Readings from Last 3 Encounters:   09/16/20 64   08/11/20 94   07/09/20 97     AAox3  CTA B  Sinus  Soft/nd/nt BS present  2+ pulses B    Lab Results   Component Value Date    WBC 4.70 09/16/2020    HGB 8.0 (L) 09/16/2020    HCT 24.6 (L) 09/16/2020     (H) 09/16/2020     09/16/2020       BMP  Lab Results   Component Value Date     09/16/2020    K 4.6 09/16/2020     09/16/2020    CO2 26 09/16/2020    BUN 2 (L) 09/16/2020    CREATININE 0.6 09/16/2020    CALCIUM 8.5 (L) 09/16/2020    ANIONGAP 7 (L) 09/16/2020    ESTGFRAFRICA >60 09/16/2020    EGFRNONAA >60 09/16/2020     A/P: s/p robotic LAR  Ambulate  Regular diet  Ok to d/c f/u with me next week

## 2020-09-17 ENCOUNTER — TELEPHONE (OUTPATIENT)
Dept: SURGERY | Facility: CLINIC | Age: 65
End: 2020-09-17

## 2020-09-17 ENCOUNTER — PATIENT OUTREACH (OUTPATIENT)
Dept: ADMINISTRATIVE | Facility: CLINIC | Age: 65
End: 2020-09-17

## 2020-09-17 ENCOUNTER — TELEPHONE (OUTPATIENT)
Dept: MEDSURG UNIT | Facility: HOSPITAL | Age: 65
End: 2020-09-17

## 2020-09-17 LAB
FINAL PATHOLOGIC DIAGNOSIS: NORMAL
GROSS: NORMAL

## 2020-09-17 NOTE — TELEPHONE ENCOUNTER
----- Message from Lesli Johnson MA sent at 9/17/2020 10:13 AM CDT -----  Type: Needs Medical Advice  Who Called: ricardo  Marcos Call Back Number: [phone]  Additional Information: patient is concerned about medications on her AVS that she has not taken.  Please call to discuss

## 2020-09-17 NOTE — TELEPHONE ENCOUNTER
Discontinued the bowel preps, instructed she needs to speak to a PCP regarding the HCTZ. Instructed to call for orders if constipated, ambulate and to drink plenty fluids.Call back prn.

## 2020-09-17 NOTE — TELEPHONE ENCOUNTER
C3 nurse attempted to contact patient. No answer. The following message was left for the patient to return the call:  Good afternoon   I am a nurse calling on behalf of [facility] from the [facility].  This is a Transitional Care Call for Comfort Alondra. When you have a moment please contact us at [phone] or [phone] Monday through Friday, between the hours of 8 am to 4 pm. We look forward to speaking with you. On behalf of [facility] have a nice day.    The patient has a scheduled HOSFU appointment with Natasha Pedraza on 9/24/2020 at 11:00 am

## 2020-09-22 ENCOUNTER — OFFICE VISIT (OUTPATIENT)
Dept: SURGERY | Facility: CLINIC | Age: 65
End: 2020-09-22
Payer: MEDICARE

## 2020-09-22 VITALS
BODY MASS INDEX: 19.75 KG/M2 | HEIGHT: 68 IN | SYSTOLIC BLOOD PRESSURE: 175 MMHG | HEART RATE: 94 BPM | WEIGHT: 130.31 LBS | TEMPERATURE: 98 F | DIASTOLIC BLOOD PRESSURE: 81 MMHG

## 2020-09-22 DIAGNOSIS — Z09 POSTOP CHECK: Primary | ICD-10-CM

## 2020-09-22 PROCEDURE — 99024 POSTOP FOLLOW-UP VISIT: CPT | Mod: S$GLB,,, | Performed by: SURGERY

## 2020-09-22 PROCEDURE — 99024 PR POST-OP FOLLOW-UP VISIT: ICD-10-PCS | Mod: S$GLB,,, | Performed by: SURGERY

## 2020-09-22 PROCEDURE — 99999 PR PBB SHADOW E&M-EST. PATIENT-LVL III: ICD-10-PCS | Mod: PBBFAC,,, | Performed by: SURGERY

## 2020-09-22 PROCEDURE — 99999 PR PBB SHADOW E&M-EST. PATIENT-LVL III: CPT | Mod: PBBFAC,,, | Performed by: SURGERY

## 2020-09-22 RX ORDER — OXYCODONE AND ACETAMINOPHEN 5; 325 MG/1; MG/1
1 TABLET ORAL EVERY 4 HOURS PRN
Qty: 30 TABLET | Refills: 0 | Status: SHIPPED | OUTPATIENT
Start: 2020-09-22 | End: 2020-09-23 | Stop reason: SDUPTHER

## 2020-09-22 RX ORDER — SULFAMETHOXAZOLE AND TRIMETHOPRIM 800; 160 MG/1; MG/1
1 TABLET ORAL
Status: DISCONTINUED | OUTPATIENT
Start: 2020-09-22 | End: 2020-11-11

## 2020-09-22 NOTE — PROGRESS NOTES
Cc: post op    HPI: 64 y.o.  female  1 weeks s/p robotic LAR.   Pt notes feeling well.  Having BM.  SOme soreness at lower pfannenstiel incision.  NO drainage.      PE: AFVSS    AAOx3  CTA  Soft/NT/nd  Inc: c/d/i , mild eryhthema SUspected hematoma at pfannenstiel incision.        Path: 1. SIGMOID COLON, RESECTION:   Portion of benign colon with focal tattoo and related changes   (tattoo area all submitted)   Negative for residual neoplasia or malignancy   Diverticulosis   Multiple benign lymph nodes   2. DISTAL DONUT:   Benign colonic mucosa    A/P:   Pt doing well post surgery.   F/U with me in 2 weeks  Will start bactrim for skin redness; instructed to rtc if worsens or develops fever/  Pain pills given

## 2020-09-23 ENCOUNTER — TELEPHONE (OUTPATIENT)
Dept: SURGERY | Facility: CLINIC | Age: 65
End: 2020-09-23

## 2020-09-23 DIAGNOSIS — G89.18 POST-OP PAIN: Primary | ICD-10-CM

## 2020-09-23 RX ORDER — OXYCODONE AND ACETAMINOPHEN 5; 325 MG/1; MG/1
1 TABLET ORAL EVERY 4 HOURS PRN
Qty: 30 TABLET | Refills: 0 | Status: SHIPPED | OUTPATIENT
Start: 2020-09-23 | End: 2020-11-05

## 2020-09-23 RX ORDER — SULFAMETHOXAZOLE AND TRIMETHOPRIM 800; 160 MG/1; MG/1
1 TABLET ORAL 2 TIMES DAILY
Qty: 20 TABLET | Refills: 0 | Status: SHIPPED | OUTPATIENT
Start: 2020-09-23 | End: 2020-10-03

## 2020-09-23 NOTE — TELEPHONE ENCOUNTER
----- Message from Lesli Johnson MA sent at 9/23/2020  1:24 PM CDT -----  Type: Needs Medical Advice  Who Called:  Comfort Rodríguez Call Back Number: [phone]  Additional Information: patient is calling in reference to the percocet that was to be ordered yesterday.  It looks as though it was set to print instead of escribe.  Can this be sent over to the pharmacy for the patient today please.  Please call patient when it is sent over

## 2020-10-06 ENCOUNTER — OFFICE VISIT (OUTPATIENT)
Dept: SURGERY | Facility: CLINIC | Age: 65
End: 2020-10-06
Payer: MEDICARE

## 2020-10-06 VITALS — WEIGHT: 127.63 LBS | HEIGHT: 68 IN | BODY MASS INDEX: 19.34 KG/M2 | TEMPERATURE: 98 F

## 2020-10-06 DIAGNOSIS — Z09 POSTOP CHECK: Primary | ICD-10-CM

## 2020-10-06 PROCEDURE — 99999 PR PBB SHADOW E&M-EST. PATIENT-LVL III: ICD-10-PCS | Mod: PBBFAC,,, | Performed by: SURGERY

## 2020-10-06 PROCEDURE — 99024 POSTOP FOLLOW-UP VISIT: CPT | Mod: S$GLB,,, | Performed by: SURGERY

## 2020-10-06 PROCEDURE — 99999 PR PBB SHADOW E&M-EST. PATIENT-LVL III: CPT | Mod: PBBFAC,,, | Performed by: SURGERY

## 2020-10-06 PROCEDURE — 99024 PR POST-OP FOLLOW-UP VISIT: ICD-10-PCS | Mod: S$GLB,,, | Performed by: SURGERY

## 2020-10-06 NOTE — PROGRESS NOTES
Cc: post op    HPI: 64 y.o.  female  3 weeks s/p robotic LAR.   Pt notes that she is feeling well.  Pain much better. Still some pain.  Denies n/v.  No fever/chills.       PE: AFVSS    AAOx3  CTA  Soft/NT/nd  Inc: c/d/i        Path:     . SIGMOID COLON, RESECTION:   Portion of benign colon with focal tattoo and related changes   (tattoo area all submitted)   Negative for residual neoplasia or malignancy   Diverticulosis   Multiple benign lymph nodes    A/P:   Pt doing well post surgery.   F/U with me prn  D/w pt.  I have recommended cscope in one year with either Dr you or Dr servin

## 2020-10-24 ENCOUNTER — PATIENT OUTREACH (OUTPATIENT)
Dept: ADMINISTRATIVE | Facility: HOSPITAL | Age: 65
End: 2020-10-24

## 2020-10-24 NOTE — PROGRESS NOTES
Care Everywhere reviewed.   [facility] and [facility] reviewed w/ no recent, applicable results yielded.  Attempted to contact patient to discuss/schedule overdue PAP.   LMOM for return call.       GREGOR

## 2020-10-26 ENCOUNTER — TELEPHONE (OUTPATIENT)
Dept: FAMILY MEDICINE | Facility: CLINIC | Age: 65
End: 2020-10-26

## 2020-10-26 DIAGNOSIS — Z12.31 ENCOUNTER FOR SCREENING MAMMOGRAM FOR MALIGNANT NEOPLASM OF BREAST: Primary | ICD-10-CM

## 2020-10-26 NOTE — TELEPHONE ENCOUNTER
----- Message from Koko Telles sent at 10/26/2020  1:44 PM CDT -----  Type:  Mammogram    Caller is requesting to schedule their annual mammogram appointment.  Order is not listed in EPIC.  Please enter order and contact patient to schedule.    Name of Caller:  Patient  Where would they like the mammogram performed?  [facility] Call Back Number:  [phone]  Additional Information: Patient also states that she would like to see someone for a Pap smear.

## 2020-10-26 NOTE — TELEPHONE ENCOUNTER
----- Message from Koko Telles sent at 10/26/2020  1:44 PM CDT -----  Type:  Mammogram    Caller is requesting to schedule their annual mammogram appointment.  Order is not listed in EPIC.  Please enter order and contact patient to schedule.    Name of Caller:  Patient  Where would they like the mammogram performed?  [facility] Call Back Number:  [phone]  Additional Information: Patient also states that she would like to see someone for a Pap smear.

## 2020-11-05 ENCOUNTER — OFFICE VISIT (OUTPATIENT)
Dept: FAMILY MEDICINE | Facility: CLINIC | Age: 65
End: 2020-11-05
Payer: MEDICARE

## 2020-11-05 DIAGNOSIS — I10 ESSENTIAL HYPERTENSION: ICD-10-CM

## 2020-11-05 DIAGNOSIS — Z01.419 WELL WOMAN EXAM WITH ROUTINE GYNECOLOGICAL EXAM: Primary | ICD-10-CM

## 2020-11-05 DIAGNOSIS — H61.23 BILATERAL IMPACTED CERUMEN: ICD-10-CM

## 2020-11-05 DIAGNOSIS — Z12.31 BREAST CANCER SCREENING BY MAMMOGRAM: ICD-10-CM

## 2020-11-05 PROCEDURE — 3008F BODY MASS INDEX DOCD: CPT | Mod: CPTII,S$GLB,, | Performed by: NURSE PRACTITIONER

## 2020-11-05 PROCEDURE — 69209 REMOVE IMPACTED EAR WAX UNI: CPT | Mod: 50,S$GLB,, | Performed by: NURSE PRACTITIONER

## 2020-11-05 PROCEDURE — 87624 HPV HI-RISK TYP POOLED RSLT: CPT

## 2020-11-05 PROCEDURE — 99396 PR PREVENTIVE VISIT,EST,40-64: ICD-10-PCS | Mod: 25,S$GLB,, | Performed by: NURSE PRACTITIONER

## 2020-11-05 PROCEDURE — 3077F SYST BP >= 140 MM HG: CPT | Mod: CPTII,S$GLB,, | Performed by: NURSE PRACTITIONER

## 2020-11-05 PROCEDURE — 99396 PREV VISIT EST AGE 40-64: CPT | Mod: 25,S$GLB,, | Performed by: NURSE PRACTITIONER

## 2020-11-05 PROCEDURE — 3008F PR BODY MASS INDEX (BMI) DOCUMENTED: ICD-10-PCS | Mod: CPTII,S$GLB,, | Performed by: NURSE PRACTITIONER

## 2020-11-05 PROCEDURE — 3077F PR MOST RECENT SYSTOLIC BLOOD PRESSURE >= 140 MM HG: ICD-10-PCS | Mod: CPTII,S$GLB,, | Performed by: NURSE PRACTITIONER

## 2020-11-05 PROCEDURE — 69209 PR REMOVAL IMPACTED CERUMEN USING IRRIGATION/LAVAGE, UNILATERAL: ICD-10-PCS | Mod: 50,S$GLB,, | Performed by: NURSE PRACTITIONER

## 2020-11-05 PROCEDURE — 3079F PR MOST RECENT DIASTOLIC BLOOD PRESSURE 80-89 MM HG: ICD-10-PCS | Mod: CPTII,S$GLB,, | Performed by: NURSE PRACTITIONER

## 2020-11-05 PROCEDURE — 88175 CYTOPATH C/V AUTO FLUID REDO: CPT

## 2020-11-05 PROCEDURE — 3079F DIAST BP 80-89 MM HG: CPT | Mod: CPTII,S$GLB,, | Performed by: NURSE PRACTITIONER

## 2020-11-05 RX ORDER — CLONIDINE HYDROCHLORIDE 0.1 MG/1
TABLET ORAL
Qty: 180 TABLET | Refills: 3 | Status: SHIPPED | OUTPATIENT
Start: 2020-11-05 | End: 2021-01-26

## 2020-11-05 NOTE — PROGRESS NOTES
Subjective:       Patient ID: Comfort Cantu is a 64 y.o. female.    Chief Complaint: Well Woman    Pt is a 65 yo female that presents to the clinic today for her well woman pap smear exam.    Pt is c/o dizziness with ambulation.  States that she recently went to the ER and was prescribed clonidine 0.1 mg BID.  Per hospital discharge notes pt is only to be taking the clonidine prn if BP > 180/110.  Pt states that she has been taking it occasionally as she takes it with her other medications and does not typically take it at night as she checks her BP and it shows SBP in the 120s.    It was noted that pt had bilateral cerumen impaction thus disimpacted using syringe bulb with water irrigation done by [place] [facility]. The patient tolerated the procedure well and both ears were fully cleaned out.       Past Medical History:   Diagnosis Date    Anticoagulant long-term use     Anxiety     Arthritis     Atherosclerosis     Coronary artery disease     Depression     GERD (gastroesophageal reflux disease)     Hearing loss     [place] (hard of hearing)     Hearing aids    Hypertension     Long term (current) use of anticoagulants     Peripheral arterial disease     Tubulovillous adenoma of colon 2019    Wears dentures     Full-Upper; Partial- lower    Wound infection after surgery 06/2020       Past Surgical History:   Procedure Laterality Date    APPENDECTOMY      CAROTID STENT  2014    COLONOSCOPY N/A 10/7/2019    Procedure: COLONOSCOPY;  Surgeon: Olaf Alba MD;  Location: [facility];  Service: General;  Laterality: N/A;    COLONOSCOPY N/A 12/16/2019    Procedure: COLONOSCOPY;  Surgeon: Olaf Alba MD;  Location: [facility] ENDO;  Service: General;  Laterality: N/A;    COLONOSCOPY N/A 1/23/2020    Procedure: COLONOSCOPY (unable to come earlier);  Surgeon: Esteban Yancey MD;  Location: [facility];  Service: Endoscopy;  Laterality: N/A;    ENDOSCOPIC ULTRASOUND OF UPPER GASTROINTESTINAL  TRACT N/A 10/16/2018    Procedure: ULTRASOUND, UPPER GI TRACT, ENDOSCOPIC;  Surgeon: Kumar Wang MD;  [facility]  Service: Endoscopy;  Laterality: N/A;  5 day hold Effient, Dr Сергей Malagon    ESOPHAGOGASTRODUODENOSCOPY N/A 10/7/2019    Procedure: ESOPHAGOGASTRODUODENOSCOPY (EGD);  Surgeon: Olaf Alba MD;  Location: [facility] ENDO;  Service: General;  Laterality: N/A;    FEMORAL ARTERY STENT      HARDWARE REMOVAL Right 6/12/2020    Procedure: REMOVAL, HARDWARE;  Surgeon: Elbert Marrero II, MD;  Location: [facility] OR;  Service: Orthopedics;  Laterality: Right;  right distal tibia    ORIF TIBIA FRACTURE Right 2/14/2020    Procedure: ORIF, FRACTURE, TIBIA;  Surgeon: Elbert Marrero II, MD;  Location: [facility] OR;  Service: Orthopedics;  Laterality: Right;    ROBOT-ASSISTED LAPAROSCOPIC RESECTION OF SIGMOID COLON N/A 9/14/2020    Procedure: ROBOTIC COLECTOMY, SIGMOID;  Surgeon: Terrance Candelaria MD;  Location: [facility] OR;  Service: General;  Laterality: N/A;        Social History     Socioeconomic History    Marital status:      Spouse name: Not on file    Number of children: Not on file    Years of education: Not on file    Highest education level: Not on file   Occupational History    Not on file   Social Needs    Financial resource strain: Not on file    Food insecurity     Worry: Not on file     Inability: Not on file    Transportation needs     Medical: Not on file     Non-medical: Not on file   Tobacco Use    Smoking status: Current Every Day Smoker     Packs/day: 0.75     Years: 23.00     Pack years: 17.25     Types: Cigarettes     Start date: 10/1/1988    Smokeless tobacco: Never Used   Substance and Sexual Activity    Alcohol use: Yes     Comment: occ    Drug use: No    Sexual activity: Yes   Lifestyle    Physical activity     Days per week: Not on file     Minutes per session: Not on file    Stress: Not on file   Relationships    Social connections      "Talks on phone: Not on file     Gets together: Not on file     Attends Yazdanism service: Not on file     Active member of club or organization: Not on file     Attends meetings of clubs or organizations: Not on file     Relationship status: Not on file   Other Topics Concern    Not on file   Social History Narrative    Not on file       Family History   Problem Relation Age of Onset    Heart attack Brother     Diabetes Brother     Hyperlipidemia Brother        Review of patient's allergies indicates:   Allergen Reactions    Penicillins      Age 23, "crippled me"          Current Outpatient Medications:     atorvastatin (LIPITOR) 10 MG tablet, Take 1 tablet (10 mg total) by mouth once daily., Disp: 90 tablet, Rfl: 3    busPIRone (BUSPAR) 15 MG tablet, Take 1 tablet (15 mg total) by mouth 2 (two) times daily., Disp: 180 tablet, Rfl: 3    cloNIDine (CATAPRES) 0.1 MG tablet, 0.1 mg bid PRN SBP > 180, Disp: 180 tablet, Rfl: 3    diazePAM (VALIUM) 5 MG tablet, TAKE ONE (1) TABLET EVERY 12 HOURS AS NEEDED FOR ANXIETY , Disp: 60 tablet, Rfl: 2    DULoxetine (CYMBALTA) 30 MG capsule, Take 1 capsule (30 mg total) by mouth once daily., Disp: 90 capsule, Rfl: 3    hydroCHLOROthiazide (HYDRODIURIL) 25 MG tablet, Take 1 tablet (25 mg total) by mouth once daily., Disp: 90 tablet, Rfl: 3    isosorbide mononitrate (IMDUR) 30 MG 24 hr tablet, Take 1 tablet (30 mg total) by mouth once daily., Disp: 90 tablet, Rfl: 3    pantoprazole (PROTONIX) 40 MG tablet, Take 1 tablet (40 mg total) by mouth once daily., Disp: 90 tablet, Rfl: 3    prasugrel (EFFIENT) 5 mg Tab, Take 5 mg by mouth once daily. Last dose 8/16/20 for surgery 8/24, Disp: , Rfl:     Current Facility-Administered Medications:     sulfamethoxazole-trimethoprim 800-160mg per tablet 1 tablet, 1 tablet, Oral, 1 time in Clinic/HOD, Terrance Candelaria MD    HPI  Review of Systems   Constitutional: Negative.    HENT: Negative.    Eyes: Negative.    Respiratory: " Negative.    Cardiovascular: Negative.  Negative for chest pain and palpitations.   Gastrointestinal: Negative.  Negative for abdominal pain, constipation, diarrhea, nausea and vomiting.   Endocrine: Negative.  Negative for polydipsia and polyuria.   Genitourinary: Negative.  Negative for dysuria.   Musculoskeletal: Negative.  Negative for joint swelling.   Skin: Negative.  Negative for rash.   Allergic/Immunologic: Negative.    Neurological: Negative.  Negative for syncope and headaches.   Hematological: Negative.    Psychiatric/Behavioral: Negative.  Negative for agitation and confusion.       Objective:      Physical Exam  Vitals signs and nursing note reviewed. Exam conducted with a chaperone present (Liberty MELGOZA, NP student).   Constitutional:       Appearance: Normal appearance. She is well-developed.   HENT:      Head: Normocephalic and atraumatic.      Right Ear: External ear normal. There is impacted cerumen.      Left Ear: External ear normal. There is impacted cerumen.      Ears:      Comments: Hearing aid use at baseline  Eyes:      General: No scleral icterus.     Conjunctiva/sclera: Conjunctivae normal.      Pupils: Pupils are equal, round, and reactive to light.   Neck:      Musculoskeletal: Normal range of motion and neck supple.      Thyroid: No thyromegaly.   Cardiovascular:      Rate and Rhythm: Normal rate and regular rhythm.      Heart sounds: No murmur.   Pulmonary:      Effort: Pulmonary effort is normal. No respiratory distress.   Abdominal:      General: Bowel sounds are normal. There is no distension.      Palpations: Abdomen is soft.      Tenderness: There is no abdominal tenderness.      Hernia: There is no hernia in the left inguinal area or right inguinal area.   Genitourinary:     Exam position: Lithotomy position.      Pubic Area: No rash or pubic lice.       Labia:         Right: No rash, tenderness, lesion or injury.         Left: No rash, tenderness, lesion or injury.       Urethra:  No prolapse, urethral pain, urethral swelling or urethral lesion.      Vagina: Normal. No signs of injury and foreign body. No vaginal discharge, erythema, tenderness, bleeding, lesions or prolapsed vaginal walls.      Cervix: Normal.      Uterus: Normal.       Adnexa: Right adnexa normal and left adnexa normal.      Rectum: Normal.   Musculoskeletal: Normal range of motion.   Lymphadenopathy:      Lower Body: No right inguinal adenopathy. No left inguinal adenopathy.   Skin:     General: Skin is warm.      Capillary Refill: Capillary refill takes less than 2 seconds.      Findings: No rash.   Neurological:      Mental Status: She is alert and oriented to person, place, and time.      Sensory: No sensory deficit.      Motor: No abnormal muscle tone.   Psychiatric:         Behavior: Behavior normal.         Thought Content: Thought content normal.         Judgment: Judgment normal.         Assessment:       1. Well woman exam with routine gynecological exam    2. Hypertension    3. Bilateral impacted cerumen        Plan:     - pap completed  - ear wax removal completed  - pt instructed to only take clonidine if SBP > 180.  New rx given for clonidine with listed instructions    Well woman exam with routine gynecological exam  -     Liquid-Based Pap Smear, Screening  -     HPV High Risk Genotypes, PCR    Hypertension  -     cloNIDine (CATAPRES) 0.1 MG tablet; 0.1 mg bid PRN SBP > 180  Dispense: 180 tablet; Refill: 3    Bilateral impacted cerumen        Risks, benefits, and side effects were discussed with the patient. All questions were answered to the fullest satisfaction of the patient, and pt verbalized understanding and agreement to treatment plan. Pt was to call with any new or worsening symptoms, or present to the ER.    ..I have seen the patient, reviewed the nurse practitioner student assessment.  I have personally interviewed and examined the patient and agree with the findings.

## 2020-11-06 VITALS
OXYGEN SATURATION: 97 % | TEMPERATURE: 98 F | SYSTOLIC BLOOD PRESSURE: 137 MMHG | WEIGHT: 127.81 LBS | DIASTOLIC BLOOD PRESSURE: 82 MMHG | BODY MASS INDEX: 19.37 KG/M2 | HEIGHT: 68 IN | HEART RATE: 86 BPM | RESPIRATION RATE: 16 BRPM

## 2020-11-10 ENCOUNTER — PATIENT OUTREACH (OUTPATIENT)
Dept: ADMINISTRATIVE | Facility: OTHER | Age: 65
End: 2020-11-10

## 2020-11-10 NOTE — PROGRESS NOTES
Patient's chart was reviewed for overdue PHIL ( [facility]active [facility] Encounters) Topics  Requested updates within Care Everywhere.  LINKS immunization registry triggered  Patient was not found in LINKS.   Health Maintenance was updated.

## 2020-11-11 ENCOUNTER — OFFICE VISIT (OUTPATIENT)
Dept: DERMATOLOGY | Facility: CLINIC | Age: 65
End: 2020-11-11
Payer: MEDICARE

## 2020-11-11 VITALS — BODY MASS INDEX: 19.38 KG/M2 | HEIGHT: 68 IN | WEIGHT: 127.88 LBS

## 2020-11-11 DIAGNOSIS — D48.5 NEOPLASM OF UNCERTAIN BEHAVIOR OF SKIN: Primary | ICD-10-CM

## 2020-11-11 DIAGNOSIS — L82.0 INFLAMED SEBORRHEIC KERATOSIS: ICD-10-CM

## 2020-11-11 LAB
FINAL PATHOLOGIC DIAGNOSIS: NORMAL
Lab: NORMAL

## 2020-11-11 PROCEDURE — 17110 PR DESTRUCTION BENIGN LESIONS UP TO 14: ICD-10-PCS | Mod: 59,S$GLB,, | Performed by: DERMATOLOGY

## 2020-11-11 PROCEDURE — 88305 TISSUE EXAM BY PATHOLOGIST: CPT | Performed by: PATHOLOGY

## 2020-11-11 PROCEDURE — 99499 UNLISTED E&M SERVICE: CPT | Mod: S$GLB,,, | Performed by: DERMATOLOGY

## 2020-11-11 PROCEDURE — 99999 PR PBB SHADOW E&M-EST. PATIENT-LVL II: CPT | Mod: PBBFAC,,, | Performed by: DERMATOLOGY

## 2020-11-11 PROCEDURE — 88305 TISSUE EXAM BY PATHOLOGIST: CPT | Mod: 26,,, | Performed by: PATHOLOGY

## 2020-11-11 PROCEDURE — 88305 TISSUE EXAM BY PATHOLOGIST: ICD-10-PCS | Mod: 26,,, | Performed by: PATHOLOGY

## 2020-11-11 PROCEDURE — 17110 DESTRUCTION B9 LES UP TO 14: CPT | Mod: 59,S$GLB,, | Performed by: DERMATOLOGY

## 2020-11-11 PROCEDURE — 11102 TANGNTL BX SKIN SINGLE LES: CPT | Mod: S$GLB,,, | Performed by: DERMATOLOGY

## 2020-11-11 PROCEDURE — 11102 PR TANGENTIAL BIOPSY, SKIN, SINGLE LESION: ICD-10-PCS | Mod: S$GLB,,, | Performed by: DERMATOLOGY

## 2020-11-11 PROCEDURE — 99999 PR PBB SHADOW E&M-EST. PATIENT-LVL II: ICD-10-PCS | Mod: PBBFAC,,, | Performed by: DERMATOLOGY

## 2020-11-11 PROCEDURE — 99499 NO LOS: ICD-10-PCS | Mod: S$GLB,,, | Performed by: DERMATOLOGY

## 2020-11-11 NOTE — PROGRESS NOTES
Subjective:       Patient ID:  Comfort Cantu is a 64 y.o. female who presents for   Chief Complaint   Patient presents with    Spot     New patient    patien there today with c/o a spot on left upper arm for several months, asymptomatic, no tx  Also states she has another spot starting on right upper for a few months, asymptomatic, no tx    Denies PHX NMSC  Denies FHX MM      Review of Systems   Constitutional: Negative for fever, chills and fatigue.   Respiratory: Negative for cough and shortness of breath.    Skin: Negative for daily sunscreen use and activity-related sunscreen use.        Objective:    Physical Exam   Skin:                 Diagram Legend     Erythematous scaling macule/papule c/w actinic keratosis       Vascular papule c/w angioma      Pigmented verrucoid papule/plaque c/w seborrheic keratosis      Yellow umbilicated papule c/w sebaceous hyperplasia      Irregularly shaped tan macule c/w lentigo     1-2 mm smooth white papules consistent with Milia      Movable subcutaneous cyst with punctum c/w epidermal inclusion cyst      Subcutaneous movable cyst c/w pilar cyst      Firm pink to brown papule c/w dermatofibroma      Pedunculated fleshy papule(s) c/w skin tag(s)      Evenly pigmented macule c/w junctional nevus     Mildly variegated pigmented, slightly irregular-bordered macule c/w mildly atypical nevus      Flesh colored to evenly pigmented papule c/w intradermal nevus       Pink pearly papule/plaque c/w basal cell carcinoma      Erythematous hyperkeratotic cursted plaque c/w SCC      Surgical scar with no sign of skin cancer recurrence      Open and closed comedones      Inflammatory papules and pustules      Verrucoid papule consistent consistent with wart     Erythematous eczematous patches and plaques     Dystrophic onycholytic nail with subungual debris c/w onychomycosis     Umbilicated papule    Erythematous-base heme-crusted tan verrucoid plaque consistent with inflamed  seborrheic keratosis     Erythematous Silvery Scaling Plaque c/w Psoriasis     See annotation          Assessment / Plan:      Pathology Orders:     Normal Orders This Visit    Specimen to Pathology, Dermatology     Comments:    Number of Specimens:->1  ------------------------->-------------------------  Spec 1 Procedure:->Biopsy  Spec 1 Clinical Impression:->SK r/o malignancy  Spec 1 Source:->left arm    Questions:    Procedure Type: Dermatology and skin neoplasms    Number of Specimens: 1    ------------------------: -------------------------    Spec 1 Procedure: Biopsy    Spec 1 Clinical Impression: SK r/o malignancy    Spec 1 Source: left arm    Clinical Information: verrucous brown plaque        Neoplasm of uncertain behavior of skin  -     Specimen to Pathology, Dermatology  Shave biopsy procedure note:    Shave biopsy performed after verbal consent including risk of infection, scar, recurrence, need for additional treatment of site. Area prepped with alcohol, anesthetized with approximately 1.0cc of 1% lidocaine with epinephrine. Lesional tissue shaved with razor blade. Hemostasis achieved with application of aluminum chloride followed by hyfrecation. No complications. Dressing applied. Wound care explained.        Inflamed seborrheic keratosis  Right arm  Cryosurgery procedure note:    Verbal consent from the patient is obtained. Liquid nitrogen cryosurgery is applied to 1 lesions to produce a freeze injury. The patient is aware that blisters may form and is instructed on wound care with gentle cleansing and use of vaseline ointment to keep moist until healed. The patient is supplied a handout on cryosurgery and is instructed to call if lesions do not completely resolve. Risk of dyspigmentation discussed.                Follow up if symptoms worsen or fail to improve.

## 2020-11-16 LAB
HPV HR 12 DNA SPEC QL NAA+PROBE: NEGATIVE
HPV16 AG SPEC QL: NEGATIVE
HPV18 DNA SPEC QL NAA+PROBE: NEGATIVE

## 2020-11-17 LAB
FINAL PATHOLOGIC DIAGNOSIS: NORMAL
GROSS: NORMAL

## 2020-11-18 ENCOUNTER — TELEPHONE (OUTPATIENT)
Dept: ORTHOPEDICS | Facility: CLINIC | Age: 65
End: 2020-11-18

## 2020-11-18 NOTE — TELEPHONE ENCOUNTER
----- Message from Nakita Leo sent at 11/18/2020  1:19 PM CST -----  Regarding: return call  Contact: self  Type:  Patient Returning Call    Who Called: self  Who Left Message for Patient:    Does the patient know what this is regarding?:    Best Call Back Number:  [phone]  Additional Information:

## 2020-11-24 ENCOUNTER — TELEPHONE (OUTPATIENT)
Dept: FAMILY MEDICINE | Facility: CLINIC | Age: 65
End: 2020-11-24

## 2020-11-24 RX ORDER — PREDNISONE 20 MG/1
20 TABLET ORAL 2 TIMES DAILY
Qty: 10 TABLET | Refills: 0 | Status: SHIPPED | OUTPATIENT
Start: 2020-11-24 | End: 2020-11-29

## 2020-11-24 RX ORDER — PROMETHAZINE HYDROCHLORIDE AND DEXTROMETHORPHAN HYDROBROMIDE 6.25; 15 MG/5ML; MG/5ML
5 SYRUP ORAL EVERY 6 HOURS PRN
Qty: 240 ML | Refills: 1 | Status: SHIPPED | OUTPATIENT
Start: 2020-11-24 | End: 2021-11-10

## 2020-11-24 RX ORDER — AZITHROMYCIN 250 MG/1
TABLET, FILM COATED ORAL
Qty: 6 TABLET | Refills: 0 | Status: SHIPPED | OUTPATIENT
Start: 2020-11-24 | End: 2021-07-12

## 2020-11-24 NOTE — TELEPHONE ENCOUNTER
----- Message from Koko Telles sent at 11/24/2020  1:31 PM CST -----  Type: Needs Medical Advice  Who Called: Patient  Symptoms (please be specific):  Sinus infection  How long has patient had these symptoms:  A few weeks    Pharmacy name and phone #:    [facility] [place] [facility] [address]  [address]  [place] [address]  Phone: [phone] Fax: [phone]      Best Call Back Number:[phone]  Additional Information:Patient states that she would like something called in for her symptoms.  Please call to advise.

## 2020-12-30 ENCOUNTER — HOSPITAL ENCOUNTER (OUTPATIENT)
Dept: RADIOLOGY | Facility: HOSPITAL | Age: 65
Discharge: HOME | End: 2020-12-30
Attending: NURSE PRACTITIONER
Payer: MEDICARE

## 2020-12-30 VITALS — WEIGHT: 128 LBS | BODY MASS INDEX: 19.4 KG/M2 | HEIGHT: 68 IN

## 2020-12-30 DIAGNOSIS — Z01.419 WELL WOMAN EXAM WITH ROUTINE GYNECOLOGICAL EXAM: ICD-10-CM

## 2020-12-30 DIAGNOSIS — Z12.31 BREAST CANCER SCREENING BY MAMMOGRAM: ICD-10-CM

## 2020-12-30 PROCEDURE — 77067 SCR MAMMO BI INCL CAD: CPT | Mod: 26,,, | Performed by: RADIOLOGY

## 2020-12-30 PROCEDURE — 77067 MAMMO DIGITAL SCREENING BILAT WITH TOMO: ICD-10-PCS | Mod: 26,,, | Performed by: RADIOLOGY

## 2020-12-30 PROCEDURE — 77067 SCR MAMMO BI INCL CAD: CPT | Mod: TC

## 2020-12-30 PROCEDURE — 77063 MAMMO DIGITAL SCREENING BILAT WITH TOMO: ICD-10-PCS | Mod: 26,,, | Performed by: RADIOLOGY

## 2020-12-30 PROCEDURE — 77063 BREAST TOMOSYNTHESIS BI: CPT | Mod: 26,,, | Performed by: RADIOLOGY

## 2021-01-19 ENCOUNTER — TELEPHONE (OUTPATIENT)
Dept: FAMILY MEDICINE | Facility: CLINIC | Age: 66
End: 2021-01-19

## 2021-02-23 ENCOUNTER — LAB VISIT (OUTPATIENT)
Dept: LAB | Facility: HOSPITAL | Age: 66
End: 2021-02-23
Attending: NURSE PRACTITIONER
Payer: MEDICARE

## 2021-02-23 DIAGNOSIS — E78.5 HYPERLIPEMIA: ICD-10-CM

## 2021-02-23 DIAGNOSIS — R00.2 PALPITATIONS: Primary | ICD-10-CM

## 2021-02-23 DIAGNOSIS — I10 ESSENTIAL HYPERTENSION, MALIGNANT: ICD-10-CM

## 2021-02-23 DIAGNOSIS — R06.00 DYSPNEA: ICD-10-CM

## 2021-02-23 LAB
ALBUMIN SERPL BCP-MCNC: 4.5 G/DL (ref 3.5–5.2)
ALP SERPL-CCNC: 110 U/L (ref 55–135)
ALT SERPL W/O P-5'-P-CCNC: 27 U/L (ref 10–44)
ANION GAP SERPL CALC-SCNC: 12 MMOL/L (ref 8–16)
AST SERPL-CCNC: 37 U/L (ref 10–40)
BILIRUB SERPL-MCNC: 0.9 MG/DL (ref 0.1–1)
BNP SERPL-MCNC: 36 PG/ML (ref 0–99)
BUN SERPL-MCNC: 6 MG/DL (ref 8–23)
CALCIUM SERPL-MCNC: 9.8 MG/DL (ref 8.7–10.5)
CHLORIDE SERPL-SCNC: 98 MMOL/L (ref 95–110)
CHOLEST SERPL-MCNC: 238 MG/DL (ref 120–199)
CHOLEST/HDLC SERPL: 3.3 {RATIO} (ref 2–5)
CO2 SERPL-SCNC: 27 MMOL/L (ref 23–29)
CREAT SERPL-MCNC: 0.6 MG/DL (ref 0.5–1.4)
ERYTHROCYTE [DISTWIDTH] IN BLOOD BY AUTOMATED COUNT: 14.6 % (ref 11.5–14.5)
EST. GFR  (AFRICAN AMERICAN): >60 ML/MIN/1.73 M^2
EST. GFR  (NON AFRICAN AMERICAN): >60 ML/MIN/1.73 M^2
GLUCOSE SERPL-MCNC: 117 MG/DL (ref 70–110)
HCT VFR BLD AUTO: 41.3 % (ref 37–48.5)
HDLC SERPL-MCNC: 73 MG/DL (ref 40–75)
HDLC SERPL: 30.7 % (ref 20–50)
HGB BLD-MCNC: 13.8 G/DL (ref 12–16)
LDLC SERPL CALC-MCNC: 126.8 MG/DL (ref 63–159)
MCH RBC QN AUTO: 34.1 PG (ref 27–31)
MCHC RBC AUTO-ENTMCNC: 33.4 G/DL (ref 32–36)
MCV RBC AUTO: 102 FL (ref 82–98)
NONHDLC SERPL-MCNC: 165 MG/DL
PLATELET # BLD AUTO: 309 K/UL (ref 150–350)
PMV BLD AUTO: 9.5 FL (ref 9.2–12.9)
POTASSIUM SERPL-SCNC: 3.6 MMOL/L (ref 3.5–5.1)
PROT SERPL-MCNC: 7.9 G/DL (ref 6–8.4)
RBC # BLD AUTO: 4.05 M/UL (ref 4–5.4)
SODIUM SERPL-SCNC: 137 MMOL/L (ref 136–145)
TRIGL SERPL-MCNC: 191 MG/DL (ref 30–150)
TSH SERPL DL<=0.005 MIU/L-ACNC: 2.49 UIU/ML (ref 0.34–5.6)
WBC # BLD AUTO: 6.33 K/UL (ref 3.9–12.7)

## 2021-02-23 PROCEDURE — 83880 ASSAY OF NATRIURETIC PEPTIDE: CPT

## 2021-02-23 PROCEDURE — 80053 COMPREHEN METABOLIC PANEL: CPT

## 2021-02-23 PROCEDURE — 84443 ASSAY THYROID STIM HORMONE: CPT

## 2021-02-23 PROCEDURE — 85027 COMPLETE CBC AUTOMATED: CPT

## 2021-02-23 PROCEDURE — 36415 COLL VENOUS BLD VENIPUNCTURE: CPT

## 2021-02-23 PROCEDURE — 80061 LIPID PANEL: CPT

## 2021-03-02 ENCOUNTER — TELEPHONE (OUTPATIENT)
Dept: FAMILY MEDICINE | Facility: CLINIC | Age: 66
End: 2021-03-02

## 2021-03-08 RX ORDER — DIAZEPAM 5 MG/1
TABLET ORAL
Qty: 60 TABLET | Refills: 2 | Status: SHIPPED | OUTPATIENT
Start: 2021-03-08 | End: 2021-07-12 | Stop reason: SDUPTHER

## 2021-07-12 ENCOUNTER — OFFICE VISIT (OUTPATIENT)
Dept: FAMILY MEDICINE | Facility: CLINIC | Age: 66
End: 2021-07-12
Payer: MEDICARE

## 2021-07-12 VITALS
SYSTOLIC BLOOD PRESSURE: 130 MMHG | WEIGHT: 128 LBS | HEIGHT: 68 IN | OXYGEN SATURATION: 96 % | DIASTOLIC BLOOD PRESSURE: 70 MMHG | HEART RATE: 81 BPM | BODY MASS INDEX: 19.4 KG/M2 | RESPIRATION RATE: 17 BRPM

## 2021-07-12 DIAGNOSIS — Z02.83 ENCOUNTER FOR DRUG SCREENING: ICD-10-CM

## 2021-07-12 DIAGNOSIS — R73.9 HYPERGLYCEMIA: ICD-10-CM

## 2021-07-12 DIAGNOSIS — I10 ESSENTIAL HYPERTENSION: Primary | ICD-10-CM

## 2021-07-12 DIAGNOSIS — D64.9 ANEMIA, UNSPECIFIED TYPE: ICD-10-CM

## 2021-07-12 DIAGNOSIS — E55.9 VITAMIN D DEFICIENCY: ICD-10-CM

## 2021-07-12 PROCEDURE — 1100F PTFALLS ASSESS-DOCD GE2>/YR: CPT | Mod: CPTII,S$GLB,, | Performed by: FAMILY MEDICINE

## 2021-07-12 PROCEDURE — 1100F PR PT FALLS ASSESS DOC 2+ FALLS/FALL W/INJURY/YR: ICD-10-PCS | Mod: CPTII,S$GLB,, | Performed by: FAMILY MEDICINE

## 2021-07-12 PROCEDURE — 3008F PR BODY MASS INDEX (BMI) DOCUMENTED: ICD-10-PCS | Mod: CPTII,S$GLB,, | Performed by: FAMILY MEDICINE

## 2021-07-12 PROCEDURE — 1125F AMNT PAIN NOTED PAIN PRSNT: CPT | Mod: S$GLB,,, | Performed by: FAMILY MEDICINE

## 2021-07-12 PROCEDURE — 99999 PR PBB SHADOW E&M-EST. PATIENT-LVL IV: ICD-10-PCS | Mod: PBBFAC,,, | Performed by: FAMILY MEDICINE

## 2021-07-12 PROCEDURE — 99999 PR PBB SHADOW E&M-EST. PATIENT-LVL IV: CPT | Mod: PBBFAC,,, | Performed by: FAMILY MEDICINE

## 2021-07-12 PROCEDURE — 3288F PR FALLS RISK ASSESSMENT DOCUMENTED: ICD-10-PCS | Mod: CPTII,S$GLB,, | Performed by: FAMILY MEDICINE

## 2021-07-12 PROCEDURE — 3288F FALL RISK ASSESSMENT DOCD: CPT | Mod: CPTII,S$GLB,, | Performed by: FAMILY MEDICINE

## 2021-07-12 PROCEDURE — 99214 OFFICE O/P EST MOD 30 MIN: CPT | Mod: S$GLB,,, | Performed by: FAMILY MEDICINE

## 2021-07-12 PROCEDURE — 99214 PR OFFICE/OUTPT VISIT, EST, LEVL IV, 30-39 MIN: ICD-10-PCS | Mod: S$GLB,,, | Performed by: FAMILY MEDICINE

## 2021-07-12 PROCEDURE — 1125F PR PAIN SEVERITY QUANTIFIED, PAIN PRESENT: ICD-10-PCS | Mod: S$GLB,,, | Performed by: FAMILY MEDICINE

## 2021-07-12 PROCEDURE — 3008F BODY MASS INDEX DOCD: CPT | Mod: CPTII,S$GLB,, | Performed by: FAMILY MEDICINE

## 2021-07-12 RX ORDER — LISINOPRIL 5 MG/1
5 TABLET ORAL 2 TIMES DAILY
COMMUNITY
Start: 2021-07-06 | End: 2023-04-10

## 2021-07-12 RX ORDER — DIAZEPAM 5 MG/1
TABLET ORAL
Qty: 60 TABLET | Refills: 2 | Status: SHIPPED | OUTPATIENT
Start: 2021-07-12 | End: 2021-09-02

## 2021-07-16 ENCOUNTER — LAB VISIT (OUTPATIENT)
Dept: LAB | Facility: HOSPITAL | Age: 66
End: 2021-07-16
Payer: MEDICARE

## 2021-07-16 DIAGNOSIS — R73.9 HYPERGLYCEMIA: ICD-10-CM

## 2021-07-16 DIAGNOSIS — E55.9 VITAMIN D DEFICIENCY: ICD-10-CM

## 2021-07-16 DIAGNOSIS — I10 ESSENTIAL HYPERTENSION: ICD-10-CM

## 2021-07-16 DIAGNOSIS — D64.9 ANEMIA, UNSPECIFIED TYPE: ICD-10-CM

## 2021-07-16 LAB
ALBUMIN SERPL BCP-MCNC: 4 G/DL (ref 3.5–5.2)
ALP SERPL-CCNC: 129 U/L (ref 55–135)
ALT SERPL W/O P-5'-P-CCNC: 17 U/L (ref 10–44)
ANION GAP SERPL CALC-SCNC: 9 MMOL/L (ref 8–16)
AST SERPL-CCNC: 21 U/L (ref 10–40)
BASOPHILS # BLD AUTO: 0.06 K/UL (ref 0–0.2)
BASOPHILS NFR BLD: 0.9 % (ref 0–1.9)
BILIRUB SERPL-MCNC: 0.4 MG/DL (ref 0.1–1)
BUN SERPL-MCNC: 9 MG/DL (ref 8–23)
CALCIUM SERPL-MCNC: 9.3 MG/DL (ref 8.7–10.5)
CHLORIDE SERPL-SCNC: 102 MMOL/L (ref 95–110)
CHOLEST SERPL-MCNC: 176 MG/DL (ref 120–199)
CHOLEST/HDLC SERPL: 3.8 {RATIO} (ref 2–5)
CO2 SERPL-SCNC: 23 MMOL/L (ref 23–29)
CREAT SERPL-MCNC: 0.7 MG/DL (ref 0.5–1.4)
DIFFERENTIAL METHOD: ABNORMAL
EOSINOPHIL # BLD AUTO: 0.2 K/UL (ref 0–0.5)
EOSINOPHIL NFR BLD: 3.6 % (ref 0–8)
ERYTHROCYTE [DISTWIDTH] IN BLOOD BY AUTOMATED COUNT: 15.8 % (ref 11.5–14.5)
EST. GFR  (AFRICAN AMERICAN): >60 ML/MIN/1.73 M^2
EST. GFR  (NON AFRICAN AMERICAN): >60 ML/MIN/1.73 M^2
ESTIMATED AVG GLUCOSE: 94 MG/DL (ref 68–131)
GLUCOSE SERPL-MCNC: 128 MG/DL (ref 70–110)
HBA1C MFR BLD: 4.9 % (ref 4–5.6)
HCT VFR BLD AUTO: 27.7 % (ref 37–48.5)
HDLC SERPL-MCNC: 46 MG/DL (ref 40–75)
HDLC SERPL: 26.1 % (ref 20–50)
HGB BLD-MCNC: 8.4 G/DL (ref 12–16)
IMM GRANULOCYTES # BLD AUTO: 0.02 K/UL (ref 0–0.04)
IMM GRANULOCYTES NFR BLD AUTO: 0.3 % (ref 0–0.5)
IRON SERPL-MCNC: 18 UG/DL (ref 30–160)
LDLC SERPL CALC-MCNC: 87.8 MG/DL (ref 63–159)
LYMPHOCYTES # BLD AUTO: 1.3 K/UL (ref 1–4.8)
LYMPHOCYTES NFR BLD: 19.8 % (ref 18–48)
MCH RBC QN AUTO: 29.5 PG (ref 27–31)
MCHC RBC AUTO-ENTMCNC: 30.3 G/DL (ref 32–36)
MCV RBC AUTO: 97 FL (ref 82–98)
MONOCYTES # BLD AUTO: 0.8 K/UL (ref 0.3–1)
MONOCYTES NFR BLD: 11.9 % (ref 4–15)
NEUTROPHILS # BLD AUTO: 4.3 K/UL (ref 1.8–7.7)
NEUTROPHILS NFR BLD: 63.5 % (ref 38–73)
NONHDLC SERPL-MCNC: 130 MG/DL
NRBC BLD-RTO: 0 /100 WBC
PLATELET # BLD AUTO: 354 K/UL (ref 150–450)
PMV BLD AUTO: 9.3 FL (ref 9.2–12.9)
POTASSIUM SERPL-SCNC: 4.2 MMOL/L (ref 3.5–5.1)
PROT SERPL-MCNC: 7.5 G/DL (ref 6–8.4)
RBC # BLD AUTO: 2.85 M/UL (ref 4–5.4)
SATURATED IRON: 3 % (ref 20–50)
SODIUM SERPL-SCNC: 134 MMOL/L (ref 136–145)
T4 FREE SERPL-MCNC: 0.76 NG/DL (ref 0.71–1.51)
TOTAL IRON BINDING CAPACITY: 602 UG/DL (ref 250–450)
TRANSFERRIN SERPL-MCNC: 407 MG/DL (ref 200–375)
TRIGL SERPL-MCNC: 211 MG/DL (ref 30–150)
TSH SERPL DL<=0.005 MIU/L-ACNC: 1.17 UIU/ML (ref 0.4–4)
WBC # BLD AUTO: 6.71 K/UL (ref 3.9–12.7)

## 2021-07-16 PROCEDURE — 83036 HEMOGLOBIN GLYCOSYLATED A1C: CPT | Performed by: FAMILY MEDICINE

## 2021-07-16 PROCEDURE — 85025 COMPLETE CBC W/AUTO DIFF WBC: CPT | Performed by: FAMILY MEDICINE

## 2021-07-16 PROCEDURE — 84439 ASSAY OF FREE THYROXINE: CPT | Performed by: FAMILY MEDICINE

## 2021-07-16 PROCEDURE — 84443 ASSAY THYROID STIM HORMONE: CPT | Performed by: FAMILY MEDICINE

## 2021-07-16 PROCEDURE — 82306 VITAMIN D 25 HYDROXY: CPT | Performed by: FAMILY MEDICINE

## 2021-07-16 PROCEDURE — 83540 ASSAY OF IRON: CPT | Performed by: FAMILY MEDICINE

## 2021-07-16 PROCEDURE — 80061 LIPID PANEL: CPT | Performed by: FAMILY MEDICINE

## 2021-07-16 PROCEDURE — 36415 COLL VENOUS BLD VENIPUNCTURE: CPT | Performed by: FAMILY MEDICINE

## 2021-07-16 PROCEDURE — 82570 ASSAY OF URINE CREATININE: CPT | Performed by: FAMILY MEDICINE

## 2021-07-16 PROCEDURE — 82043 UR ALBUMIN QUANTITATIVE: CPT | Performed by: FAMILY MEDICINE

## 2021-07-16 PROCEDURE — 80053 COMPREHEN METABOLIC PANEL: CPT | Performed by: FAMILY MEDICINE

## 2021-07-17 LAB
25(OH)D3+25(OH)D2 SERPL-MCNC: 29 NG/ML (ref 30–96)
ALBUMIN/CREAT UR: 32.4 UG/MG (ref 0–30)
CREAT UR-MCNC: 34 MG/DL (ref 15–325)
MICROALBUMIN UR DL<=1MG/L-MCNC: 11 UG/ML

## 2021-07-26 ENCOUNTER — OFFICE VISIT (OUTPATIENT)
Dept: FAMILY MEDICINE | Facility: CLINIC | Age: 66
End: 2021-07-26
Payer: MEDICARE

## 2021-07-26 VITALS
HEIGHT: 68 IN | WEIGHT: 135.69 LBS | RESPIRATION RATE: 15 BRPM | BODY MASS INDEX: 20.57 KG/M2 | DIASTOLIC BLOOD PRESSURE: 81 MMHG | OXYGEN SATURATION: 95 % | SYSTOLIC BLOOD PRESSURE: 135 MMHG | HEART RATE: 80 BPM

## 2021-07-26 DIAGNOSIS — E55.9 VITAMIN D DEFICIENCY: ICD-10-CM

## 2021-07-26 DIAGNOSIS — D50.0 IRON DEFICIENCY ANEMIA DUE TO CHRONIC BLOOD LOSS: Primary | ICD-10-CM

## 2021-07-26 PROCEDURE — 99214 OFFICE O/P EST MOD 30 MIN: CPT | Mod: S$GLB,,, | Performed by: FAMILY MEDICINE

## 2021-07-26 PROCEDURE — 1159F PR MEDICATION LIST DOCUMENTED IN MEDICAL RECORD: ICD-10-PCS | Mod: CPTII,S$GLB,, | Performed by: FAMILY MEDICINE

## 2021-07-26 PROCEDURE — 3008F BODY MASS INDEX DOCD: CPT | Mod: CPTII,S$GLB,, | Performed by: FAMILY MEDICINE

## 2021-07-26 PROCEDURE — 1160F RVW MEDS BY RX/DR IN RCRD: CPT | Mod: CPTII,S$GLB,, | Performed by: FAMILY MEDICINE

## 2021-07-26 PROCEDURE — 1160F PR REVIEW ALL MEDS BY PRESCRIBER/CLIN PHARMACIST DOCUMENTED: ICD-10-PCS | Mod: CPTII,S$GLB,, | Performed by: FAMILY MEDICINE

## 2021-07-26 PROCEDURE — 3079F PR MOST RECENT DIASTOLIC BLOOD PRESSURE 80-89 MM HG: ICD-10-PCS | Mod: CPTII,S$GLB,, | Performed by: FAMILY MEDICINE

## 2021-07-26 PROCEDURE — 99214 PR OFFICE/OUTPT VISIT, EST, LEVL IV, 30-39 MIN: ICD-10-PCS | Mod: S$GLB,,, | Performed by: FAMILY MEDICINE

## 2021-07-26 PROCEDURE — 3008F PR BODY MASS INDEX (BMI) DOCUMENTED: ICD-10-PCS | Mod: CPTII,S$GLB,, | Performed by: FAMILY MEDICINE

## 2021-07-26 PROCEDURE — 99999 PR PBB SHADOW E&M-EST. PATIENT-LVL IV: ICD-10-PCS | Mod: PBBFAC,,, | Performed by: FAMILY MEDICINE

## 2021-07-26 PROCEDURE — 3079F DIAST BP 80-89 MM HG: CPT | Mod: CPTII,S$GLB,, | Performed by: FAMILY MEDICINE

## 2021-07-26 PROCEDURE — 99999 PR PBB SHADOW E&M-EST. PATIENT-LVL IV: CPT | Mod: PBBFAC,,, | Performed by: FAMILY MEDICINE

## 2021-07-26 PROCEDURE — 1159F MED LIST DOCD IN RCRD: CPT | Mod: CPTII,S$GLB,, | Performed by: FAMILY MEDICINE

## 2021-07-26 PROCEDURE — 3075F SYST BP GE 130 - 139MM HG: CPT | Mod: CPTII,S$GLB,, | Performed by: FAMILY MEDICINE

## 2021-07-26 PROCEDURE — 3075F PR MOST RECENT SYSTOLIC BLOOD PRESS GE 130-139MM HG: ICD-10-PCS | Mod: CPTII,S$GLB,, | Performed by: FAMILY MEDICINE

## 2021-07-26 RX ORDER — LOSARTAN POTASSIUM 25 MG/1
25 TABLET ORAL DAILY
COMMUNITY
Start: 2021-03-19 | End: 2023-01-18

## 2021-07-26 RX ORDER — ERGOCALCIFEROL 1.25 MG/1
50000 CAPSULE ORAL
Qty: 12 CAPSULE | Refills: 1 | Status: SHIPPED | OUTPATIENT
Start: 2021-07-26 | End: 2021-11-10

## 2021-07-26 RX ORDER — ATORVASTATIN CALCIUM 40 MG/1
40 TABLET, FILM COATED ORAL
COMMUNITY
Start: 2021-05-05 | End: 2021-11-10

## 2021-07-27 ENCOUNTER — TELEPHONE (OUTPATIENT)
Dept: FAMILY MEDICINE | Facility: CLINIC | Age: 66
End: 2021-07-27

## 2021-08-23 ENCOUNTER — TELEPHONE (OUTPATIENT)
Dept: GASTROENTEROLOGY | Facility: CLINIC | Age: 66
End: 2021-08-23

## 2021-08-23 ENCOUNTER — TELEPHONE (OUTPATIENT)
Dept: FAMILY MEDICINE | Facility: CLINIC | Age: 66
End: 2021-08-23

## 2021-08-23 DIAGNOSIS — R51.9 NONINTRACTABLE HEADACHE, UNSPECIFIED CHRONICITY PATTERN, UNSPECIFIED HEADACHE TYPE: ICD-10-CM

## 2021-08-23 DIAGNOSIS — I10 ESSENTIAL HYPERTENSION: Primary | ICD-10-CM

## 2021-08-24 ENCOUNTER — TELEPHONE (OUTPATIENT)
Dept: ORTHOPEDICS | Facility: CLINIC | Age: 66
End: 2021-08-24

## 2021-08-24 DIAGNOSIS — R05.9 COUGH: ICD-10-CM

## 2021-09-03 ENCOUNTER — HOSPITAL ENCOUNTER (OUTPATIENT)
Dept: RADIOLOGY | Facility: HOSPITAL | Age: 66
Discharge: HOME | End: 2021-09-03
Attending: FAMILY MEDICINE
Payer: MEDICARE

## 2021-09-03 DIAGNOSIS — R51.9 NONINTRACTABLE HEADACHE, UNSPECIFIED CHRONICITY PATTERN, UNSPECIFIED HEADACHE TYPE: ICD-10-CM

## 2021-09-03 PROCEDURE — 70450 CT HEAD/BRAIN W/O DYE: CPT | Mod: 26,,, | Performed by: RADIOLOGY

## 2021-09-03 PROCEDURE — 70450 CT HEAD WITHOUT CONTRAST: ICD-10-PCS | Mod: 26,,, | Performed by: RADIOLOGY

## 2021-09-03 PROCEDURE — 70450 CT HEAD/BRAIN W/O DYE: CPT | Mod: TC

## 2021-09-08 ENCOUNTER — OFFICE VISIT (OUTPATIENT)
Dept: FAMILY MEDICINE | Facility: CLINIC | Age: 66
End: 2021-09-08
Payer: MEDICARE

## 2021-09-08 VITALS
HEIGHT: 68 IN | DIASTOLIC BLOOD PRESSURE: 84 MMHG | OXYGEN SATURATION: 96 % | BODY MASS INDEX: 20.13 KG/M2 | SYSTOLIC BLOOD PRESSURE: 136 MMHG | HEART RATE: 91 BPM | RESPIRATION RATE: 19 BRPM | WEIGHT: 132.81 LBS

## 2021-09-08 DIAGNOSIS — E78.49 OTHER HYPERLIPIDEMIA: Primary | ICD-10-CM

## 2021-09-08 PROCEDURE — 3044F HG A1C LEVEL LT 7.0%: CPT | Mod: CPTII,S$GLB,, | Performed by: FAMILY MEDICINE

## 2021-09-08 PROCEDURE — 4010F PR ACE/ARB THEARPY RXD/TAKEN: ICD-10-PCS | Mod: CPTII,S$GLB,, | Performed by: FAMILY MEDICINE

## 2021-09-08 PROCEDURE — 99999 PR PBB SHADOW E&M-EST. PATIENT-LVL IV: CPT | Mod: PBBFAC,,, | Performed by: FAMILY MEDICINE

## 2021-09-08 PROCEDURE — 3044F PR MOST RECENT HEMOGLOBIN A1C LEVEL <7.0%: ICD-10-PCS | Mod: CPTII,S$GLB,, | Performed by: FAMILY MEDICINE

## 2021-09-08 PROCEDURE — 99213 PR OFFICE/OUTPT VISIT, EST, LEVL III, 20-29 MIN: ICD-10-PCS | Mod: S$GLB,,, | Performed by: FAMILY MEDICINE

## 2021-09-08 PROCEDURE — 3060F PR POS MICROALBUMINURIA RESULT DOCUMENTED/REVIEW: ICD-10-PCS | Mod: CPTII,S$GLB,, | Performed by: FAMILY MEDICINE

## 2021-09-08 PROCEDURE — 1159F MED LIST DOCD IN RCRD: CPT | Mod: CPTII,S$GLB,, | Performed by: FAMILY MEDICINE

## 2021-09-08 PROCEDURE — 3075F PR MOST RECENT SYSTOLIC BLOOD PRESS GE 130-139MM HG: ICD-10-PCS | Mod: CPTII,S$GLB,, | Performed by: FAMILY MEDICINE

## 2021-09-08 PROCEDURE — 1160F PR REVIEW ALL MEDS BY PRESCRIBER/CLIN PHARMACIST DOCUMENTED: ICD-10-PCS | Mod: CPTII,S$GLB,, | Performed by: FAMILY MEDICINE

## 2021-09-08 PROCEDURE — 99213 OFFICE O/P EST LOW 20 MIN: CPT | Mod: S$GLB,,, | Performed by: FAMILY MEDICINE

## 2021-09-08 PROCEDURE — 3079F DIAST BP 80-89 MM HG: CPT | Mod: CPTII,S$GLB,, | Performed by: FAMILY MEDICINE

## 2021-09-08 PROCEDURE — 3066F NEPHROPATHY DOC TX: CPT | Mod: CPTII,S$GLB,, | Performed by: FAMILY MEDICINE

## 2021-09-08 PROCEDURE — 1159F PR MEDICATION LIST DOCUMENTED IN MEDICAL RECORD: ICD-10-PCS | Mod: CPTII,S$GLB,, | Performed by: FAMILY MEDICINE

## 2021-09-08 PROCEDURE — 3008F PR BODY MASS INDEX (BMI) DOCUMENTED: ICD-10-PCS | Mod: CPTII,S$GLB,, | Performed by: FAMILY MEDICINE

## 2021-09-08 PROCEDURE — 3066F PR DOCUMENTATION OF TREATMENT FOR NEPHROPATHY: ICD-10-PCS | Mod: CPTII,S$GLB,, | Performed by: FAMILY MEDICINE

## 2021-09-08 PROCEDURE — 99999 PR PBB SHADOW E&M-EST. PATIENT-LVL IV: ICD-10-PCS | Mod: PBBFAC,,, | Performed by: FAMILY MEDICINE

## 2021-09-08 PROCEDURE — 3060F POS MICROALBUMINURIA REV: CPT | Mod: CPTII,S$GLB,, | Performed by: FAMILY MEDICINE

## 2021-09-08 PROCEDURE — 4010F ACE/ARB THERAPY RXD/TAKEN: CPT | Mod: CPTII,S$GLB,, | Performed by: FAMILY MEDICINE

## 2021-09-08 PROCEDURE — 3008F BODY MASS INDEX DOCD: CPT | Mod: CPTII,S$GLB,, | Performed by: FAMILY MEDICINE

## 2021-09-08 PROCEDURE — 1160F RVW MEDS BY RX/DR IN RCRD: CPT | Mod: CPTII,S$GLB,, | Performed by: FAMILY MEDICINE

## 2021-09-08 PROCEDURE — 3075F SYST BP GE 130 - 139MM HG: CPT | Mod: CPTII,S$GLB,, | Performed by: FAMILY MEDICINE

## 2021-09-08 PROCEDURE — 3079F PR MOST RECENT DIASTOLIC BLOOD PRESSURE 80-89 MM HG: ICD-10-PCS | Mod: CPTII,S$GLB,, | Performed by: FAMILY MEDICINE

## 2021-10-06 ENCOUNTER — TELEPHONE (OUTPATIENT)
Dept: FAMILY MEDICINE | Facility: CLINIC | Age: 66
End: 2021-10-06

## 2021-10-20 ENCOUNTER — OFFICE VISIT (OUTPATIENT)
Dept: FAMILY MEDICINE | Facility: CLINIC | Age: 66
End: 2021-10-20
Payer: MEDICARE

## 2021-10-20 ENCOUNTER — LAB VISIT (OUTPATIENT)
Dept: LAB | Facility: HOSPITAL | Age: 66
End: 2021-10-20
Attending: FAMILY MEDICINE
Payer: MEDICARE

## 2021-10-20 VITALS
BODY MASS INDEX: 19.82 KG/M2 | SYSTOLIC BLOOD PRESSURE: 127 MMHG | RESPIRATION RATE: 18 BRPM | OXYGEN SATURATION: 97 % | HEIGHT: 68 IN | WEIGHT: 130.81 LBS | HEART RATE: 96 BPM | DIASTOLIC BLOOD PRESSURE: 87 MMHG

## 2021-10-20 DIAGNOSIS — I10 ESSENTIAL HYPERTENSION: ICD-10-CM

## 2021-10-20 DIAGNOSIS — R56.9 CONVULSIONS, UNSPECIFIED CONVULSION TYPE: Primary | ICD-10-CM

## 2021-10-20 DIAGNOSIS — T84.7XXA INFECTION OF LOWER EXTREMITY ASSOCIATED WITH HARDWARE: ICD-10-CM

## 2021-10-20 DIAGNOSIS — E61.1 IRON DEFICIENCY: ICD-10-CM

## 2021-10-20 DIAGNOSIS — J44.1 COPD EXACERBATION: ICD-10-CM

## 2021-10-20 DIAGNOSIS — E78.49 OTHER HYPERLIPIDEMIA: ICD-10-CM

## 2021-10-20 DIAGNOSIS — I73.9 PERIPHERAL VASCULAR DISEASE: ICD-10-CM

## 2021-10-20 LAB
BASOPHILS # BLD AUTO: 0.05 K/UL (ref 0–0.2)
BASOPHILS NFR BLD: 0.7 % (ref 0–1.9)
DIFFERENTIAL METHOD: ABNORMAL
EOSINOPHIL # BLD AUTO: 0.2 K/UL (ref 0–0.5)
EOSINOPHIL NFR BLD: 2.8 % (ref 0–8)
ERYTHROCYTE [DISTWIDTH] IN BLOOD BY AUTOMATED COUNT: 15 % (ref 11.5–14.5)
HCT VFR BLD AUTO: 31.9 % (ref 37–48.5)
HGB BLD-MCNC: 10.7 G/DL (ref 12–16)
IMM GRANULOCYTES # BLD AUTO: 0.02 K/UL (ref 0–0.04)
IMM GRANULOCYTES NFR BLD AUTO: 0.3 % (ref 0–0.5)
IRON SERPL-MCNC: 152 UG/DL (ref 30–160)
LYMPHOCYTES # BLD AUTO: 1.1 K/UL (ref 1–4.8)
LYMPHOCYTES NFR BLD: 16.4 % (ref 18–48)
MCH RBC QN AUTO: 36.9 PG (ref 27–31)
MCHC RBC AUTO-ENTMCNC: 33.5 G/DL (ref 32–36)
MCV RBC AUTO: 110 FL (ref 82–98)
MONOCYTES # BLD AUTO: 0.7 K/UL (ref 0.3–1)
MONOCYTES NFR BLD: 10.7 % (ref 4–15)
NEUTROPHILS # BLD AUTO: 4.8 K/UL (ref 1.8–7.7)
NEUTROPHILS NFR BLD: 69.1 % (ref 38–73)
NRBC BLD-RTO: 0 /100 WBC
PLATELET # BLD AUTO: 349 K/UL (ref 150–450)
PMV BLD AUTO: 8.6 FL (ref 9.2–12.9)
RBC # BLD AUTO: 2.9 M/UL (ref 4–5.4)
SATURATED IRON: 31 % (ref 20–50)
TOTAL IRON BINDING CAPACITY: 487 UG/DL (ref 250–450)
TRANSFERRIN SERPL-MCNC: 329 MG/DL (ref 200–375)
WBC # BLD AUTO: 6.89 K/UL (ref 3.9–12.7)

## 2021-10-20 PROCEDURE — 99999 PR PBB SHADOW E&M-EST. PATIENT-LVL IV: ICD-10-PCS | Mod: PBBFAC,,, | Performed by: FAMILY MEDICINE

## 2021-10-20 PROCEDURE — 3060F POS MICROALBUMINURIA REV: CPT | Mod: CPTII,S$GLB,, | Performed by: FAMILY MEDICINE

## 2021-10-20 PROCEDURE — 99214 PR OFFICE/OUTPT VISIT, EST, LEVL IV, 30-39 MIN: ICD-10-PCS | Mod: S$GLB,,, | Performed by: FAMILY MEDICINE

## 2021-10-20 PROCEDURE — 85025 COMPLETE CBC W/AUTO DIFF WBC: CPT | Performed by: FAMILY MEDICINE

## 2021-10-20 PROCEDURE — 4010F PR ACE/ARB THEARPY RXD/TAKEN: ICD-10-PCS | Mod: CPTII,S$GLB,, | Performed by: FAMILY MEDICINE

## 2021-10-20 PROCEDURE — 3066F NEPHROPATHY DOC TX: CPT | Mod: CPTII,S$GLB,, | Performed by: FAMILY MEDICINE

## 2021-10-20 PROCEDURE — 84466 ASSAY OF TRANSFERRIN: CPT | Performed by: FAMILY MEDICINE

## 2021-10-20 PROCEDURE — 3074F PR MOST RECENT SYSTOLIC BLOOD PRESSURE < 130 MM HG: ICD-10-PCS | Mod: CPTII,S$GLB,, | Performed by: FAMILY MEDICINE

## 2021-10-20 PROCEDURE — 3008F BODY MASS INDEX DOCD: CPT | Mod: CPTII,S$GLB,, | Performed by: FAMILY MEDICINE

## 2021-10-20 PROCEDURE — 4010F ACE/ARB THERAPY RXD/TAKEN: CPT | Mod: CPTII,S$GLB,, | Performed by: FAMILY MEDICINE

## 2021-10-20 PROCEDURE — 1159F MED LIST DOCD IN RCRD: CPT | Mod: CPTII,S$GLB,, | Performed by: FAMILY MEDICINE

## 2021-10-20 PROCEDURE — 3079F PR MOST RECENT DIASTOLIC BLOOD PRESSURE 80-89 MM HG: ICD-10-PCS | Mod: CPTII,S$GLB,, | Performed by: FAMILY MEDICINE

## 2021-10-20 PROCEDURE — 3066F PR DOCUMENTATION OF TREATMENT FOR NEPHROPATHY: ICD-10-PCS | Mod: CPTII,S$GLB,, | Performed by: FAMILY MEDICINE

## 2021-10-20 PROCEDURE — 82728 ASSAY OF FERRITIN: CPT | Performed by: FAMILY MEDICINE

## 2021-10-20 PROCEDURE — 3060F PR POS MICROALBUMINURIA RESULT DOCUMENTED/REVIEW: ICD-10-PCS | Mod: CPTII,S$GLB,, | Performed by: FAMILY MEDICINE

## 2021-10-20 PROCEDURE — 3079F DIAST BP 80-89 MM HG: CPT | Mod: CPTII,S$GLB,, | Performed by: FAMILY MEDICINE

## 2021-10-20 PROCEDURE — 1159F PR MEDICATION LIST DOCUMENTED IN MEDICAL RECORD: ICD-10-PCS | Mod: CPTII,S$GLB,, | Performed by: FAMILY MEDICINE

## 2021-10-20 PROCEDURE — 3044F PR MOST RECENT HEMOGLOBIN A1C LEVEL <7.0%: ICD-10-PCS | Mod: CPTII,S$GLB,, | Performed by: FAMILY MEDICINE

## 2021-10-20 PROCEDURE — 99999 PR PBB SHADOW E&M-EST. PATIENT-LVL IV: CPT | Mod: PBBFAC,,, | Performed by: FAMILY MEDICINE

## 2021-10-20 PROCEDURE — 3074F SYST BP LT 130 MM HG: CPT | Mod: CPTII,S$GLB,, | Performed by: FAMILY MEDICINE

## 2021-10-20 PROCEDURE — 3008F PR BODY MASS INDEX (BMI) DOCUMENTED: ICD-10-PCS | Mod: CPTII,S$GLB,, | Performed by: FAMILY MEDICINE

## 2021-10-20 PROCEDURE — 36415 COLL VENOUS BLD VENIPUNCTURE: CPT | Performed by: FAMILY MEDICINE

## 2021-10-20 PROCEDURE — 99214 OFFICE O/P EST MOD 30 MIN: CPT | Mod: S$GLB,,, | Performed by: FAMILY MEDICINE

## 2021-10-20 PROCEDURE — 3044F HG A1C LEVEL LT 7.0%: CPT | Mod: CPTII,S$GLB,, | Performed by: FAMILY MEDICINE

## 2021-10-21 LAB — FERRITIN SERPL-MCNC: 72 NG/ML (ref 20–300)

## 2021-10-27 ENCOUNTER — TELEPHONE (OUTPATIENT)
Dept: FAMILY MEDICINE | Facility: CLINIC | Age: 66
End: 2021-10-27
Payer: MEDICARE

## 2021-10-27 DIAGNOSIS — R51.9 NONINTRACTABLE HEADACHE, UNSPECIFIED CHRONICITY PATTERN, UNSPECIFIED HEADACHE TYPE: Primary | ICD-10-CM

## 2021-11-04 ENCOUNTER — HOSPITAL ENCOUNTER (OUTPATIENT)
Dept: RADIOLOGY | Facility: HOSPITAL | Age: 66
Discharge: HOME | End: 2021-11-04
Attending: FAMILY MEDICINE
Payer: MEDICARE

## 2021-11-04 DIAGNOSIS — R51.9 NONINTRACTABLE HEADACHE, UNSPECIFIED CHRONICITY PATTERN, UNSPECIFIED HEADACHE TYPE: ICD-10-CM

## 2021-11-04 PROCEDURE — 70551 MRI BRAIN WITHOUT CONTRAST: ICD-10-PCS | Mod: 26,,, | Performed by: RADIOLOGY

## 2021-11-04 PROCEDURE — 70551 MRI BRAIN STEM W/O DYE: CPT | Mod: 26,,, | Performed by: RADIOLOGY

## 2021-11-04 PROCEDURE — 70551 MRI BRAIN STEM W/O DYE: CPT | Mod: TC

## 2021-11-10 ENCOUNTER — OFFICE VISIT (OUTPATIENT)
Dept: FAMILY MEDICINE | Facility: CLINIC | Age: 66
End: 2021-11-10
Payer: MEDICARE

## 2021-11-10 VITALS
RESPIRATION RATE: 17 BRPM | DIASTOLIC BLOOD PRESSURE: 70 MMHG | OXYGEN SATURATION: 96 % | HEART RATE: 73 BPM | SYSTOLIC BLOOD PRESSURE: 132 MMHG | HEIGHT: 68 IN | BODY MASS INDEX: 19.89 KG/M2

## 2021-11-10 DIAGNOSIS — I10 BENIGN ESSENTIAL HYPERTENSION: Primary | ICD-10-CM

## 2021-11-10 DIAGNOSIS — I63.89 INFARCTION OF PARIETAL LOBE: ICD-10-CM

## 2021-11-10 PROCEDURE — 3066F PR DOCUMENTATION OF TREATMENT FOR NEPHROPATHY: ICD-10-PCS | Mod: CPTII,S$GLB,, | Performed by: FAMILY MEDICINE

## 2021-11-10 PROCEDURE — 3075F SYST BP GE 130 - 139MM HG: CPT | Mod: CPTII,S$GLB,, | Performed by: FAMILY MEDICINE

## 2021-11-10 PROCEDURE — 3008F BODY MASS INDEX DOCD: CPT | Mod: CPTII,S$GLB,, | Performed by: FAMILY MEDICINE

## 2021-11-10 PROCEDURE — 1159F PR MEDICATION LIST DOCUMENTED IN MEDICAL RECORD: ICD-10-PCS | Mod: CPTII,S$GLB,, | Performed by: FAMILY MEDICINE

## 2021-11-10 PROCEDURE — 1159F MED LIST DOCD IN RCRD: CPT | Mod: CPTII,S$GLB,, | Performed by: FAMILY MEDICINE

## 2021-11-10 PROCEDURE — 3066F NEPHROPATHY DOC TX: CPT | Mod: CPTII,S$GLB,, | Performed by: FAMILY MEDICINE

## 2021-11-10 PROCEDURE — 4010F PR ACE/ARB THEARPY RXD/TAKEN: ICD-10-PCS | Mod: CPTII,S$GLB,, | Performed by: FAMILY MEDICINE

## 2021-11-10 PROCEDURE — 99999 PR PBB SHADOW E&M-EST. PATIENT-LVL III: CPT | Mod: PBBFAC,,, | Performed by: FAMILY MEDICINE

## 2021-11-10 PROCEDURE — 3008F PR BODY MASS INDEX (BMI) DOCUMENTED: ICD-10-PCS | Mod: CPTII,S$GLB,, | Performed by: FAMILY MEDICINE

## 2021-11-10 PROCEDURE — 99214 OFFICE O/P EST MOD 30 MIN: CPT | Mod: S$GLB,,, | Performed by: FAMILY MEDICINE

## 2021-11-10 PROCEDURE — 3060F PR POS MICROALBUMINURIA RESULT DOCUMENTED/REVIEW: ICD-10-PCS | Mod: CPTII,S$GLB,, | Performed by: FAMILY MEDICINE

## 2021-11-10 PROCEDURE — 3078F DIAST BP <80 MM HG: CPT | Mod: CPTII,S$GLB,, | Performed by: FAMILY MEDICINE

## 2021-11-10 PROCEDURE — 1160F RVW MEDS BY RX/DR IN RCRD: CPT | Mod: CPTII,S$GLB,, | Performed by: FAMILY MEDICINE

## 2021-11-10 PROCEDURE — 1160F PR REVIEW ALL MEDS BY PRESCRIBER/CLIN PHARMACIST DOCUMENTED: ICD-10-PCS | Mod: CPTII,S$GLB,, | Performed by: FAMILY MEDICINE

## 2021-11-10 PROCEDURE — 99214 PR OFFICE/OUTPT VISIT, EST, LEVL IV, 30-39 MIN: ICD-10-PCS | Mod: S$GLB,,, | Performed by: FAMILY MEDICINE

## 2021-11-10 PROCEDURE — 3078F PR MOST RECENT DIASTOLIC BLOOD PRESSURE < 80 MM HG: ICD-10-PCS | Mod: CPTII,S$GLB,, | Performed by: FAMILY MEDICINE

## 2021-11-10 PROCEDURE — 3044F HG A1C LEVEL LT 7.0%: CPT | Mod: CPTII,S$GLB,, | Performed by: FAMILY MEDICINE

## 2021-11-10 PROCEDURE — 99999 PR PBB SHADOW E&M-EST. PATIENT-LVL III: ICD-10-PCS | Mod: PBBFAC,,, | Performed by: FAMILY MEDICINE

## 2021-11-10 PROCEDURE — 3044F PR MOST RECENT HEMOGLOBIN A1C LEVEL <7.0%: ICD-10-PCS | Mod: CPTII,S$GLB,, | Performed by: FAMILY MEDICINE

## 2021-11-10 PROCEDURE — 3075F PR MOST RECENT SYSTOLIC BLOOD PRESS GE 130-139MM HG: ICD-10-PCS | Mod: CPTII,S$GLB,, | Performed by: FAMILY MEDICINE

## 2021-11-10 PROCEDURE — 3060F POS MICROALBUMINURIA REV: CPT | Mod: CPTII,S$GLB,, | Performed by: FAMILY MEDICINE

## 2021-11-10 PROCEDURE — 4010F ACE/ARB THERAPY RXD/TAKEN: CPT | Mod: CPTII,S$GLB,, | Performed by: FAMILY MEDICINE

## 2021-11-17 ENCOUNTER — IMMUNIZATION (OUTPATIENT)
Dept: FAMILY MEDICINE | Facility: CLINIC | Age: 66
End: 2021-11-17
Payer: MEDICARE

## 2021-11-17 DIAGNOSIS — Z23 NEED FOR VACCINATION: Primary | ICD-10-CM

## 2021-11-17 PROCEDURE — 0004A COVID-19, MRNA, LNP-S, PF, 30 MCG/0.3 ML DOSE VACCINE: CPT | Mod: PBBFAC | Performed by: FAMILY MEDICINE

## 2022-01-05 DIAGNOSIS — Z12.31 OTHER SCREENING MAMMOGRAM: ICD-10-CM

## 2022-01-06 ENCOUNTER — HOSPITAL ENCOUNTER (OUTPATIENT)
Dept: RADIOLOGY | Facility: HOSPITAL | Age: 67
Discharge: HOME | End: 2022-01-06
Attending: PSYCHIATRY & NEUROLOGY
Payer: MEDICARE

## 2022-01-06 DIAGNOSIS — M54.2 NECK PAIN: ICD-10-CM
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
## 2022-01-06 PROCEDURE — 72141 MRI NECK SPINE W/O DYE: CPT | Mod: 26,,, | Performed by: RADIOLOGY

## 2022-01-06 PROCEDURE — 72141 MRI CERVICAL SPINE WITHOUT CONTRAST: ICD-10-PCS | Mod: 26,,, | Performed by: RADIOLOGY

## 2022-01-06 PROCEDURE — 72141 MRI NECK SPINE W/O DYE: CPT | Mod: TC

## 2022-02-02 RX ORDER — BUSPIRONE HYDROCHLORIDE 15 MG/1
TABLET ORAL
Qty: 180 TABLET | Refills: 3 | Status: SHIPPED | OUTPATIENT
Start: 2022-02-02 | End: 2023-02-16

## 2022-02-02 RX ORDER — PANTOPRAZOLE SODIUM 40 MG/1
40 TABLET, DELAYED RELEASE ORAL DAILY
Qty: 90 TABLET | Refills: 3 | Status: SHIPPED | OUTPATIENT
Start: 2022-02-02 | End: 2023-02-16

## 2022-02-02 NOTE — TELEPHONE ENCOUNTER
----- Message from Lesa López sent at 2/2/2022  1:22 PM CST -----  Contact: pt at [phone]  Type:  Sooner Apoointment Request    Caller is requesting a sooner appointment.  Caller declined first available appointment listed below.  Caller will not accept being placed on the waitlist and is requesting a message be sent to doctor.    Name of Caller:  pt  When is the first available appointment? May  Symptoms:  pt is having severe pain in left leg and ankle, stays swollen for a while now  Best Call Back Number:  [phone]

## 2022-02-26 RX ORDER — DIAZEPAM 5 MG/1
TABLET ORAL
Qty: 60 TABLET | Refills: 2 | Status: SHIPPED | OUTPATIENT
Start: 2022-02-26 | End: 2022-05-27

## 2022-03-14 ENCOUNTER — HOSPITAL ENCOUNTER (EMERGENCY)
Facility: HOSPITAL | Age: 67
Discharge: HOME | End: 2022-03-14
Attending: INTERNAL MEDICINE
Payer: MEDICARE

## 2022-03-14 VITALS
DIASTOLIC BLOOD PRESSURE: 60 MMHG | WEIGHT: 128 LBS | HEART RATE: 78 BPM | HEIGHT: 68 IN | SYSTOLIC BLOOD PRESSURE: 117 MMHG | RESPIRATION RATE: 20 BRPM | TEMPERATURE: 98 F | BODY MASS INDEX: 19.4 KG/M2 | OXYGEN SATURATION: 100 %
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
## 2022-03-14 DIAGNOSIS — S82.301A CLOSED FRACTURE OF DISTAL END OF RIGHT TIBIA, UNSPECIFIED FRACTURE MORPHOLOGY, INITIAL ENCOUNTER: ICD-10-CM

## 2022-03-14 DIAGNOSIS — M25.571 ANKLE PAIN, RIGHT: ICD-10-CM

## 2022-03-14 PROCEDURE — 25000003 PHARM REV CODE 250: Performed by: INTERNAL MEDICINE

## 2022-03-14 PROCEDURE — 99283 EMERGENCY DEPT VISIT LOW MDM: CPT | Mod: 25

## 2022-03-14 PROCEDURE — 73610 X-RAY EXAM OF ANKLE: CPT | Mod: 26,RT,, | Performed by: RADIOLOGY

## 2022-03-14 PROCEDURE — 73610 X-RAY EXAM OF ANKLE: CPT | Mod: TC,FY,RT

## 2022-03-14 PROCEDURE — 73610 XR ANKLE COMPLETE 3 VIEW RIGHT: ICD-10-PCS | Mod: 26,RT,, | Performed by: RADIOLOGY

## 2022-03-14 PROCEDURE — 29515 APPLICATION SHORT LEG SPLINT: CPT | Mod: RT

## 2022-03-14 RX ORDER — HYDROCODONE BITARTRATE AND ACETAMINOPHEN 5; 325 MG/1; MG/1
1 TABLET ORAL
Status: COMPLETED | OUTPATIENT
Start: 2022-03-14 | End: 2022-03-14

## 2022-03-14 RX ORDER — HYDROCODONE BITARTRATE AND ACETAMINOPHEN 5; 325 MG/1; MG/1
1 TABLET ORAL EVERY 6 HOURS PRN
Qty: 12 TABLET | Refills: 0 | Status: SHIPPED | OUTPATIENT
Start: 2022-03-14 | End: 2023-09-13

## 2022-03-14 RX ADMIN — HYDROCODONE BITARTRATE AND ACETAMINOPHEN 1 TABLET: 5; 325 TABLET ORAL at 02:03

## 2022-03-14 NOTE — ED TRIAGE NOTES
Pt states that she has been getting dizzy and has been falling Pt states that this is a continuing issue and that she is being cared for this with her pcp and heart doctor. pt states that she fell last night hurting her right ankle. Pt denies hitting her head or and LOC pt denies any other symptoms.

## 2022-03-14 NOTE — ED PROVIDER NOTES
"Encounter Date: 3/14/2022       History     Chief Complaint   Patient presents with    Fall     Patient is a 66-year-old white female with a history of weakness and frequent falls who fell last night between the washer and drier.  She reports no head injury, she hurt her right ankle.    The history is provided by the patient. No  was used.   Leg Pain   The incident occurred at home. The injury mechanism was a fall. The incident occurred yesterday (last night). The pain is present in the right ankle. The quality of the pain is described as aching. The pain has been constant since onset. Associated symptoms include inability to bear weight. Pertinent negatives include no tingling. She reports no foreign bodies present. The symptoms are aggravated by bearing weight. She has tried nothing for the symptoms. The treatment provided no relief.     Review of patient's allergies indicates:   Allergen Reactions    Penicillins      Age 23, "crippled me"     Past Medical History:   Diagnosis Date    Anticoagulant long-term use     Anxiety     Arthritis     Atherosclerosis     Coronary artery disease     Depression     GERD (gastroesophageal reflux disease)     Hearing loss     [place] (hard of hearing)     Hearing aids    Hypertension     Long term (current) use of anticoagulants     Peripheral arterial disease     Tubulovillous adenoma of colon 2019    Wears dentures     Full-Upper; Partial- lower    Wound infection after surgery 06/2020     Past Surgical History:   Procedure Laterality Date    APPENDECTOMY      CAROTID STENT  2014    COLONOSCOPY N/A 10/7/2019    Procedure: COLONOSCOPY;  Surgeon: Olaf Alba MD;  Location: [facility] ENDO;  Service: General;  Laterality: N/A;    COLONOSCOPY N/A 12/16/2019    Procedure: COLONOSCOPY;  Surgeon: Olaf Alba MD;  Location: [facility] ENDO;  Service: General;  Laterality: N/A;    COLONOSCOPY N/A 1/23/2020    Procedure: COLONOSCOPY " (unable to come earlier);  Surgeon: Esteban Yancey MD;  Location: [facility] ENDO;  Service: Endoscopy;  Laterality: N/A;    ENDOSCOPIC ULTRASOUND OF UPPER GASTROINTESTINAL TRACT N/A 10/16/2018    Procedure: ULTRASOUND, UPPER GI TRACT, ENDOSCOPIC;  Surgeon: Kumar Wang MD;  Location: [facility] ENDO (2ND FLR);  Service: Endoscopy;  Laterality: N/A;  5 day hold Effient, Dr Сергей Malagon    ESOPHAGOGASTRODUODENOSCOPY N/A 10/7/2019    Procedure: ESOPHAGOGASTRODUODENOSCOPY (EGD);  Surgeon: Olaf Alba MD;  Location: [facility] ENDO;  Service: General;  Laterality: N/A;    FEMORAL ARTERY STENT      HARDWARE REMOVAL Right 6/12/2020    Procedure: REMOVAL, HARDWARE;  Surgeon: Elbert Marrero II, MD;  Location: [facility] OR;  Service: Orthopedics;  Laterality: Right;  right distal tibia    ORIF TIBIA FRACTURE Right 2/14/2020    Procedure: ORIF, FRACTURE, TIBIA;  Surgeon: Elbert Marrero II, MD;  Location: [facility] OR;  Service: Orthopedics;  Laterality: Right;    ROBOT-ASSISTED LAPAROSCOPIC RESECTION OF SIGMOID COLON N/A 9/14/2020    Procedure: ROBOTIC COLECTOMY, SIGMOID;  Surgeon: Terrance Candelaria MD;  Location: [facility] OR;  Service: General;  Laterality: N/A;     Family History   Problem Relation Age of Onset    Heart attack Brother     Diabetes Brother     Hyperlipidemia Brother     Eczema Neg Hx     Lupus Neg Hx     Psoriasis Neg Hx     Melanoma Neg Hx     Breast cancer Neg Hx     Ovarian cancer Neg Hx      Social History     Tobacco Use    Smoking status: Current Every Day Smoker     Packs/day: 0.75     Years: 23.00     Pack years: 17.25     Types: Cigarettes     Start date: 10/1/1988    Smokeless tobacco: Never Used   Substance Use Topics    Alcohol use: Yes     Comment: occ    Drug use: No     Review of Systems   Neurological: Negative for tingling.   All other systems reviewed and are negative.      Physical Exam     Initial Vitals   BP Pulse Resp Temp SpO2   03/14/22 1401 03/14/22 1356 03/14/22 1356  03/14/22 1401 03/14/22 1356   117/60 78 18 97.8 °F (36.6 °C) 100 %      MAP       --                Physical Exam    Nursing note and vitals reviewed.  Constitutional: She appears well-developed and well-nourished.   HENT:   Head: Normocephalic and atraumatic.   Eyes: Conjunctivae and EOM are normal. Pupils are equal, round, and reactive to light.   Neck: Neck supple.   Normal range of motion.  Cardiovascular: Normal rate, regular rhythm, normal heart sounds and intact distal pulses.   Pulmonary/Chest: Breath sounds normal.   Abdominal: Abdomen is soft. Bowel sounds are normal.   Musculoskeletal:         General: Tenderness present. No edema.      Cervical back: Normal range of motion and neck supple.      Comments: Right foot has a 2+ dorsalis pedis pulse, sensation is intact in the toes, patient is able move all 5 toes that difficulty.  Cap refill in the toes is less than 2 seconds.  The patient reports pain to the anterior aspect of the ankle.  There is no deformity.  There is pain with passive and active range of motion.     Neurological: She is alert and oriented to person, place, and time. She has normal strength. GCS score is 15. GCS eye subscore is 4. GCS verbal subscore is 5. GCS motor subscore is 6.   Skin: Skin is warm and dry. Capillary refill takes less than 2 seconds.   Psychiatric: She has a normal mood and affect.         ED Course   Procedures  Labs Reviewed - No data to display       Imaging Results          X-Ray Ankle Complete Right (Final result)  Result time 03/14/22 14:33:31    Final result by Cindi Yeager MD (03/14/22 14:33:31)                 Impression:      The since the prior exam the plate and screws affixing the distal tibia have been removed.  The fracture plane of the distal tibia appears similar to what was seen on 06/04/2020 however there is an acute appearing component of this fracture suggesting re fracture of the distal tibia.      Electronically signed by: Cindi Yeager  MD  Date:    03/14/2022  Time:    14:33             Narrative:    EXAMINATION:  XR ANKLE COMPLETE 3 VIEW RIGHT    CLINICAL HISTORY:  Pain in right ankle and joints of right foot    TECHNIQUE:  AP, lateral, and oblique images of the right ankle were performed.    COMPARISON:  06/04/2020    FINDINGS:  There is no oblique, mildly displaced fracture of the distal tibia.  Since the prior exam there has been interval removal of the lateral plate and screw.  The fracture plane appears similar to what was seen on prior exam however there is an acute appearing fracture plane visualized across the distal tibia.                                 Medications   HYDROcodone-acetaminophen 5-325 mg per tablet 1 tablet (1 tablet Oral Given 3/14/22 1443)     Medical Decision Making:   Differential Diagnosis:   Fracture, dislocation, sprain, strain, soft tissue injury             ED Course as of 03/14/22 1449   Mon Mar 14, 2022   1443 X-ray reveals a pre fracture through the previous fracture on patient's right ankle.  Patient will be placed in a splint.  Patient is refusing crutches, she states she has a walker at home that is much easier for her to use.  Patient will be discharged home with pain medication.  Patient to follow-up with orthopedic surgery as scheduled or return to the ER if symptoms worsen. [JS]      ED Course User Index  [JS] Allan Tesfaye MD             Clinical Impression:   Final diagnoses:  [M25.571] Ankle pain, right  [W19.XXXA] Fall, initial encounter (Primary)  [S82.301A] Closed fracture of distal end of right tibia, unspecified fracture morphology, initial encounter          ED Disposition Condition    Discharge Stable        ED Prescriptions     Medication Sig Dispense Start Date End Date Auth. Provider    HYDROcodone-acetaminophen (NORCO) 5-325 mg per tablet Take 1 tablet by mouth every 6 (six) hours as needed for Pain. 12 tablet 3/14/2022  Allan Tesfaye MD        Follow-up Information     Follow up  With Specialties Details Why Contact Info    Darinel Riddle MD Family Medicine Schedule an appointment as soon as possible for a visit in 2 days  [address]  [facility] [address] [phone]          Follow-up with orthopedic surgery as scheduled.           Allan Tesfaye MD  03/14/22 0361

## 2022-03-15 ENCOUNTER — TELEPHONE (OUTPATIENT)
Dept: FAMILY MEDICINE | Facility: CLINIC | Age: 67
End: 2022-03-15
Payer: MEDICARE

## 2022-03-15 DIAGNOSIS — S82.899A CLOSED FRACTURE OF ANKLE, UNSPECIFIED LATERALITY, INITIAL ENCOUNTER: Primary | ICD-10-CM

## 2022-03-15 NOTE — TELEPHONE ENCOUNTER
Placed patient call at number provided in chart. Spoke with Comfort. Verified patient name and date of birth. Patient requests ortho referral be sent to Dr. Elbert Marrero that performed previous ORIF of ankle. Sent via epic.

## 2022-03-15 NOTE — TELEPHONE ENCOUNTER
request order for her to get a wheelchair and a walker as she broke her ankle for the second time

## 2022-03-15 NOTE — TELEPHONE ENCOUNTER
----- Message from Michel Vitale sent at 3/15/2022 11:02 AM CDT -----  Contact: pt at [phone]  Type: Needs Medical Advice  Who Called:  pt  Best Call Back Number: [phone]  Additional Information: pt is calling the office to have a request put in for her to get a wheelchair and a walker as she broke her ankle for the second time. Please call back and advise.

## 2022-03-16 ENCOUNTER — TELEPHONE (OUTPATIENT)
Dept: FAMILY MEDICINE | Facility: CLINIC | Age: 67
End: 2022-03-16
Payer: MEDICARE

## 2022-03-16 NOTE — TELEPHONE ENCOUNTER
----- Message from Kathleen Smith sent at 3/16/2022  7:59 AM CDT -----  Contact: pt  Type: Needs Medical Advice    Who: Called: pt     Best Call Back Number: [phone]    Inquiry/Question: Pt is needing the office to call her back regarding a wheel chair she broke her ankle for the second time and she is needing a order for it please advise Thank you~

## 2022-03-16 NOTE — TELEPHONE ENCOUNTER
----- Message from Katina Arce sent at 3/16/2022  2:43 PM CDT -----  Contact: pt  Type: Needs Medical Advice  Who Called:  pt  Best Call Back Number: [phone]   Additional Information:  pt calling to see if a order was sent in for her wheelchair and talk to doctor about setting up a appt with the doctor who operated on her broke ankle

## 2022-03-16 NOTE — TELEPHONE ENCOUNTER
Informed patient Dr. Riddle placed order for wheel chair and we could send to her DME store of choice. Patient states she will call back

## 2022-03-17 ENCOUNTER — TELEPHONE (OUTPATIENT)
Dept: FAMILY MEDICINE | Facility: CLINIC | Age: 67
End: 2022-03-17
Payer: MEDICARE

## 2022-03-17 ENCOUNTER — TELEPHONE (OUTPATIENT)
Dept: ORTHOPEDICS | Facility: CLINIC | Age: 67
End: 2022-03-17
Payer: MEDICARE

## 2022-03-17 NOTE — TELEPHONE ENCOUNTER
----- Message from Sanjiv Harper sent at 3/17/2022 12:07 PM CDT -----  Contact: Self  Type: Needs Medical Advice  Who Called:  Patient  Best Call Back Number: [phone]   Additional Information: Called to follow up on order for wheelchair. Please call with status.

## 2022-03-17 NOTE — TELEPHONE ENCOUNTER
Returned call regarding appt request. Pt instructed to remain in splint, elevate, ice, and rest leg/ankle until she is able to see provider in office on 3/28/22 (soonest avail appt). She admits to removing hard part of splint due to discomfort. Advised to return to ED or UC to have ankle re-splinted appropriately. Discussed importance of fracture being stabilized in splint, and benefits of doing so for proper bone alignment and healing. She verbalized understanding of this. Message sent to provider to see if appropriate for her to wait until the 28th or whether or not she should try to see a podiatrist sooner. Awaiting response.

## 2022-03-17 NOTE — TELEPHONE ENCOUNTER
Orders, face sheet and ED notes sent via [employer] to [phone] Command Information        ----- Message from Beatrice Julien sent at 3/17/2022  9:03 AM CDT -----  Regarding: Call back  Contact: [phone]  Type:  Patient Call Back    Who Called:pt  What this is regarding?: the place where she will be get wheel chair information patient choice incorporated [address] [place] [address] [phone] // also appt with dr dodd   Would the patient rather a call back or a response via [facility]? Call back  Best Call Back Number:[phone]  Additional Information:     Advised to call back directly if there are further questions, or if these symptoms fail to improve as anticipated or worsen.

## 2022-03-18 NOTE — TELEPHONE ENCOUNTER
Reached out to patient to inform her that she can wait to be seen in the clinic on 03/28/2022 per Dr. Marrero, but needs to stay non-weight bearing on the RLE. Left detailed message regarding this and explained to elevate, rest, apply ice pack in 20 minute intervals, and ambulate with walker.

## 2022-03-23 ENCOUNTER — TELEPHONE (OUTPATIENT)
Dept: FAMILY MEDICINE | Facility: CLINIC | Age: 67
End: 2022-03-23
Payer: MEDICARE

## 2022-03-23 NOTE — TELEPHONE ENCOUNTER
----- Message from Jennifer Leo LPN sent at 3/23/2022  2:12 PM CDT -----  Hello, please contact this patient to discuss additional information needed to order a wheelchair with DME.

## 2022-03-24 ENCOUNTER — TELEPHONE (OUTPATIENT)
Dept: FAMILY MEDICINE | Facility: CLINIC | Age: 67
End: 2022-03-24
Payer: MEDICARE

## 2022-03-24 DIAGNOSIS — M25.571 RIGHT ANKLE PAIN, UNSPECIFIED CHRONICITY: Primary | ICD-10-CM

## 2022-03-24 NOTE — TELEPHONE ENCOUNTER
Spoke with Tequila at [facility]. She faxed over a WDO to be sent back after PCP signature. Placed in Dr. Riddle folder for review.         ----- Message from Zoe Marin LPN sent at 3/24/2022  1:27 PM CDT -----  Regarding: FW: advice  Contact: Tequila with [facility]    ----- Message -----  From: Ivy Gaxiola  Sent: 3/24/2022   1:04 PM CDT  To: Venkatesh Tena Staff  Subject: advice                                           Type: Needs Medical Advice  Who Called:  Tequila with [facility]  Best Call Back Number: [phone]  Additional Information: Tequila is calling regarding the Detailed written order sent last week for patient's wheelchair. Patient called for the status of the request. Tequila needs the order signed by doctor and sent back. Please call Tequila on status. Thanks!

## 2022-03-24 NOTE — TELEPHONE ENCOUNTER
Left message with [employer] for Tequila to return call regarding required paperwork for patient's WC and walker orders to be processed.         ----- Message from Lesli Johnson MA sent at 3/24/2022 10:30 AM CDT -----  Type: Needs Medical Advice  Who Called:  Comfort Rodríguez Call Back Number: [phone]  Additional Information: patient states that the company who is supplying her wheelchair sent a fax to Dr Marrero's office by mistake requesting information from the office.  Dr Marrero's office contacted the patient regarding this issue, and realized it was supposed to be sent to Dr Riddle' office to be filled out and whatever info they needed provided and sent back to wheelchair company.  Patient is concerned that this issue will delay her receiving her wheelchair.  Please call patient to discuss

## 2022-03-25 ENCOUNTER — TELEPHONE (OUTPATIENT)
Dept: FAMILY MEDICINE | Facility: CLINIC | Age: 67
End: 2022-03-25
Payer: MEDICARE

## 2022-03-25 NOTE — TELEPHONE ENCOUNTER
Paperwork in Dr. Riddle folder. Will be faxed when signed.       ----- Message from Anne Sanderson sent at 3/25/2022 12:40 PM CDT -----  Contact: self  Patient was told earlier today that you faxed by the paperwork for her wheelchairs portable with leg lift to [employer] but they have not received it.  Please call the patient back at [phone] (home), explained that Zoe put in Dr Riddle in basket yesterday for review, so she is confused and needs this for appts this coming week.  Please call patient back to advise and thank

## 2022-03-28 ENCOUNTER — HOSPITAL ENCOUNTER (OUTPATIENT)
Dept: RADIOLOGY | Facility: HOSPITAL | Age: 67
Discharge: HOME | End: 2022-03-28
Attending: ORTHOPAEDIC SURGERY
Payer: MEDICARE

## 2022-03-28 ENCOUNTER — OFFICE VISIT (OUTPATIENT)
Dept: ORTHOPEDICS | Facility: CLINIC | Age: 67
End: 2022-03-28
Payer: MEDICARE

## 2022-03-28 VITALS — BODY MASS INDEX: 19.41 KG/M2 | HEIGHT: 68 IN | WEIGHT: 128.06 LBS

## 2022-03-28 DIAGNOSIS — S82.391A OTHER CLOSED FRACTURE OF DISTAL END OF RIGHT TIBIA, INITIAL ENCOUNTER: Primary | ICD-10-CM

## 2022-03-28 DIAGNOSIS — M25.571 RIGHT ANKLE PAIN, UNSPECIFIED CHRONICITY: ICD-10-CM

## 2022-03-28 PROCEDURE — 3008F BODY MASS INDEX DOCD: CPT | Mod: CPTII,S$GLB,, | Performed by: ORTHOPAEDIC SURGERY

## 2022-03-28 PROCEDURE — 1160F RVW MEDS BY RX/DR IN RCRD: CPT | Mod: CPTII,S$GLB,, | Performed by: ORTHOPAEDIC SURGERY

## 2022-03-28 PROCEDURE — 1125F AMNT PAIN NOTED PAIN PRSNT: CPT | Mod: CPTII,S$GLB,, | Performed by: ORTHOPAEDIC SURGERY

## 2022-03-28 PROCEDURE — 27750 TREATMENT OF TIBIA FRACTURE: CPT | Mod: RT,S$GLB,, | Performed by: ORTHOPAEDIC SURGERY

## 2022-03-28 PROCEDURE — 73610 XR ANKLE COMPLETE 3 VIEW RIGHT: ICD-10-PCS | Mod: 26,RT,, | Performed by: RADIOLOGY

## 2022-03-28 PROCEDURE — 99999 PR PBB SHADOW E&M-EST. PATIENT-LVL III: CPT | Mod: PBBFAC,,, | Performed by: ORTHOPAEDIC SURGERY

## 2022-03-28 PROCEDURE — 1125F PR PAIN SEVERITY QUANTIFIED, PAIN PRESENT: ICD-10-PCS | Mod: CPTII,S$GLB,, | Performed by: ORTHOPAEDIC SURGERY

## 2022-03-28 PROCEDURE — 3288F PR FALLS RISK ASSESSMENT DOCUMENTED: ICD-10-PCS | Mod: CPTII,S$GLB,, | Performed by: ORTHOPAEDIC SURGERY

## 2022-03-28 PROCEDURE — 4010F PR ACE/ARB THEARPY RXD/TAKEN: ICD-10-PCS | Mod: CPTII,S$GLB,, | Performed by: ORTHOPAEDIC SURGERY

## 2022-03-28 PROCEDURE — 73610 X-RAY EXAM OF ANKLE: CPT | Mod: TC,PN,RT

## 2022-03-28 PROCEDURE — 4010F ACE/ARB THERAPY RXD/TAKEN: CPT | Mod: CPTII,S$GLB,, | Performed by: ORTHOPAEDIC SURGERY

## 2022-03-28 PROCEDURE — 27750 PR CLOSED RX TIBIA SHAFT FX: ICD-10-PCS | Mod: RT,S$GLB,, | Performed by: ORTHOPAEDIC SURGERY

## 2022-03-28 PROCEDURE — 1159F PR MEDICATION LIST DOCUMENTED IN MEDICAL RECORD: ICD-10-PCS | Mod: CPTII,S$GLB,, | Performed by: ORTHOPAEDIC SURGERY

## 2022-03-28 PROCEDURE — 99214 PR OFFICE/OUTPT VISIT, EST, LEVL IV, 30-39 MIN: ICD-10-PCS | Mod: 57,S$GLB,, | Performed by: ORTHOPAEDIC SURGERY

## 2022-03-28 PROCEDURE — 1101F PT FALLS ASSESS-DOCD LE1/YR: CPT | Mod: CPTII,S$GLB,, | Performed by: ORTHOPAEDIC SURGERY

## 2022-03-28 PROCEDURE — 3008F PR BODY MASS INDEX (BMI) DOCUMENTED: ICD-10-PCS | Mod: CPTII,S$GLB,, | Performed by: ORTHOPAEDIC SURGERY

## 2022-03-28 PROCEDURE — 73610 X-RAY EXAM OF ANKLE: CPT | Mod: 26,RT,, | Performed by: RADIOLOGY

## 2022-03-28 PROCEDURE — 1159F MED LIST DOCD IN RCRD: CPT | Mod: CPTII,S$GLB,, | Performed by: ORTHOPAEDIC SURGERY

## 2022-03-28 PROCEDURE — 99999 PR PBB SHADOW E&M-EST. PATIENT-LVL III: ICD-10-PCS | Mod: PBBFAC,,, | Performed by: ORTHOPAEDIC SURGERY

## 2022-03-28 PROCEDURE — 1160F PR REVIEW ALL MEDS BY PRESCRIBER/CLIN PHARMACIST DOCUMENTED: ICD-10-PCS | Mod: CPTII,S$GLB,, | Performed by: ORTHOPAEDIC SURGERY

## 2022-03-28 PROCEDURE — 3288F FALL RISK ASSESSMENT DOCD: CPT | Mod: CPTII,S$GLB,, | Performed by: ORTHOPAEDIC SURGERY

## 2022-03-28 PROCEDURE — 1101F PR PT FALLS ASSESS DOC 0-1 FALLS W/OUT INJ PAST YR: ICD-10-PCS | Mod: CPTII,S$GLB,, | Performed by: ORTHOPAEDIC SURGERY

## 2022-03-28 PROCEDURE — 99214 OFFICE O/P EST MOD 30 MIN: CPT | Mod: 57,S$GLB,, | Performed by: ORTHOPAEDIC SURGERY

## 2022-03-28 RX ORDER — HYDROCODONE BITARTRATE AND ACETAMINOPHEN 7.5; 325 MG/1; MG/1
1 TABLET ORAL EVERY 6 HOURS PRN
Qty: 28 TABLET | Refills: 0 | Status: SHIPPED | OUTPATIENT
Start: 2022-03-28 | End: 2022-04-04

## 2022-03-28 NOTE — PROGRESS NOTES
CC:  66-year-old female presents for evaluation of right ankle pain.  The patient had a previous distal 3rd tibia fracture that was treated with ORIF.  That was 2 years ago on 02/14/2020.  She subsequently had undergo hardware removal on 06/12/2020 for skin breakdown over the plate.  The patient was then and continues to be a heavy smoker and smokes pack a day at least.  She was doing relatively well and ambulating with a walker when she tripped and fell and re-injured the right tibia.  This knee date of injury was 03/13/2022, about 2 weeks ago.  She apparently has a new distal 3rd tibia fracture through the area of her previous fracture.  She was seen at an outside emergency room the day after the injury and placed in a splint and presents today for evaluation.    Past Medical History:   Diagnosis Date    Anticoagulant long-term use     Anxiety     Arthritis     Atherosclerosis     Coronary artery disease     Depression     GERD (gastroesophageal reflux disease)     Hearing loss     [place] (hard of hearing)     Hearing aids    Hypertension     Long term (current) use of anticoagulants     Peripheral arterial disease     Tubulovillous adenoma of colon 2019    Wears dentures     Full-Upper; Partial- lower    Wound infection after surgery 06/2020       Past Surgical History:   Procedure Laterality Date    APPENDECTOMY      CAROTID STENT  2014    COLONOSCOPY N/A 10/7/2019    Procedure: COLONOSCOPY;  Surgeon: Olaf Alba MD;  Location: [facility] ENDO;  Service: General;  Laterality: N/A;    COLONOSCOPY N/A 12/16/2019    Procedure: COLONOSCOPY;  Surgeon: Olaf Alba MD;  Location: [facility] ENDO;  Service: General;  Laterality: N/A;    COLONOSCOPY N/A 1/23/2020    Procedure: COLONOSCOPY (unable to come earlier);  Surgeon: Esteban Yancey MD;  Location: [facility];  Service: Endoscopy;  Laterality: N/A;    ENDOSCOPIC ULTRASOUND OF UPPER GASTROINTESTINAL TRACT N/A 10/16/2018    Procedure:  ULTRASOUND, UPPER GI TRACT, ENDOSCOPIC;  Surgeon: Kumar Wang MD;  Location: [facility] ENDO (2ND FLR);  Service: Endoscopy;  Laterality: N/A;  5 day hold Effient, Dr Сергей Malagon    ESOPHAGOGASTRODUODENOSCOPY N/A 10/7/2019    Procedure: ESOPHAGOGASTRODUODENOSCOPY (EGD);  Surgeon: Olaf Alba MD;  Location: [facility] ENDO;  Service: General;  Laterality: N/A;    FEMORAL ARTERY STENT      HARDWARE REMOVAL Right 6/12/2020    Procedure: REMOVAL, HARDWARE;  Surgeon: Elbert Marrero II, MD;  Location: [facility] OR;  Service: Orthopedics;  Laterality: Right;  right distal tibia    ORIF TIBIA FRACTURE Right 2/14/2020    Procedure: ORIF, FRACTURE, TIBIA;  Surgeon: Elbert Marrero II, MD;  Location: [facility] OR;  Service: Orthopedics;  Laterality: Right;    ROBOT-ASSISTED LAPAROSCOPIC RESECTION OF SIGMOID COLON N/A 9/14/2020    Procedure: ROBOTIC COLECTOMY, SIGMOID;  Surgeon: Terrance Candelaria MD;  Location: [facility] OR;  Service: General;  Laterality: N/A;     Current Outpatient Medications on File Prior to Visit   Medication Sig Dispense Refill    atorvastatin (LIPITOR) 10 MG tablet Take 1 tablet (10 mg total) by mouth once daily. 90 tablet 3    busPIRone (BUSPAR) 15 MG tablet TAKE 1 TABLET TWICE DAILY 180 tablet 3    cloNIDine (CATAPRES) 0.1 MG tablet TAKE 1 TABLET (0.1 MG TOTAL) BY MOUTH 2 (TWO) TIMES DAILY. 180 tablet 3    diazePAM (VALIUM) 5 MG tablet TAKE ONE (1) TABLET EVERY 12 HOURS AS NEEDED FOR ANXIETY 60 tablet 2    DULoxetine (CYMBALTA) 30 MG capsule TAKE 1 CAPSULE EVERY DAY 90 capsule 3    hydroCHLOROthiazide (HYDRODIURIL) 25 MG tablet TAKE 1 TABLET (25 MG TOTAL) BY MOUTH ONCE DAILY. 90 tablet 3    HYDROcodone-acetaminophen (NORCO) 5-325 mg per tablet Take 1 tablet by mouth every 6 (six) hours as needed for Pain. 12 tablet 0    isosorbide mononitrate (IMDUR) 30 MG 24 hr tablet TAKE 1 TABLET (30 MG TOTAL) BY MOUTH ONCE DAILY. 90 tablet 3    lisinopriL (PRINIVIL,ZESTRIL) 5 MG tablet Take 5 mg by mouth  2 (two) times daily.       losartan (COZAAR) 25 MG tablet Take 25 mg by mouth once daily.      pantoprazole (PROTONIX) 40 MG tablet TAKE 1 TABLET (40 MG TOTAL) BY MOUTH ONCE DAILY. 90 tablet 3    prasugrel (EFFIENT) 5 mg Tab Take 5 mg by mouth once daily. Last dose 8/16/20 for surgery 8/24       No current facility-administered medications on file prior to visit.       ROS:    Constitution: Denies chills, fever, and sweats.  HENT: Denies headaches or blurry vision.  Cardiovascular: Denies chest pain or irregular heart beat.  Respiratory: Denies cough or shortness of breath.  Gastrointestinal: Denies abdominal pain, nausea, or vomiting.  Genitourinary:  Denies urinary incontinence, bladder and kidney issues  Musculoskeletal:  Denies muscle cramps.  Neurological: Denies dizziness or focal weakness.  Psychiatric/Behavioral: Normal mental status.  Hematologic/Lymphatic: Denies bleeding problem or easy bruising/bleeding.  Skin: Denies rash or suspicious lesions.    Physical examination     Gen - No acute distress, well nourished, well groomed   Eyes - Extraoccular motions intact, pupils equally round and reactive to light and accommodation   ENT - normocephalic, atruamtic, oropharynx clear   Neck - Supple, no abnormal masses   Cardiovascular - regular rate and rhythm   Pulmonary - clear to auscultation bilaterally, no wheezes, ronchi, or rales   Abdomen - soft, non-tender, non-distended, positive bowel sounds   Psych - The patient is alert and oriented x3 with normal mood and affect    RLE:  Splint was taken down for examination  Skin changes consistent with the patient's splinting for the last 2 weeks.  Grossly intact motor sensory function distally  The patient's previous incisions appear well healed.  Sluggish capillary refill    X-ray images were examined and personally interpreted by me.  Three views the right ankle dated 03/28/2022 show a fracture of the distal 3rd of the right tibia with evidence of a previous  old fracture.  There is a new fracture line.  The fracture is essentially nondisplaced.    Dx:  Distal 3rd tibia fracture of the right tibia.    Plan:  I had a long discussion with the patient about treatment options.  The patient is currently smoking and based on her previous history of wound issues with ORIF I would recommend surgical fixation at this time do the risk of wound breakdown and infection especially considering her smoking status.  The patient was not interested in casting.  She was requesting a brace.  We discussed the fact that if she walks either in the cast or a fracture boot that the fracture could displace.  She verbalized understanding and reported that she would be nonweightbearing.  She is working with her primary care provider to obtain a wheelchair and reports that he has the paperwork to sign off on a wheelchair for her over in [facility].  We placed her in a fracture boot today she is to be strict nonweightbearing and follow-up in 4 weeks with an x-ray.

## 2022-04-21 DIAGNOSIS — S82.391A OTHER CLOSED FRACTURE OF DISTAL END OF RIGHT TIBIA, INITIAL ENCOUNTER: Primary | ICD-10-CM

## 2022-04-25 ENCOUNTER — OFFICE VISIT (OUTPATIENT)
Dept: ORTHOPEDICS | Facility: CLINIC | Age: 67
End: 2022-04-25
Payer: MEDICARE

## 2022-04-25 ENCOUNTER — HOSPITAL ENCOUNTER (OUTPATIENT)
Dept: RADIOLOGY | Facility: HOSPITAL | Age: 67
Discharge: HOME | End: 2022-04-25
Attending: ORTHOPAEDIC SURGERY
Payer: MEDICARE

## 2022-04-25 VITALS — WEIGHT: 128.06 LBS | BODY MASS INDEX: 19.41 KG/M2 | HEIGHT: 68 IN

## 2022-04-25 DIAGNOSIS — S82.391G OTHER CLOSED FRACTURE OF DISTAL END OF RIGHT TIBIA WITH DELAYED HEALING, SUBSEQUENT ENCOUNTER: Primary | ICD-10-CM

## 2022-04-25 DIAGNOSIS — S82.391A OTHER CLOSED FRACTURE OF DISTAL END OF RIGHT TIBIA, INITIAL ENCOUNTER: ICD-10-CM

## 2022-04-25 PROCEDURE — 99024 POSTOP FOLLOW-UP VISIT: CPT | Mod: S$GLB,,, | Performed by: ORTHOPAEDIC SURGERY

## 2022-04-25 PROCEDURE — 73610 X-RAY EXAM OF ANKLE: CPT | Mod: TC,PN,RT

## 2022-04-25 PROCEDURE — 1125F AMNT PAIN NOTED PAIN PRSNT: CPT | Mod: CPTII,S$GLB,, | Performed by: ORTHOPAEDIC SURGERY

## 2022-04-25 PROCEDURE — 3008F PR BODY MASS INDEX (BMI) DOCUMENTED: ICD-10-PCS | Mod: CPTII,S$GLB,, | Performed by: ORTHOPAEDIC SURGERY

## 2022-04-25 PROCEDURE — 1160F RVW MEDS BY RX/DR IN RCRD: CPT | Mod: CPTII,S$GLB,, | Performed by: ORTHOPAEDIC SURGERY

## 2022-04-25 PROCEDURE — 1159F MED LIST DOCD IN RCRD: CPT | Mod: CPTII,S$GLB,, | Performed by: ORTHOPAEDIC SURGERY

## 2022-04-25 PROCEDURE — 99999 PR PBB SHADOW E&M-EST. PATIENT-LVL III: ICD-10-PCS | Mod: PBBFAC,,, | Performed by: ORTHOPAEDIC SURGERY

## 2022-04-25 PROCEDURE — 3288F FALL RISK ASSESSMENT DOCD: CPT | Mod: CPTII,S$GLB,, | Performed by: ORTHOPAEDIC SURGERY

## 2022-04-25 PROCEDURE — 1101F PT FALLS ASSESS-DOCD LE1/YR: CPT | Mod: CPTII,S$GLB,, | Performed by: ORTHOPAEDIC SURGERY

## 2022-04-25 PROCEDURE — 73610 XR ANKLE COMPLETE 3 VIEW RIGHT: ICD-10-PCS | Mod: 26,RT,, | Performed by: RADIOLOGY

## 2022-04-25 PROCEDURE — 3008F BODY MASS INDEX DOCD: CPT | Mod: CPTII,S$GLB,, | Performed by: ORTHOPAEDIC SURGERY

## 2022-04-25 PROCEDURE — 3288F PR FALLS RISK ASSESSMENT DOCUMENTED: ICD-10-PCS | Mod: CPTII,S$GLB,, | Performed by: ORTHOPAEDIC SURGERY

## 2022-04-25 PROCEDURE — 4010F PR ACE/ARB THEARPY RXD/TAKEN: ICD-10-PCS | Mod: CPTII,S$GLB,, | Performed by: ORTHOPAEDIC SURGERY

## 2022-04-25 PROCEDURE — 1125F PR PAIN SEVERITY QUANTIFIED, PAIN PRESENT: ICD-10-PCS | Mod: CPTII,S$GLB,, | Performed by: ORTHOPAEDIC SURGERY

## 2022-04-25 PROCEDURE — 99024 PR POST-OP FOLLOW-UP VISIT: ICD-10-PCS | Mod: S$GLB,,, | Performed by: ORTHOPAEDIC SURGERY

## 2022-04-25 PROCEDURE — 1101F PR PT FALLS ASSESS DOC 0-1 FALLS W/OUT INJ PAST YR: ICD-10-PCS | Mod: CPTII,S$GLB,, | Performed by: ORTHOPAEDIC SURGERY

## 2022-04-25 PROCEDURE — 4010F ACE/ARB THERAPY RXD/TAKEN: CPT | Mod: CPTII,S$GLB,, | Performed by: ORTHOPAEDIC SURGERY

## 2022-04-25 PROCEDURE — 1159F PR MEDICATION LIST DOCUMENTED IN MEDICAL RECORD: ICD-10-PCS | Mod: CPTII,S$GLB,, | Performed by: ORTHOPAEDIC SURGERY

## 2022-04-25 PROCEDURE — 73610 X-RAY EXAM OF ANKLE: CPT | Mod: 26,RT,, | Performed by: RADIOLOGY

## 2022-04-25 PROCEDURE — 1160F PR REVIEW ALL MEDS BY PRESCRIBER/CLIN PHARMACIST DOCUMENTED: ICD-10-PCS | Mod: CPTII,S$GLB,, | Performed by: ORTHOPAEDIC SURGERY

## 2022-04-25 PROCEDURE — 99999 PR PBB SHADOW E&M-EST. PATIENT-LVL III: CPT | Mod: PBBFAC,,, | Performed by: ORTHOPAEDIC SURGERY

## 2022-05-27 RX ORDER — DIAZEPAM 5 MG/1
TABLET ORAL
Qty: 60 TABLET | Refills: 2 | Status: SHIPPED | OUTPATIENT
Start: 2022-05-27 | End: 2022-10-18 | Stop reason: SDUPTHER

## 2022-05-30 NOTE — PLAN OF CARE
Discharge teaching complete. All questions answered and necessary handouts provided. Doctor spoke with patient and family.     yes yes

## 2022-06-06 ENCOUNTER — OFFICE VISIT (OUTPATIENT)
Dept: ORTHOPEDICS | Facility: CLINIC | Age: 67
End: 2022-06-06
Payer: MEDICARE

## 2022-06-06 ENCOUNTER — HOSPITAL ENCOUNTER (OUTPATIENT)
Dept: RADIOLOGY | Facility: HOSPITAL | Age: 67
Discharge: HOME | End: 2022-06-06
Attending: ORTHOPAEDIC SURGERY
Payer: MEDICARE

## 2022-06-06 VITALS — WEIGHT: 128 LBS | HEIGHT: 68 IN | RESPIRATION RATE: 17 BRPM | BODY MASS INDEX: 19.4 KG/M2

## 2022-06-06 DIAGNOSIS — S82.391G OTHER CLOSED FRACTURE OF DISTAL END OF RIGHT TIBIA WITH DELAYED HEALING, SUBSEQUENT ENCOUNTER: Primary | ICD-10-CM

## 2022-06-06 DIAGNOSIS — S82.391G OTHER CLOSED FRACTURE OF DISTAL END OF RIGHT TIBIA WITH DELAYED HEALING, SUBSEQUENT ENCOUNTER: ICD-10-CM

## 2022-06-06 PROCEDURE — 99024 PR POST-OP FOLLOW-UP VISIT: ICD-10-PCS | Mod: S$GLB,,, | Performed by: ORTHOPAEDIC SURGERY

## 2022-06-06 PROCEDURE — 1101F PT FALLS ASSESS-DOCD LE1/YR: CPT | Mod: CPTII,S$GLB,, | Performed by: ORTHOPAEDIC SURGERY

## 2022-06-06 PROCEDURE — 99024 POSTOP FOLLOW-UP VISIT: CPT | Mod: S$GLB,,, | Performed by: ORTHOPAEDIC SURGERY

## 2022-06-06 PROCEDURE — 73590 X-RAY EXAM OF LOWER LEG: CPT | Mod: 26,RT,, | Performed by: RADIOLOGY

## 2022-06-06 PROCEDURE — 1159F PR MEDICATION LIST DOCUMENTED IN MEDICAL RECORD: ICD-10-PCS | Mod: CPTII,S$GLB,, | Performed by: ORTHOPAEDIC SURGERY

## 2022-06-06 PROCEDURE — 1159F MED LIST DOCD IN RCRD: CPT | Mod: CPTII,S$GLB,, | Performed by: ORTHOPAEDIC SURGERY

## 2022-06-06 PROCEDURE — 3008F PR BODY MASS INDEX (BMI) DOCUMENTED: ICD-10-PCS | Mod: CPTII,S$GLB,, | Performed by: ORTHOPAEDIC SURGERY

## 2022-06-06 PROCEDURE — 1160F RVW MEDS BY RX/DR IN RCRD: CPT | Mod: CPTII,S$GLB,, | Performed by: ORTHOPAEDIC SURGERY

## 2022-06-06 PROCEDURE — 1160F PR REVIEW ALL MEDS BY PRESCRIBER/CLIN PHARMACIST DOCUMENTED: ICD-10-PCS | Mod: CPTII,S$GLB,, | Performed by: ORTHOPAEDIC SURGERY

## 2022-06-06 PROCEDURE — 1126F PR PAIN SEVERITY QUANTIFIED, NO PAIN PRESENT: ICD-10-PCS | Mod: CPTII,S$GLB,, | Performed by: ORTHOPAEDIC SURGERY

## 2022-06-06 PROCEDURE — 3288F FALL RISK ASSESSMENT DOCD: CPT | Mod: CPTII,S$GLB,, | Performed by: ORTHOPAEDIC SURGERY

## 2022-06-06 PROCEDURE — 73590 X-RAY EXAM OF LOWER LEG: CPT | Mod: TC,PN,RT

## 2022-06-06 PROCEDURE — 3008F BODY MASS INDEX DOCD: CPT | Mod: CPTII,S$GLB,, | Performed by: ORTHOPAEDIC SURGERY

## 2022-06-06 PROCEDURE — 4010F ACE/ARB THERAPY RXD/TAKEN: CPT | Mod: CPTII,S$GLB,, | Performed by: ORTHOPAEDIC SURGERY

## 2022-06-06 PROCEDURE — 1101F PR PT FALLS ASSESS DOC 0-1 FALLS W/OUT INJ PAST YR: ICD-10-PCS | Mod: CPTII,S$GLB,, | Performed by: ORTHOPAEDIC SURGERY

## 2022-06-06 PROCEDURE — 99999 PR PBB SHADOW E&M-EST. PATIENT-LVL III: ICD-10-PCS | Mod: PBBFAC,,, | Performed by: ORTHOPAEDIC SURGERY

## 2022-06-06 PROCEDURE — 1126F AMNT PAIN NOTED NONE PRSNT: CPT | Mod: CPTII,S$GLB,, | Performed by: ORTHOPAEDIC SURGERY

## 2022-06-06 PROCEDURE — 3288F PR FALLS RISK ASSESSMENT DOCUMENTED: ICD-10-PCS | Mod: CPTII,S$GLB,, | Performed by: ORTHOPAEDIC SURGERY

## 2022-06-06 PROCEDURE — 73590 XR TIBIA FIBULA 2 VIEW RIGHT: ICD-10-PCS | Mod: 26,RT,, | Performed by: RADIOLOGY

## 2022-06-06 PROCEDURE — 99999 PR PBB SHADOW E&M-EST. PATIENT-LVL III: CPT | Mod: PBBFAC,,, | Performed by: ORTHOPAEDIC SURGERY

## 2022-06-06 PROCEDURE — 4010F PR ACE/ARB THEARPY RXD/TAKEN: ICD-10-PCS | Mod: CPTII,S$GLB,, | Performed by: ORTHOPAEDIC SURGERY

## 2022-06-06 NOTE — PROGRESS NOTES
CC::  66-year-old female follows up with a right distal tibia fracture.  Overall she is doing well.  She does not report any pain over her right ankle.    Right Lower Extremity Examination     Skin is intact throughout   Motor is intact distally EHL, FHL, TA, and gastroc   +2 dorsalis pedis and posterior tibial pulses   Sensation to light touch is intact distally dorsal, plantar, and first web space     Examination of the Right ankle and foot:   Hindfoot alignment in neutral   Good subtalar motion   Anterior drawer - negative  Posterior drawer - negative  Talar tilt - negative    Tenderness to palpation:   ATFL - negative  CFL - negative  Deltoid Ligament - negative  Tibialis anterior - negative  Tibialis posterior - negative  Peroneus longus - negative  Peroneus brevis - negative  Gastroc - negative  Plantar fascia - negative  Subtalar joint - negative  Tibiotalar joint - negative  Midfoot - negative  MTP joints - negative  Hallux valgus present - negative  Hammertoes present -negative    X-ray images were examined and personally interpreted by me.  Three views the right tib-fib dated 06/06/2022 show a healing fracture of the distal 3rd of the tibia in good alignment with abundant callus formation.    Dx:  Healing distal 3rd tibia fracture right ankle    Plan:  The patient can begin weight-bearing as tolerated.  Follow-up as scheduled.

## 2022-07-07 ENCOUNTER — PATIENT OUTREACH (OUTPATIENT)
Dept: ADMINISTRATIVE | Facility: HOSPITAL | Age: 67
End: 2022-07-07
Payer: MEDICARE

## 2022-07-07 DIAGNOSIS — Z78.0 ASYMPTOMATIC MENOPAUSAL STATE: Primary | ICD-10-CM

## 2022-07-07 NOTE — LETTER
July 7, 2022    Comfort Cantu  [address] [place] [address]             [place] [id number]  [place] [address]  Phone: [phone] [facility] to your [facility].  To help you get the most out of each of your visits, we will review your information to make sure you are up to date on all of your recommended tests and/or procedures.      Dr. Darinel Riddle has found that your chart shows you may be due for Bone Density Scan Screening. This is just an x-ray. The order has been placed in your [facility] chart. Please call to schedule at [phone]    If you have had any of the above done at another facility, please bring the records or information with you so that your record at [facility] will be complete.  If you would like to schedule any of these, please contact me.    If you are currently taking medication, please bring it with you to your appointment for review.    Thank You,     Your [facility] Team  MD Daysi Cardoza LPN  Performance Improvement Coordinator  [facility]  [address] [place] [address] [phone]

## 2022-07-19 ENCOUNTER — PATIENT OUTREACH (OUTPATIENT)
Dept: ADMINISTRATIVE | Facility: HOSPITAL | Age: 67
End: 2022-07-19
Payer: MEDICARE

## 2022-07-19 NOTE — PROGRESS NOTES
Population Health Outreach.Population Health review for Dexa history. Need to get schedule for dexa screening. Attempted to contact ,no answer left a VM.

## 2022-07-24 NOTE — TELEPHONE ENCOUNTER
reviewed and consistent  Will refill medicine as shown given that patient receives greater than 30% benefit from medicine and keeps patient functional and able to complete ADL's.  UDS reviewed  Pain contract reviewed  No abuse concerns     You can access the FollowMyHealth Patient Portal offered by [facility] by registering at the following website: [email or web address]. By joining [facility]s FollowMyHealth portal, you will also be able to view your health information using other applications (apps) compatible with our system.

## 2022-07-25 ENCOUNTER — TELEPHONE (OUTPATIENT)
Dept: FAMILY MEDICINE | Facility: CLINIC | Age: 67
End: 2022-07-25
Payer: MEDICARE

## 2022-07-25 ENCOUNTER — PATIENT OUTREACH (OUTPATIENT)
Dept: ADMINISTRATIVE | Facility: HOSPITAL | Age: 67
End: 2022-07-25
Payer: MEDICARE

## 2022-07-25 NOTE — PROGRESS NOTES
Population Health Outreach. Attempted to call and schedeule a Dexa screening, no answer. Left VM.

## 2022-07-25 NOTE — TELEPHONE ENCOUNTER
----- Message from Flower Mercer sent at 7/25/2022  3:02 PM CDT -----  Contact: Pt  Type: Needs Medical Advice    Who Called:  Pt    Best Call Back Number: [phone]    Additional Information: Please call back regarding dexa.  Thanks.

## 2022-07-25 NOTE — TELEPHONE ENCOUNTER
----- Message from Flower Mercer sent at 7/25/2022  3:02 PM CDT -----  Contact: Pt  Type: Needs Medical Advice    Who Called:  Pt    Best Call Back Number: [phone]    Additional Information: Please call back regarding dexa.  Thanks.

## 2022-07-28 ENCOUNTER — PATIENT OUTREACH (OUTPATIENT)
Dept: ADMINISTRATIVE | Facility: HOSPITAL | Age: 67
End: 2022-07-28
Payer: MEDICARE

## 2022-07-28 NOTE — PROGRESS NOTES
Population Health Outreach. Spoke with pt about overdue Mammogram & Dexa Screenings. Schedule for 08/02/2022 At [facility] for 1520, orders linked and appts confirmed with pt at this time. Pt requesting appt for pap smear screening, scheduled for 08/10/2022 at [facility] with NASH Benavidez NP, confirmed appt at this time

## 2022-08-02 ENCOUNTER — HOSPITAL ENCOUNTER (OUTPATIENT)
Dept: RADIOLOGY | Facility: HOSPITAL | Age: 67
Discharge: HOME | End: 2022-08-02
Attending: FAMILY MEDICINE
Payer: MEDICARE

## 2022-08-02 DIAGNOSIS — Z78.0 ASYMPTOMATIC MENOPAUSAL STATE: ICD-10-CM

## 2022-08-02 DIAGNOSIS — Z12.31 OTHER SCREENING MAMMOGRAM: ICD-10-CM

## 2022-08-02 PROCEDURE — 77063 BREAST TOMOSYNTHESIS BI: CPT | Mod: 26,,, | Performed by: RADIOLOGY

## 2022-08-02 PROCEDURE — 77080 DXA BONE DENSITY AXIAL: CPT | Mod: TC

## 2022-08-02 PROCEDURE — 77067 MAMMO DIGITAL SCREENING BILAT WITH TOMO: ICD-10-PCS | Mod: 26,,, | Performed by: RADIOLOGY

## 2022-08-02 PROCEDURE — 77080 DEXA BONE DENSITY SPINE HIP: ICD-10-PCS | Mod: 26,,, | Performed by: RADIOLOGY

## 2022-08-02 PROCEDURE — 77063 MAMMO DIGITAL SCREENING BILAT WITH TOMO: ICD-10-PCS | Mod: 26,,, | Performed by: RADIOLOGY

## 2022-08-02 PROCEDURE — 77067 SCR MAMMO BI INCL CAD: CPT | Mod: TC

## 2022-08-02 PROCEDURE — 77067 SCR MAMMO BI INCL CAD: CPT | Mod: 26,,, | Performed by: RADIOLOGY

## 2022-08-02 PROCEDURE — 77080 DXA BONE DENSITY AXIAL: CPT | Mod: 26,,, | Performed by: RADIOLOGY

## 2022-09-02 ENCOUNTER — HOSPITAL ENCOUNTER (EMERGENCY)
Facility: HOSPITAL | Age: 67
Discharge: HOME | End: 2022-09-02
Attending: EMERGENCY MEDICINE
Payer: MEDICARE

## 2022-09-02 VITALS
DIASTOLIC BLOOD PRESSURE: 80 MMHG | SYSTOLIC BLOOD PRESSURE: 155 MMHG | HEART RATE: 85 BPM | HEIGHT: 68 IN | RESPIRATION RATE: 18 BRPM | WEIGHT: 125 LBS | BODY MASS INDEX: 18.94 KG/M2 | OXYGEN SATURATION: 99 % | TEMPERATURE: 99 F

## 2022-09-02 DIAGNOSIS — D64.9 ANEMIA, UNSPECIFIED TYPE: Primary | ICD-10-CM

## 2022-09-02 DIAGNOSIS — N39.0 URINARY TRACT INFECTION WITHOUT HEMATURIA, SITE UNSPECIFIED: ICD-10-CM

## 2022-09-02 DIAGNOSIS — R06.02 SOB (SHORTNESS OF BREATH): ICD-10-CM

## 2022-09-02 LAB
ABO + RH BLD: NORMAL
ALBUMIN SERPL BCP-MCNC: 3.9 G/DL (ref 3.5–5.2)
ALP SERPL-CCNC: 114 U/L (ref 55–135)
ALT SERPL W/O P-5'-P-CCNC: 16 U/L (ref 10–44)
ANION GAP SERPL CALC-SCNC: 16 MMOL/L (ref 8–16)
AST SERPL-CCNC: 22 U/L (ref 10–40)
BACTERIA #/AREA URNS HPF: ABNORMAL /HPF
BASOPHILS # BLD AUTO: 0.04 K/UL (ref 0–0.2)
BASOPHILS NFR BLD: 0.8 % (ref 0–1.9)
BILIRUB SERPL-MCNC: 0.4 MG/DL (ref 0.1–1)
BILIRUB UR QL STRIP: NEGATIVE
BLD GP AB SCN CELLS X3 SERPL QL: NORMAL
BLD PROD TYP BPU: NORMAL
BLOOD UNIT EXPIRATION DATE: NORMAL
BLOOD UNIT TYPE CODE: 6200
BLOOD UNIT TYPE: NORMAL
BUN SERPL-MCNC: 4 MG/DL (ref 8–23)
CALCIUM SERPL-MCNC: 9.3 MG/DL (ref 8.7–10.5)
CHLORIDE SERPL-SCNC: 101 MMOL/L (ref 95–110)
CLARITY UR: CLEAR
CO2 SERPL-SCNC: 21 MMOL/L (ref 23–29)
CODING SYSTEM: NORMAL
COLOR UR: YELLOW
CREAT SERPL-MCNC: 0.7 MG/DL (ref 0.5–1.4)
DIFFERENTIAL METHOD: ABNORMAL
DISPENSE STATUS: NORMAL
EOSINOPHIL # BLD AUTO: 0.1 K/UL (ref 0–0.5)
EOSINOPHIL NFR BLD: 1.4 % (ref 0–8)
ERYTHROCYTE [DISTWIDTH] IN BLOOD BY AUTOMATED COUNT: 20.7 % (ref 11.5–14.5)
EST. GFR  (NO RACE VARIABLE): >60 ML/MIN/1.73 M^2
GLUCOSE SERPL-MCNC: 84 MG/DL (ref 70–110)
GLUCOSE UR QL STRIP: NEGATIVE
HCT VFR BLD AUTO: 22 % (ref 37–48.5)
HGB BLD-MCNC: 6.2 G/DL (ref 12–16)
HGB UR QL STRIP: ABNORMAL
IMM GRANULOCYTES # BLD AUTO: 0.01 K/UL (ref 0–0.04)
IMM GRANULOCYTES NFR BLD AUTO: 0.2 % (ref 0–0.5)
IRON SERPL-MCNC: 361 UG/DL (ref 30–160)
KETONES UR QL STRIP: NEGATIVE
LEUKOCYTE ESTERASE UR QL STRIP: ABNORMAL
LYMPHOCYTES # BLD AUTO: 1.1 K/UL (ref 1–4.8)
LYMPHOCYTES NFR BLD: 22.6 % (ref 18–48)
MCH RBC QN AUTO: 22.9 PG (ref 27–31)
MCHC RBC AUTO-ENTMCNC: 28.2 G/DL (ref 32–36)
MCV RBC AUTO: 81 FL (ref 82–98)
MICROSCOPIC COMMENT: ABNORMAL
MONOCYTES # BLD AUTO: 0.6 K/UL (ref 0.3–1)
MONOCYTES NFR BLD: 12.6 % (ref 4–15)
NEUTROPHILS # BLD AUTO: 3.1 K/UL (ref 1.8–7.7)
NEUTROPHILS NFR BLD: 62.4 % (ref 38–73)
NITRITE UR QL STRIP: NEGATIVE
NRBC BLD-RTO: 0 /100 WBC
NUM UNITS TRANS PACKED RBC: NORMAL
OB PNL STL: NEGATIVE
PH UR STRIP: 7 [PH] (ref 5–8)
PLATELET # BLD AUTO: 291 K/UL (ref 150–450)
PMV BLD AUTO: 9.7 FL (ref 9.2–12.9)
POTASSIUM SERPL-SCNC: 4.1 MMOL/L (ref 3.5–5.1)
PROT SERPL-MCNC: 7.3 G/DL (ref 6–8.4)
PROT UR QL STRIP: NEGATIVE
RBC # BLD AUTO: 2.71 M/UL (ref 4–5.4)
RBC #/AREA URNS HPF: 8 /HPF (ref 0–4)
SATURATED IRON: 62 % (ref 20–50)
SODIUM SERPL-SCNC: 138 MMOL/L (ref 136–145)
SP GR UR STRIP: 1.01 (ref 1–1.03)
SQUAMOUS #/AREA URNS HPF: ABNORMAL /HPF
TOTAL IRON BINDING CAPACITY: 586 UG/DL (ref 250–450)
TRANSFERRIN SERPL-MCNC: 396 MG/DL (ref 200–375)
URN SPEC COLLECT METH UR: ABNORMAL
UROBILINOGEN UR STRIP-ACNC: NEGATIVE EU/DL
WBC # BLD AUTO: 4.91 K/UL (ref 3.9–12.7)
WBC #/AREA URNS HPF: 25 /HPF (ref 0–5)

## 2022-09-02 PROCEDURE — 93010 EKG 12-LEAD: ICD-10-PCS | Mod: ,,, | Performed by: INTERNAL MEDICINE

## 2022-09-02 PROCEDURE — 71045 X-RAY EXAM CHEST 1 VIEW: CPT | Mod: TC

## 2022-09-02 PROCEDURE — 99900031 HC PATIENT EDUCATION (STAT)

## 2022-09-02 PROCEDURE — 82728 ASSAY OF FERRITIN: CPT | Performed by: NURSE PRACTITIONER

## 2022-09-02 PROCEDURE — 96365 THER/PROPH/DIAG IV INF INIT: CPT

## 2022-09-02 PROCEDURE — 36430 TRANSFUSION BLD/BLD COMPNT: CPT

## 2022-09-02 PROCEDURE — P9016 RBC LEUKOCYTES REDUCED: HCPCS | Performed by: EMERGENCY MEDICINE

## 2022-09-02 PROCEDURE — 82272 OCCULT BLD FECES 1-3 TESTS: CPT | Performed by: NURSE PRACTITIONER

## 2022-09-02 PROCEDURE — 81000 URINALYSIS NONAUTO W/SCOPE: CPT | Performed by: NURSE PRACTITIONER

## 2022-09-02 PROCEDURE — 86901 BLOOD TYPING SEROLOGIC RH(D): CPT | Performed by: NURSE PRACTITIONER

## 2022-09-02 PROCEDURE — 86920 COMPATIBILITY TEST SPIN: CPT | Performed by: EMERGENCY MEDICINE

## 2022-09-02 PROCEDURE — 87086 URINE CULTURE/COLONY COUNT: CPT | Performed by: NURSE PRACTITIONER

## 2022-09-02 PROCEDURE — 25000003 PHARM REV CODE 250: Performed by: EMERGENCY MEDICINE

## 2022-09-02 PROCEDURE — 71250 CT THORAX DX C-: CPT | Mod: 26,,, | Performed by: RADIOLOGY

## 2022-09-02 PROCEDURE — 94761 N-INVAS EAR/PLS OXIMETRY MLT: CPT

## 2022-09-02 PROCEDURE — 71250 CT CHEST WITHOUT CONTRAST: ICD-10-PCS | Mod: 26,,, | Performed by: RADIOLOGY

## 2022-09-02 PROCEDURE — 71045 XR CHEST AP PORTABLE: ICD-10-PCS | Mod: 26,,, | Performed by: RADIOLOGY

## 2022-09-02 PROCEDURE — 80053 COMPREHEN METABOLIC PANEL: CPT | Performed by: NURSE PRACTITIONER

## 2022-09-02 PROCEDURE — 94640 AIRWAY INHALATION TREATMENT: CPT

## 2022-09-02 PROCEDURE — 25000242 PHARM REV CODE 250 ALT 637 W/ HCPCS: Performed by: EMERGENCY MEDICINE

## 2022-09-02 PROCEDURE — 84466 ASSAY OF TRANSFERRIN: CPT | Performed by: NURSE PRACTITIONER

## 2022-09-02 PROCEDURE — 93005 ELECTROCARDIOGRAM TRACING: CPT

## 2022-09-02 PROCEDURE — 63600175 PHARM REV CODE 636 W HCPCS: Performed by: EMERGENCY MEDICINE

## 2022-09-02 PROCEDURE — 71250 CT THORAX DX C-: CPT | Mod: TC

## 2022-09-02 PROCEDURE — 96375 TX/PRO/DX INJ NEW DRUG ADDON: CPT

## 2022-09-02 PROCEDURE — 93010 ELECTROCARDIOGRAM REPORT: CPT | Mod: ,,, | Performed by: INTERNAL MEDICINE

## 2022-09-02 PROCEDURE — 85025 COMPLETE CBC W/AUTO DIFF WBC: CPT | Performed by: NURSE PRACTITIONER

## 2022-09-02 PROCEDURE — 36415 COLL VENOUS BLD VENIPUNCTURE: CPT | Performed by: NURSE PRACTITIONER

## 2022-09-02 PROCEDURE — 99285 EMERGENCY DEPT VISIT HI MDM: CPT | Mod: 25

## 2022-09-02 PROCEDURE — 71045 X-RAY EXAM CHEST 1 VIEW: CPT | Mod: 26,,, | Performed by: RADIOLOGY

## 2022-09-02 RX ORDER — IPRATROPIUM BROMIDE AND ALBUTEROL SULFATE 2.5; .5 MG/3ML; MG/3ML
3 SOLUTION RESPIRATORY (INHALATION)
Status: COMPLETED | OUTPATIENT
Start: 2022-09-02 | End: 2022-09-02

## 2022-09-02 RX ORDER — NITROFURANTOIN 25; 75 MG/1; MG/1
100 CAPSULE ORAL 2 TIMES DAILY
Qty: 14 CAPSULE | Refills: 0 | Status: SHIPPED | OUTPATIENT
Start: 2022-09-02 | End: 2022-09-09

## 2022-09-02 RX ORDER — HYDROCODONE BITARTRATE AND ACETAMINOPHEN 500; 5 MG/1; MG/1
TABLET ORAL
Status: DISCONTINUED | OUTPATIENT
Start: 2022-09-02 | End: 2022-09-02 | Stop reason: HOSPADM

## 2022-09-02 RX ORDER — DIPHENHYDRAMINE HYDROCHLORIDE 50 MG/ML
25 INJECTION INTRAMUSCULAR; INTRAVENOUS
Status: COMPLETED | OUTPATIENT
Start: 2022-09-02 | End: 2022-09-02

## 2022-09-02 RX ORDER — NITROFURANTOIN 25; 75 MG/1; MG/1
100 CAPSULE ORAL 2 TIMES DAILY
Qty: 20 CAPSULE | Refills: 0 | Status: SHIPPED | OUTPATIENT
Start: 2022-09-02 | End: 2022-09-02 | Stop reason: SDUPTHER

## 2022-09-02 RX ORDER — ACETAMINOPHEN 325 MG/1
650 TABLET ORAL
Status: COMPLETED | OUTPATIENT
Start: 2022-09-02 | End: 2022-09-02

## 2022-09-02 RX ADMIN — SODIUM CHLORIDE: 0.9 INJECTION, SOLUTION INTRAVENOUS at 08:09

## 2022-09-02 RX ADMIN — CEFTRIAXONE 1 G: 1 INJECTION, SOLUTION INTRAVENOUS at 05:09

## 2022-09-02 RX ADMIN — ACETAMINOPHEN 650 MG: 325 TABLET ORAL at 06:09

## 2022-09-02 RX ADMIN — DIPHENHYDRAMINE HYDROCHLORIDE 25 MG: 50 INJECTION, SOLUTION INTRAMUSCULAR; INTRAVENOUS at 06:09

## 2022-09-02 RX ADMIN — IPRATROPIUM BROMIDE AND ALBUTEROL SULFATE 3 ML: .5; 2.5 SOLUTION RESPIRATORY (INHALATION) at 04:09

## 2022-09-02 NOTE — ED PROVIDER NOTES
"Encounter Date: 9/2/2022       History     Chief Complaint   Patient presents with    sent by MD low H&H     Pt here for eval low H/H found yesterday on routine labs. Pt says her doctor called her last night and told her she needed to go to the ER bc her blood count was dangerously low. She denies bleeding or black stools. No abd pain. She is a smoker but denies hx of COPD. She reports being more sob than usual. No vag bleeding.     The history is provided by the patient.   Review of patient's allergies indicates:   Allergen Reactions    Penicillins      Age 23, "crippled me"     Past Medical History:   Diagnosis Date    Anticoagulant long-term use     Anxiety     Arthritis     Atherosclerosis     Coronary artery disease     Depression     GERD (gastroesophageal reflux disease)     Hearing loss     [place] (hard of hearing)     Hearing aids    Hypertension     Long term (current) use of anticoagulants     Peripheral arterial disease     Tubulovillous adenoma of colon 2019    Wears dentures     Full-Upper; Partial- lower    Wound infection after surgery 06/2020     Past Surgical History:   Procedure Laterality Date    APPENDECTOMY      CAROTID STENT  2014    COLONOSCOPY N/A 10/7/2019    Procedure: COLONOSCOPY;  Surgeon: Olaf Alba MD;  Location: [facility];  Service: General;  Laterality: N/A;    COLONOSCOPY N/A 12/16/2019    Procedure: COLONOSCOPY;  Surgeon: Olaf Alba MD;  Location: [facility];  Service: General;  Laterality: N/A;    COLONOSCOPY N/A 1/23/2020    Procedure: COLONOSCOPY (unable to come earlier);  Surgeon: Esteban Yancey MD;  Location: [facility];  Service: Endoscopy;  Laterality: N/A;    ENDOSCOPIC ULTRASOUND OF UPPER GASTROINTESTINAL TRACT N/A 10/16/2018    Procedure: ULTRASOUND, UPPER GI TRACT, ENDOSCOPIC;  Surgeon: Kumar Wang MD;  Location: [facility] (2ND FLR);  Service: Endoscopy;  Laterality: N/A;  5 day hold Effient, Dr Сергей Malagon    ESOPHAGOGASTRODUODENOSCOPY " N/A 10/7/2019    Procedure: ESOPHAGOGASTRODUODENOSCOPY (EGD);  Surgeon: Olaf Alba MD;  Location: [facility] ENDO;  Service: General;  Laterality: N/A;    FEMORAL ARTERY STENT      HARDWARE REMOVAL Right 6/12/2020    Procedure: REMOVAL, HARDWARE;  Surgeon: Elbert Marrero II, MD;  Location: [facility] OR;  Service: Orthopedics;  Laterality: Right;  right distal tibia    ORIF TIBIA FRACTURE Right 2/14/2020    Procedure: ORIF, FRACTURE, TIBIA;  Surgeon: Elbert Marrero II, MD;  Location: [facility] OR;  Service: Orthopedics;  Laterality: Right;    ROBOT-ASSISTED LAPAROSCOPIC RESECTION OF SIGMOID COLON N/A 9/14/2020    Procedure: ROBOTIC COLECTOMY, SIGMOID;  Surgeon: Terrance Candelaria MD;  Location: [facility] OR;  Service: General;  Laterality: N/A;     Family History   Problem Relation Age of Onset    Heart attack Brother     Diabetes Brother     Hyperlipidemia Brother     Eczema Neg Hx     Lupus Neg Hx     Psoriasis Neg Hx     Melanoma Neg Hx     Breast cancer Neg Hx     Ovarian cancer Neg Hx      Social History     Tobacco Use    Smoking status: Every Day     Packs/day: 0.75     Years: 23.00     Pack years: 17.25     Types: Cigarettes     Start date: 10/1/1988    Smokeless tobacco: Never   Substance Use Topics    Alcohol use: Yes     Comment: occ    Drug use: No     Review of Systems   Respiratory:  Positive for cough and shortness of breath.    Cardiovascular:  Negative for chest pain and leg swelling.   Gastrointestinal:  Negative for abdominal pain, anal bleeding, blood in stool and rectal pain.   Genitourinary:  Positive for hematuria. Negative for dysuria, flank pain and pelvic pain.   Hematological:  Does not bruise/bleed easily.   All other systems reviewed and are negative.    Physical Exam     Initial Vitals [09/02/22 1434]   BP Pulse Resp Temp SpO2   133/73 100 18 97.8 °F (36.6 °C) 100 %      MAP       --         Physical Exam    Nursing note and vitals reviewed.  Constitutional: She appears well-developed and  well-nourished. No distress.   HENT:   Head: Normocephalic and atraumatic.   Mouth/Throat: Oropharynx is clear and moist.   Mucosa slightly pale   Eyes: Conjunctivae and EOM are normal. No scleral icterus.   Neck: Neck supple. No JVD present.   Normal range of motion.  Cardiovascular:  Normal rate, regular rhythm, normal heart sounds and intact distal pulses.           Pulmonary/Chest: Breath sounds normal.   Abdominal: Abdomen is soft. She exhibits no distension. There is no abdominal tenderness.   Musculoskeletal:         General: No tenderness or edema. Normal range of motion.      Cervical back: Normal range of motion and neck supple.     Neurological: She is alert and oriented to person, place, and time. GCS score is 15. GCS eye subscore is 4. GCS verbal subscore is 5. GCS motor subscore is 6.   Skin: Skin is warm and dry. Capillary refill takes less than 2 seconds. No rash noted. No erythema. No pallor.       ED Course   Procedures  Labs Reviewed   CBC W/ AUTO DIFFERENTIAL - Abnormal; Notable for the following components:       Result Value    RBC 2.71 (*)     Hemoglobin 6.2 (*)     Hematocrit 22.0 (*)     MCV 81 (*)     MCH 22.9 (*)     MCHC 28.2 (*)     RDW 20.7 (*)     All other components within normal limits   COMPREHENSIVE METABOLIC PANEL - Abnormal; Notable for the following components:    CO2 21 (*)     BUN 4 (*)     All other components within normal limits   URINALYSIS, REFLEX TO URINE CULTURE - Abnormal; Notable for the following components:    Occult Blood UA 2+ (*)     Leukocytes, UA 1+ (*)     All other components within normal limits    Narrative:     Preferred Collection Type->Urine, Clean Catch  Specimen Source->Urine   URINALYSIS MICROSCOPIC - Abnormal; Notable for the following components:    RBC, UA 8 (*)     WBC, UA 25 (*)     Bacteria Moderate (*)     All other components within normal limits    Narrative:     Preferred Collection Type->Urine, Clean Catch  Specimen Source->Urine    CULTURE, URINE   OCCULT BLOOD X 1, STOOL   IRON AND TIBC   FERRITIN   TYPE & SCREEN   PREPARE RBC SOFT     EKG Readings: (Independently Interpreted)   Rhythm: Normal Sinus Rhythm. Heart Rate: 95. Ectopy: No Ectopy. Conduction: Normal. ST Segments: Normal ST Segments. T Waves: Normal. Clinical Impression: Normal Sinus Rhythm   ECG Results              EKG 12-lead (In process)  Result time 09/02/22 17:14:21      In process by Interface, Lab In [facility] (09/02/22 17:14:21)                   Narrative:    Test Reason : R06.02,    Vent. Rate : 095 BPM     Atrial Rate : 095 BPM     P-R Int : 152 ms          QRS Dur : 076 ms      QT Int : 376 ms       P-R-T Axes : 072 043 063 degrees     QTc Int : 472 ms    Sinus rhythm with occasional Premature ventricular complexes  Otherwise normal ECG  When compared with ECG of 14-SEP-2020 06:37,  Premature ventricular complexes are now Present  Vent. rate has increased BY  32 BPM    Referred By: [email or web address]   SELF           Confirmed By:                                   Imaging Results              CT Chest Without Contrast (In process)  Result time 09/02/22 18:05:22                     X-Ray Chest AP Portable (Final result)  Result time 09/02/22 16:49:09      Final result by Zully Quinteros MD (09/02/22 16:49:09)                   Impression:      Minimal subsegmental atelectasis or scarring left lung base.  Nodular density right apex.  Follow-up chest CT recommended.      Electronically signed by: Zully Quinteros  Date:    09/02/2022  Time:    16:49               Narrative:    EXAMINATION:  XR CHEST AP PORTABLE    CLINICAL HISTORY:  sob;    TECHNIQUE:  Single frontal view of the chest was performed.    COMPARISON:  08/10/2019    FINDINGS:  Cardiomediastinal silhouette is within normal limits.  Lungs are deeply inflated.  There are a few bands of subsegmental atelectasis or scarring at the left lung base.  There is a stable nodular opacity at the right apex which may be  related to asymmetric nodular apical pleural thickening.  Apical lung nodule included in the differential.  There is no pleural effusion.                                       Medications   cefTRIAXone (ROCEPHIN) 1 g/50 mL D5W IVPB (1 g Intravenous New Bag 9/2/22 1758)   acetaminophen tablet 650 mg (has no administration in time range)   diphenhydrAMINE injection 25 mg (has no administration in time range)   0.9%  NaCl infusion (for blood administration) (has no administration in time range)   albuterol-ipratropium 2.5 mg-0.5 mg/3 mL nebulizer solution 3 mL (3 mLs Nebulization Given 9/2/22 1640)     Medical Decision Making:   Differential Diagnosis:   GI bleed, vag bleeding, anemia  Clinical Tests:   Lab Tests: Ordered and Reviewed  ED Management:  Pt presented for eval anemia found yesterday. Pt denied bleeding other than some faint blood on toilet paper after urinating which is likely 2/2 her UTI. H/H 6/22 today. Heme neg brown stool. CMP unremarkable. She had some sob and cough and I recommended obs for transfusion and eval for COPD but she declined. She says she feels fine and is back to normal after the neb. She said she would stay in the ED for transfusion but would not be admitted overnight. Pt was transfused 1U PRBC in the ED and was advised to f/u with her PCP for further eval of her anemia. Hematology referral sent. Ferrous Sulfate Rx along with macrobid. She was given Rocephin in the ED for her UTI which she tolerated well.           ED Course as of 09/02/22 1812   Fri Sep 02, 2022   1702 Pt says she can take rocephin.  [DC]   1707 Pt refusing admission. She says she has two dogs at home that she has to go take care of. Will transfuse 1U in the ED and dc home.  [DC]      ED Course User Index  [DC] Miguelito Morillo Jr., MD             Clinical Impression:   Final diagnoses:  [R06.02] SOB (shortness of breath)  [D64.9] Anemia, unspecified type (Primary)  [N39.0] Urinary tract infection without hematuria, site  unspecified      ED Disposition Condition    Discharge Stable          ED Prescriptions       Medication Sig Dispense Start Date End Date Auth. Provider    nitrofurantoin, macrocrystal-monohydrate, (MACROBID) 100 MG capsule Take 1 capsule (100 mg total) by mouth 2 (two) times daily. for 10 days 20 capsule 9/2/2022 9/12/2022 Miguelito Morillo Jr., MD          Follow-up Information       Follow up With Specialties Details Why Contact Info    Darinel Riddle MD [facility] Medicine In 3 days  [address]  [place] [address] [phone]               Miguelito Morillo Jr., MD  09/02/22 1074

## 2022-09-02 NOTE — ED TRIAGE NOTES
Pt states that she was supposed to have some teeth pulled pt states that the dentist required the pt to get clearance with her heart doctor pt states that her heart doctor Dr. Moreno performed lab work. Pt states that yesterday around 1800 he called and told her to go straight to the er due to having a low H&H. Pt states that she did not come and received another call this am informing her to go straight to there E.R.  pts hem was 6.1 yesterday pt states this am when she went to the restroom this morning she states there was blood in her urine

## 2022-09-03 LAB — FERRITIN SERPL-MCNC: 26 NG/ML (ref 20–300)

## 2022-09-03 NOTE — ED NOTES
Pt denies adverse reactions to the blood transfusion, denies sob or cp    Vital signs remain stable, aaox4. No acute distress. Resp equal and unlabor

## 2022-09-03 NOTE — ED NOTES
Pt states feeling better. No complications r/t the blood transfusion. Vital signs has been stable, pre-during-post transfusion. Denies sob or cp.

## 2022-09-04 LAB
BACTERIA UR CULT: NORMAL
BACTERIA UR CULT: NORMAL

## 2022-09-12 ENCOUNTER — TELEPHONE (OUTPATIENT)
Dept: HEMATOLOGY/ONCOLOGY | Facility: CLINIC | Age: 67
End: 2022-09-12
Payer: MEDICARE

## 2022-09-14 ENCOUNTER — OFFICE VISIT (OUTPATIENT)
Dept: HEMATOLOGY/ONCOLOGY | Facility: CLINIC | Age: 67
End: 2022-09-14
Payer: MEDICARE

## 2022-09-14 VITALS
RESPIRATION RATE: 12 BRPM | WEIGHT: 125.88 LBS | HEIGHT: 68 IN | BODY MASS INDEX: 19.08 KG/M2 | HEART RATE: 96 BPM | OXYGEN SATURATION: 96 % | TEMPERATURE: 97 F | SYSTOLIC BLOOD PRESSURE: 145 MMHG | DIASTOLIC BLOOD PRESSURE: 76 MMHG

## 2022-09-14 DIAGNOSIS — D64.9 NORMOCYTIC ANEMIA: Primary | ICD-10-CM

## 2022-09-14 DIAGNOSIS — D64.9 ANEMIA, UNSPECIFIED TYPE: ICD-10-CM

## 2022-09-14 DIAGNOSIS — E46 PROTEIN-CALORIE MALNUTRITION, UNSPECIFIED SEVERITY: ICD-10-CM

## 2022-09-14 PROCEDURE — 3077F PR MOST RECENT SYSTOLIC BLOOD PRESSURE >= 140 MM HG: ICD-10-PCS | Mod: CPTII,S$GLB,, | Performed by: INTERNAL MEDICINE

## 2022-09-14 PROCEDURE — 99204 PR OFFICE/OUTPT VISIT, NEW, LEVL IV, 45-59 MIN: ICD-10-PCS | Mod: S$GLB,,, | Performed by: INTERNAL MEDICINE

## 2022-09-14 PROCEDURE — 1101F PT FALLS ASSESS-DOCD LE1/YR: CPT | Mod: CPTII,S$GLB,, | Performed by: INTERNAL MEDICINE

## 2022-09-14 PROCEDURE — 4010F ACE/ARB THERAPY RXD/TAKEN: CPT | Mod: CPTII,S$GLB,, | Performed by: INTERNAL MEDICINE

## 2022-09-14 PROCEDURE — 99999 PR PBB SHADOW E&M-EST. PATIENT-LVL IV: ICD-10-PCS | Mod: PBBFAC,,, | Performed by: INTERNAL MEDICINE

## 2022-09-14 PROCEDURE — 3078F DIAST BP <80 MM HG: CPT | Mod: CPTII,S$GLB,, | Performed by: INTERNAL MEDICINE

## 2022-09-14 PROCEDURE — 3077F SYST BP >= 140 MM HG: CPT | Mod: CPTII,S$GLB,, | Performed by: INTERNAL MEDICINE

## 2022-09-14 PROCEDURE — 3288F PR FALLS RISK ASSESSMENT DOCUMENTED: ICD-10-PCS | Mod: CPTII,S$GLB,, | Performed by: INTERNAL MEDICINE

## 2022-09-14 PROCEDURE — 3288F FALL RISK ASSESSMENT DOCD: CPT | Mod: CPTII,S$GLB,, | Performed by: INTERNAL MEDICINE

## 2022-09-14 PROCEDURE — 3008F PR BODY MASS INDEX (BMI) DOCUMENTED: ICD-10-PCS | Mod: CPTII,S$GLB,, | Performed by: INTERNAL MEDICINE

## 2022-09-14 PROCEDURE — 1126F PR PAIN SEVERITY QUANTIFIED, NO PAIN PRESENT: ICD-10-PCS | Mod: CPTII,S$GLB,, | Performed by: INTERNAL MEDICINE

## 2022-09-14 PROCEDURE — 4010F PR ACE/ARB THEARPY RXD/TAKEN: ICD-10-PCS | Mod: CPTII,S$GLB,, | Performed by: INTERNAL MEDICINE

## 2022-09-14 PROCEDURE — 3008F BODY MASS INDEX DOCD: CPT | Mod: CPTII,S$GLB,, | Performed by: INTERNAL MEDICINE

## 2022-09-14 PROCEDURE — 1159F MED LIST DOCD IN RCRD: CPT | Mod: CPTII,S$GLB,, | Performed by: INTERNAL MEDICINE

## 2022-09-14 PROCEDURE — 1159F PR MEDICATION LIST DOCUMENTED IN MEDICAL RECORD: ICD-10-PCS | Mod: CPTII,S$GLB,, | Performed by: INTERNAL MEDICINE

## 2022-09-14 PROCEDURE — 3078F PR MOST RECENT DIASTOLIC BLOOD PRESSURE < 80 MM HG: ICD-10-PCS | Mod: CPTII,S$GLB,, | Performed by: INTERNAL MEDICINE

## 2022-09-14 PROCEDURE — 99204 OFFICE O/P NEW MOD 45 MIN: CPT | Mod: S$GLB,,, | Performed by: INTERNAL MEDICINE

## 2022-09-14 PROCEDURE — 1126F AMNT PAIN NOTED NONE PRSNT: CPT | Mod: CPTII,S$GLB,, | Performed by: INTERNAL MEDICINE

## 2022-09-14 PROCEDURE — 1101F PR PT FALLS ASSESS DOC 0-1 FALLS W/OUT INJ PAST YR: ICD-10-PCS | Mod: CPTII,S$GLB,, | Performed by: INTERNAL MEDICINE

## 2022-09-14 PROCEDURE — 99999 PR PBB SHADOW E&M-EST. PATIENT-LVL IV: CPT | Mod: PBBFAC,,, | Performed by: INTERNAL MEDICINE

## 2022-09-14 NOTE — PROGRESS NOTES
HPI    60-year-old female referred to [facility]ology Clinic for evaluation of severe anemia.  Patient was recently transfused in the ED with hemoglobin 6.2 grams/deciliter.  At the time patient is also microcytic.  The patient denies any overt bleeding.  We do not have too much of blood work study in the epic.  However about a year ago patient's hemoglobin was 10.7 grams/deciliter however her MCV at the time was 110.      Currently patient denies any overt bleeding.        Past Medical History:   Diagnosis Date    Anticoagulant long-term use     Anxiety     Arthritis     Atherosclerosis     Coronary artery disease     Depression     GERD (gastroesophageal reflux disease)     Hearing loss     [place] (hard of hearing)     Hearing aids    Hypertension     Long term (current) use of anticoagulants     Peripheral arterial disease     Tubulovillous adenoma of colon 2019    Wears dentures     Full-Upper; Partial- lower    Wound infection after surgery 06/2020     Social History     Socioeconomic History    Marital status:    Tobacco Use    Smoking status: Every Day     Packs/day: 0.75     Years: 23.00     Pack years: 17.25     Types: Cigarettes     Start date: 10/1/1988    Smokeless tobacco: Never   Substance and Sexual Activity    Alcohol use: Yes     Comment: occ    Drug use: No    Sexual activity: Yes         Subjective      Review of Systems   Constitutional: Negative for appetite change, fatigue and unexpected weight change.   HENT: Negative for mouth sores.   Eyes: Negative for visual disturbance.   Respiratory: Negative for cough and shortness of breath.   Cardiovascular: Negative for chest pain.   Gastrointestinal: Negative for diarrhea.   Genitourinary: Negative for frequency.   Musculoskeletal: Negative for back pain.   Skin: Negative for rash.   Neurological: Negative for headaches.   Hematological: Negative for adenopathy.   Psychiatric/Behavioral: The patient is not nervous/anxious.   All other systems  reviewed and are negative.     Objective    Physical Exam   Vitals:    09/14/22 1439   BP: (!) 145/76   Pulse: 96   Resp: 12   Temp: 97.2 °F (36.2 °C)       Constitutional: patient is oriented to person, place, and time. patient appears well-developed and well-nourished. No distress.   HENT:   Right Ear: External ear normal.   Left Ear: External ear normal.   Nose: Nose normal.   Mouth/Throat: Oropharynx is clear and moist. No oropharyngeal exudate.   Teeth, gums and lips are normal   No sinus tenderness   Palate, tongue, posterior pharynx are normal   Eyes: Conjunctivae and lids are normal.   Neck: Trachea normal and normal range of motion. No thyromegaly   Cardiovascular: Normal rate, regular rhythm, normal heart sounds, intact distal pulses and normal pulses.   No murmur heard.   No edema, no tenderness in the extremities.   Pulmonary/Chest: Effort normal and breath sounds normal. No accessory muscle usage. patient has no wheezes..   Abdominal: Soft. Normal appearance and bowel sounds are normal. patient exhibits no distension and no mass. There is no hepatosplenomegaly. There is no tenderness.   Musculoskeletal: Normal range of motion.   Gait is normal   No clubbing, cyanosis     Lymphadenopathy:   Head (right side): No submental and no submandibular adenopathy present.   Head (left side): No submental and no submandibular adenopathy present.   patient has no cervical adenopathy.   Right: No supraclavicular adenopathy present.   Left: No supraclavicular adenopathy present.   Neurological: patient is alert and oriented to person, place, and time. patient has normal strength and normal reflexes. No sensory deficit. Gait normal.   Skin: Skin is warm, dry and intact. No bruising, no lesion and no rash noted. No cyanosis. Nails show no clubbing.   No lesions   Psychiatric: patient has a normal mood and affect. patient speech is normal and behavior is normal. Judgment normal. Cognition and memory are normal.   Vitals  reviewed.     CMP  Sodium   Date Value Ref Range Status   09/02/2022 138 136 - 145 mmol/L Final     Potassium   Date Value Ref Range Status   09/02/2022 4.1 3.5 - 5.1 mmol/L Final     Chloride   Date Value Ref Range Status   09/02/2022 101 95 - 110 mmol/L Final     CO2   Date Value Ref Range Status   09/02/2022 21 (L) 23 - 29 mmol/L Final     Glucose   Date Value Ref Range Status   09/02/2022 84 70 - 110 mg/dL Final     BUN   Date Value Ref Range Status   09/02/2022 4 (L) 8 - 23 mg/dL Final     Creatinine   Date Value Ref Range Status   09/02/2022 0.7 0.5 - 1.4 mg/dL Final     Calcium   Date Value Ref Range Status   09/02/2022 9.3 8.7 - 10.5 mg/dL Final     Total Protein   Date Value Ref Range Status   09/02/2022 7.3 6.0 - 8.4 g/dL Final     Albumin   Date Value Ref Range Status   09/02/2022 3.9 3.5 - 5.2 g/dL Final     Total Bilirubin   Date Value Ref Range Status   09/02/2022 0.4 0.1 - 1.0 mg/dL Final     Comment:     For infants and newborns, interpretation of results should be based  on gestational age, weight and in agreement with clinical  observations.    Premature Infant recommended reference ranges:  Up to 24 hours.............<8.0 mg/dL  Up to 48 hours............<12.0 mg/dL  3-5 days..................<15.0 mg/dL  6-29 days.................<15.0 mg/dL       Alkaline Phosphatase   Date Value Ref Range Status   09/02/2022 114 55 - 135 U/L Final     AST   Date Value Ref Range Status   09/02/2022 22 10 - 40 U/L Final     ALT   Date Value Ref Range Status   09/02/2022 16 10 - 44 U/L Final     Anion Gap   Date Value Ref Range Status   09/02/2022 16 8 - 16 mmol/L Final     eGFR if    Date Value Ref Range Status   09/03/2021 >60.0 >60 mL/min/1.73 m^2 Final     eGFR if non    Date Value Ref Range Status   09/03/2021 >60.0 >60 mL/min/1.73 m^2 Final     Comment:     Calculation used to obtain the estimated glomerular filtration  rate (eGFR) is the CKD-EPI equation.        Lab Results    Component Value Date    WBC 4.91 09/02/2022    HGB 6.2 (L) 09/02/2022    HCT 22.0 (L) 09/02/2022    MCV 81 (L) 09/02/2022     09/02/2022           Assessment    Microcytic anemia require transfusion.  Last transfusion 1 unit packed red blood cell on 09/02/2022.  Patient was seen in the ED for the transfusion which is directed by patient's PCP at the time.    It is unclear as to what cause patient's anemia however it could be a form of blood loss.  Previously patient had a resection of sigmoid colon path shows negative for any residual neoplasm or malignancy.  And 10 months ago patient is microcytic with hemoglobin of 10.7    Plan    At this time I will repeat blood work.  I will have patient return to clinic 1 week from now.    May require bone marrow biopsy for evaluation.        Anemia, unspecified type  -     Ambulatory referral/consult to Hematology / Oncology

## 2022-09-15 ENCOUNTER — LAB VISIT (OUTPATIENT)
Dept: LAB | Facility: HOSPITAL | Age: 67
End: 2022-09-15
Attending: INTERNAL MEDICINE
Payer: MEDICARE

## 2022-09-15 DIAGNOSIS — E46 PROTEIN-CALORIE MALNUTRITION, UNSPECIFIED SEVERITY: ICD-10-CM

## 2022-09-15 DIAGNOSIS — D64.9 NORMOCYTIC ANEMIA: ICD-10-CM

## 2022-09-15 DIAGNOSIS — D64.9 ANEMIA, UNSPECIFIED TYPE: ICD-10-CM

## 2022-09-15 LAB
ALBUMIN SERPL BCP-MCNC: 4 G/DL (ref 3.5–5.2)
ALP SERPL-CCNC: 158 U/L (ref 55–135)
ALT SERPL W/O P-5'-P-CCNC: 24 U/L (ref 10–44)
ANION GAP SERPL CALC-SCNC: 14 MMOL/L (ref 8–16)
AST SERPL-CCNC: 30 U/L (ref 10–40)
BASOPHILS # BLD AUTO: 0.04 K/UL (ref 0–0.2)
BASOPHILS NFR BLD: 0.6 % (ref 0–1.9)
BILIRUB SERPL-MCNC: 0.7 MG/DL (ref 0.1–1)
BUN SERPL-MCNC: 4 MG/DL (ref 8–23)
CALCIUM SERPL-MCNC: 9.2 MG/DL (ref 8.7–10.5)
CHLORIDE SERPL-SCNC: 98 MMOL/L (ref 95–110)
CO2 SERPL-SCNC: 22 MMOL/L (ref 23–29)
CREAT SERPL-MCNC: 0.7 MG/DL (ref 0.5–1.4)
DIFFERENTIAL METHOD: ABNORMAL
EOSINOPHIL # BLD AUTO: 0.2 K/UL (ref 0–0.5)
EOSINOPHIL NFR BLD: 2.5 % (ref 0–8)
ERYTHROCYTE [DISTWIDTH] IN BLOOD BY AUTOMATED COUNT: 25 % (ref 11.5–14.5)
EST. GFR  (NO RACE VARIABLE): >60 ML/MIN/1.73 M^2
FERRITIN SERPL-MCNC: 48 NG/ML (ref 20–300)
FOLATE SERPL-MCNC: 13.9 NG/ML (ref 4–24)
GLUCOSE SERPL-MCNC: 105 MG/DL (ref 70–110)
HAPTOGLOB SERPL-MCNC: 148 MG/DL (ref 30–250)
HCT VFR BLD AUTO: 32.4 % (ref 37–48.5)
HGB BLD-MCNC: 10 G/DL (ref 12–16)
IMM GRANULOCYTES # BLD AUTO: 0.02 K/UL (ref 0–0.04)
IMM GRANULOCYTES NFR BLD AUTO: 0.3 % (ref 0–0.5)
IRON SERPL-MCNC: 40 UG/DL (ref 30–160)
LDH SERPL L TO P-CCNC: 155 U/L (ref 110–260)
LDH SERPL L TO P-CCNC: 155 U/L (ref 110–260)
LYMPHOCYTES # BLD AUTO: 1 K/UL (ref 1–4.8)
LYMPHOCYTES NFR BLD: 15 % (ref 18–48)
MCH RBC QN AUTO: 26.8 PG (ref 27–31)
MCHC RBC AUTO-ENTMCNC: 30.9 G/DL (ref 32–36)
MCV RBC AUTO: 87 FL (ref 82–98)
MONOCYTES # BLD AUTO: 0.8 K/UL (ref 0.3–1)
MONOCYTES NFR BLD: 12.1 % (ref 4–15)
NEUTROPHILS # BLD AUTO: 4.5 K/UL (ref 1.8–7.7)
NEUTROPHILS NFR BLD: 69.5 % (ref 38–73)
NRBC BLD-RTO: 0 /100 WBC
PATH REV BLD -IMP: NORMAL
PLATELET # BLD AUTO: 376 K/UL (ref 150–450)
PMV BLD AUTO: 8.5 FL (ref 9.2–12.9)
POTASSIUM SERPL-SCNC: 4.2 MMOL/L (ref 3.5–5.1)
PROT SERPL-MCNC: 7.6 G/DL (ref 6–8.4)
RBC # BLD AUTO: 3.73 M/UL (ref 4–5.4)
RETICS/RBC NFR AUTO: 1.5 % (ref 0.5–2.5)
SATURATED IRON: 8 % (ref 20–50)
SODIUM SERPL-SCNC: 134 MMOL/L (ref 136–145)
TOTAL IRON BINDING CAPACITY: 517 UG/DL (ref 250–450)
TRANSFERRIN SERPL-MCNC: 349 MG/DL (ref 200–375)
VIT B12 SERPL-MCNC: 747 PG/ML (ref 210–950)
WBC # BLD AUTO: 6.45 K/UL (ref 3.9–12.7)

## 2022-09-15 PROCEDURE — 84466 ASSAY OF TRANSFERRIN: CPT | Performed by: INTERNAL MEDICINE

## 2022-09-15 PROCEDURE — 82668 ASSAY OF ERYTHROPOIETIN: CPT | Performed by: INTERNAL MEDICINE

## 2022-09-15 PROCEDURE — 82746 ASSAY OF FOLIC ACID SERUM: CPT | Performed by: INTERNAL MEDICINE

## 2022-09-15 PROCEDURE — 82728 ASSAY OF FERRITIN: CPT | Performed by: INTERNAL MEDICINE

## 2022-09-15 PROCEDURE — 85060 PATHOLOGIST REVIEW: ICD-10-PCS | Mod: ,,, | Performed by: PATHOLOGY

## 2022-09-15 PROCEDURE — 80053 COMPREHEN METABOLIC PANEL: CPT | Performed by: INTERNAL MEDICINE

## 2022-09-15 PROCEDURE — 85060 BLOOD SMEAR INTERPRETATION: CPT | Mod: ,,, | Performed by: PATHOLOGY

## 2022-09-15 PROCEDURE — 85045 AUTOMATED RETICULOCYTE COUNT: CPT | Performed by: INTERNAL MEDICINE

## 2022-09-15 PROCEDURE — 83010 ASSAY OF HAPTOGLOBIN QUANT: CPT | Performed by: INTERNAL MEDICINE

## 2022-09-15 PROCEDURE — 85025 COMPLETE CBC W/AUTO DIFF WBC: CPT | Performed by: INTERNAL MEDICINE

## 2022-09-15 PROCEDURE — 82607 VITAMIN B-12: CPT | Performed by: INTERNAL MEDICINE

## 2022-09-15 PROCEDURE — 83615 LACTATE (LD) (LDH) ENZYME: CPT | Performed by: INTERNAL MEDICINE

## 2022-09-15 PROCEDURE — 36415 COLL VENOUS BLD VENIPUNCTURE: CPT | Performed by: INTERNAL MEDICINE

## 2022-09-16 LAB — PATH REV BLD -IMP: NORMAL

## 2022-09-19 LAB — EPO SERPL-ACNC: 72.2 MIU/ML (ref 2.6–18.5)

## 2022-09-21 ENCOUNTER — OFFICE VISIT (OUTPATIENT)
Dept: HEMATOLOGY/ONCOLOGY | Facility: CLINIC | Age: 67
End: 2022-09-21
Payer: MEDICARE

## 2022-09-21 VITALS
OXYGEN SATURATION: 98 % | BODY MASS INDEX: 19.21 KG/M2 | HEIGHT: 68 IN | TEMPERATURE: 98 F | HEART RATE: 97 BPM | RESPIRATION RATE: 12 BRPM | DIASTOLIC BLOOD PRESSURE: 76 MMHG | SYSTOLIC BLOOD PRESSURE: 147 MMHG | WEIGHT: 126.75 LBS

## 2022-09-21 DIAGNOSIS — E46 PROTEIN-CALORIE MALNUTRITION, UNSPECIFIED SEVERITY: ICD-10-CM

## 2022-09-21 DIAGNOSIS — D64.9 NORMOCYTIC ANEMIA: ICD-10-CM

## 2022-09-21 DIAGNOSIS — D64.9 ANEMIA, UNSPECIFIED TYPE: Primary | ICD-10-CM

## 2022-09-21 PROCEDURE — 3008F BODY MASS INDEX DOCD: CPT | Mod: CPTII,S$GLB,, | Performed by: INTERNAL MEDICINE

## 2022-09-21 PROCEDURE — 3288F FALL RISK ASSESSMENT DOCD: CPT | Mod: CPTII,S$GLB,, | Performed by: INTERNAL MEDICINE

## 2022-09-21 PROCEDURE — 3077F SYST BP >= 140 MM HG: CPT | Mod: CPTII,S$GLB,, | Performed by: INTERNAL MEDICINE

## 2022-09-21 PROCEDURE — 4010F PR ACE/ARB THEARPY RXD/TAKEN: ICD-10-PCS | Mod: CPTII,S$GLB,, | Performed by: INTERNAL MEDICINE

## 2022-09-21 PROCEDURE — 1126F AMNT PAIN NOTED NONE PRSNT: CPT | Mod: CPTII,S$GLB,, | Performed by: INTERNAL MEDICINE

## 2022-09-21 PROCEDURE — 1101F PT FALLS ASSESS-DOCD LE1/YR: CPT | Mod: CPTII,S$GLB,, | Performed by: INTERNAL MEDICINE

## 2022-09-21 PROCEDURE — 99213 PR OFFICE/OUTPT VISIT, EST, LEVL III, 20-29 MIN: ICD-10-PCS | Mod: S$GLB,,, | Performed by: INTERNAL MEDICINE

## 2022-09-21 PROCEDURE — 3077F PR MOST RECENT SYSTOLIC BLOOD PRESSURE >= 140 MM HG: ICD-10-PCS | Mod: CPTII,S$GLB,, | Performed by: INTERNAL MEDICINE

## 2022-09-21 PROCEDURE — 1101F PR PT FALLS ASSESS DOC 0-1 FALLS W/OUT INJ PAST YR: ICD-10-PCS | Mod: CPTII,S$GLB,, | Performed by: INTERNAL MEDICINE

## 2022-09-21 PROCEDURE — 4010F ACE/ARB THERAPY RXD/TAKEN: CPT | Mod: CPTII,S$GLB,, | Performed by: INTERNAL MEDICINE

## 2022-09-21 PROCEDURE — 1126F PR PAIN SEVERITY QUANTIFIED, NO PAIN PRESENT: ICD-10-PCS | Mod: CPTII,S$GLB,, | Performed by: INTERNAL MEDICINE

## 2022-09-21 PROCEDURE — 99999 PR PBB SHADOW E&M-EST. PATIENT-LVL III: ICD-10-PCS | Mod: PBBFAC,,, | Performed by: INTERNAL MEDICINE

## 2022-09-21 PROCEDURE — 1159F PR MEDICATION LIST DOCUMENTED IN MEDICAL RECORD: ICD-10-PCS | Mod: CPTII,S$GLB,, | Performed by: INTERNAL MEDICINE

## 2022-09-21 PROCEDURE — 1159F MED LIST DOCD IN RCRD: CPT | Mod: CPTII,S$GLB,, | Performed by: INTERNAL MEDICINE

## 2022-09-21 PROCEDURE — 3078F DIAST BP <80 MM HG: CPT | Mod: CPTII,S$GLB,, | Performed by: INTERNAL MEDICINE

## 2022-09-21 PROCEDURE — 3078F PR MOST RECENT DIASTOLIC BLOOD PRESSURE < 80 MM HG: ICD-10-PCS | Mod: CPTII,S$GLB,, | Performed by: INTERNAL MEDICINE

## 2022-09-21 PROCEDURE — 3008F PR BODY MASS INDEX (BMI) DOCUMENTED: ICD-10-PCS | Mod: CPTII,S$GLB,, | Performed by: INTERNAL MEDICINE

## 2022-09-21 PROCEDURE — 99999 PR PBB SHADOW E&M-EST. PATIENT-LVL III: CPT | Mod: PBBFAC,,, | Performed by: INTERNAL MEDICINE

## 2022-09-21 PROCEDURE — 3288F PR FALLS RISK ASSESSMENT DOCUMENTED: ICD-10-PCS | Mod: CPTII,S$GLB,, | Performed by: INTERNAL MEDICINE

## 2022-09-21 PROCEDURE — 99213 OFFICE O/P EST LOW 20 MIN: CPT | Mod: S$GLB,,, | Performed by: INTERNAL MEDICINE

## 2022-09-21 NOTE — PROGRESS NOTES
HPI    60-year-old female referred to Hematology Clinic for evaluation of severe anemia.  Patient was recently transfused in the ED with hemoglobin 6.2 grams/deciliter.  At the time patient is also microcytic.  The patient denies any overt bleeding.  We do not have too much of blood work study in the epic.  However about a year ago patient's hemoglobin was 10.7 grams/deciliter however her MCV at the time was 110.      Currently patient denies any overt bleeding.        Past Medical History:   Diagnosis Date    Anticoagulant long-term use     Anxiety     Arthritis     Atherosclerosis     Coronary artery disease     Depression     GERD (gastroesophageal reflux disease)     Hearing loss     [place] (hard of hearing)     Hearing aids    Hypertension     Long term (current) use of anticoagulants     Peripheral arterial disease     Tubulovillous adenoma of colon 2019    Wears dentures     Full-Upper; Partial- lower    Wound infection after surgery 06/2020     Social History     Socioeconomic History    Marital status:    Tobacco Use    Smoking status: Every Day     Packs/day: 0.75     Years: 23.00     Pack years: 17.25     Types: Cigarettes     Start date: 10/1/1988    Smokeless tobacco: Never   Substance and Sexual Activity    Alcohol use: Yes     Comment: occ    Drug use: No    Sexual activity: Yes         Subjective      Review of Systems   Constitutional: Negative for appetite change, fatigue and unexpected weight change.   HENT: Negative for mouth sores.   Eyes: Negative for visual disturbance.   Respiratory: Negative for cough and shortness of breath.   Cardiovascular: Negative for chest pain.   Gastrointestinal: Negative for diarrhea.   Genitourinary: Negative for frequency.   Musculoskeletal: Negative for back pain.   Skin: Negative for rash.   Neurological: Negative for headaches.   Hematological: Negative for adenopathy.   Psychiatric/Behavioral: The patient is not nervous/anxious.   All other systems  reviewed and are negative.     Objective    Physical Exam   There were no vitals filed for this visit.      Constitutional: patient is oriented to person, place, and time. patient appears well-developed and well-nourished. No distress.   HENT:   Right Ear: External ear normal.   Left Ear: External ear normal.   Nose: Nose normal.   Mouth/Throat: Oropharynx is clear and moist. No oropharyngeal exudate.   Teeth, gums and lips are normal   No sinus tenderness   Palate, tongue, posterior pharynx are normal   Eyes: Conjunctivae and lids are normal.   Neck: Trachea normal and normal range of motion. No thyromegaly   Cardiovascular: Normal rate, regular rhythm, normal heart sounds, intact distal pulses and normal pulses.   No murmur heard.   No edema, no tenderness in the extremities.   Pulmonary/Chest: Effort normal and breath sounds normal. No accessory muscle usage. patient has no wheezes..   Abdominal: Soft. Normal appearance and bowel sounds are normal. patient exhibits no distension and no mass. There is no hepatosplenomegaly. There is no tenderness.   Musculoskeletal: Normal range of motion.   Gait is normal   No clubbing, cyanosis     Lymphadenopathy:   Head (right side): No submental and no submandibular adenopathy present.   Head (left side): No submental and no submandibular adenopathy present.   patient has no cervical adenopathy.   Right: No supraclavicular adenopathy present.   Left: No supraclavicular adenopathy present.   Neurological: patient is alert and oriented to person, place, and time. patient has normal strength and normal reflexes. No sensory deficit. Gait normal.   Skin: Skin is warm, dry and intact. No bruising, no lesion and no rash noted. No cyanosis. Nails show no clubbing.   No lesions   Psychiatric: patient has a normal mood and affect. patient speech is normal and behavior is normal. Judgment normal. Cognition and memory are normal.   Vitals reviewed.     CMP  Sodium   Date Value Ref Range  Status   09/15/2022 134 (L) 136 - 145 mmol/L Final     Potassium   Date Value Ref Range Status   09/15/2022 4.2 3.5 - 5.1 mmol/L Final     Chloride   Date Value Ref Range Status   09/15/2022 98 95 - 110 mmol/L Final     CO2   Date Value Ref Range Status   09/15/2022 22 (L) 23 - 29 mmol/L Final     Glucose   Date Value Ref Range Status   09/15/2022 105 70 - 110 mg/dL Final     BUN   Date Value Ref Range Status   09/15/2022 4 (L) 8 - 23 mg/dL Final     Creatinine   Date Value Ref Range Status   09/15/2022 0.7 0.5 - 1.4 mg/dL Final     Calcium   Date Value Ref Range Status   09/15/2022 9.2 8.7 - 10.5 mg/dL Final     Total Protein   Date Value Ref Range Status   09/15/2022 7.6 6.0 - 8.4 g/dL Final     Albumin   Date Value Ref Range Status   09/15/2022 4.0 3.5 - 5.2 g/dL Final     Total Bilirubin   Date Value Ref Range Status   09/15/2022 0.7 0.1 - 1.0 mg/dL Final     Comment:     For infants and newborns, interpretation of results should be based  on gestational age, weight and in agreement with clinical  observations.    Premature Infant recommended reference ranges:  Up to 24 hours.............<8.0 mg/dL  Up to 48 hours............<12.0 mg/dL  3-5 days..................<15.0 mg/dL  6-29 days.................<15.0 mg/dL       Alkaline Phosphatase   Date Value Ref Range Status   09/15/2022 158 (H) 55 - 135 U/L Final     AST   Date Value Ref Range Status   09/15/2022 30 10 - 40 U/L Final     ALT   Date Value Ref Range Status   09/15/2022 24 10 - 44 U/L Final     Anion Gap   Date Value Ref Range Status   09/15/2022 14 8 - 16 mmol/L Final     eGFR if    Date Value Ref Range Status   09/03/2021 >60.0 >60 mL/min/1.73 m^2 Final     eGFR if non    Date Value Ref Range Status   09/03/2021 >60.0 >60 mL/min/1.73 m^2 Final     Comment:     Calculation used to obtain the estimated glomerular filtration  rate (eGFR) is the CKD-EPI equation.        Lab Results   Component Value Date    WBC 6.45  09/15/2022    HGB 10.0 (L) 09/15/2022    HCT 32.4 (L) 09/15/2022    MCV 87 09/15/2022     09/15/2022           Assessment    Microcytic anemia require transfusion.  Last transfusion 1 unit packed red blood cell on 09/02/2022.  Patient was seen in the ED for the transfusion which is directed by patient's PCP at the time.    It is unclear as to what cause patient's anemia however it could be a form of blood loss.  Previously patient had a resection of sigmoid colon path shows negative for any residual neoplasm or malignancy.  And 10 months ago patient is microcytic with hemoglobin of 10.7    On today's visit 09/21/2022 patient's hemoglobin has been improved after transfusion.  Iron saturation is at 8.  Ferritin is at 48.  Epogen level is high and reticulocyte count is within normal range.  There is no evidence of hemolysis on blood work.  Peripheral blood smear did not yield any abnormal white blood cell or platelets.  Red blood cell predominantly normochromic and normocytic.    Plan    At this point I will recommend patient continue on oral iron supplements.  I will recheck patient's iron level and hemoglobin in about 4 weeks.      There are no diagnoses linked to this encounter.

## 2022-10-17 ENCOUNTER — LAB VISIT (OUTPATIENT)
Dept: LAB | Facility: HOSPITAL | Age: 67
End: 2022-10-17
Attending: INTERNAL MEDICINE
Payer: MEDICARE

## 2022-10-17 DIAGNOSIS — D64.9 ANEMIA, UNSPECIFIED TYPE: ICD-10-CM

## 2022-10-17 DIAGNOSIS — D64.9 NORMOCYTIC ANEMIA: ICD-10-CM

## 2022-10-17 DIAGNOSIS — E46 PROTEIN-CALORIE MALNUTRITION, UNSPECIFIED SEVERITY: ICD-10-CM

## 2022-10-17 LAB
BASOPHILS # BLD AUTO: 0.07 K/UL (ref 0–0.2)
BASOPHILS NFR BLD: 1.1 % (ref 0–1.9)
DIFFERENTIAL METHOD: ABNORMAL
EOSINOPHIL # BLD AUTO: 0.1 K/UL (ref 0–0.5)
EOSINOPHIL NFR BLD: 1.9 % (ref 0–8)
ERYTHROCYTE [DISTWIDTH] IN BLOOD BY AUTOMATED COUNT: 25 % (ref 11.5–14.5)
FERRITIN SERPL-MCNC: 41 NG/ML (ref 20–300)
HCT VFR BLD AUTO: 35.8 % (ref 37–48.5)
HGB BLD-MCNC: 11.8 G/DL (ref 12–16)
IMM GRANULOCYTES # BLD AUTO: 0.01 K/UL (ref 0–0.04)
IMM GRANULOCYTES NFR BLD AUTO: 0.2 % (ref 0–0.5)
IRON SERPL-MCNC: 40 UG/DL (ref 30–160)
LYMPHOCYTES # BLD AUTO: 1.3 K/UL (ref 1–4.8)
LYMPHOCYTES NFR BLD: 20.5 % (ref 18–48)
MCH RBC QN AUTO: 30.8 PG (ref 27–31)
MCHC RBC AUTO-ENTMCNC: 33 G/DL (ref 32–36)
MCV RBC AUTO: 94 FL (ref 82–98)
MONOCYTES # BLD AUTO: 0.7 K/UL (ref 0.3–1)
MONOCYTES NFR BLD: 11.4 % (ref 4–15)
NEUTROPHILS # BLD AUTO: 4 K/UL (ref 1.8–7.7)
NEUTROPHILS NFR BLD: 64.9 % (ref 38–73)
NRBC BLD-RTO: 0 /100 WBC
PLATELET # BLD AUTO: 297 K/UL (ref 150–450)
PMV BLD AUTO: 8.8 FL (ref 9.2–12.9)
RBC # BLD AUTO: 3.83 M/UL (ref 4–5.4)
SATURATED IRON: 9 % (ref 20–50)
TOTAL IRON BINDING CAPACITY: 451 UG/DL (ref 250–450)
TRANSFERRIN SERPL-MCNC: 305 MG/DL (ref 200–375)
WBC # BLD AUTO: 6.16 K/UL (ref 3.9–12.7)

## 2022-10-17 PROCEDURE — 82728 ASSAY OF FERRITIN: CPT | Performed by: INTERNAL MEDICINE

## 2022-10-17 PROCEDURE — 84466 ASSAY OF TRANSFERRIN: CPT | Performed by: INTERNAL MEDICINE

## 2022-10-17 PROCEDURE — 36415 COLL VENOUS BLD VENIPUNCTURE: CPT | Performed by: INTERNAL MEDICINE

## 2022-10-17 PROCEDURE — 85025 COMPLETE CBC W/AUTO DIFF WBC: CPT | Performed by: INTERNAL MEDICINE

## 2022-10-18 ENCOUNTER — OFFICE VISIT (OUTPATIENT)
Dept: FAMILY MEDICINE | Facility: CLINIC | Age: 67
End: 2022-10-18
Payer: MEDICARE

## 2022-10-18 VITALS
DIASTOLIC BLOOD PRESSURE: 84 MMHG | OXYGEN SATURATION: 96 % | BODY MASS INDEX: 18.91 KG/M2 | WEIGHT: 124.81 LBS | HEART RATE: 102 BPM | SYSTOLIC BLOOD PRESSURE: 134 MMHG | RESPIRATION RATE: 14 BRPM | HEIGHT: 68 IN

## 2022-10-18 DIAGNOSIS — Z02.83 ENCOUNTER FOR DRUG SCREENING: ICD-10-CM

## 2022-10-18 DIAGNOSIS — F41.1 GAD (GENERALIZED ANXIETY DISORDER): Primary | ICD-10-CM

## 2022-10-18 PROCEDURE — 4010F PR ACE/ARB THEARPY RXD/TAKEN: ICD-10-PCS | Mod: CPTII,S$GLB,, | Performed by: FAMILY MEDICINE

## 2022-10-18 PROCEDURE — 4010F ACE/ARB THERAPY RXD/TAKEN: CPT | Mod: CPTII,S$GLB,, | Performed by: FAMILY MEDICINE

## 2022-10-18 PROCEDURE — 99999 PR PBB SHADOW E&M-EST. PATIENT-LVL III: CPT | Mod: PBBFAC,,, | Performed by: FAMILY MEDICINE

## 2022-10-18 PROCEDURE — 1160F RVW MEDS BY RX/DR IN RCRD: CPT | Mod: CPTII,S$GLB,, | Performed by: FAMILY MEDICINE

## 2022-10-18 PROCEDURE — 3079F PR MOST RECENT DIASTOLIC BLOOD PRESSURE 80-89 MM HG: ICD-10-PCS | Mod: CPTII,S$GLB,, | Performed by: FAMILY MEDICINE

## 2022-10-18 PROCEDURE — 99213 OFFICE O/P EST LOW 20 MIN: CPT | Mod: S$GLB,,, | Performed by: FAMILY MEDICINE

## 2022-10-18 PROCEDURE — 3075F PR MOST RECENT SYSTOLIC BLOOD PRESS GE 130-139MM HG: ICD-10-PCS | Mod: CPTII,S$GLB,, | Performed by: FAMILY MEDICINE

## 2022-10-18 PROCEDURE — 1160F PR REVIEW ALL MEDS BY PRESCRIBER/CLIN PHARMACIST DOCUMENTED: ICD-10-PCS | Mod: CPTII,S$GLB,, | Performed by: FAMILY MEDICINE

## 2022-10-18 PROCEDURE — 1159F MED LIST DOCD IN RCRD: CPT | Mod: CPTII,S$GLB,, | Performed by: FAMILY MEDICINE

## 2022-10-18 PROCEDURE — 3075F SYST BP GE 130 - 139MM HG: CPT | Mod: CPTII,S$GLB,, | Performed by: FAMILY MEDICINE

## 2022-10-18 PROCEDURE — 99213 PR OFFICE/OUTPT VISIT, EST, LEVL III, 20-29 MIN: ICD-10-PCS | Mod: S$GLB,,, | Performed by: FAMILY MEDICINE

## 2022-10-18 PROCEDURE — 99999 PR PBB SHADOW E&M-EST. PATIENT-LVL III: ICD-10-PCS | Mod: PBBFAC,,, | Performed by: FAMILY MEDICINE

## 2022-10-18 PROCEDURE — 80326 AMPHETAMINES 5 OR MORE: CPT | Performed by: FAMILY MEDICINE

## 2022-10-18 PROCEDURE — 1159F PR MEDICATION LIST DOCUMENTED IN MEDICAL RECORD: ICD-10-PCS | Mod: CPTII,S$GLB,, | Performed by: FAMILY MEDICINE

## 2022-10-18 PROCEDURE — 3079F DIAST BP 80-89 MM HG: CPT | Mod: CPTII,S$GLB,, | Performed by: FAMILY MEDICINE

## 2022-10-18 RX ORDER — ATORVASTATIN CALCIUM 40 MG/1
40 TABLET, FILM COATED ORAL DAILY
COMMUNITY
Start: 2022-10-07

## 2022-10-18 RX ORDER — DIAZEPAM 5 MG/1
TABLET ORAL
Qty: 60 TABLET | Refills: 2 | Status: SHIPPED | OUTPATIENT
Start: 2022-10-18 | End: 2022-12-05

## 2022-10-18 NOTE — PROGRESS NOTES
" Patient ID: Comfort Cantu is a 66 y.o. female.    Chief Complaint: Follow-up and Medication Refill      Reviewed family, medical, surgical, and social history.    No cp/sob  No change in mental status  No fever  No asymmetrical limb swelling    Objective:      /84 (BP Location: Right arm, Patient Position: Sitting, BP Method: Medium (Manual))   Pulse 102   Resp 14   Ht 5' 8" (1.727 m)   Wt 56.6 kg (124 lb 12.8 oz)   SpO2 96%   BMI 18.98 kg/m²   RRR, CTAB, s/nt/nd, no c/c/e, non-toxic appearing, no focal weakness  Assessment:       1. ETHAN (generalized anxiety disorder)    2. Encounter for drug screening        Plan:       ETHAN (generalized anxiety disorder)  -     diazePAM (VALIUM) 5 MG tablet; TAKE ONE (1) TABLET EVERY 12 HOURS AS NEEDED FOR ANXIETY Strength: 5 mg  Dispense: 60 tablet; Refill: 2    Encounter for drug screening  -     [facility] Drug Screen            Continue current medicines, any changes noted above   reviewed  Uds today  Fu in 3 months  Labs, radiology studies, and procedures/notes from the last 3 months were reviewed.    Risks, benefits, and side effects were discussed with the patient. All questions were answered to the fullest satisfaction of the patient, and pt verbalized understanding and agreement to treatment plan. Pt was to call with any new or worsening symptoms, or present to the ER.      "

## 2022-10-19 ENCOUNTER — OFFICE VISIT (OUTPATIENT)
Dept: HEMATOLOGY/ONCOLOGY | Facility: CLINIC | Age: 67
End: 2022-10-19
Payer: MEDICARE

## 2022-10-19 VITALS
HEART RATE: 102 BPM | OXYGEN SATURATION: 96 % | RESPIRATION RATE: 12 BRPM | BODY MASS INDEX: 18.88 KG/M2 | SYSTOLIC BLOOD PRESSURE: 155 MMHG | WEIGHT: 124.56 LBS | HEIGHT: 68 IN | DIASTOLIC BLOOD PRESSURE: 78 MMHG | TEMPERATURE: 97 F

## 2022-10-19 DIAGNOSIS — D64.9 NORMOCYTIC ANEMIA: ICD-10-CM

## 2022-10-19 DIAGNOSIS — E61.1 IRON DEFICIENCY: ICD-10-CM

## 2022-10-19 DIAGNOSIS — D64.9 ANEMIA, UNSPECIFIED TYPE: Primary | ICD-10-CM

## 2022-10-19 PROCEDURE — 1126F AMNT PAIN NOTED NONE PRSNT: CPT | Mod: CPTII,S$GLB,, | Performed by: INTERNAL MEDICINE

## 2022-10-19 PROCEDURE — 99999 PR PBB SHADOW E&M-EST. PATIENT-LVL III: ICD-10-PCS | Mod: PBBFAC,,, | Performed by: INTERNAL MEDICINE

## 2022-10-19 PROCEDURE — 3077F SYST BP >= 140 MM HG: CPT | Mod: CPTII,S$GLB,, | Performed by: INTERNAL MEDICINE

## 2022-10-19 PROCEDURE — 4010F ACE/ARB THERAPY RXD/TAKEN: CPT | Mod: CPTII,S$GLB,, | Performed by: INTERNAL MEDICINE

## 2022-10-19 PROCEDURE — 3077F PR MOST RECENT SYSTOLIC BLOOD PRESSURE >= 140 MM HG: ICD-10-PCS | Mod: CPTII,S$GLB,, | Performed by: INTERNAL MEDICINE

## 2022-10-19 PROCEDURE — 3078F DIAST BP <80 MM HG: CPT | Mod: CPTII,S$GLB,, | Performed by: INTERNAL MEDICINE

## 2022-10-19 PROCEDURE — 3078F PR MOST RECENT DIASTOLIC BLOOD PRESSURE < 80 MM HG: ICD-10-PCS | Mod: CPTII,S$GLB,, | Performed by: INTERNAL MEDICINE

## 2022-10-19 PROCEDURE — 99213 OFFICE O/P EST LOW 20 MIN: CPT | Mod: S$GLB,,, | Performed by: INTERNAL MEDICINE

## 2022-10-19 PROCEDURE — 1100F PR PT FALLS ASSESS DOC 2+ FALLS/FALL W/INJURY/YR: ICD-10-PCS | Mod: CPTII,S$GLB,, | Performed by: INTERNAL MEDICINE

## 2022-10-19 PROCEDURE — 1159F MED LIST DOCD IN RCRD: CPT | Mod: CPTII,S$GLB,, | Performed by: INTERNAL MEDICINE

## 2022-10-19 PROCEDURE — 4010F PR ACE/ARB THEARPY RXD/TAKEN: ICD-10-PCS | Mod: CPTII,S$GLB,, | Performed by: INTERNAL MEDICINE

## 2022-10-19 PROCEDURE — 1126F PR PAIN SEVERITY QUANTIFIED, NO PAIN PRESENT: ICD-10-PCS | Mod: CPTII,S$GLB,, | Performed by: INTERNAL MEDICINE

## 2022-10-19 PROCEDURE — 99999 PR PBB SHADOW E&M-EST. PATIENT-LVL III: CPT | Mod: PBBFAC,,, | Performed by: INTERNAL MEDICINE

## 2022-10-19 PROCEDURE — 99213 PR OFFICE/OUTPT VISIT, EST, LEVL III, 20-29 MIN: ICD-10-PCS | Mod: S$GLB,,, | Performed by: INTERNAL MEDICINE

## 2022-10-19 PROCEDURE — 1159F PR MEDICATION LIST DOCUMENTED IN MEDICAL RECORD: ICD-10-PCS | Mod: CPTII,S$GLB,, | Performed by: INTERNAL MEDICINE

## 2022-10-19 PROCEDURE — 3288F FALL RISK ASSESSMENT DOCD: CPT | Mod: CPTII,S$GLB,, | Performed by: INTERNAL MEDICINE

## 2022-10-19 PROCEDURE — 1100F PTFALLS ASSESS-DOCD GE2>/YR: CPT | Mod: CPTII,S$GLB,, | Performed by: INTERNAL MEDICINE

## 2022-10-19 PROCEDURE — 3288F PR FALLS RISK ASSESSMENT DOCUMENTED: ICD-10-PCS | Mod: CPTII,S$GLB,, | Performed by: INTERNAL MEDICINE

## 2022-10-19 NOTE — PROGRESS NOTES
HPI    66-year-old female referred to Hematology Clinic for evaluation of severe anemia.  Patient was recently transfused in the ED with hemoglobin 6.2 grams/deciliter.  At the time patient is also microcytic.  The patient denies any overt bleeding.      Most recent lab show Hb of 11.8g/dL MCV 94\    Currently patient denies any overt bleeding.        Past Medical History:   Diagnosis Date    Anticoagulant long-term use     Anxiety     Arthritis     Atherosclerosis     Coronary artery disease     Depression     GERD (gastroesophageal reflux disease)     Hearing loss     [place] (hard of hearing)     Hearing aids    Hypertension     Long term (current) use of anticoagulants     Peripheral arterial disease     Tubulovillous adenoma of colon 2019    Wears dentures     Full-Upper; Partial- lower    Wound infection after surgery 06/2020     Social History     Socioeconomic History    Marital status:    Tobacco Use    Smoking status: Every Day     Packs/day: 0.75     Years: 23.00     Pack years: 17.25     Types: Cigarettes     Start date: 10/1/1988    Smokeless tobacco: Never   Substance and Sexual Activity    Alcohol use: Yes     Comment: occ    Drug use: No    Sexual activity: Yes         Subjective      Review of Systems   Constitutional: Negative for appetite change, fatigue and unexpected weight change.   HENT: Negative for mouth sores.   Eyes: Negative for visual disturbance.   Respiratory: Negative for cough and shortness of breath.   Cardiovascular: Negative for chest pain.   Gastrointestinal: Negative for diarrhea.   Genitourinary: Negative for frequency.   Musculoskeletal: Negative for back pain.   Skin: Negative for rash.   Neurological: Negative for headaches.   Hematological: Negative for adenopathy.   Psychiatric/Behavioral: The patient is not nervous/anxious.   All other systems reviewed and are negative.     Objective    Physical Exam   Vitals:    10/19/22 1458   BP: (!) 155/78   Pulse: 102   Resp:  12   Temp: 96.9 °F (36.1 °C)         Constitutional: patient is oriented to person, place, and time. patient appears well-developed and well-nourished. No distress.   HENT:   Right Ear: External ear normal.   Left Ear: External ear normal.   Nose: Nose normal.   Mouth/Throat: Oropharynx is clear and moist. No oropharyngeal exudate.   Teeth, gums and lips are normal   No sinus tenderness   Palate, tongue, posterior pharynx are normal   Eyes: Conjunctivae and lids are normal.   Neck: Trachea normal and normal range of motion. No thyromegaly   Cardiovascular: Normal rate, regular rhythm, normal heart sounds, intact distal pulses and normal pulses.   No murmur heard.   No edema, no tenderness in the extremities.   Pulmonary/Chest: Effort normal and breath sounds normal. No accessory muscle usage. patient has no wheezes..   Abdominal: Soft. Normal appearance and bowel sounds are normal. patient exhibits no distension and no mass. There is no hepatosplenomegaly. There is no tenderness.   Musculoskeletal: Normal range of motion.   Gait is normal   No clubbing, cyanosis     Lymphadenopathy:   Head (right side): No submental and no submandibular adenopathy present.   Head (left side): No submental and no submandibular adenopathy present.   patient has no cervical adenopathy.   Right: No supraclavicular adenopathy present.   Left: No supraclavicular adenopathy present.   Neurological: patient is alert and oriented to person, place, and time. patient has normal strength and normal reflexes. No sensory deficit. Gait normal.   Skin: Skin is warm, dry and intact. No bruising, no lesion and no rash noted. No cyanosis. Nails show no clubbing.   No lesions   Psychiatric: patient has a normal mood and affect. patient speech is normal and behavior is normal. Judgment normal. Cognition and memory are normal.   Vitals reviewed.     CMP  Sodium   Date Value Ref Range Status   09/15/2022 134 (L) 136 - 145 mmol/L Final     Potassium   Date  Value Ref Range Status   09/15/2022 4.2 3.5 - 5.1 mmol/L Final     Chloride   Date Value Ref Range Status   09/15/2022 98 95 - 110 mmol/L Final     CO2   Date Value Ref Range Status   09/15/2022 22 (L) 23 - 29 mmol/L Final     Glucose   Date Value Ref Range Status   09/15/2022 105 70 - 110 mg/dL Final     BUN   Date Value Ref Range Status   09/15/2022 4 (L) 8 - 23 mg/dL Final     Creatinine   Date Value Ref Range Status   09/15/2022 0.7 0.5 - 1.4 mg/dL Final     Calcium   Date Value Ref Range Status   09/15/2022 9.2 8.7 - 10.5 mg/dL Final     Total Protein   Date Value Ref Range Status   09/15/2022 7.6 6.0 - 8.4 g/dL Final     Albumin   Date Value Ref Range Status   09/15/2022 4.0 3.5 - 5.2 g/dL Final     Total Bilirubin   Date Value Ref Range Status   09/15/2022 0.7 0.1 - 1.0 mg/dL Final     Comment:     For infants and newborns, interpretation of results should be based  on gestational age, weight and in agreement with clinical  observations.    Premature Infant recommended reference ranges:  Up to 24 hours.............<8.0 mg/dL  Up to 48 hours............<12.0 mg/dL  3-5 days..................<15.0 mg/dL  6-29 days.................<15.0 mg/dL       Alkaline Phosphatase   Date Value Ref Range Status   09/15/2022 158 (H) 55 - 135 U/L Final     AST   Date Value Ref Range Status   09/15/2022 30 10 - 40 U/L Final     ALT   Date Value Ref Range Status   09/15/2022 24 10 - 44 U/L Final     Anion Gap   Date Value Ref Range Status   09/15/2022 14 8 - 16 mmol/L Final     eGFR if    Date Value Ref Range Status   09/03/2021 >60.0 >60 mL/min/1.73 m^2 Final     eGFR if non    Date Value Ref Range Status   09/03/2021 >60.0 >60 mL/min/1.73 m^2 Final     Comment:     Calculation used to obtain the estimated glomerular filtration  rate (eGFR) is the CKD-EPI equation.        Lab Results   Component Value Date    WBC 6.16 10/17/2022    HGB 11.8 (L) 10/17/2022    HCT 35.8 (L) 10/17/2022    MCV 94  10/17/2022     10/17/2022           Assessment    Microcytic anemia require transfusion.  Last transfusion 1 unit packed red blood cell on 09/02/2022.  Patient was seen in the ED for the transfusion which is directed by patient's PCP at the time.    It is unclear as to what cause patient's anemia however it could be a form of blood loss.  Previously patient had a resection of sigmoid colon path shows negative for any residual neoplasm or malignancy.  And 10 months ago patient is microcytic with hemoglobin of 10.7    On today's visit 09/21/2022 patient's hemoglobin has been improved after transfusion.  Iron saturation is at 8.  Ferritin is at 48.  Epogen level is high and reticulocyte count is within normal range.  There is no evidence of hemolysis on blood work.  Peripheral blood smear did not yield any abnormal white blood cell or platelets.  Red blood cell predominantly normochromic and normocytic.    Plan    At this point I will recommend patient continue on oral iron supplements.  I will recheck patient's iron level and hemoglobin in about 3 month with lab       There are no diagnoses linked to this encounter.

## 2022-10-24 LAB
6MAM UR QL: NOT DETECTED
7AMINOCLONAZEPAM UR QL: NOT DETECTED
A-OH ALPRAZ UR QL: NOT DETECTED
ALPHA-OH-MIDAZOLAM: NOT DETECTED
ALPRAZ UR QL: NOT DETECTED
AMPHET UR QL SCN: NOT DETECTED
ANNOTATION COMMENT IMP: NORMAL
ANNOTATION COMMENT IMP: NORMAL
BARBITURATES UR QL: NOT DETECTED
BUPRENORPHINE UR QL: NOT DETECTED
BZE UR QL: NOT DETECTED
CARBOXYTHC UR QL: PRESENT
CARISOPRODOL UR QL: NOT DETECTED
CLONAZEPAM UR QL: NOT DETECTED
CODEINE UR QL: NOT DETECTED
CREAT UR-MCNC: 30.3 MG/DL (ref 20–400)
DIAZEPAM UR QL: NOT DETECTED
ETHYL GLUCURONIDE UR QL: PRESENT
FENTANYL UR QL: NOT DETECTED
GABAPENTIN: NOT DETECTED
HYDROCODONE UR QL: NOT DETECTED
HYDROMORPHONE UR QL: NOT DETECTED
LORAZEPAM UR QL: NOT DETECTED
MDA UR QL: NOT DETECTED
MDEA UR QL: NOT DETECTED
MDMA UR QL: NOT DETECTED
ME-PHENIDATE UR QL: NOT DETECTED
METHADONE UR QL: NOT DETECTED
METHAMPHET UR QL: NOT DETECTED
MIDAZOLAM UR QL SCN: NOT DETECTED
MORPHINE UR QL: NOT DETECTED
NALOXONE: NOT DETECTED
NORBUPRENORPHINE UR QL CFM: NOT DETECTED
NORDIAZEPAM UR QL: PRESENT
NORFENTANYL UR QL: NOT DETECTED
NORHYDROCODONE UR QL CFM: NOT DETECTED
NORMEPERIDINE UR QL CFM: NOT DETECTED
NOROXYCODONE UR QL CFM: NOT DETECTED
NOROXYMORPHONE UR QL SCN: NOT DETECTED
OXAZEPAM UR QL: PRESENT
OXYCODONE UR QL: NOT DETECTED
OXYMORPHONE UR QL: NOT DETECTED
PATHOLOGY STUDY: NORMAL
PCP UR QL: NOT DETECTED
PHENTERMINE UR QL: NOT DETECTED
PREGABALIN: NOT DETECTED
SERVICE CMNT-IMP: NORMAL
TAPENTADOL UR QL SCN: NOT DETECTED
TAPENTADOL UR QL SCN: NOT DETECTED
TEMAZEPAM UR QL: PRESENT
TRAMADOL UR QL: NOT DETECTED
ZOLPIDEM METABOLITE: NOT DETECTED
ZOLPIDEM UR QL: NOT DETECTED

## 2022-11-09 ENCOUNTER — PATIENT OUTREACH (OUTPATIENT)
Dept: ADMINISTRATIVE | Facility: HOSPITAL | Age: 67
End: 2022-11-09
Payer: MEDICARE

## 2022-11-09 NOTE — PROGRESS NOTES
Population Health chart review. Working a [facility] [employer] report.Attempted to reach pt by phone, no answer, left VM.

## 2022-12-15 ENCOUNTER — LAB VISIT (OUTPATIENT)
Dept: LAB | Facility: HOSPITAL | Age: 67
End: 2022-12-15
Attending: INTERNAL MEDICINE
Payer: MEDICARE

## 2022-12-15 DIAGNOSIS — D64.9 ANEMIA, UNSPECIFIED TYPE: ICD-10-CM

## 2022-12-15 DIAGNOSIS — E61.1 IRON DEFICIENCY: ICD-10-CM

## 2022-12-15 DIAGNOSIS — D64.9 NORMOCYTIC ANEMIA: ICD-10-CM

## 2022-12-15 LAB
BASOPHILS # BLD AUTO: 0.05 K/UL (ref 0–0.2)
BASOPHILS NFR BLD: 0.7 % (ref 0–1.9)
DIFFERENTIAL METHOD: ABNORMAL
EOSINOPHIL # BLD AUTO: 0.1 K/UL (ref 0–0.5)
EOSINOPHIL NFR BLD: 0.9 % (ref 0–8)
ERYTHROCYTE [DISTWIDTH] IN BLOOD BY AUTOMATED COUNT: 14.1 % (ref 11.5–14.5)
HCT VFR BLD AUTO: 38.8 % (ref 37–48.5)
HGB BLD-MCNC: 13.6 G/DL (ref 12–16)
IMM GRANULOCYTES # BLD AUTO: 0.03 K/UL (ref 0–0.04)
IMM GRANULOCYTES NFR BLD AUTO: 0.4 % (ref 0–0.5)
IRON SERPL-MCNC: 95 UG/DL (ref 30–160)
LYMPHOCYTES # BLD AUTO: 1.3 K/UL (ref 1–4.8)
LYMPHOCYTES NFR BLD: 16.5 % (ref 18–48)
MCH RBC QN AUTO: 37.1 PG (ref 27–31)
MCHC RBC AUTO-ENTMCNC: 35.1 G/DL (ref 32–36)
MCV RBC AUTO: 106 FL (ref 82–98)
MONOCYTES # BLD AUTO: 0.7 K/UL (ref 0.3–1)
MONOCYTES NFR BLD: 8.5 % (ref 4–15)
NEUTROPHILS # BLD AUTO: 5.6 K/UL (ref 1.8–7.7)
NEUTROPHILS NFR BLD: 73 % (ref 38–73)
NRBC BLD-RTO: 0 /100 WBC
PLATELET # BLD AUTO: 249 K/UL (ref 150–450)
PMV BLD AUTO: 8.8 FL (ref 9.2–12.9)
RBC # BLD AUTO: 3.67 M/UL (ref 4–5.4)
SATURATED IRON: 23 % (ref 20–50)
TOTAL IRON BINDING CAPACITY: 410 UG/DL (ref 250–450)
TRANSFERRIN SERPL-MCNC: 277 MG/DL (ref 200–375)
WBC # BLD AUTO: 7.68 K/UL (ref 3.9–12.7)

## 2022-12-15 PROCEDURE — 82728 ASSAY OF FERRITIN: CPT | Performed by: INTERNAL MEDICINE

## 2022-12-15 PROCEDURE — 85025 COMPLETE CBC W/AUTO DIFF WBC: CPT | Performed by: INTERNAL MEDICINE

## 2022-12-15 PROCEDURE — 36415 COLL VENOUS BLD VENIPUNCTURE: CPT | Performed by: INTERNAL MEDICINE

## 2022-12-15 PROCEDURE — 84466 ASSAY OF TRANSFERRIN: CPT | Performed by: INTERNAL MEDICINE

## 2022-12-16 LAB — FERRITIN SERPL-MCNC: 94 NG/ML (ref 20–300)

## 2022-12-19 ENCOUNTER — OFFICE VISIT (OUTPATIENT)
Dept: HEMATOLOGY/ONCOLOGY | Facility: CLINIC | Age: 67
End: 2022-12-19
Payer: MEDICARE

## 2022-12-19 VITALS
DIASTOLIC BLOOD PRESSURE: 76 MMHG | OXYGEN SATURATION: 90 % | BODY MASS INDEX: 19.01 KG/M2 | TEMPERATURE: 98 F | SYSTOLIC BLOOD PRESSURE: 159 MMHG | RESPIRATION RATE: 14 BRPM | WEIGHT: 125 LBS | HEART RATE: 104 BPM

## 2022-12-19 DIAGNOSIS — D64.9 ANEMIA, UNSPECIFIED TYPE: Primary | ICD-10-CM

## 2022-12-19 DIAGNOSIS — E61.1 IRON DEFICIENCY: ICD-10-CM

## 2022-12-19 PROCEDURE — 99999 PR PBB SHADOW E&M-EST. PATIENT-LVL III: CPT | Mod: PBBFAC,,, | Performed by: INTERNAL MEDICINE

## 2022-12-19 PROCEDURE — 3008F PR BODY MASS INDEX (BMI) DOCUMENTED: ICD-10-PCS | Mod: CPTII,S$GLB,, | Performed by: INTERNAL MEDICINE

## 2022-12-19 PROCEDURE — 3077F SYST BP >= 140 MM HG: CPT | Mod: CPTII,S$GLB,, | Performed by: INTERNAL MEDICINE

## 2022-12-19 PROCEDURE — 3077F PR MOST RECENT SYSTOLIC BLOOD PRESSURE >= 140 MM HG: ICD-10-PCS | Mod: CPTII,S$GLB,, | Performed by: INTERNAL MEDICINE

## 2022-12-19 PROCEDURE — 99213 PR OFFICE/OUTPT VISIT, EST, LEVL III, 20-29 MIN: ICD-10-PCS | Mod: S$GLB,,, | Performed by: INTERNAL MEDICINE

## 2022-12-19 PROCEDURE — 4010F ACE/ARB THERAPY RXD/TAKEN: CPT | Mod: CPTII,S$GLB,, | Performed by: INTERNAL MEDICINE

## 2022-12-19 PROCEDURE — 3078F DIAST BP <80 MM HG: CPT | Mod: CPTII,S$GLB,, | Performed by: INTERNAL MEDICINE

## 2022-12-19 PROCEDURE — 3288F PR FALLS RISK ASSESSMENT DOCUMENTED: ICD-10-PCS | Mod: CPTII,S$GLB,, | Performed by: INTERNAL MEDICINE

## 2022-12-19 PROCEDURE — 3288F FALL RISK ASSESSMENT DOCD: CPT | Mod: CPTII,S$GLB,, | Performed by: INTERNAL MEDICINE

## 2022-12-19 PROCEDURE — 1159F PR MEDICATION LIST DOCUMENTED IN MEDICAL RECORD: ICD-10-PCS | Mod: CPTII,S$GLB,, | Performed by: INTERNAL MEDICINE

## 2022-12-19 PROCEDURE — 1101F PT FALLS ASSESS-DOCD LE1/YR: CPT | Mod: CPTII,S$GLB,, | Performed by: INTERNAL MEDICINE

## 2022-12-19 PROCEDURE — 1126F PR PAIN SEVERITY QUANTIFIED, NO PAIN PRESENT: ICD-10-PCS | Mod: CPTII,S$GLB,, | Performed by: INTERNAL MEDICINE

## 2022-12-19 PROCEDURE — 99999 PR PBB SHADOW E&M-EST. PATIENT-LVL III: ICD-10-PCS | Mod: PBBFAC,,, | Performed by: INTERNAL MEDICINE

## 2022-12-19 PROCEDURE — 1126F AMNT PAIN NOTED NONE PRSNT: CPT | Mod: CPTII,S$GLB,, | Performed by: INTERNAL MEDICINE

## 2022-12-19 PROCEDURE — 1159F MED LIST DOCD IN RCRD: CPT | Mod: CPTII,S$GLB,, | Performed by: INTERNAL MEDICINE

## 2022-12-19 PROCEDURE — 1101F PR PT FALLS ASSESS DOC 0-1 FALLS W/OUT INJ PAST YR: ICD-10-PCS | Mod: CPTII,S$GLB,, | Performed by: INTERNAL MEDICINE

## 2022-12-19 PROCEDURE — 99213 OFFICE O/P EST LOW 20 MIN: CPT | Mod: S$GLB,,, | Performed by: INTERNAL MEDICINE

## 2022-12-19 PROCEDURE — 3008F BODY MASS INDEX DOCD: CPT | Mod: CPTII,S$GLB,, | Performed by: INTERNAL MEDICINE

## 2022-12-19 PROCEDURE — 3078F PR MOST RECENT DIASTOLIC BLOOD PRESSURE < 80 MM HG: ICD-10-PCS | Mod: CPTII,S$GLB,, | Performed by: INTERNAL MEDICINE

## 2022-12-19 PROCEDURE — 4010F PR ACE/ARB THEARPY RXD/TAKEN: ICD-10-PCS | Mod: CPTII,S$GLB,, | Performed by: INTERNAL MEDICINE

## 2022-12-19 NOTE — PROGRESS NOTES
HPI    67-year-old female referred to [facility] for evaluation of severe anemia.  Patient was recently transfused in the ED with hemoglobin 6.2 grams/deciliter.  At the time patient is also microcytic.  The patient denies any overt bleeding.      Most recent lab show Hb of 11.8g/dL MCV 94\    Currently patient denies any overt bleeding.        Past Medical History:   Diagnosis Date    Anticoagulant long-term use     Anxiety     Arthritis     Atherosclerosis     Coronary artery disease     Depression     GERD (gastroesophageal reflux disease)     Hearing loss     [place] (hard of hearing)     Hearing aids    Hypertension     Long term (current) use of anticoagulants     Peripheral arterial disease     Tubulovillous adenoma of colon 2019    Wears dentures     Full-Upper; Partial- lower    Wound infection after surgery 06/2020     Social History     Socioeconomic History    Marital status:    Tobacco Use    Smoking status: Every Day     Packs/day: 0.75     Years: 23.00     Pack years: 17.25     Types: Cigarettes     Start date: 10/1/1988    Smokeless tobacco: Never   Substance and Sexual Activity    Alcohol use: Yes     Comment: occ    Drug use: No    Sexual activity: Yes         Subjective      Review of Systems   Constitutional: Negative for appetite change, fatigue and unexpected weight change.   HENT: Negative for mouth sores.   Eyes: Negative for visual disturbance.   Respiratory: Negative for cough and shortness of breath.   Cardiovascular: Negative for chest pain.   Gastrointestinal: Negative for diarrhea.   Genitourinary: Negative for frequency.   Musculoskeletal: Negative for back pain.   Skin: Negative for rash.   Neurological: Negative for headaches.   Hematological: Negative for adenopathy.   Psychiatric/Behavioral: The patient is not nervous/anxious.   All other systems reviewed and are negative.     Objective    Physical Exam   Vitals:    12/19/22 1435   BP: (!) 159/76   Pulse: 104   Resp:  14   Temp: 98 °F (36.7 °C)         Constitutional: patient is oriented to person, place, and time. patient appears well-developed and well-nourished. No distress.   HENT:   Right Ear: External ear normal.   Left Ear: External ear normal.   Nose: Nose normal.   Mouth/Throat: Oropharynx is clear and moist. No oropharyngeal exudate.   Teeth, gums and lips are normal   No sinus tenderness   Palate, tongue, posterior pharynx are normal   Eyes: Conjunctivae and lids are normal.   Neck: Trachea normal and normal range of motion. No thyromegaly   Cardiovascular: Normal rate, regular rhythm, normal heart sounds, intact distal pulses and normal pulses.   No murmur heard.   No edema, no tenderness in the extremities.   Pulmonary/Chest: Effort normal and breath sounds normal. No accessory muscle usage. patient has no wheezes..   Abdominal: Soft. Normal appearance and bowel sounds are normal. patient exhibits no distension and no mass. There is no hepatosplenomegaly. There is no tenderness.   Musculoskeletal: Normal range of motion.   Gait is normal   No clubbing, cyanosis     Lymphadenopathy:   Head (right side): No submental and no submandibular adenopathy present.   Head (left side): No submental and no submandibular adenopathy present.   patient has no cervical adenopathy.   Right: No supraclavicular adenopathy present.   Left: No supraclavicular adenopathy present.   Neurological: patient is alert and oriented to person, place, and time. patient has normal strength and normal reflexes. No sensory deficit. Gait normal.   Skin: Skin is warm, dry and intact. No bruising, no lesion and no rash noted. No cyanosis. Nails show no clubbing.   No lesions   Psychiatric: patient has a normal mood and affect. patient speech is normal and behavior is normal. Judgment normal. Cognition and memory are normal.   Vitals reviewed.     CMP  Sodium   Date Value Ref Range Status   09/15/2022 134 (L) 136 - 145 mmol/L Final     Potassium   Date  Value Ref Range Status   09/15/2022 4.2 3.5 - 5.1 mmol/L Final     Chloride   Date Value Ref Range Status   09/15/2022 98 95 - 110 mmol/L Final     CO2   Date Value Ref Range Status   09/15/2022 22 (L) 23 - 29 mmol/L Final     Glucose   Date Value Ref Range Status   09/15/2022 105 70 - 110 mg/dL Final     BUN   Date Value Ref Range Status   09/15/2022 4 (L) 8 - 23 mg/dL Final     Creatinine   Date Value Ref Range Status   09/15/2022 0.7 0.5 - 1.4 mg/dL Final     Calcium   Date Value Ref Range Status   09/15/2022 9.2 8.7 - 10.5 mg/dL Final     Total Protein   Date Value Ref Range Status   09/15/2022 7.6 6.0 - 8.4 g/dL Final     Albumin   Date Value Ref Range Status   09/15/2022 4.0 3.5 - 5.2 g/dL Final     Total Bilirubin   Date Value Ref Range Status   09/15/2022 0.7 0.1 - 1.0 mg/dL Final     Comment:     For infants and newborns, interpretation of results should be based  on gestational age, weight and in agreement with clinical  observations.    Premature Infant recommended reference ranges:  Up to 24 hours.............<8.0 mg/dL  Up to 48 hours............<12.0 mg/dL  3-5 days..................<15.0 mg/dL  6-29 days.................<15.0 mg/dL       Alkaline Phosphatase   Date Value Ref Range Status   09/15/2022 158 (H) 55 - 135 U/L Final     AST   Date Value Ref Range Status   09/15/2022 30 10 - 40 U/L Final     ALT   Date Value Ref Range Status   09/15/2022 24 10 - 44 U/L Final     Anion Gap   Date Value Ref Range Status   09/15/2022 14 8 - 16 mmol/L Final     eGFR if    Date Value Ref Range Status   09/03/2021 >60.0 >60 mL/min/1.73 m^2 Final     eGFR if non    Date Value Ref Range Status   09/03/2021 >60.0 >60 mL/min/1.73 m^2 Final     Comment:     Calculation used to obtain the estimated glomerular filtration  rate (eGFR) is the CKD-EPI equation.        Lab Results   Component Value Date    WBC 7.68 12/15/2022    HGB 13.6 12/15/2022    HCT 38.8 12/15/2022     (H)  12/15/2022     12/15/2022           Assessment    Microcytic anemia require transfusion.  Last transfusion 1 unit packed red blood cell on 09/02/2022.  Patient was seen in the ED for the transfusion which is directed by patient's PCP at the time.    It is unclear as to what cause patient's anemia however it could be a form of blood loss.  Previously patient had a resection of sigmoid colon path shows negative for any residual neoplasm or malignancy.  And 10 months ago patient is microcytic with hemoglobin of 10.7    On today's visit 09/21/2022 patient's hemoglobin has been improved after transfusion.  Iron saturation is at 8.  Ferritin is at 48.  Epogen level is high and reticulocyte count is within normal range.  There is no evidence of hemolysis on blood work.  Peripheral blood smear did not yield any abnormal white blood cell or platelets.  Red blood cell predominantly normochromic and normocytic.    Plan    At this point I will recommend patient continue on oral iron supplements.  I will recheck patient's iron level and hemoglobin in about 4 month with lab with NP       There are no diagnoses linked to this encounter.

## 2023-01-18 ENCOUNTER — OFFICE VISIT (OUTPATIENT)
Dept: FAMILY MEDICINE | Facility: CLINIC | Age: 68
End: 2023-01-18
Payer: MEDICARE

## 2023-01-18 VITALS
SYSTOLIC BLOOD PRESSURE: 132 MMHG | OXYGEN SATURATION: 97 % | DIASTOLIC BLOOD PRESSURE: 88 MMHG | RESPIRATION RATE: 15 BRPM | HEART RATE: 96 BPM | BODY MASS INDEX: 18.89 KG/M2 | HEIGHT: 68 IN | WEIGHT: 124.63 LBS

## 2023-01-18 DIAGNOSIS — I10 BENIGN ESSENTIAL HYPERTENSION: Primary | ICD-10-CM

## 2023-01-18 DIAGNOSIS — Z02.83 ENCOUNTER FOR DRUG SCREENING: ICD-10-CM

## 2023-01-18 DIAGNOSIS — F41.1 GAD (GENERALIZED ANXIETY DISORDER): ICD-10-CM

## 2023-01-18 PROCEDURE — 3075F SYST BP GE 130 - 139MM HG: CPT | Mod: CPTII,S$GLB,, | Performed by: FAMILY MEDICINE

## 2023-01-18 PROCEDURE — 3008F PR BODY MASS INDEX (BMI) DOCUMENTED: ICD-10-PCS | Mod: CPTII,S$GLB,, | Performed by: FAMILY MEDICINE

## 2023-01-18 PROCEDURE — 3079F PR MOST RECENT DIASTOLIC BLOOD PRESSURE 80-89 MM HG: ICD-10-PCS | Mod: CPTII,S$GLB,, | Performed by: FAMILY MEDICINE

## 2023-01-18 PROCEDURE — 1159F MED LIST DOCD IN RCRD: CPT | Mod: CPTII,S$GLB,, | Performed by: FAMILY MEDICINE

## 2023-01-18 PROCEDURE — 99214 OFFICE O/P EST MOD 30 MIN: CPT | Mod: S$GLB,,, | Performed by: FAMILY MEDICINE

## 2023-01-18 PROCEDURE — 99999 PR PBB SHADOW E&M-EST. PATIENT-LVL IV: CPT | Mod: PBBFAC,,, | Performed by: FAMILY MEDICINE

## 2023-01-18 PROCEDURE — 99999 PR PBB SHADOW E&M-EST. PATIENT-LVL IV: ICD-10-PCS | Mod: PBBFAC,,, | Performed by: FAMILY MEDICINE

## 2023-01-18 PROCEDURE — 1159F PR MEDICATION LIST DOCUMENTED IN MEDICAL RECORD: ICD-10-PCS | Mod: CPTII,S$GLB,, | Performed by: FAMILY MEDICINE

## 2023-01-18 PROCEDURE — 99214 PR OFFICE/OUTPT VISIT, EST, LEVL IV, 30-39 MIN: ICD-10-PCS | Mod: S$GLB,,, | Performed by: FAMILY MEDICINE

## 2023-01-18 PROCEDURE — 1160F RVW MEDS BY RX/DR IN RCRD: CPT | Mod: CPTII,S$GLB,, | Performed by: FAMILY MEDICINE

## 2023-01-18 PROCEDURE — 3008F BODY MASS INDEX DOCD: CPT | Mod: CPTII,S$GLB,, | Performed by: FAMILY MEDICINE

## 2023-01-18 PROCEDURE — 3079F DIAST BP 80-89 MM HG: CPT | Mod: CPTII,S$GLB,, | Performed by: FAMILY MEDICINE

## 2023-01-18 PROCEDURE — 3075F PR MOST RECENT SYSTOLIC BLOOD PRESS GE 130-139MM HG: ICD-10-PCS | Mod: CPTII,S$GLB,, | Performed by: FAMILY MEDICINE

## 2023-01-18 PROCEDURE — 80326 AMPHETAMINES 5 OR MORE: CPT | Performed by: FAMILY MEDICINE

## 2023-01-18 PROCEDURE — 1160F PR REVIEW ALL MEDS BY PRESCRIBER/CLIN PHARMACIST DOCUMENTED: ICD-10-PCS | Mod: CPTII,S$GLB,, | Performed by: FAMILY MEDICINE

## 2023-01-18 RX ORDER — DIAZEPAM 5 MG/1
TABLET ORAL
Qty: 60 TABLET | Refills: 2 | Status: SHIPPED | OUTPATIENT
Start: 2023-01-18 | End: 2023-04-18 | Stop reason: SDUPTHER

## 2023-01-18 NOTE — PROGRESS NOTES
" Patient ID: Comfort Cantu is a 67 y.o. female.    Chief Complaint: Follow-up      Reviewed family, medical, surgical, and social history.    No cp/sob  No change in mental status  No fever  No asymmetrical limb swelling    Objective:      BP (!) 158/86 (BP Location: Right arm, Patient Position: Sitting, BP Method: Medium (Manual))   Pulse 96   Resp 15   Ht 5' 8" (1.727 m)   Wt 56.5 kg (124 lb 9.6 oz)   SpO2 97%   BMI 18.95 kg/m²   RRR, CTAB, s/nt/nd, no c/c/e, non-toxic appearing, no focal weakness  Assessment:       1. Benign essential hypertension    2. Encounter for drug screening    3. ETHAN (generalized anxiety disorder)          Plan:       Benign essential hypertension    Encounter for drug screening  -     [facility] Drug Screen    ETHAN (generalized anxiety disorder)  -     diazePAM (VALIUM) 5 MG tablet; TAKE 1 TABLET EVERY 12 HOURS AS NEEDED FOR ANXIETY  Dispense: 60 tablet; Refill: 2              Continue current medicines, any changes noted above   reivewed  Uds today  Well controlled  Labs, radiology studies, and procedures/notes from the last 3 months were reviewed.    Risks, benefits, and side effects were discussed with the patient. All questions were answered to the fullest satisfaction of the patient, and pt verbalized understanding and agreement to treatment plan. Pt was to call with any new or worsening symptoms, or present to the ER.    "

## 2023-01-23 LAB
6MAM UR QL: NOT DETECTED
7AMINOCLONAZEPAM UR QL: NOT DETECTED
A-OH ALPRAZ UR QL: NOT DETECTED
ALPHA-OH-MIDAZOLAM: NOT DETECTED
ALPRAZ UR QL: NOT DETECTED
AMPHET UR QL SCN: NOT DETECTED
ANNOTATION COMMENT IMP: ABNORMAL
ANNOTATION COMMENT IMP: ABNORMAL
BARBITURATES UR QL: NOT DETECTED
BUPRENORPHINE UR QL: NOT DETECTED
BZE UR QL: NOT DETECTED
CARBOXYTHC UR QL: PRESENT
CARISOPRODOL UR QL: NOT DETECTED
CLONAZEPAM UR QL: NOT DETECTED
CODEINE UR QL: NOT DETECTED
CREAT UR-MCNC: <20 MG/DL (ref 20–400)
DIAZEPAM UR QL: NOT DETECTED
ETHYL GLUCURONIDE UR QL: PRESENT
FENTANYL UR QL: NOT DETECTED
GABAPENTIN: NOT DETECTED
HYDROCODONE UR QL: NOT DETECTED
HYDROMORPHONE UR QL: NOT DETECTED
LORAZEPAM UR QL: NOT DETECTED
MDA UR QL: NOT DETECTED
MDEA UR QL: NOT DETECTED
MDMA UR QL: NOT DETECTED
ME-PHENIDATE UR QL: NOT DETECTED
METHADONE UR QL: NOT DETECTED
METHAMPHET UR QL: NOT DETECTED
MIDAZOLAM UR QL SCN: NOT DETECTED
MORPHINE UR QL: NOT DETECTED
NALOXONE: NOT DETECTED
NORBUPRENORPHINE UR QL CFM: NOT DETECTED
NORDIAZEPAM UR QL: PRESENT
NORFENTANYL UR QL: NOT DETECTED
NORHYDROCODONE UR QL CFM: NOT DETECTED
NORMEPERIDINE UR QL CFM: NOT DETECTED
NOROXYCODONE UR QL CFM: NOT DETECTED
NOROXYMORPHONE UR QL SCN: NOT DETECTED
OXAZEPAM UR QL: PRESENT
OXYCODONE UR QL: NOT DETECTED
OXYMORPHONE UR QL: NOT DETECTED
PATHOLOGY STUDY: ABNORMAL
PCP UR QL: NOT DETECTED
PHENTERMINE UR QL: NOT DETECTED
PREGABALIN: NOT DETECTED
SERVICE CMNT-IMP: ABNORMAL
TAPENTADOL UR QL SCN: NOT DETECTED
TAPENTADOL UR QL SCN: NOT DETECTED
TEMAZEPAM UR QL: PRESENT
TRAMADOL UR QL: NOT DETECTED
ZOLPIDEM METABOLITE: NOT DETECTED
ZOLPIDEM UR QL: NOT DETECTED

## 2023-04-06 ENCOUNTER — LAB VISIT (OUTPATIENT)
Dept: LAB | Facility: HOSPITAL | Age: 68
End: 2023-04-06
Attending: INTERNAL MEDICINE
Payer: MEDICARE

## 2023-04-06 DIAGNOSIS — D64.9 ANEMIA, UNSPECIFIED TYPE: ICD-10-CM

## 2023-04-06 DIAGNOSIS — E61.1 IRON DEFICIENCY: ICD-10-CM

## 2023-04-06 LAB
BASOPHILS # BLD AUTO: 0.06 K/UL (ref 0–0.2)
BASOPHILS NFR BLD: 0.9 % (ref 0–1.9)
DIFFERENTIAL METHOD: ABNORMAL
EOSINOPHIL # BLD AUTO: 0.1 K/UL (ref 0–0.5)
EOSINOPHIL NFR BLD: 1.2 % (ref 0–8)
ERYTHROCYTE [DISTWIDTH] IN BLOOD BY AUTOMATED COUNT: 11.7 % (ref 11.5–14.5)
HCT VFR BLD AUTO: 39.5 % (ref 37–48.5)
HGB BLD-MCNC: 13.7 G/DL (ref 12–16)
IMM GRANULOCYTES # BLD AUTO: 0.02 K/UL (ref 0–0.04)
IMM GRANULOCYTES NFR BLD AUTO: 0.3 % (ref 0–0.5)
IRON SERPL-MCNC: 112 UG/DL (ref 30–160)
LYMPHOCYTES # BLD AUTO: 1.2 K/UL (ref 1–4.8)
LYMPHOCYTES NFR BLD: 16.9 % (ref 18–48)
MCH RBC QN AUTO: 37 PG (ref 27–31)
MCHC RBC AUTO-ENTMCNC: 34.7 G/DL (ref 32–36)
MCV RBC AUTO: 107 FL (ref 82–98)
MONOCYTES # BLD AUTO: 0.7 K/UL (ref 0.3–1)
MONOCYTES NFR BLD: 10.6 % (ref 4–15)
NEUTROPHILS # BLD AUTO: 4.8 K/UL (ref 1.8–7.7)
NEUTROPHILS NFR BLD: 70.1 % (ref 38–73)
NRBC BLD-RTO: 0 /100 WBC
PLATELET # BLD AUTO: 254 K/UL (ref 150–450)
PMV BLD AUTO: 9 FL (ref 9.2–12.9)
RBC # BLD AUTO: 3.7 M/UL (ref 4–5.4)
SATURATED IRON: 31 % (ref 20–50)
TOTAL IRON BINDING CAPACITY: 358 UG/DL (ref 250–450)
TRANSFERRIN SERPL-MCNC: 242 MG/DL (ref 200–375)
WBC # BLD AUTO: 6.79 K/UL (ref 3.9–12.7)

## 2023-04-06 PROCEDURE — 82728 ASSAY OF FERRITIN: CPT | Performed by: INTERNAL MEDICINE

## 2023-04-06 PROCEDURE — 84466 ASSAY OF TRANSFERRIN: CPT | Performed by: INTERNAL MEDICINE

## 2023-04-06 PROCEDURE — 36415 COLL VENOUS BLD VENIPUNCTURE: CPT | Performed by: INTERNAL MEDICINE

## 2023-04-06 PROCEDURE — 85025 COMPLETE CBC W/AUTO DIFF WBC: CPT | Performed by: INTERNAL MEDICINE

## 2023-04-07 LAB — FERRITIN SERPL-MCNC: 90 NG/ML (ref 20–300)

## 2023-04-10 ENCOUNTER — OFFICE VISIT (OUTPATIENT)
Dept: HEMATOLOGY/ONCOLOGY | Facility: CLINIC | Age: 68
End: 2023-04-10
Payer: MEDICARE

## 2023-04-10 VITALS
DIASTOLIC BLOOD PRESSURE: 82 MMHG | TEMPERATURE: 98 F | HEIGHT: 68 IN | OXYGEN SATURATION: 97 % | BODY MASS INDEX: 18.64 KG/M2 | WEIGHT: 123 LBS | SYSTOLIC BLOOD PRESSURE: 158 MMHG | HEART RATE: 118 BPM

## 2023-04-10 DIAGNOSIS — E53.8 B12 DEFICIENCY: ICD-10-CM

## 2023-04-10 DIAGNOSIS — E03.9 HYPOTHYROIDISM, UNSPECIFIED TYPE: ICD-10-CM

## 2023-04-10 DIAGNOSIS — R91.1 SOLITARY PULMONARY NODULE: Primary | ICD-10-CM

## 2023-04-10 PROCEDURE — 99214 PR OFFICE/OUTPT VISIT, EST, LEVL IV, 30-39 MIN: ICD-10-PCS | Mod: S$GLB,,, | Performed by: NURSE PRACTITIONER

## 2023-04-10 PROCEDURE — 3079F DIAST BP 80-89 MM HG: CPT | Mod: CPTII,S$GLB,, | Performed by: NURSE PRACTITIONER

## 2023-04-10 PROCEDURE — 3288F PR FALLS RISK ASSESSMENT DOCUMENTED: ICD-10-PCS | Mod: CPTII,S$GLB,, | Performed by: NURSE PRACTITIONER

## 2023-04-10 PROCEDURE — 3008F BODY MASS INDEX DOCD: CPT | Mod: CPTII,S$GLB,, | Performed by: NURSE PRACTITIONER

## 2023-04-10 PROCEDURE — 1101F PT FALLS ASSESS-DOCD LE1/YR: CPT | Mod: CPTII,S$GLB,, | Performed by: NURSE PRACTITIONER

## 2023-04-10 PROCEDURE — 99999 PR PBB SHADOW E&M-EST. PATIENT-LVL IV: CPT | Mod: PBBFAC,,, | Performed by: NURSE PRACTITIONER

## 2023-04-10 PROCEDURE — 1125F AMNT PAIN NOTED PAIN PRSNT: CPT | Mod: CPTII,S$GLB,, | Performed by: NURSE PRACTITIONER

## 2023-04-10 PROCEDURE — 99999 PR PBB SHADOW E&M-EST. PATIENT-LVL IV: ICD-10-PCS | Mod: PBBFAC,,, | Performed by: NURSE PRACTITIONER

## 2023-04-10 PROCEDURE — 99214 OFFICE O/P EST MOD 30 MIN: CPT | Mod: S$GLB,,, | Performed by: NURSE PRACTITIONER

## 2023-04-10 PROCEDURE — 1160F PR REVIEW ALL MEDS BY PRESCRIBER/CLIN PHARMACIST DOCUMENTED: ICD-10-PCS | Mod: CPTII,S$GLB,, | Performed by: NURSE PRACTITIONER

## 2023-04-10 PROCEDURE — 3008F PR BODY MASS INDEX (BMI) DOCUMENTED: ICD-10-PCS | Mod: CPTII,S$GLB,, | Performed by: NURSE PRACTITIONER

## 2023-04-10 PROCEDURE — 3077F PR MOST RECENT SYSTOLIC BLOOD PRESSURE >= 140 MM HG: ICD-10-PCS | Mod: CPTII,S$GLB,, | Performed by: NURSE PRACTITIONER

## 2023-04-10 PROCEDURE — 1159F PR MEDICATION LIST DOCUMENTED IN MEDICAL RECORD: ICD-10-PCS | Mod: CPTII,S$GLB,, | Performed by: NURSE PRACTITIONER

## 2023-04-10 PROCEDURE — 1101F PR PT FALLS ASSESS DOC 0-1 FALLS W/OUT INJ PAST YR: ICD-10-PCS | Mod: CPTII,S$GLB,, | Performed by: NURSE PRACTITIONER

## 2023-04-10 PROCEDURE — 1160F RVW MEDS BY RX/DR IN RCRD: CPT | Mod: CPTII,S$GLB,, | Performed by: NURSE PRACTITIONER

## 2023-04-10 PROCEDURE — 1125F PR PAIN SEVERITY QUANTIFIED, PAIN PRESENT: ICD-10-PCS | Mod: CPTII,S$GLB,, | Performed by: NURSE PRACTITIONER

## 2023-04-10 PROCEDURE — 3288F FALL RISK ASSESSMENT DOCD: CPT | Mod: CPTII,S$GLB,, | Performed by: NURSE PRACTITIONER

## 2023-04-10 PROCEDURE — 3077F SYST BP >= 140 MM HG: CPT | Mod: CPTII,S$GLB,, | Performed by: NURSE PRACTITIONER

## 2023-04-10 PROCEDURE — 3079F PR MOST RECENT DIASTOLIC BLOOD PRESSURE 80-89 MM HG: ICD-10-PCS | Mod: CPTII,S$GLB,, | Performed by: NURSE PRACTITIONER

## 2023-04-10 PROCEDURE — 1159F MED LIST DOCD IN RCRD: CPT | Mod: CPTII,S$GLB,, | Performed by: NURSE PRACTITIONER

## 2023-04-10 RX ORDER — RANOLAZINE 500 MG/1
TABLET, EXTENDED RELEASE ORAL
COMMUNITY
Start: 2023-02-16 | End: 2023-09-13

## 2023-04-10 NOTE — PROGRESS NOTES
HPI    67-year-old female referred to Hematology Clinic for evaluation of severe anemia.  Patient was recently transfused in the ED with hemoglobin 6.2 grams/deciliter.  At the time patient is also microcytic.  The patient denies any overt bleeding.      Most recent lab show Hb of 11.8g/dL MCV 94\    Currently patient denies any overt bleeding.      4/10/2023:  She returns today follow-up with complaints of fatigue      Past Medical History:   Diagnosis Date    Anticoagulant long-term use     Anxiety     Arthritis     Atherosclerosis     Coronary artery disease     Depression     GERD (gastroesophageal reflux disease)     Hearing loss     [place] (hard of hearing)     Hearing aids    Hypertension     Long term (current) use of anticoagulants     Peripheral arterial disease     Tubulovillous adenoma of colon 2019    Wears dentures     Full-Upper; Partial- lower    Wound infection after surgery 06/2020     Social History     Socioeconomic History    Marital status:    Tobacco Use    Smoking status: Every Day     Packs/day: 0.75     Years: 23.00     Pack years: 17.25     Types: Cigarettes     Start date: 10/1/1988    Smokeless tobacco: Never   Substance and Sexual Activity    Alcohol use: Yes     Comment: occ    Drug use: No    Sexual activity: Yes         Subjective      Review of Systems   Constitutional: Negative for appetite change, fatigue and unexpected weight change.  Positive for fatigue  HENT: Negative for mouth sores.   Eyes: Negative for visual disturbance.   Respiratory: Negative for cough and shortness of breath.   Cardiovascular: Negative for chest pain.   Gastrointestinal: Negative for diarrhea.   Genitourinary: Negative for frequency.   Musculoskeletal: Negative for back pain.   Skin: Negative for rash.   Neurological: Negative for headaches.   Hematological: Negative for adenopathy.   Psychiatric/Behavioral: The patient is not nervous/anxious.   All other systems reviewed and are negative.  "    Objective    Physical Exam   Vitals:    04/10/23 1443   BP: (!) 158/82   Pulse: (!) 118   Temp: 97.8 °F (36.6 °C)   SpO2: 97%   Weight: 55.8 kg (123 lb)   Height: 5' 8" (1.727 m)      Constitutional: patient is oriented to person, place, and time. patient appears well-developed and well-nourished. No distress.   HENT:   Right Ear: External ear normal.   Left Ear: External ear normal.   Nose: Nose normal.   Mouth/Throat: Oropharynx is clear and moist. No oropharyngeal exudate.   Teeth, gums and lips are normal   No sinus tenderness   Palate, tongue, posterior pharynx are normal   Eyes: Conjunctivae and lids are normal.   Neck: Trachea normal and normal range of motion. No thyromegaly   Cardiovascular: Normal rate, regular rhythm, normal heart sounds, intact distal pulses and normal pulses.   No murmur heard.   No edema, no tenderness in the extremities.   Pulmonary/Chest: Effort normal and breath sounds normal. No accessory muscle usage. patient has no wheezes..   Abdominal: Soft. Normal appearance and bowel sounds are normal. patient exhibits no distension and no mass. There is no hepatosplenomegaly. There is no tenderness.   Musculoskeletal: Normal range of motion.   Gait is normal   No clubbing, cyanosis     Lymphadenopathy:   Head (right side): No submental and no submandibular adenopathy present.   Head (left side): No submental and no submandibular adenopathy present.   patient has no cervical adenopathy.   Right: No supraclavicular adenopathy present.   Left: No supraclavicular adenopathy present.   Neurological: patient is alert and oriented to person, place, and time. patient has normal strength and normal reflexes. No sensory deficit. Gait normal.   Skin: Skin is warm, dry and intact. No bruising, no lesion and no rash noted. No cyanosis. Nails show no clubbing.   No lesions   Psychiatric: patient has a normal mood and affect. patient speech is normal and behavior is normal. Judgment normal. Cognition " and memory are normal.   Vitals reviewed.     CMP  Sodium   Date Value Ref Range Status   09/15/2022 134 (L) 136 - 145 mmol/L Final     Potassium   Date Value Ref Range Status   09/15/2022 4.2 3.5 - 5.1 mmol/L Final     Chloride   Date Value Ref Range Status   09/15/2022 98 95 - 110 mmol/L Final     CO2   Date Value Ref Range Status   09/15/2022 22 (L) 23 - 29 mmol/L Final     Glucose   Date Value Ref Range Status   09/15/2022 105 70 - 110 mg/dL Final     BUN   Date Value Ref Range Status   09/15/2022 4 (L) 8 - 23 mg/dL Final     Creatinine   Date Value Ref Range Status   09/15/2022 0.7 0.5 - 1.4 mg/dL Final     Calcium   Date Value Ref Range Status   09/15/2022 9.2 8.7 - 10.5 mg/dL Final     Total Protein   Date Value Ref Range Status   09/15/2022 7.6 6.0 - 8.4 g/dL Final     Albumin   Date Value Ref Range Status   09/15/2022 4.0 3.5 - 5.2 g/dL Final     Total Bilirubin   Date Value Ref Range Status   09/15/2022 0.7 0.1 - 1.0 mg/dL Final     Comment:     For infants and newborns, interpretation of results should be based  on gestational age, weight and in agreement with clinical  observations.    Premature Infant recommended reference ranges:  Up to 24 hours.............<8.0 mg/dL  Up to 48 hours............<12.0 mg/dL  3-5 days..................<15.0 mg/dL  6-29 days.................<15.0 mg/dL       Alkaline Phosphatase   Date Value Ref Range Status   09/15/2022 158 (H) 55 - 135 U/L Final     AST   Date Value Ref Range Status   09/15/2022 30 10 - 40 U/L Final     ALT   Date Value Ref Range Status   09/15/2022 24 10 - 44 U/L Final     Anion Gap   Date Value Ref Range Status   09/15/2022 14 8 - 16 mmol/L Final     eGFR if    Date Value Ref Range Status   09/03/2021 >60.0 >60 mL/min/1.73 m^2 Final     eGFR if non    Date Value Ref Range Status   09/03/2021 >60.0 >60 mL/min/1.73 m^2 Final     Comment:     Calculation used to obtain the estimated glomerular filtration  rate (eGFR) is  the CKD-EPI equation.        Lab Results   Component Value Date    WBC 6.79 04/06/2023    HGB 13.7 04/06/2023    HCT 39.5 04/06/2023     (H) 04/06/2023     04/06/2023           Assessment  Iron deficiency anemia    -Last transfusion 1 unit packed red blood cell on 09/02/2022.    -iron deficiency anemia has resolved  -continue oral iron supplement    Tubular adenoma with high-grade dysplasia in sigmoid colon  -status post phlebotomy colectomy  -last colonoscopy 09/2020  -continue to do surveillance colonoscopy as patient is increased risk for colon cancer  -refer patient to Dr. Alba for repeat colonoscopy as she is past due    Hypertension/coronary artery disease:   -follow-up with PCP    COPD:   -follow-up with PCP    Pulmonary nodules:   -repeat CT of chest and see me with results    Fatigue:  -check B12 and TSH and see me with results

## 2023-04-11 ENCOUNTER — TELEPHONE (OUTPATIENT)
Dept: FAMILY MEDICINE | Facility: CLINIC | Age: 68
End: 2023-04-11
Payer: MEDICARE

## 2023-04-11 NOTE — TELEPHONE ENCOUNTER
----- Message from Dinorah Tucker sent at 4/11/2023  2:37 PM CDT -----  Regarding: return call  Contact: Patient  Type:  Patient Returning Call    Who Called:  Patient  Who Left Message for Patient:    Does the patient know what this is regarding?:    Best Call Back Number:  [phone] (home)     Additional Information:  Patient stated that she missed a call from doctor or nurse. Please contact to advise. Thanks!

## 2023-05-01 NOTE — TELEPHONE ENCOUNTER
Notified pt, cleared for surgery hold Effient starting Monday 8/17/20. Notify Dr Candelaria.   Length To Time In Minutes Device Was In Place: 10

## 2023-05-16 ENCOUNTER — TELEPHONE (OUTPATIENT)
Dept: HEMATOLOGY/ONCOLOGY | Facility: CLINIC | Age: 68
End: 2023-05-16
Payer: MEDICARE

## 2023-05-19 ENCOUNTER — TELEPHONE (OUTPATIENT)
Dept: HEMATOLOGY/ONCOLOGY | Facility: CLINIC | Age: 68
End: 2023-05-19
Payer: MEDICARE

## 2023-05-19 NOTE — TELEPHONE ENCOUNTER
----- Message from Ejbetty Barrettisabel Hwang sent at 5/19/2023  3:51 PM CDT -----  Type:  Sooner Appointment Request    Caller is requesting a sooner appointment.  Caller declined first available appointment listed below.  Caller will not accept being placed on the waitlist and is requesting a message be sent to doctor.    Name of Caller:  pt  When is the first available appointment?  NA  Symptoms: CT chest  Best Call Back Number:  [phone]  Additional Information:  pt is wanting to ranjeet CT of chest but it is not allowing me to ranjeet at the [facility]. Please advise asap, thanks!

## 2023-05-22 ENCOUNTER — OFFICE VISIT (OUTPATIENT)
Dept: FAMILY MEDICINE | Facility: CLINIC | Age: 68
End: 2023-05-22
Payer: MEDICARE

## 2023-05-22 ENCOUNTER — TELEPHONE (OUTPATIENT)
Dept: CARDIOLOGY | Facility: CLINIC | Age: 68
End: 2023-05-22
Payer: MEDICARE

## 2023-05-22 VITALS
OXYGEN SATURATION: 98 % | RESPIRATION RATE: 18 BRPM | SYSTOLIC BLOOD PRESSURE: 174 MMHG | BODY MASS INDEX: 18.67 KG/M2 | WEIGHT: 123.19 LBS | TEMPERATURE: 97 F | HEIGHT: 68 IN | HEART RATE: 94 BPM | DIASTOLIC BLOOD PRESSURE: 94 MMHG

## 2023-05-22 DIAGNOSIS — Z79.899 CHRONIC PRESCRIPTION BENZODIAZEPINE USE: ICD-10-CM

## 2023-05-22 DIAGNOSIS — Z02.83 ENCOUNTER FOR DRUG SCREENING: ICD-10-CM

## 2023-05-22 DIAGNOSIS — I10 BENIGN ESSENTIAL HYPERTENSION: Primary | ICD-10-CM

## 2023-05-22 DIAGNOSIS — R30.0 DYSURIA: ICD-10-CM

## 2023-05-22 DIAGNOSIS — F41.1 GAD (GENERALIZED ANXIETY DISORDER): ICD-10-CM

## 2023-05-22 PROCEDURE — 3008F BODY MASS INDEX DOCD: CPT | Mod: CPTII,S$GLB,, | Performed by: FAMILY MEDICINE

## 2023-05-22 PROCEDURE — 99999 PR PBB SHADOW E&M-EST. PATIENT-LVL IV: CPT | Mod: PBBFAC,,, | Performed by: FAMILY MEDICINE

## 2023-05-22 PROCEDURE — 1160F RVW MEDS BY RX/DR IN RCRD: CPT | Mod: CPTII,S$GLB,, | Performed by: FAMILY MEDICINE

## 2023-05-22 PROCEDURE — 1159F MED LIST DOCD IN RCRD: CPT | Mod: CPTII,S$GLB,, | Performed by: FAMILY MEDICINE

## 2023-05-22 PROCEDURE — 3080F DIAST BP >= 90 MM HG: CPT | Mod: CPTII,S$GLB,, | Performed by: FAMILY MEDICINE

## 2023-05-22 PROCEDURE — 1160F PR REVIEW ALL MEDS BY PRESCRIBER/CLIN PHARMACIST DOCUMENTED: ICD-10-PCS | Mod: CPTII,S$GLB,, | Performed by: FAMILY MEDICINE

## 2023-05-22 PROCEDURE — 3008F PR BODY MASS INDEX (BMI) DOCUMENTED: ICD-10-PCS | Mod: CPTII,S$GLB,, | Performed by: FAMILY MEDICINE

## 2023-05-22 PROCEDURE — 80307 DRUG TEST PRSMV CHEM ANLYZR: CPT | Performed by: FAMILY MEDICINE

## 2023-05-22 PROCEDURE — 3077F SYST BP >= 140 MM HG: CPT | Mod: CPTII,S$GLB,, | Performed by: FAMILY MEDICINE

## 2023-05-22 PROCEDURE — 99999 PR PBB SHADOW E&M-EST. PATIENT-LVL IV: ICD-10-PCS | Mod: PBBFAC,,, | Performed by: FAMILY MEDICINE

## 2023-05-22 PROCEDURE — 3080F PR MOST RECENT DIASTOLIC BLOOD PRESSURE >= 90 MM HG: ICD-10-PCS | Mod: CPTII,S$GLB,, | Performed by: FAMILY MEDICINE

## 2023-05-22 PROCEDURE — 99214 OFFICE O/P EST MOD 30 MIN: CPT | Mod: S$GLB,,, | Performed by: FAMILY MEDICINE

## 2023-05-22 PROCEDURE — 81000 URINALYSIS NONAUTO W/SCOPE: CPT | Mod: 59 | Performed by: FAMILY MEDICINE

## 2023-05-22 PROCEDURE — 99214 PR OFFICE/OUTPT VISIT, EST, LEVL IV, 30-39 MIN: ICD-10-PCS | Mod: S$GLB,,, | Performed by: FAMILY MEDICINE

## 2023-05-22 PROCEDURE — 1159F PR MEDICATION LIST DOCUMENTED IN MEDICAL RECORD: ICD-10-PCS | Mod: CPTII,S$GLB,, | Performed by: FAMILY MEDICINE

## 2023-05-22 PROCEDURE — 3077F PR MOST RECENT SYSTOLIC BLOOD PRESSURE >= 140 MM HG: ICD-10-PCS | Mod: CPTII,S$GLB,, | Performed by: FAMILY MEDICINE

## 2023-05-22 RX ORDER — DIAZEPAM 5 MG/1
TABLET ORAL
Qty: 60 TABLET | Refills: 2 | Status: SHIPPED | OUTPATIENT
Start: 2023-05-22 | End: 2023-08-30 | Stop reason: SDUPTHER

## 2023-05-22 RX ORDER — OLMESARTAN MEDOXOMIL 20 MG/1
20 TABLET ORAL DAILY
Qty: 30 TABLET | Refills: 3 | Status: SHIPPED | OUTPATIENT
Start: 2023-05-22 | End: 2023-09-01

## 2023-05-22 NOTE — PROGRESS NOTES
Subjective:       Patient ID: Comfort Cantu is a 67 y.o. female.    Chief Complaint: Follow-up    UTI symptoms: hurts when she urinates. Burning. Wants to be checked for UTI.     Due for benzo refill. Follows with Dr. Riddle. UDS today. Controlled agreement on file.     Essential hypertension: Blood pressures have been poorly controlled..  BP Readings from Last 3 Encounters:  05/22/23 : (!) 174/94  04/10/23 : (!) 158/82  01/18/23 : 132/88  Hypertension Medications       cloNIDine (CATAPRES) 0.1 MG tablet TAKE 1 TABLET (0.1 MG TOTAL) BY MOUTH   2 (TWO) TIMES DAILY.    hydroCHLOROthiazide (HYDRODIURIL) 25 MG tablet TAKE 1 TABLET (25 MG   TOTAL) BY MOUTH ONCE DAILY.    isosorbide mononitrate (IMDUR) 30 MG 24 hr tablet TAKE 1 TABLET EVERY DAY    Patient denies headache, chest pain, palpitations, shortness of breath.   She tells me that she used to be on lisinopril for years. She had her medication increased and then she fell and broke her leg. Since that time she has not been on the medication. She tells me that her heart doctor told her that he did not want to give her any more blood medications and she wants to see another cardiologist.       Review of Systems   Constitutional:  Negative for activity change, appetite change, fatigue and fever.   Respiratory:  Negative for shortness of breath.    Gastrointestinal:  Negative for abdominal pain.   Integumentary:  Negative for rash.       Objective:      Physical Exam  Vitals and nursing note reviewed.   Constitutional:       General: She is not in acute distress.     Appearance: She is not ill-appearing.   Cardiovascular:      Rate and Rhythm: Normal rate and regular rhythm.      Heart sounds: No murmur heard.  Pulmonary:      Effort: Pulmonary effort is normal.      Breath sounds: Normal breath sounds. No wheezing.   Skin:     General: Skin is warm and dry.      Findings: No rash.   Neurological:      Mental Status: She is alert.   Psychiatric:         Mood and  Affect: Mood normal.         Behavior: Behavior normal.       Assessment:       1. Benign essential hypertension    2. Encounter for drug screening    3. ETHAN (generalized anxiety disorder)    4. Dysuria    5. Chronic prescription benzodiazepine use        Plan:       Problem List Items Addressed This Visit          Cardiac/Vascular    Benign essential hypertension - Primary    Relevant Medications    olmesartan (BENICAR) 20 MG tablet    Other Relevant Orders    Ambulatory referral/consult to Cardiology     Other Visit Diagnoses       Encounter for drug screening        Relevant Orders    Drug screen panel, in-house    ETHAN (generalized anxiety disorder)        Relevant Orders    Drug screen panel, in-house    Dysuria        Relevant Orders    Urinalysis Microscopic    Chronic prescription benzodiazepine use        Relevant Orders    Drug screen panel, in-house

## 2023-05-23 ENCOUNTER — TELEPHONE (OUTPATIENT)
Dept: CARDIOLOGY | Facility: CLINIC | Age: 68
End: 2023-05-23
Payer: MEDICARE

## 2023-05-23 DIAGNOSIS — N30.01 ACUTE CYSTITIS WITH HEMATURIA: Primary | ICD-10-CM

## 2023-05-23 LAB
AMPHET+METHAMPHET UR QL: NEGATIVE
BACTERIA #/AREA URNS HPF: ABNORMAL /HPF
BARBITURATES UR QL SCN>200 NG/ML: NEGATIVE
BENZODIAZ UR QL SCN>200 NG/ML: ABNORMAL
BZE UR QL SCN: NEGATIVE
CANNABINOIDS UR QL SCN: ABNORMAL
CREAT UR-MCNC: 25.7 MG/DL (ref 15–325)
METHADONE UR QL SCN>300 NG/ML: NEGATIVE
MICROSCOPIC COMMENT: ABNORMAL
OPIATES UR QL SCN: NEGATIVE
PCP UR QL SCN>25 NG/ML: NEGATIVE
RBC #/AREA URNS HPF: 10 /HPF (ref 0–4)
TOXICOLOGY INFORMATION: ABNORMAL
WBC #/AREA URNS HPF: >100 /HPF (ref 0–5)

## 2023-05-23 RX ORDER — CIPROFLOXACIN 250 MG/1
250 TABLET, FILM COATED ORAL EVERY 12 HOURS
Qty: 6 TABLET | Refills: 0 | Status: SHIPPED | OUTPATIENT
Start: 2023-05-23 | End: 2023-05-26

## 2023-05-29 ENCOUNTER — TELEPHONE (OUTPATIENT)
Dept: FAMILY MEDICINE | Facility: CLINIC | Age: 68
End: 2023-05-29
Payer: MEDICARE

## 2023-05-29 NOTE — TELEPHONE ENCOUNTER
Called pt to schedule from cardio referral. Pt was seeing Dr. Palacios. She wants to switch. She had just seen him in September so she wanted an appt in September.

## 2023-06-07 ENCOUNTER — HOSPITAL ENCOUNTER (OUTPATIENT)
Dept: RADIOLOGY | Facility: HOSPITAL | Age: 68
Discharge: HOME | End: 2023-06-07
Attending: NURSE PRACTITIONER
Payer: MEDICARE

## 2023-06-07 DIAGNOSIS — R91.1 SOLITARY PULMONARY NODULE: ICD-10-CM

## 2023-06-07 PROCEDURE — 71250 CT THORAX DX C-: CPT | Mod: TC

## 2023-06-07 PROCEDURE — 71250 CT CHEST WITHOUT CONTRAST: ICD-10-PCS | Mod: 26,,, | Performed by: RADIOLOGY

## 2023-06-07 PROCEDURE — 71250 CT THORAX DX C-: CPT | Mod: 26,,, | Performed by: RADIOLOGY

## 2023-06-13 ENCOUNTER — TELEPHONE (OUTPATIENT)
Dept: FAMILY MEDICINE | Facility: CLINIC | Age: 68
End: 2023-06-13
Payer: MEDICARE

## 2023-06-13 NOTE — TELEPHONE ENCOUNTER
----- Message from Gauri Tabares sent at 6/13/2023  3:15 PM CDT -----  Contact: pt  Type medication request--urgent pt says, in a lot of pain      Who Called:  pt  Symptoms (please be specific):  uti and kidney and bladder infection she has still and is out of the medication you gave and still has these issues.. wants another round of antibiotics      How long has patient had these symptoms:  for over a week    Pharmacy name and phone #:    [facility] - [place] - [address]  [place] [address]  Phone: [phone] Fax: [phone]      Best Call Back Number: [phone]  Additional Information:

## 2023-06-13 NOTE — TELEPHONE ENCOUNTER
uti and kidney and bladder infection she has still and is out of the medication you gave and still has these issues.. wants another round of antibiotics

## 2023-06-14 RX ORDER — NITROFURANTOIN 25; 75 MG/1; MG/1
100 CAPSULE ORAL 2 TIMES DAILY
Qty: 10 CAPSULE | Refills: 0 | Status: SHIPPED | OUTPATIENT
Start: 2023-06-14 | End: 2023-06-19

## 2023-06-21 ENCOUNTER — TELEPHONE (OUTPATIENT)
Dept: FAMILY MEDICINE | Facility: CLINIC | Age: 68
End: 2023-06-21
Payer: MEDICARE

## 2023-06-21 NOTE — TELEPHONE ENCOUNTER
----- Message from Holli Arce sent at 6/21/2023  3:20 PM CDT -----  Contact: pt  Type: Needs Medical Advice  Who Called:  pt   Symptoms (please be specific):  back pain , odor coming from private area , possible uti  How long has patient had these symptoms:  a month       Best Call Back Number: [phone]    Additional Information: pt would like a referral for a pap smear or Gyn clinic. Please advise.

## 2023-06-22 RX ORDER — NITROFURANTOIN 25; 75 MG/1; MG/1
100 CAPSULE ORAL 2 TIMES DAILY
Qty: 10 CAPSULE | Refills: 0 | Status: SHIPPED | OUTPATIENT
Start: 2023-06-22 | End: 2023-06-27

## 2023-07-06 RX ORDER — PRASUGREL 5 MG/1
5 TABLET, FILM COATED ORAL DAILY
OUTPATIENT
Start: 2023-07-06

## 2023-07-06 NOTE — TELEPHONE ENCOUNTER
Care Due:                  Date            Visit Type   Department     Provider  --------------------------------------------------------------------------------    Last Visit: None Found      None         None Found  Next Visit: None Scheduled  None         None Found                                                            Last  Test          Frequency    Reason                     Performed    Due Date  --------------------------------------------------------------------------------    Office Visit  12 months..  hydroCHLOROthiazide,       Not Found    Overdue                             isosorbide...............    CMP.........  12 months..  hydroCHLOROthiazide......  09-   09-    [facility] Embedded Care Due Messages. Reference number: [id number].   7/05/2023 7:04:22 PM CDT

## 2023-07-17 RX ORDER — PRASUGREL 5 MG/1
TABLET, FILM COATED ORAL
Qty: 90 TABLET | Refills: 12 | OUTPATIENT
Start: 2023-07-17

## 2023-07-17 NOTE — TELEPHONE ENCOUNTER
No care due was identified.  [facility] Embedded Care Due Messages. Reference number: [id number].   7/17/2023 2:38:19 PM CDT

## 2023-07-17 NOTE — TELEPHONE ENCOUNTER
Refill Routing Note   Medication(s) are not appropriate for processing by [facility] for the following reason(s):      No active prescription written by PCP    [facility] action(s):  Defer Care Due:  None identified              Appointments  past 12m or future 3m with PCP    Date Provider   Last Visit   1/18/2023 Darinel Riddle MD   Next Visit   9/22/2023 Darinel Riddle MD   ED visits in past 90 days: 0        Note composed:3:06 PM 07/17/2023

## 2023-07-17 NOTE — TELEPHONE ENCOUNTER
Refill Decision Note   Comofrt Cantu  is requesting a refill authorization.  Brief Assessment and Rationale for Refill:  Quick Discontinue     Medication Therapy Plan:         Medication reconciliation completed: Yes   Comments:     Note composed:3:17 PM 07/17/2023           Pharmacy Call Documentation    Pharmacy Called:   [place] Call Time: 15:18   Spoke with: Saida 07/17/2023      Called to notify: pharmacy to send request to Dr. Сергей Malagon; Dr. Riddle has not previously prescribed script before.        Current Medication Requested: (refill encounter)   Requested Prescriptions     Refused Prescriptions Disp Refills    prasugreL (EFFIENT) 5 mg Tab [Pharmacy Med Name: PRASUGREL 5MG TABLET] 90 tablet 12     Sig: TAKE ONE (1) TABLET ONCE DAILY FOR STROKE PREVENTION      [facility]     Note composed: 07/17/2023 3:17 PM

## 2023-08-24 ENCOUNTER — OFFICE VISIT (OUTPATIENT)
Dept: FAMILY MEDICINE | Facility: CLINIC | Age: 68
End: 2023-08-24
Payer: MEDICARE

## 2023-08-24 ENCOUNTER — HOSPITAL ENCOUNTER (OUTPATIENT)
Dept: RADIOLOGY | Facility: HOSPITAL | Age: 68
Discharge: HOME | End: 2023-08-24
Attending: FAMILY MEDICINE
Payer: MEDICARE

## 2023-08-24 VITALS
OXYGEN SATURATION: 96 % | HEART RATE: 111 BPM | DIASTOLIC BLOOD PRESSURE: 70 MMHG | SYSTOLIC BLOOD PRESSURE: 120 MMHG | BODY MASS INDEX: 19.57 KG/M2 | TEMPERATURE: 98 F | RESPIRATION RATE: 18 BRPM | WEIGHT: 129.13 LBS | HEIGHT: 68 IN

## 2023-08-24 DIAGNOSIS — M54.42 CHRONIC BILATERAL LOW BACK PAIN WITH BILATERAL SCIATICA: ICD-10-CM

## 2023-08-24 DIAGNOSIS — J43.1 PANLOBULAR EMPHYSEMA: ICD-10-CM

## 2023-08-24 DIAGNOSIS — G89.29 CHRONIC BILATERAL LOW BACK PAIN WITH BILATERAL SCIATICA: ICD-10-CM

## 2023-08-24 DIAGNOSIS — E46 PROTEIN-CALORIE MALNUTRITION, UNSPECIFIED SEVERITY: ICD-10-CM

## 2023-08-24 DIAGNOSIS — Z79.899 CHRONIC PRESCRIPTION BENZODIAZEPINE USE: ICD-10-CM

## 2023-08-24 DIAGNOSIS — R29.898 LEG WEAKNESS, BILATERAL: ICD-10-CM

## 2023-08-24 DIAGNOSIS — R56.9 CONVULSIONS, UNSPECIFIED CONVULSION TYPE: ICD-10-CM

## 2023-08-24 DIAGNOSIS — M54.41 CHRONIC BILATERAL LOW BACK PAIN WITH BILATERAL SCIATICA: ICD-10-CM

## 2023-08-24 DIAGNOSIS — F17.210 SMOKING GREATER THAN 40 PACK YEARS: ICD-10-CM

## 2023-08-24 DIAGNOSIS — F41.1 GAD (GENERALIZED ANXIETY DISORDER): Primary | ICD-10-CM

## 2023-08-24 DIAGNOSIS — F41.0 PANIC DISORDER: ICD-10-CM

## 2023-08-24 PROBLEM — S82.241A CLOSED DISPLACED SPIRAL FRACTURE OF SHAFT OF RIGHT TIBIA: Status: RESOLVED | Noted: 2020-02-03 | Resolved: 2023-08-24

## 2023-08-24 LAB
AMPHET+METHAMPHET UR QL: NEGATIVE
BARBITURATES UR QL SCN>200 NG/ML: NEGATIVE
BENZODIAZ UR QL SCN>200 NG/ML: ABNORMAL
BZE UR QL SCN: NEGATIVE
CANNABINOIDS UR QL SCN: ABNORMAL
CREAT UR-MCNC: 55.3 MG/DL (ref 15–325)
METHADONE UR QL SCN>300 NG/ML: NEGATIVE
OPIATES UR QL SCN: NEGATIVE
PCP UR QL SCN>25 NG/ML: NEGATIVE
TOXICOLOGY INFORMATION: ABNORMAL

## 2023-08-24 PROCEDURE — 3074F SYST BP LT 130 MM HG: CPT | Mod: CPTII,S$GLB,, | Performed by: FAMILY MEDICINE

## 2023-08-24 PROCEDURE — 1126F PR PAIN SEVERITY QUANTIFIED, NO PAIN PRESENT: ICD-10-PCS | Mod: CPTII,S$GLB,, | Performed by: FAMILY MEDICINE

## 2023-08-24 PROCEDURE — 4010F PR ACE/ARB THEARPY RXD/TAKEN: ICD-10-PCS | Mod: CPTII,S$GLB,, | Performed by: FAMILY MEDICINE

## 2023-08-24 PROCEDURE — 99214 PR OFFICE/OUTPT VISIT, EST, LEVL IV, 30-39 MIN: ICD-10-PCS | Mod: S$GLB,,, | Performed by: FAMILY MEDICINE

## 2023-08-24 PROCEDURE — 99999 PR PBB SHADOW E&M-EST. PATIENT-LVL V: ICD-10-PCS | Mod: PBBFAC,,, | Performed by: FAMILY MEDICINE

## 2023-08-24 PROCEDURE — 1126F AMNT PAIN NOTED NONE PRSNT: CPT | Mod: CPTII,S$GLB,, | Performed by: FAMILY MEDICINE

## 2023-08-24 PROCEDURE — 3288F FALL RISK ASSESSMENT DOCD: CPT | Mod: CPTII,S$GLB,, | Performed by: FAMILY MEDICINE

## 2023-08-24 PROCEDURE — 72114 X-RAY EXAM L-S SPINE BENDING: CPT | Mod: 26,,, | Performed by: RADIOLOGY

## 2023-08-24 PROCEDURE — 1160F PR REVIEW ALL MEDS BY PRESCRIBER/CLIN PHARMACIST DOCUMENTED: ICD-10-PCS | Mod: CPTII,S$GLB,, | Performed by: FAMILY MEDICINE

## 2023-08-24 PROCEDURE — 80307 DRUG TEST PRSMV CHEM ANLYZR: CPT | Performed by: FAMILY MEDICINE

## 2023-08-24 PROCEDURE — 3078F DIAST BP <80 MM HG: CPT | Mod: CPTII,S$GLB,, | Performed by: FAMILY MEDICINE

## 2023-08-24 PROCEDURE — 1160F RVW MEDS BY RX/DR IN RCRD: CPT | Mod: CPTII,S$GLB,, | Performed by: FAMILY MEDICINE

## 2023-08-24 PROCEDURE — 1159F MED LIST DOCD IN RCRD: CPT | Mod: CPTII,S$GLB,, | Performed by: FAMILY MEDICINE

## 2023-08-24 PROCEDURE — 3078F PR MOST RECENT DIASTOLIC BLOOD PRESSURE < 80 MM HG: ICD-10-PCS | Mod: CPTII,S$GLB,, | Performed by: FAMILY MEDICINE

## 2023-08-24 PROCEDURE — 99999 PR PBB SHADOW E&M-EST. PATIENT-LVL V: CPT | Mod: PBBFAC,,, | Performed by: FAMILY MEDICINE

## 2023-08-24 PROCEDURE — 99214 OFFICE O/P EST MOD 30 MIN: CPT | Mod: S$GLB,,, | Performed by: FAMILY MEDICINE

## 2023-08-24 PROCEDURE — 1101F PR PT FALLS ASSESS DOC 0-1 FALLS W/OUT INJ PAST YR: ICD-10-PCS | Mod: CPTII,S$GLB,, | Performed by: FAMILY MEDICINE

## 2023-08-24 PROCEDURE — 1101F PT FALLS ASSESS-DOCD LE1/YR: CPT | Mod: CPTII,S$GLB,, | Performed by: FAMILY MEDICINE

## 2023-08-24 PROCEDURE — 72114 XR LUMBAR SPINE 5 VIEW WITH FLEX AND EXT: ICD-10-PCS | Mod: 26,,, | Performed by: RADIOLOGY

## 2023-08-24 PROCEDURE — 3008F BODY MASS INDEX DOCD: CPT | Mod: CPTII,S$GLB,, | Performed by: FAMILY MEDICINE

## 2023-08-24 PROCEDURE — 4010F ACE/ARB THERAPY RXD/TAKEN: CPT | Mod: CPTII,S$GLB,, | Performed by: FAMILY MEDICINE

## 2023-08-24 PROCEDURE — 3008F PR BODY MASS INDEX (BMI) DOCUMENTED: ICD-10-PCS | Mod: CPTII,S$GLB,, | Performed by: FAMILY MEDICINE

## 2023-08-24 PROCEDURE — 3288F PR FALLS RISK ASSESSMENT DOCUMENTED: ICD-10-PCS | Mod: CPTII,S$GLB,, | Performed by: FAMILY MEDICINE

## 2023-08-24 PROCEDURE — 3074F PR MOST RECENT SYSTOLIC BLOOD PRESSURE < 130 MM HG: ICD-10-PCS | Mod: CPTII,S$GLB,, | Performed by: FAMILY MEDICINE

## 2023-08-24 PROCEDURE — 1159F PR MEDICATION LIST DOCUMENTED IN MEDICAL RECORD: ICD-10-PCS | Mod: CPTII,S$GLB,, | Performed by: FAMILY MEDICINE

## 2023-08-24 PROCEDURE — 72114 X-RAY EXAM L-S SPINE BENDING: CPT | Mod: TC,PN

## 2023-08-24 NOTE — PROGRESS NOTES
Subjective:       Patient ID: Comfort Cantu is a 67 y.o. female.    Chief Complaint: Establish Care, lower extremities pain (weakness), and Hypertension    Ms. Cantu presents today to establish care. She is a former patient of Dr. Riddle.     She presents today with a concern that there are times that she stands up and she gets burning pain down both legs and she will get very weak and shaky and will have to sit down because she is about to fall. This does not happen every time she stands.     She regularly walks with a cane and has had to do so ever since she had a fall and broke her leg and then her ankle got infected and they had to move hardware. And then she broke the leg again hitting her leg on the walker and her foot healed crooked.     She has chronic anxiety and she takes cymbalta, buspar and valium. She is due for drug screen. Reviewed last UDS which was positive for her benzos and marijuana. She uses only occasional THC but reports that this rare.     Hypertension  Pertinent negatives include no shortness of breath.     Review of Systems   Constitutional:  Negative for activity change, appetite change, fatigue and fever.   Respiratory:  Negative for shortness of breath.    Gastrointestinal:  Negative for abdominal pain.   Integumentary:  Negative for rash.         Objective:      Physical Exam  Vitals and nursing note reviewed.   Constitutional:       General: She is not in acute distress.     Appearance: She is not ill-appearing.   Cardiovascular:      Rate and Rhythm: Normal rate and regular rhythm.      Heart sounds: No murmur heard.  Pulmonary:      Effort: Pulmonary effort is normal.      Breath sounds: Normal breath sounds. No wheezing.   Skin:     General: Skin is warm and dry.      Findings: No rash.   Neurological:      Mental Status: She is alert.   Psychiatric:         Mood and Affect: Mood normal.         Behavior: Behavior normal.         Assessment:       1. ETHAN (generalized anxiety  disorder)    2. Protein-calorie malnutrition, unspecified severity    3. Convulsions, unspecified convulsion type    4. Panlobular emphysema    5. Panic disorder    6. Chronic prescription benzodiazepine use        Plan:       Problem List Items Addressed This Visit          Neuro    Convulsions, unspecified convulsion type       Psychiatric    ETHAN (generalized anxiety disorder) - Primary    Relevant Orders    Drug screen panel, in-house    Panic disorder    Relevant Orders    Drug screen panel, in-house       Endocrine    Protein-calorie malnutrition, unspecified severity     Other Visit Diagnoses       Panlobular emphysema        Relevant Orders    Ambulatory referral/consult to Pulmonology    Chronic prescription benzodiazepine use        Relevant Orders    Drug screen panel, in-house

## 2023-08-25 ENCOUNTER — TELEPHONE (OUTPATIENT)
Dept: HEMATOLOGY/ONCOLOGY | Facility: CLINIC | Age: 68
End: 2023-08-25
Payer: MEDICARE

## 2023-08-28 ENCOUNTER — TELEPHONE (OUTPATIENT)
Dept: FAMILY MEDICINE | Facility: CLINIC | Age: 68
End: 2023-08-28
Payer: MEDICARE

## 2023-08-28 ENCOUNTER — OFFICE VISIT (OUTPATIENT)
Dept: HEMATOLOGY/ONCOLOGY | Facility: CLINIC | Age: 68
End: 2023-08-28
Payer: MEDICARE

## 2023-08-28 ENCOUNTER — TELEPHONE (OUTPATIENT)
Dept: HEMATOLOGY/ONCOLOGY | Facility: CLINIC | Age: 68
End: 2023-08-28

## 2023-08-28 VITALS
WEIGHT: 128 LBS | BODY MASS INDEX: 20.09 KG/M2 | OXYGEN SATURATION: 97 % | SYSTOLIC BLOOD PRESSURE: 152 MMHG | HEIGHT: 67 IN | RESPIRATION RATE: 18 BRPM | HEART RATE: 106 BPM | TEMPERATURE: 97 F | DIASTOLIC BLOOD PRESSURE: 97 MMHG

## 2023-08-28 DIAGNOSIS — D64.9 ANEMIA, UNSPECIFIED TYPE: Primary | ICD-10-CM

## 2023-08-28 PROCEDURE — 1101F PT FALLS ASSESS-DOCD LE1/YR: CPT | Mod: CPTII,S$GLB,, | Performed by: NURSE PRACTITIONER

## 2023-08-28 PROCEDURE — 3080F DIAST BP >= 90 MM HG: CPT | Mod: CPTII,S$GLB,, | Performed by: NURSE PRACTITIONER

## 2023-08-28 PROCEDURE — 1101F PR PT FALLS ASSESS DOC 0-1 FALLS W/OUT INJ PAST YR: ICD-10-PCS | Mod: CPTII,S$GLB,, | Performed by: NURSE PRACTITIONER

## 2023-08-28 PROCEDURE — 99214 OFFICE O/P EST MOD 30 MIN: CPT | Mod: S$GLB,,, | Performed by: NURSE PRACTITIONER

## 2023-08-28 PROCEDURE — 1159F PR MEDICATION LIST DOCUMENTED IN MEDICAL RECORD: ICD-10-PCS | Mod: CPTII,S$GLB,, | Performed by: NURSE PRACTITIONER

## 2023-08-28 PROCEDURE — 3080F PR MOST RECENT DIASTOLIC BLOOD PRESSURE >= 90 MM HG: ICD-10-PCS | Mod: CPTII,S$GLB,, | Performed by: NURSE PRACTITIONER

## 2023-08-28 PROCEDURE — 3288F FALL RISK ASSESSMENT DOCD: CPT | Mod: CPTII,S$GLB,, | Performed by: NURSE PRACTITIONER

## 2023-08-28 PROCEDURE — 3077F PR MOST RECENT SYSTOLIC BLOOD PRESSURE >= 140 MM HG: ICD-10-PCS | Mod: CPTII,S$GLB,, | Performed by: NURSE PRACTITIONER

## 2023-08-28 PROCEDURE — 3008F BODY MASS INDEX DOCD: CPT | Mod: CPTII,S$GLB,, | Performed by: NURSE PRACTITIONER

## 2023-08-28 PROCEDURE — 3008F PR BODY MASS INDEX (BMI) DOCUMENTED: ICD-10-PCS | Mod: CPTII,S$GLB,, | Performed by: NURSE PRACTITIONER

## 2023-08-28 PROCEDURE — 4010F PR ACE/ARB THEARPY RXD/TAKEN: ICD-10-PCS | Mod: CPTII,S$GLB,, | Performed by: NURSE PRACTITIONER

## 2023-08-28 PROCEDURE — 4010F ACE/ARB THERAPY RXD/TAKEN: CPT | Mod: CPTII,S$GLB,, | Performed by: NURSE PRACTITIONER

## 2023-08-28 PROCEDURE — 3288F PR FALLS RISK ASSESSMENT DOCUMENTED: ICD-10-PCS | Mod: CPTII,S$GLB,, | Performed by: NURSE PRACTITIONER

## 2023-08-28 PROCEDURE — 1160F PR REVIEW ALL MEDS BY PRESCRIBER/CLIN PHARMACIST DOCUMENTED: ICD-10-PCS | Mod: CPTII,S$GLB,, | Performed by: NURSE PRACTITIONER

## 2023-08-28 PROCEDURE — 99999 PR PBB SHADOW E&M-EST. PATIENT-LVL IV: CPT | Mod: PBBFAC,,, | Performed by: NURSE PRACTITIONER

## 2023-08-28 PROCEDURE — 99999 PR PBB SHADOW E&M-EST. PATIENT-LVL IV: ICD-10-PCS | Mod: PBBFAC,,, | Performed by: NURSE PRACTITIONER

## 2023-08-28 PROCEDURE — 3077F SYST BP >= 140 MM HG: CPT | Mod: CPTII,S$GLB,, | Performed by: NURSE PRACTITIONER

## 2023-08-28 PROCEDURE — 1126F AMNT PAIN NOTED NONE PRSNT: CPT | Mod: CPTII,S$GLB,, | Performed by: NURSE PRACTITIONER

## 2023-08-28 PROCEDURE — 99214 PR OFFICE/OUTPT VISIT, EST, LEVL IV, 30-39 MIN: ICD-10-PCS | Mod: S$GLB,,, | Performed by: NURSE PRACTITIONER

## 2023-08-28 PROCEDURE — 1160F RVW MEDS BY RX/DR IN RCRD: CPT | Mod: CPTII,S$GLB,, | Performed by: NURSE PRACTITIONER

## 2023-08-28 PROCEDURE — 1126F PR PAIN SEVERITY QUANTIFIED, NO PAIN PRESENT: ICD-10-PCS | Mod: CPTII,S$GLB,, | Performed by: NURSE PRACTITIONER

## 2023-08-28 PROCEDURE — 1159F MED LIST DOCD IN RCRD: CPT | Mod: CPTII,S$GLB,, | Performed by: NURSE PRACTITIONER

## 2023-08-28 NOTE — TELEPHONE ENCOUNTER
----- Message from iSmran Pool MD sent at 8/27/2023  2:20 PM CDT -----  Please let her know that her x-ray shows some curvature of the spine with degenerative discs (expected with age) and arthritis (also expected with age) but no instability. -Dr. Pool

## 2023-08-28 NOTE — PROGRESS NOTES
HPI    67-year-old female referred to Hematology Clinic for evaluation of severe anemia.  Patient was recently transfused in the ED with hemoglobin 6.2 grams/deciliter.  At the time patient is also microcytic.  The patient denies any overt bleeding.      Most recent lab show Hb of 11.8g/dL MCV 94\    Currently patient denies any overt bleeding.      4/10/2023:  She returns today follow-up with complaints of fatigue    8/28/2023:  She returns today follow-up.  Patient has no new symptoms and no new complaints  Past Medical History:   Diagnosis Date    Anticoagulant long-term use     Anxiety     Arthritis     Atherosclerosis     Closed displaced spiral fracture of shaft of right tibia 2/3/2020    Coronary artery disease     Depression     GERD (gastroesophageal reflux disease)     Hearing loss     [place] (hard of hearing)     Hearing aids    Hypertension     Long term (current) use of anticoagulants     Peripheral arterial disease     Tubulovillous adenoma of colon 2019    Wears dentures     Full-Upper; Partial- lower    Wound infection after surgery 06/2020     Social History     Socioeconomic History    Marital status:    Tobacco Use    Smoking status: Every Day     Current packs/day: 0.75     Average packs/day: 0.7 packs/day for 34.9 years (26.2 ttl pk-yrs)     Types: Cigarettes     Start date: 10/1/1988    Smokeless tobacco: Never   Substance and Sexual Activity    Alcohol use: Yes     Comment: occ    Drug use: Yes     Types: Oxycodone, Benzodiazepines     Comment: as prescribed    Sexual activity: Yes         Subjective      Review of Systems   Constitutional: Negative for appetite change, fatigue and unexpected weight change.  Positive for fatigue  HENT: Negative for mouth sores.   Eyes: Negative for visual disturbance.   Respiratory: Negative for cough and shortness of breath.   Cardiovascular: Negative for chest pain.   Gastrointestinal: Negative for diarrhea.   Genitourinary: Negative for frequency.  "  Musculoskeletal: Negative for back pain.   Skin: Negative for rash.   Neurological: Negative for headaches.   Hematological: Negative for adenopathy.   Psychiatric/Behavioral: The patient is not nervous/anxious.   All other systems reviewed and are negative.     Objective    Physical Exam   There were no vitals filed for this visit.     Constitutional: patient is oriented to person, place, and time. patient appears well-developed and well-nourished. No distress.   HENT:   Right Ear: External ear normal.   Left Ear: External ear normal.   Nose: Nose normal.   Mouth/Throat: Oropharynx is clear and moist. No oropharyngeal exudate.   Teeth, gums and lips are normal   No sinus tenderness   Palate, tongue, posterior pharynx are normal   Eyes: Conjunctivae and lids are normal.   Neck: Trachea normal and normal range of motion. No thyromegaly   Cardiovascular: Normal rate, regular rhythm, normal heart sounds, intact distal pulses and normal pulses.   No murmur heard.   No edema, no tenderness in the extremities.   Pulmonary/Chest: Effort normal and breath sounds normal. No accessory muscle usage. patient has no wheezes..   Abdominal: Soft. Normal appearance and bowel sounds are normal. patient exhibits no distension and no mass. There is no hepatosplenomegaly. There is no tenderness.   Musculoskeletal: Normal range of motion.   Gait is normal   No clubbing, cyanosis       Vitals:    08/28/23 1332   BP: (!) 152/97   BP Location: Left arm   Patient Position: Sitting   BP Method: Medium (Automatic)   Pulse: 106   Resp: 18   Temp: 97.2 °F (36.2 °C)   TempSrc: Temporal   SpO2: 97%   Weight: 58.1 kg (128 lb)   Height: 5' 7" (1.702 m)        CMP  Sodium   Date Value Ref Range Status   09/15/2022 134 (L) 136 - 145 mmol/L Final     Potassium   Date Value Ref Range Status   09/15/2022 4.2 3.5 - 5.1 mmol/L Final     Chloride   Date Value Ref Range Status   09/15/2022 98 95 - 110 mmol/L Final     CO2   Date Value Ref Range Status "   09/15/2022 22 (L) 23 - 29 mmol/L Final     Glucose   Date Value Ref Range Status   09/15/2022 105 70 - 110 mg/dL Final     BUN   Date Value Ref Range Status   09/15/2022 4 (L) 8 - 23 mg/dL Final     Creatinine   Date Value Ref Range Status   06/07/2023 0.7 0.5 - 1.4 mg/dL Final     Calcium   Date Value Ref Range Status   09/15/2022 9.2 8.7 - 10.5 mg/dL Final     Total Protein   Date Value Ref Range Status   09/15/2022 7.6 6.0 - 8.4 g/dL Final     Albumin   Date Value Ref Range Status   09/15/2022 4.0 3.5 - 5.2 g/dL Final     Total Bilirubin   Date Value Ref Range Status   09/15/2022 0.7 0.1 - 1.0 mg/dL Final     Comment:     For infants and newborns, interpretation of results should be based  on gestational age, weight and in agreement with clinical  observations.    Premature Infant recommended reference ranges:  Up to 24 hours.............<8.0 mg/dL  Up to 48 hours............<12.0 mg/dL  3-5 days..................<15.0 mg/dL  6-29 days.................<15.0 mg/dL       Alkaline Phosphatase   Date Value Ref Range Status   09/15/2022 158 (H) 55 - 135 U/L Final     AST   Date Value Ref Range Status   09/15/2022 30 10 - 40 U/L Final     ALT   Date Value Ref Range Status   09/15/2022 24 10 - 44 U/L Final     Anion Gap   Date Value Ref Range Status   09/15/2022 14 8 - 16 mmol/L Final     eGFR if    Date Value Ref Range Status   09/03/2021 >60.0 >60 mL/min/1.73 m^2 Final     eGFR if non    Date Value Ref Range Status   09/03/2021 >60.0 >60 mL/min/1.73 m^2 Final     Comment:     Calculation used to obtain the estimated glomerular filtration  rate (eGFR) is the CKD-EPI equation.        Lab Results   Component Value Date    WBC 6.79 04/06/2023    HGB 13.7 04/06/2023    HCT 39.5 04/06/2023     (H) 04/06/2023     04/06/2023           Assessment  Iron deficiency anemia    -Last transfusion 1 unit packed red blood cell on 09/02/2022.    -iron deficiency anemia has  resolved  -continue oral iron supplement  -repeat labs in 4 months    Tubular adenoma with high-grade dysplasia in sigmoid colon  -status post phlebotomy colectomy  -last colonoscopy 09/2020  -continue to do surveillance colonoscopy as patient is increased risk for colon cancer  -refer patient to Dr. Melgoza for surveillance colonoscopy    Hypertension/coronary artery disease:   -follow-up with PCP    COPD:   -follow-up with PCP    Pulmonary nodules:   -CT chest with stable recommended 12 month follow-up with low-dose CT scan    Emphysema:   -has appointment with pulmonologist

## 2023-08-28 NOTE — TELEPHONE ENCOUNTER
Was told by Edith that referrals are being sent to Dr Jerardo Duncan .  Sent the message/referral  to his staff.      ----- Message from Edith Melgoza MD sent at 8/28/2023  2:29 PM CDT -----  Send to GI as I am no longer doing screening colonoscopies    Dr. Melgoza  ----- Message -----  From: Lin Wells MA  Sent: 8/28/2023   1:55 PM CDT  To: Edith Melgoza MD    Please schedule a colonoscopy within the next 4 months for this pt.  Referral [id number].    Thank you,  Lin QUINONEZ

## 2023-08-28 NOTE — TELEPHONE ENCOUNTER
I have sent the referral to Dr Jerardo Duncan.  I have also charted that I sent the referral to Dr Duncan approximately an hour ago.            ----- Message from Eve Galo sent at 8/28/2023  3:25 PM CDT -----  Contact: PT  Type:  Patient Requesting Referral    Who Called: PT  Does the patient already have the specialty appointment scheduled?:  NO  If yes, what is the date of that appointment?:  ??  Referral to What Specialty:  Gastroenterologist  Reason for Referral:  Colonoscopy  Does the patient want the referral with a specific physician?:  yes, Dr. Jerardo Duncan FAX# [phone]  [facility] the specialist an [facility]  [facility]  Patient Requesting a Call Back?:  yes  Best Call Back Number:  [phone]  Additional Information:   PT states no one has contacted her about scheduling

## 2023-08-30 DIAGNOSIS — F41.1 GAD (GENERALIZED ANXIETY DISORDER): ICD-10-CM

## 2023-08-30 RX ORDER — DIAZEPAM 5 MG/1
TABLET ORAL
Qty: 60 TABLET | Refills: 2 | Status: SHIPPED | OUTPATIENT
Start: 2023-08-30 | End: 2023-10-24 | Stop reason: SDUPTHER

## 2023-08-30 NOTE — TELEPHONE ENCOUNTER
----- Message from Koko Telles sent at 8/30/2023  3:14 PM CDT -----  Type: Needs Medical Advice  Who Called:  Patient    Pharmacy name and phone #:    [facility] - [place] - [address]  [place] [address]  Phone: [phone] Fax: [phone]    Best Call Back Number: [phone]  Additional Information: Patient states that her refill isn't at the pharmacy:  diazePAM (VALIUM) 5 MG tablet    Please call to advise

## 2023-08-31 ENCOUNTER — TELEPHONE (OUTPATIENT)
Dept: FAMILY MEDICINE | Facility: CLINIC | Age: 68
End: 2023-08-31
Payer: MEDICARE

## 2023-08-31 DIAGNOSIS — F41.1 GAD (GENERALIZED ANXIETY DISORDER): ICD-10-CM

## 2023-08-31 NOTE — TELEPHONE ENCOUNTER
----- Message from Michel Vitale sent at 8/31/2023  8:45 AM CDT -----  Type:  Pharmacy Calling to Clarify an RX    Name of Caller:  Lesli  Pharmacy Name:  [facility]  Prescription Name:  diazePAM (VALIUM) 5 MG tablet  What do they need to clarify?:  This medication has been discontinued and is not longer in production.   Best Call Back Number:  Phone: [phone] Fax: [phone]  Additional Information:  A new, different medication will need to be called in to replace this Rx. The medication is no longer available. Please call back to advise. Thanks!

## 2023-08-31 NOTE — TELEPHONE ENCOUNTER
per pharmacy diazePAM (VALIUM) 5 MG tablet  This medication has been discontinued and is no longer in production.   A new, different medication will need to be called in to replace this Rx.

## 2023-09-01 ENCOUNTER — TELEPHONE (OUTPATIENT)
Dept: FAMILY MEDICINE | Facility: CLINIC | Age: 68
End: 2023-09-01
Payer: MEDICARE

## 2023-09-01 DIAGNOSIS — I10 BENIGN ESSENTIAL HYPERTENSION: ICD-10-CM

## 2023-09-01 RX ORDER — OLMESARTAN MEDOXOMIL 20 MG/1
20 TABLET ORAL
Qty: 30 TABLET | Refills: 3 | Status: SHIPPED | OUTPATIENT
Start: 2023-09-01 | End: 2024-02-01 | Stop reason: SDUPTHER

## 2023-09-01 NOTE — TELEPHONE ENCOUNTER
Spoke with Abril at [facility]. Message confirm correct. Pharmacy unable to get Valium  as it has been taking off market

## 2023-09-01 NOTE — TELEPHONE ENCOUNTER
----- Message from Eve Galo sent at 9/1/2023  3:00 PM CDT -----  Contact: PT  Type: Needs Medical Advice  Who Called:  PT  Symptoms (please be specific):  Anxiety  How long has patient had these symptoms:  ongoing  Pharmacy name and phone #:    [facility] - [place] - [address]  [address]  [place] [address]  Phone: [phone] Fax: [phone]    Best Call Back Number: [phone]  Additional Information: PT asking  to get a New RX for KLONPIN, states she used to take before. Was previously prescribed Valium, was told by pharm medication is no loner on the market.

## 2023-09-01 NOTE — TELEPHONE ENCOUNTER
I was not aware of this shortage and diazepam 5mg is readily available at other pharmacies. Can you please clarify this message?

## 2023-09-01 NOTE — TELEPHONE ENCOUNTER
----- Message from Eve Iraj sent at 2023  3:05 PM CDT -----  Contact: PT  Type: Needs Medical Advice    Who Called: PT  Best Call Back Number: [phone]  Additional  Information: PT  requesting a call back  to know if provider can mail the completed forms to her. So she can get her handicap sticker renewed it has .  Please Advise- Thank you

## 2023-09-05 ENCOUNTER — TELEPHONE (OUTPATIENT)
Dept: FAMILY MEDICINE | Facility: CLINIC | Age: 68
End: 2023-09-05
Payer: MEDICARE

## 2023-09-05 NOTE — TELEPHONE ENCOUNTER
----- Message from Huyen Galo sent at 9/5/2023  3:00 PM CDT -----  Regarding: missed call  Contact: pt  Type:  Patient Returning Call    Who Called:pt    Who Left Message for Patient:Pamella MALONE    Does the patient know what this is regarding?:yes    Would the patient rather a call back or a response via [facility]? Call back    Best Call Back Number:[phone]    Additional Information: sts she had a missed called possible in reference to anxiety medication.pt sts she needs an RX for anxiety--please advise and thank you

## 2023-09-05 NOTE — TELEPHONE ENCOUNTER
Attempted to contact Comfort Cantu to discuss  medication .    Left voice mail to return our call at [phone] on [phone] (home).    Pamella Jefferson LPN

## 2023-09-05 NOTE — TELEPHONE ENCOUNTER
Attempted to contact Comfort Cantu to discuss  medication .    Left voice mail to return our call at [phone] on [phone] (home).    Pamella Jefferson LPN

## 2023-09-07 ENCOUNTER — OFFICE VISIT (OUTPATIENT)
Dept: PULMONOLOGY | Facility: CLINIC | Age: 68
End: 2023-09-07
Payer: MEDICARE

## 2023-09-07 VITALS
SYSTOLIC BLOOD PRESSURE: 146 MMHG | WEIGHT: 130.81 LBS | OXYGEN SATURATION: 97 % | DIASTOLIC BLOOD PRESSURE: 90 MMHG | BODY MASS INDEX: 20.49 KG/M2 | HEART RATE: 93 BPM

## 2023-09-07 DIAGNOSIS — Z87.891 PERSONAL HISTORY OF NICOTINE DEPENDENCE: Primary | ICD-10-CM

## 2023-09-07 DIAGNOSIS — J43.1 PANLOBULAR EMPHYSEMA: ICD-10-CM

## 2023-09-07 PROCEDURE — 3008F PR BODY MASS INDEX (BMI) DOCUMENTED: ICD-10-PCS | Mod: CPTII,S$GLB,, | Performed by: INTERNAL MEDICINE

## 2023-09-07 PROCEDURE — 3080F PR MOST RECENT DIASTOLIC BLOOD PRESSURE >= 90 MM HG: ICD-10-PCS | Mod: CPTII,S$GLB,, | Performed by: INTERNAL MEDICINE

## 2023-09-07 PROCEDURE — 99999 PR PBB SHADOW E&M-EST. PATIENT-LVL III: CPT | Mod: PBBFAC,,, | Performed by: INTERNAL MEDICINE

## 2023-09-07 PROCEDURE — 3077F SYST BP >= 140 MM HG: CPT | Mod: CPTII,S$GLB,, | Performed by: INTERNAL MEDICINE

## 2023-09-07 PROCEDURE — 3077F PR MOST RECENT SYSTOLIC BLOOD PRESSURE >= 140 MM HG: ICD-10-PCS | Mod: CPTII,S$GLB,, | Performed by: INTERNAL MEDICINE

## 2023-09-07 PROCEDURE — 99204 OFFICE O/P NEW MOD 45 MIN: CPT | Mod: S$GLB,,, | Performed by: INTERNAL MEDICINE

## 2023-09-07 PROCEDURE — 99999 PR PBB SHADOW E&M-EST. PATIENT-LVL III: ICD-10-PCS | Mod: PBBFAC,,, | Performed by: INTERNAL MEDICINE

## 2023-09-07 PROCEDURE — 3288F FALL RISK ASSESSMENT DOCD: CPT | Mod: CPTII,S$GLB,, | Performed by: INTERNAL MEDICINE

## 2023-09-07 PROCEDURE — 4010F PR ACE/ARB THEARPY RXD/TAKEN: ICD-10-PCS | Mod: CPTII,S$GLB,, | Performed by: INTERNAL MEDICINE

## 2023-09-07 PROCEDURE — 1159F PR MEDICATION LIST DOCUMENTED IN MEDICAL RECORD: ICD-10-PCS | Mod: CPTII,S$GLB,, | Performed by: INTERNAL MEDICINE

## 2023-09-07 PROCEDURE — 1126F AMNT PAIN NOTED NONE PRSNT: CPT | Mod: CPTII,S$GLB,, | Performed by: INTERNAL MEDICINE

## 2023-09-07 PROCEDURE — 3008F BODY MASS INDEX DOCD: CPT | Mod: CPTII,S$GLB,, | Performed by: INTERNAL MEDICINE

## 2023-09-07 PROCEDURE — 1101F PR PT FALLS ASSESS DOC 0-1 FALLS W/OUT INJ PAST YR: ICD-10-PCS | Mod: CPTII,S$GLB,, | Performed by: INTERNAL MEDICINE

## 2023-09-07 PROCEDURE — 3288F PR FALLS RISK ASSESSMENT DOCUMENTED: ICD-10-PCS | Mod: CPTII,S$GLB,, | Performed by: INTERNAL MEDICINE

## 2023-09-07 PROCEDURE — 1159F MED LIST DOCD IN RCRD: CPT | Mod: CPTII,S$GLB,, | Performed by: INTERNAL MEDICINE

## 2023-09-07 PROCEDURE — 1126F PR PAIN SEVERITY QUANTIFIED, NO PAIN PRESENT: ICD-10-PCS | Mod: CPTII,S$GLB,, | Performed by: INTERNAL MEDICINE

## 2023-09-07 PROCEDURE — 99204 PR OFFICE/OUTPT VISIT, NEW, LEVL IV, 45-59 MIN: ICD-10-PCS | Mod: S$GLB,,, | Performed by: INTERNAL MEDICINE

## 2023-09-07 PROCEDURE — 1101F PT FALLS ASSESS-DOCD LE1/YR: CPT | Mod: CPTII,S$GLB,, | Performed by: INTERNAL MEDICINE

## 2023-09-07 PROCEDURE — 3080F DIAST BP >= 90 MM HG: CPT | Mod: CPTII,S$GLB,, | Performed by: INTERNAL MEDICINE

## 2023-09-07 PROCEDURE — 4010F ACE/ARB THERAPY RXD/TAKEN: CPT | Mod: CPTII,S$GLB,, | Performed by: INTERNAL MEDICINE

## 2023-09-07 RX ORDER — IBUPROFEN 200 MG
1 TABLET ORAL DAILY
Qty: 30 PATCH | Refills: 2 | Status: SHIPPED | OUTPATIENT
Start: 2023-09-07 | End: 2023-09-13

## 2023-09-07 RX ORDER — ALBUTEROL SULFATE 90 UG/1
2 AEROSOL, METERED RESPIRATORY (INHALATION) EVERY 6 HOURS PRN
Qty: 18 G | Refills: 11 | Status: SHIPPED | OUTPATIENT
Start: 2023-09-07 | End: 2023-09-13

## 2023-09-07 NOTE — PROGRESS NOTES
Pulmonary Clinic New Patient    HISTORY OF PRESENT ILLNESS   Comfort Cantu is a 67 y.o. female with a PMH of severe iron deficiency anemia, CAD, GERD, HTN on whom we have been consulted for COPD and pulmonary nodules.    She becomes SOB just walking from one side of her bed to the other.      SMOKING HISTORY  Smokes 1-2 PPD x 50 years.      REVIEW OF SYSTEMS  GENERAL: Feeling well. No fevers, chills, or night sweats.  EYES: Vision is good.  ENT: No sinusitis or pharyngitis.   HEART: No chest pain or palpitations.  LUNGS: No cough, sputum, or wheezing.  GI: No abdominal pain, nausea, vomiting, or diarrhea.  : No dysuria, urgency, or frequency.  SKIN: No lesions or rashes.  MUSCULOSKELETAL: No joint pain or myalgias.  NEURO: No headaches or neuropathy.  LYMPH: No edema or adenopathy.  PSYCH: No anxiety or depression.  ENDO: No unexpected weight change.    PFSH:  Past Medical History:   Diagnosis Date    Anticoagulant long-term use     Anxiety     Arthritis     Atherosclerosis     Closed displaced spiral fracture of shaft of right tibia 2/3/2020    Coronary artery disease     Depression     GERD (gastroesophageal reflux disease)     Hearing loss     [place] (hard of hearing)     Hearing aids    Hypertension     Long term (current) use of anticoagulants     Peripheral arterial disease     Tubulovillous adenoma of colon 2019    Wears dentures     Full-Upper; Partial- lower    Wound infection after surgery 06/2020         Past Surgical History:   Procedure Laterality Date    APPENDECTOMY      CAROTID STENT  2014    COLONOSCOPY N/A 10/7/2019    Procedure: COLONOSCOPY;  Surgeon: Olaf Alba MD;  Location: [facility] ENDO;  Service: General;  Laterality: N/A;    COLONOSCOPY N/A 12/16/2019    Procedure: COLONOSCOPY;  Surgeon: Olaf Alba MD;  Location: [facility] ENDO;  Service: General;  Laterality: N/A;    COLONOSCOPY N/A 1/23/2020    Procedure: COLONOSCOPY (unable to come earlier);  Surgeon: Esteban Yancey,  "MD;  Location: [facility] ENDO;  Service: Endoscopy;  Laterality: N/A;    ENDOSCOPIC ULTRASOUND OF UPPER GASTROINTESTINAL TRACT N/A 10/16/2018    Procedure: ULTRASOUND, UPPER GI TRACT, ENDOSCOPIC;  Surgeon: Kumar Wang MD;  Location: [facility] ENDO (2ND FLR);  Service: Endoscopy;  Laterality: N/A;  5 day hold Effient, Dr Сергей Malagon    ESOPHAGOGASTRODUODENOSCOPY N/A 10/7/2019    Procedure: ESOPHAGOGASTRODUODENOSCOPY (EGD);  Surgeon: Olaf Alba MD;  Location: [facility] ENDO;  Service: General;  Laterality: N/A;    FEMORAL ARTERY STENT      HARDWARE REMOVAL Right 6/12/2020    Procedure: REMOVAL, HARDWARE;  Surgeon: Elbert Marrero II, MD;  Location: [facility] OR;  Service: Orthopedics;  Laterality: Right;  right distal tibia    ORIF TIBIA FRACTURE Right 2/14/2020    Procedure: ORIF, FRACTURE, TIBIA;  Surgeon: Elbert Marrero II, MD;  Location: [facility] OR;  Service: Orthopedics;  Laterality: Right;    ROBOT-ASSISTED LAPAROSCOPIC RESECTION OF SIGMOID COLON N/A 9/14/2020    Procedure: ROBOTIC COLECTOMY, SIGMOID;  Surgeon: Terrance Candelaria MD;  Location: [facility] OR;  Service: General;  Laterality: N/A;     Social History     Tobacco Use    Smoking status: Every Day     Current packs/day: 0.75     Average packs/day: 0.7 packs/day for 34.9 years (26.2 ttl pk-yrs)     Types: Cigarettes     Start date: 10/1/1988    Smokeless tobacco: Never   Substance Use Topics    Alcohol use: Yes     Comment: occ    Drug use: Yes     Types: Oxycodone, Benzodiazepines     Comment: as prescribed     Family History   Problem Relation Age of Onset    Heart attack Brother     Diabetes Brother     Hyperlipidemia Brother     Eczema Neg Hx     Lupus Neg Hx     Psoriasis Neg Hx     Melanoma Neg Hx     Breast cancer Neg Hx     Ovarian cancer Neg Hx      Review of patient's allergies indicates:   Allergen Reactions    Penicillins      Age 23, "crippled me"         VITAL SIGNS (MOST RECENT)  Pulse: 93 (09/07/23 1405)  BP: (!) 146/90 (09/07/23 1405)  SpO2: " 97 % (09/07/23 1405)    PHYSICAL EXAM  GENERAL: NAD.  HEENT: Pupils equal and reactive. Extraocular movements intact.   NECK: Supple.   HEART: Regular rate and rhythm. No murmur or gallop auscultated.  LUNGS: Clear to auscultation and percussion. Lung excursion symmetrical.  ABDOMEN: Bowel sounds present. Non-tender, no masses palpated.  EXTREMITIES: Normal muscle tone and joint movement, no cyanosis or clubbing.   LYMPHATICS: No adenopathy palpated, no edema.  SKIN: Dry, intact, no lesions.   NEURO: No gross cognitive or motor deficits.  PSYCH: Appropriate affect.    Lab Results   Component Value Date    WBC 6.79 04/06/2023    HGB 13.7 04/06/2023    HCT 39.5 04/06/2023     04/06/2023    CHOL 176 07/16/2021    TRIG 211 (H) 07/16/2021    HDL 46 07/16/2021    ALT 24 09/15/2022    AST 30 09/15/2022     (L) 09/15/2022    K 4.2 09/15/2022    CL 98 09/15/2022    CREATININE 0.7 06/07/2023    BUN 4 (L) 09/15/2022    CO2 22 (L) 09/15/2022    TSH 2.910 06/07/2023    INR 1.1 02/10/2020    HGBA1C 4.9 07/16/2021       LAST ARTERIAL BLOOD GAS  @LABRCNTIP[PH,PO2,PCO2,HCO3,BE@      LAST 7 DAYS MICROBIOLOGY   Microbiology Results (last 7 days)       ** No results found for the last 168 hours. **            MOST RECENT IMAGING  X-Ray Lumbar Complete Including Flex And Ext  Narrative: EXAMINATION:  XR LUMBAR SPINE 5 VIEW WITH FLEX AND EXT    CLINICAL HISTORY:  Lumbago with sciatica, left side    TECHNIQUE:  Five views of the lumbar spine plus flexion extension views were performed.    COMPARISON:  None.    FINDINGS:  The bones appear osteopenic.  There is a moderate to marked dextrocurvature of the lumbar spine.  Vascular stents are in place.    The lumbar vertebral bodies maintain normal height.  There is no fracture.  Alignment is grossly normal considering curvature.  No instability is demonstrated flexion or extension.  Facet joint arthropathy is most apparent at the lower 3 lumbar levels.  There is severe disc space  "narrowing towards the left at the L3-4 level and towards the right at the L5-S1 level.  Impression: There is a moderate to marked dextrocurvature of the lumbar spine with degenerative disc and facet disease.  There is no obvious fracture, malalignment or instability.    Electronically signed by: Andrae Camilo MD  Date:    08/24/2023  Time:    15:52      CURRENT VISIT EKG  No results found for this visit on 09/07/23.    ECHOCARDIOGRAM RESULTS  No results found for this or any previous visit.      Pulmonary Function Tests  No results found for: "FEV1", "FVC", "BBY6MBA", "TLC", "DLCO"    CT chest 6/7/23:  Bullous emphysema of bilateral upper lobes.    ASSESSMENT & PLAN   Comfort Cantu is a 67 y.o. female with a PMH of severe iron deficiency anemia, CAD, GERD, HTN on whom we have been consulted for COPD and pulmonary nodules.    #Multiple solid pulmonary nodules, all <6mm  - as per Fleischner Society recommendations, no further imaging is needed for these nodules  - resume annual screening CT chest (next should be June 2024)    #Tobacco use  - referred to smoking cessation  - nicotine patches  - already on SSRI and has history of major depression, so I would want the patient to see a psychiatrist prior to starting Chantix, but the patient does not want to see psychiatry.    #Emphysema  - PFTs ordered  - albuterol rescue inhaler    Follow up in 3 months.    I have personally reviewed recent labs and ABGs, and I have viewed and interpreted the images of recent chest imaging, as above.     Jaylan Samuels MD  Pulmonary and Critical Care Medicine  [facility]  Date of Service: 09/07/2023  2:27 PM      "

## 2023-09-13 ENCOUNTER — OFFICE VISIT (OUTPATIENT)
Dept: CARDIOLOGY | Facility: CLINIC | Age: 68
End: 2023-09-13
Payer: MEDICARE

## 2023-09-13 ENCOUNTER — LAB VISIT (OUTPATIENT)
Dept: LAB | Facility: HOSPITAL | Age: 68
End: 2023-09-13
Attending: INTERNAL MEDICINE
Payer: MEDICARE

## 2023-09-13 VITALS
SYSTOLIC BLOOD PRESSURE: 143 MMHG | OXYGEN SATURATION: 96 % | HEIGHT: 67 IN | DIASTOLIC BLOOD PRESSURE: 81 MMHG | HEART RATE: 92 BPM | BODY MASS INDEX: 20.49 KG/M2

## 2023-09-13 DIAGNOSIS — F13.20 BENZODIAZEPINE DEPENDENCE: ICD-10-CM

## 2023-09-13 DIAGNOSIS — I65.23 BILATERAL CAROTID ARTERY STENOSIS: ICD-10-CM

## 2023-09-13 DIAGNOSIS — E87.1 HYPONATREMIA: ICD-10-CM

## 2023-09-13 DIAGNOSIS — I73.9 PERIPHERAL VASCULAR DISEASE: ICD-10-CM

## 2023-09-13 DIAGNOSIS — Z91.89 AT RISK FOR CARDIOVASCULAR EVENT: Primary | ICD-10-CM

## 2023-09-13 DIAGNOSIS — H90.6 MIXED CONDUCTIVE AND SENSORINEURAL HEARING LOSS OF BOTH EARS: ICD-10-CM

## 2023-09-13 DIAGNOSIS — F17.200 SMOKER: ICD-10-CM

## 2023-09-13 DIAGNOSIS — F41.1 GAD (GENERALIZED ANXIETY DISORDER): ICD-10-CM

## 2023-09-13 DIAGNOSIS — F10.10 ENGAGES IN BINGE CONSUMPTION OF ALCOHOL: ICD-10-CM

## 2023-09-13 DIAGNOSIS — E78.00 HYPERCHOLESTEROLEMIA: ICD-10-CM

## 2023-09-13 DIAGNOSIS — I10 PRIMARY HYPERTENSION: ICD-10-CM

## 2023-09-13 DIAGNOSIS — J47.9 BRONCHIECTASIS WITHOUT COMPLICATION: ICD-10-CM

## 2023-09-13 DIAGNOSIS — Z82.49 FAMILY HISTORY OF PREMATURE CAD: ICD-10-CM

## 2023-09-13 DIAGNOSIS — D50.8 IRON DEFICIENCY ANEMIA SECONDARY TO INADEQUATE DIETARY IRON INTAKE: ICD-10-CM

## 2023-09-13 DIAGNOSIS — Z91.89 SEDENTARY LIFESTYLE: ICD-10-CM

## 2023-09-13 DIAGNOSIS — R06.02 SOB (SHORTNESS OF BREATH): ICD-10-CM

## 2023-09-13 DIAGNOSIS — Z91.81 AT RISK FOR FALLING: ICD-10-CM

## 2023-09-13 DIAGNOSIS — Z91.89 AT RISK FOR CARDIOVASCULAR EVENT: ICD-10-CM

## 2023-09-13 LAB
CHOLEST SERPL-MCNC: 208 MG/DL (ref 120–199)
CHOLEST/HDLC SERPL: 3.2 {RATIO} (ref 2–5)
CRP SERPL-MCNC: 1 MG/L (ref 0–3.19)
HDLC SERPL-MCNC: 66 MG/DL (ref 40–75)
HDLC SERPL: 31.7 % (ref 20–50)
LDLC SERPL CALC-MCNC: 79.4 MG/DL (ref 63–159)
NONHDLC SERPL-MCNC: 142 MG/DL
TRIGL SERPL-MCNC: 313 MG/DL (ref 30–150)

## 2023-09-13 PROCEDURE — 86141 C-REACTIVE PROTEIN HS: CPT | Performed by: INTERNAL MEDICINE

## 2023-09-13 PROCEDURE — 1160F RVW MEDS BY RX/DR IN RCRD: CPT | Mod: CPTII,S$GLB,, | Performed by: INTERNAL MEDICINE

## 2023-09-13 PROCEDURE — 99999 PR PBB SHADOW E&M-EST. PATIENT-LVL III: ICD-10-PCS | Mod: PBBFAC,,, | Performed by: INTERNAL MEDICINE

## 2023-09-13 PROCEDURE — 99205 PR OFFICE/OUTPT VISIT, NEW, LEVL V, 60-74 MIN: ICD-10-PCS | Mod: 25,S$GLB,, | Performed by: INTERNAL MEDICINE

## 2023-09-13 PROCEDURE — 99999 PR PBB SHADOW E&M-EST. PATIENT-LVL III: CPT | Mod: PBBFAC,,, | Performed by: INTERNAL MEDICINE

## 2023-09-13 PROCEDURE — 3288F PR FALLS RISK ASSESSMENT DOCUMENTED: ICD-10-PCS | Mod: CPTII,S$GLB,, | Performed by: INTERNAL MEDICINE

## 2023-09-13 PROCEDURE — 3079F DIAST BP 80-89 MM HG: CPT | Mod: CPTII,S$GLB,, | Performed by: INTERNAL MEDICINE

## 2023-09-13 PROCEDURE — 3288F FALL RISK ASSESSMENT DOCD: CPT | Mod: CPTII,S$GLB,, | Performed by: INTERNAL MEDICINE

## 2023-09-13 PROCEDURE — 99205 OFFICE O/P NEW HI 60 MIN: CPT | Mod: 25,S$GLB,, | Performed by: INTERNAL MEDICINE

## 2023-09-13 PROCEDURE — 36415 COLL VENOUS BLD VENIPUNCTURE: CPT | Performed by: INTERNAL MEDICINE

## 2023-09-13 PROCEDURE — 1100F PTFALLS ASSESS-DOCD GE2>/YR: CPT | Mod: CPTII,S$GLB,, | Performed by: INTERNAL MEDICINE

## 2023-09-13 PROCEDURE — 93000 ELECTROCARDIOGRAM COMPLETE: CPT | Mod: S$GLB,,, | Performed by: INTERNAL MEDICINE

## 2023-09-13 PROCEDURE — 1159F MED LIST DOCD IN RCRD: CPT | Mod: CPTII,S$GLB,, | Performed by: INTERNAL MEDICINE

## 2023-09-13 PROCEDURE — 1159F PR MEDICATION LIST DOCUMENTED IN MEDICAL RECORD: ICD-10-PCS | Mod: CPTII,S$GLB,, | Performed by: INTERNAL MEDICINE

## 2023-09-13 PROCEDURE — 3008F PR BODY MASS INDEX (BMI) DOCUMENTED: ICD-10-PCS | Mod: CPTII,S$GLB,, | Performed by: INTERNAL MEDICINE

## 2023-09-13 PROCEDURE — 4010F PR ACE/ARB THEARPY RXD/TAKEN: ICD-10-PCS | Mod: CPTII,S$GLB,, | Performed by: INTERNAL MEDICINE

## 2023-09-13 PROCEDURE — 3079F PR MOST RECENT DIASTOLIC BLOOD PRESSURE 80-89 MM HG: ICD-10-PCS | Mod: CPTII,S$GLB,, | Performed by: INTERNAL MEDICINE

## 2023-09-13 PROCEDURE — 1100F PR PT FALLS ASSESS DOC 2+ FALLS/FALL W/INJURY/YR: ICD-10-PCS | Mod: CPTII,S$GLB,, | Performed by: INTERNAL MEDICINE

## 2023-09-13 PROCEDURE — 4010F ACE/ARB THERAPY RXD/TAKEN: CPT | Mod: CPTII,S$GLB,, | Performed by: INTERNAL MEDICINE

## 2023-09-13 PROCEDURE — 93000 EKG 12-LEAD: ICD-10-PCS | Mod: S$GLB,,, | Performed by: INTERNAL MEDICINE

## 2023-09-13 PROCEDURE — 3077F SYST BP >= 140 MM HG: CPT | Mod: CPTII,S$GLB,, | Performed by: INTERNAL MEDICINE

## 2023-09-13 PROCEDURE — 1160F PR REVIEW ALL MEDS BY PRESCRIBER/CLIN PHARMACIST DOCUMENTED: ICD-10-PCS | Mod: CPTII,S$GLB,, | Performed by: INTERNAL MEDICINE

## 2023-09-13 PROCEDURE — 3008F BODY MASS INDEX DOCD: CPT | Mod: CPTII,S$GLB,, | Performed by: INTERNAL MEDICINE

## 2023-09-13 PROCEDURE — 3077F PR MOST RECENT SYSTOLIC BLOOD PRESSURE >= 140 MM HG: ICD-10-PCS | Mod: CPTII,S$GLB,, | Performed by: INTERNAL MEDICINE

## 2023-09-13 PROCEDURE — 80061 LIPID PANEL: CPT | Performed by: INTERNAL MEDICINE

## 2023-09-13 NOTE — PROGRESS NOTES
Subjective:    Patient ID:  Comfort Cantu is a 67 y.o. female who presents for evaluation of Establish Care  PCP: Simran Pool MD   Prior cardiologist: Pepe Moctezuma MD, last seen 4/2018  Vascular: Dr. He in [facility]  Lives alone, have dogs  Sister, Sylvia, in [place]  Retired  at [employer]    Patient is a new patient to me.     Social Determinate of Health: Do you have any problem with the following: hearing aides, memory difficulties, difficult historian.  Health Literacy (understanding): low to medium  Vaccination: up to date yes, covid, covid infection no  Activities: does not work out, ankle broken then infected, use wheel chair and walker, lives in mobile home, mostly sedentary.  Nicotine: smoker  (almost 2 ppd x 47 yrs)  Alcohol: Beer occasionally, max 6 beers in any 24 hours, 1-2 times weekly   Illicit Drugs: none, on Rx Valium 5 mg daily for many years.  Cardiac Symptoms: none  Home BP: yes, 148 to 189  Medication Compliance: yes, off HCTZ 25 mg for some time per Pepe Moctezuma MD   Diet: Regular, use salt  Caffeine: Coffee 3 - 4 cups a day   Labs:   Lab Results   Component Value Date    TSH 2.910 06/07/2023        Lab Results   Component Value Date    HGBA1C 4.9 07/16/2021       Lab Results   Component Value Date    WBC 6.79 04/06/2023    HGB 13.7 04/06/2023    HCT 39.5 04/06/2023     (H) 04/06/2023     04/06/2023       CMP  Sodium   Date Value Ref Range Status   09/15/2022 134 (L) 136 - 145 mmol/L Final     Potassium   Date Value Ref Range Status   09/15/2022 4.2 3.5 - 5.1 mmol/L Final     Chloride   Date Value Ref Range Status   09/15/2022 98 95 - 110 mmol/L Final     CO2   Date Value Ref Range Status   09/15/2022 22 (L) 23 - 29 mmol/L Final     Glucose   Date Value Ref Range Status   09/15/2022 105 70 - 110 mg/dL Final     BUN   Date Value Ref Range Status   09/15/2022 4 (L) 8 - 23 mg/dL Final     Creatinine   Date Value Ref Range Status   06/07/2023 0.7 0.5 - 1.4 mg/dL Final      Calcium   Date Value Ref Range Status   09/15/2022 9.2 8.7 - 10.5 mg/dL Final     Total Protein   Date Value Ref Range Status   09/15/2022 7.6 6.0 - 8.4 g/dL Final     Albumin   Date Value Ref Range Status   09/15/2022 4.0 3.5 - 5.2 g/dL Final     Total Bilirubin   Date Value Ref Range Status   09/15/2022 0.7 0.1 - 1.0 mg/dL Final     Comment:     For infants and newborns, interpretation of results should be based  on gestational age, weight and in agreement with clinical  observations.    Premature Infant recommended reference ranges:  Up to 24 hours.............<8.0 mg/dL  Up to 48 hours............<12.0 mg/dL  3-5 days..................<15.0 mg/dL  6-29 days.................<15.0 mg/dL       Alkaline Phosphatase   Date Value Ref Range Status   09/15/2022 158 (H) 55 - 135 U/L Final     AST   Date Value Ref Range Status   09/15/2022 30 10 - 40 U/L Final     ALT   Date Value Ref Range Status   09/15/2022 24 10 - 44 U/L Final     Anion Gap   Date Value Ref Range Status   09/15/2022 14 8 - 16 mmol/L Final     eGFR if    Date Value Ref Range Status   09/03/2021 >60.0 >60 mL/min/1.73 m^2 Final     eGFR if non    Date Value Ref Range Status   09/03/2021 >60.0 >60 mL/min/1.73 m^2 Final     Comment:     Calculation used to obtain the estimated glomerular filtration  rate (eGFR) is the CKD-EPI equation.        @labrcntip(troponini)@    BNP   Date Value Ref Range Status   02/23/2021 36 0 - 99 pg/mL Final     Comment:     Values of less than 100 pg/ml are consistent with non-CHF populations.   }   Lab Results   Component Value Date    CHOL 176 07/16/2021    CHOL 238 (H) 02/23/2021    CHOL 169 02/10/2020     Lab Results   Component Value Date    HDL 46 07/16/2021    HDL 73 02/23/2021    HDL 52 02/10/2020     Lab Results   Component Value Date    LDLCALC 87.8 07/16/2021    LDLCALC 126.8 02/23/2021    LDLCALC 98.4 02/10/2020     Lab Results   Component Value Date    TRIG 211 (H) 07/16/2021     "TRIG 191 (H) 02/23/2021    TRIG 93 02/10/2020     Lab Results   Component Value Date    CHOLHDL 26.1 07/16/2021    CHOLHDL 30.7 02/23/2021    CHOLHDL 30.8 02/10/2020       Last Echo: none  Last stress test: none  Cardiovascular angiogram: none  ECG: NSR,rate 95, low voltage  Fundoscopic exam: due    WF here with request change from Pepe Moctezuma MD for CV care. Have multi-territory ASCVD on 40 mg of atorvastatin, no recent lipid panel. Continue to smoke heavily and marked physical limitations. Denies any CP but have SOB likely due to COPD. Have premature family history for CAD. Brother's first MI in his 30s and mother MI in her 60s.    CT 6/2023 - The aorta is normal in caliber with moderate calcific plaque.  There are coronary artery calcifications.  No pleural effusion is present.     Emphysema is present.  There is extensive biapical scarring with areas of calcification, right greater than left, unchanged.  Mild bronchiectasis is present.  Extensive calcifications associated with the kidneys appear to be primarily vascular, although renal calculi are not ruled out.  There is an 11 mm cyst at the upper pole of the right kidney.     Simran Pool MD noted 8/24/2023 "lower extremities pain (weakness), and Hypertension"    Pepe Moctezuma MD noted 4/2018 "Atherosclerotic heart disease of native coronary artery without angina pectoris  Essential (primary) hypertension  Dizziness and giddiness  Shortness of breath        Review of Systems   Constitutional: Negative for diaphoresis, fever, malaise/fatigue, night sweats and weight gain.   HENT:  Positive for hearing loss (bilateral) and hoarse voice. Negative for nosebleeds and tinnitus.    Eyes:  Negative for visual disturbance.   Cardiovascular:  Negative for chest pain, claudication, cyanosis, dyspnea on exertion, irregular heartbeat, leg swelling, near-syncope, orthopnea, palpitations and paroxysmal nocturnal dyspnea.   Respiratory:  Positive for shortness of breath. " Negative for cough, sleep disturbances due to breathing, snoring and wheezing.         Cresco score 1   Endocrine: Negative for polydipsia and polyuria.   Hematologic/Lymphatic: Does not bruise/bleed easily.   Skin:  Negative for color change, flushing, nail changes, poor wound healing and suspicious lesions.   Musculoskeletal:  Positive for falls. Negative for arthritis, gout, joint pain, joint swelling, muscle cramps, muscle weakness and myalgias.   Gastrointestinal:  Positive for constipation and diarrhea. Negative for heartburn, hematemesis, hematochezia, melena and nausea.   Neurological:  Positive for loss of balance. Negative for disturbances in coordination, excessive daytime sleepiness, dizziness, focal weakness, headaches, light-headedness, numbness, vertigo and weakness.   Psychiatric/Behavioral:  Positive for depression. Negative for substance abuse. The patient is nervous/anxious. The patient does not have insomnia.    Allergic/Immunologic: Positive for environmental allergies.        Objective:    Physical Exam  Constitutional:       Appearance: She is well-developed.      Comments: RA O2 sat 96%   HENT:      Head: Normocephalic.   Eyes:      Conjunctiva/sclera: Conjunctivae normal.      Pupils: Pupils are equal, round, and reactive to light.   Neck:      Thyroid: No thyromegaly.      Vascular: No JVD.   Cardiovascular:      Rate and Rhythm: Normal rate and regular rhythm.      Pulses: Intact distal pulses.           Carotid pulses are 1+ on the right side and 1+ on the left side.       Radial pulses are 1+ on the right side and 1+ on the left side.        Dorsalis pedis pulses are 0 on the right side and 0 on the left side.        Posterior tibial pulses are 0 on the right side and 0 on the left side.      Heart sounds: Heart sounds are distant. No murmur heard.     No friction rub. No gallop.   Pulmonary:      Effort: Pulmonary effort is normal.      Breath sounds: No rales.      Comments:  Diminished breath sounds and prolong expiration.       Chest:      Chest wall: No tenderness.   Abdominal:      General: Bowel sounds are normal.      Palpations: Abdomen is soft.      Tenderness: There is no abdominal tenderness.   Musculoskeletal:         General: Normal range of motion.      Cervical back: Normal range of motion and neck supple.   Lymphadenopathy:      Cervical: No cervical adenopathy.   Skin:     General: Skin is warm and dry.      Findings: No rash.   Neurological:      Mental Status: She is alert and oriented to person, place, and time.           Assessment:       1. At risk for cardiovascular event, very HIGH risk    2. Primary hypertension    3. Mixed conductive and sensorineural hearing loss of both ears    4. ETHAN (generalized anxiety disorder)    5. Bronchiectasis without complication    6. Smoker, started age 20, now up to 2 ppd    7. Peripheral vascular disease, bilateral stent    8. Bilateral carotid artery stenosis, both stented    9. Sedentary lifestyle, since ankle problem 2-3 years ago.    10. Engages in binge consumption of alcohol    11. Benzodiazepine dependence, diazepam 5 mg daily for many years.    12. Hyponatremia    13. Iron deficiency anemia secondary to inadequate dietary iron intake    14. At risk for falling    15. Family history of premature CAD    16. Hypercholesterolemia    17. SOB (shortness of breath)         Plan:       Comfort was seen today for establish care.    Diagnoses and all orders for this visit:    At risk for cardiovascular event, very HIGH risk  -     IN OFFICE EKG 12-LEAD (to Muse)  -     Lipid Panel; Future  -     CRP, High Sensitivity; Future    Primary hypertension  -     Ambulatory referral/consult to Cardiology  -     Microalbumin/Creatinine Ratio, Urine; Future  -     Echo; Future    Mixed conductive and sensorineural hearing loss of both ears    ETHAN (generalized anxiety disorder)    Bronchiectasis without complication    Smoker, started age 20,  now up to 2 ppd    Peripheral vascular disease, bilateral stent  -     Lipid Panel; Future  -     CRP, High Sensitivity; Future    Bilateral carotid artery stenosis, both stented  -     Lipid Panel; Future  -     CRP, High Sensitivity; Future    Sedentary lifestyle, since ankle problem 2-3 years ago.    Engages in binge consumption of alcohol    Benzodiazepine dependence, diazepam 5 mg daily for many years.    Hyponatremia    Iron deficiency anemia secondary to inadequate dietary iron intake    At risk for falling    Family history of premature CAD    Hypercholesterolemia  -     Lipid Panel; Future  -     CRP, High Sensitivity; Future    SOB (shortness of breath)  -     Echo; Future     - All medical issues reviewed, continue current Rx.  - Warning signs of MI and stroke given, if symptoms last more than 5 minutes, stop immediately and call 911, then chew 2-4 low-dose ASA (81 mg).   - Need annual fundoscopic examination   - CV status and all medications reviewed, patient acknowledge good understanding.  - Recommend healthy living: no nicotine, moderate alcohol, healthy diet and regular exercise aiming for fitness, restorative sleep and weight control  - Discussed healthy alcohol daily limit of 0.5 oz of pure alcohol in any 24 hours (roughly one 12-oz beers, 5 oz of wine (8%-12% alcohol), or 0.75 oz (half a shot) of liquor (80 proof)), can not save up.   - Need good exercise program, 4 key elements: 1. Aerobic (walking, swimming, dancing, jogging, biking, etc, 2. Muscle strengthening / resistance exercise, need to do 2-3 times weekly, 3. Stretching daily, good stretch takes a whole  total minute. 4. Balance exercise daily.   - Encourage activities as much as tolerated. Any activity is better than none!   - Instruction for Mediterranean diet and heart healthy exercise given.  - Check home blood pressure, 2 days weekly, do 2 readings within 5 minutes in AM and PM, keep log for review. Target resting BP is less than  130/80.   - Highly recommend 30-60 minutes of exercise / activities daily, can have Sunday off, with 2-3 sessions of muscle strengthening weekly. A  would be very helpful.  - Recommend at least annual cardiovascular evaluation in view of patient's significant risk factors.  - Phone review / encourage use of [facility], [facility]    Total time spend including review of record prior to face-to-face visit is 60 minutes. Greater than 50% of the time was spent in counseling and coordination of care. The above assessment and plan have been discussed at length. Referring provider's note reviewed. Labs and procedure over the last 6 months reviewed. Problem List updated. Asked to bring in all active medications / pills bottles with next visit. Will send note to referring / PCP.

## 2023-09-13 NOTE — PATIENT INSTRUCTIONS
Recommended Mediterranean dietEating Heart-Healthy Food: Using the DASH Plan  Eating for your heart doesnt have to be hard or boring. You just need to know how to make healthier choices. The DASH eating plan has been developed to help you do just that. DASH stands for Dietary Approaches to Stop Hypertension. It is a plan that has been proven to be healthier for your heart and to lower your risk for high blood pressure. It can also help lower your risk for cancer, heart disease, osteoporosis, and diabetes.  Choosing from Each Food Group  Choose foods from each of the food groups below each day. Try to get the recommended number of servings for each food group. The serving numbers are based on a diet of 2,000 calories a day. Talk to your doctor if youre unsure about your calorie needs.  Grains   Servings: 7-8 a day  A serving is:  1 slice bread  1 ounce dry cereal  half a cup cooked rice or pasta  Best choices: Whole grains and any grains high in fiber.  Vegetables   Servings: 4-5 a day  A serving is:  1 cup raw leafy vegetable  Half a cup cooked vegetable  Three-quarter cup vegetable juice  Best choices: Fresh or frozen vegetable prepared without too much added salt or fat.    Fruits   Servings: 4-5 a day  A serving is:  Three-quarter cup fruit juice  1 medium fruit  One-quarter cup dried fruit  One-half cup fresh, frozen, or canned fruit  Best choices: A variety of fresh fruits of different colors. Whole fruits are a much better choice than fruit juices.  Low-fat or Fat Free Dairy   Servings: 2-3 a day  A serving is:  8 ounces milk  1 cup yogurt  One and a half ounces cheese  Best choices: Skim or 1% milk, low-fat or fat free yogurt or buttermilk, and low-fat cheeses.       Meat, Poultry, Fish   Servings: 2 or fewer a day  A serving is:  3 ounces cooked meat, poultry, or fish  Best choices: Lean meats and fish. Trim away visible fat. Broil, roast, or boil instead of frying. Remove skin from poultry before eating.   Nuts, Seeds, Beans   Servings: 4-5 a week  A serving is:  One third cup nuts (or one and a half ounces)  2 tablespoons sunflower seeds  Half a cup cooked beans  Best choices: Dry roasted nuts with no salt added, lentils, kidney beans, garbanzo beans, and whole sandy beans.    Fats and Oils   Servings: 2 a day  A serving is:  1 teaspoon vegetable oil  1 teaspoon soft margarine  1 tablespoon low-fat mayonnaise  1 teaspoon regular mayonnaise  2 tablespoons light salad dressing  1 tablespoon regular salad dressing  Best choices: Monounsaturated and polyunsaturated fats such as olive, canola, or safflower oil.  Sweets   Servings: 5 a week or fewer  A serving is:  1 tablespoon sugar, maple syrup, or honey  1 tablespoon jam or jelly  1 half-ounce jelly beans (about 15)  8 ounces lemonade  Best choices: Dried fruit can be a satisfying sweet. Choose low-fat sweets when possible. And watch your serving sizes!       Aerobic Exercise for a Healthy Heart  Exercise is a lot more than an energy booster and a stress reliever. It also strengthens your heart muscle, lowers your blood pressure and blood cholesterol, and burns calories.      Remember, some activity is better than none.     Choose an Aerobic Activity  Choose a nonstop activity that makes your heart and lungs work harder than they do when you rest or walk normally. This aerobic exercise can improve the way your heart and other muscles use oxygen. Make it fun by exercising with a friend and choosing an activity you enjoy. Here are some ideas:  Walking  Swimming  Bicycling  Stair climbing  Dancing  Jogging  Exercise Regularly  If you havent been exercising regularly,  get your doctors okay first. Then start slowly.  Here are some tips:  Begin exercising 3 times a week for 5-10 minutes at a time.  When you feel comfortable, add a few minutes each week.  Slowly build up to exercising 3-4 times each week for 20-40 minutes. Aim for a total of 150 or more minutes a  week.  Be sure to carry your nitroglycerin with you when you exercise.  If you get angina when youre exercising, stop what youre doing, take your nitroglycerin, and call your doctor.  © 3853-4671 [employer]jal Drake, [address] [place] [address], [place] [address]. All rights reserved. This information is not intended as a substitute for professional medical care. Always follow your healthcare professional's instructions.

## 2023-09-14 ENCOUNTER — TELEPHONE (OUTPATIENT)
Dept: CARDIOLOGY | Facility: CLINIC | Age: 68
End: 2023-09-14
Payer: MEDICARE

## 2023-09-14 NOTE — TELEPHONE ENCOUNTER
LVM to go over test results  ----- Message from Adan Trotter MD sent at 9/13/2023  5:05 PM CDT -----  The LDL-C target is less than 55. Need to review compliance with atorvastatin 40 mg and encourage healthy living habits with heart healthy diet and regular exercise. Can consider doubling the statin dose and repeat lipid panel in 1-3 months. Thanks,    Dr. Trotter     ----- Message -----  From: [facility] Interface  Sent: 9/13/2023   4:58 PM CDT  To: Adan Trotter MD

## 2023-10-19 RX ORDER — ISOSORBIDE MONONITRATE 30 MG/1
30 TABLET, EXTENDED RELEASE ORAL DAILY
Qty: 90 TABLET | Refills: 3 | Status: SHIPPED | OUTPATIENT
Start: 2023-10-19

## 2023-10-19 NOTE — TELEPHONE ENCOUNTER
Refill Routing Note   Medication(s) are not appropriate for processing by [facility] for the following reason(s):      No active prescription written by PCP    [facility] action(s):  Defer Care Due:  None identified: FLOS 12/28/23          Appointments  past 12m or future 3m with PCP    Date Provider   Last Visit   8/24/2023 Simran Pool MD   Next Visit   11/24/2023 Simran Pool MD   ED visits in past 90 days: 0        Note composed:1:45 PM 10/19/2023

## 2023-10-19 NOTE — TELEPHONE ENCOUNTER
Care Due:                  Date            Visit Type   Department     Provider  --------------------------------------------------------------------------------                                EP -                              PRIMARY      [facility]  Last Visit: 08-      CARE ([facility])   MEDICINE       Simran Pool                              EP -                              PRIMARY      [facility]  Next Visit: 11-      CARE ([facility])   MEDICINE       Simran Pool                                                            Last  Test          Frequency    Reason                     Performed    Due Date  --------------------------------------------------------------------------------    K...........  12 months..  olmesartan...............  09-   09-    [facility] Embedded Care Due Messages. Reference number: [id number].   10/19/2023 1:41:35 PM CDT

## 2023-10-24 DIAGNOSIS — F41.1 GAD (GENERALIZED ANXIETY DISORDER): ICD-10-CM

## 2023-10-24 RX ORDER — DIAZEPAM 5 MG/1
TABLET ORAL
Qty: 60 TABLET | Refills: 0 | Status: SHIPPED | OUTPATIENT
Start: 2023-10-24 | End: 2023-10-27

## 2023-10-24 NOTE — TELEPHONE ENCOUNTER
----- Message from Juju Michael sent at 10/24/2023  3:03 PM CDT -----  Contact: self  Type:  RX Refill Request    Who Called: Pt   Refill or New Rx:  Refill/ New  RX Name and Strength:diazePAM (VALIUM) 5 MG tablet or comparable   How is the patient currently taking it? (ex. 1XDay): as directed  Is this a 30 day or 90 day RX: 30  Preferred Pharmacy with phone number:   [facility] - [place] - [address]  [facility] [address].  [place] [address]  Phone: [phone] Fax: [phone]  Local or Mail Order: Local  Ordering Provider: Dr. Pool  Would the patient rather a call back or a response via [facility]?  Call   Best Call Back Number:[phone]   Please call pt to advise... Thank you...

## 2023-10-24 NOTE — TELEPHONE ENCOUNTER
No care due was identified.  [facility] Embedded Care Due Messages. Reference number: [id number].   10/24/2023 3:12:12 PM CDT

## 2023-10-25 ENCOUNTER — TELEPHONE (OUTPATIENT)
Dept: CARDIOLOGY | Facility: CLINIC | Age: 68
End: 2023-10-25
Payer: MEDICARE

## 2023-10-25 ENCOUNTER — TELEPHONE (OUTPATIENT)
Dept: FAMILY MEDICINE | Facility: CLINIC | Age: 68
End: 2023-10-25
Payer: MEDICARE

## 2023-10-25 NOTE — TELEPHONE ENCOUNTER
----- Message from Laura Sánchez sent at 10/25/2023  2:43 PM CDT -----  Contact: Self  Type:  Patient Needs Advice    Who Called:  Self  She fell and cancelled all her appts.   What this is regarding?:  She needs to reschedule them all and her Ekg and pulm function test.  Best Call Back Number:  [phone]  Additional Information:  Please call the pt back to advise. Thanks!

## 2023-10-25 NOTE — TELEPHONE ENCOUNTER
----- Message from Mel Barron sent at 10/25/2023  7:47 AM CDT -----  Contact: pharm  Type: Needs Medical Advice         Who Called: pharm  Best Call Back Number: [phone]   Additional Information: Requesting a call back regarding pharm is informing the office that diazePAM (VALIUM) 5 MG tablet is no longer manufactured and the pt will need a different  Rx to be called in.    Klonopin or xanax would be a replacement via pharm. Both only come in 1 mg and  2 mg      [facility] - [place] - [address] [facility].  [address] [place] [address].  [place] [address]  Phone: [phone] Fax: [phone]      Please Advise- Thank you

## 2023-10-25 NOTE — TELEPHONE ENCOUNTER
Please ask patient if she wants to switch medications. Other pharmacies in the area are not having trouble getting this medication. [facility] is the only one that has reported this to us at this point. We can either send the valium to another pharmacy or we can send a new rx for another medication to [facility]. - Dr. Pool

## 2023-10-25 NOTE — TELEPHONE ENCOUNTER
Per pharmacy informing the office that diazePAM (VALIUM) 5 MG tablet is no longer manufactured and the pt will need a different  Rx to be called in.     Klonopin or xanax would be a replacement via pharm. Both only come in 1 mg and  2 mg

## 2023-10-27 RX ORDER — ALPRAZOLAM 0.25 MG/1
0.25 TABLET ORAL 2 TIMES DAILY PRN
Qty: 60 TABLET | Refills: 0 | Status: SHIPPED | OUTPATIENT
Start: 2023-10-27 | End: 2023-11-24 | Stop reason: SDUPTHER

## 2023-11-02 ENCOUNTER — HOSPITAL ENCOUNTER (OUTPATIENT)
Dept: CARDIOLOGY | Facility: HOSPITAL | Age: 68
Discharge: HOME | End: 2023-11-02
Attending: INTERNAL MEDICINE
Payer: MEDICARE

## 2023-11-02 VITALS — BODY MASS INDEX: 20.4 KG/M2 | WEIGHT: 130 LBS | HEIGHT: 67 IN

## 2023-11-02 DIAGNOSIS — R06.02 SOB (SHORTNESS OF BREATH): ICD-10-CM

## 2023-11-02 DIAGNOSIS — I10 PRIMARY HYPERTENSION: ICD-10-CM

## 2023-11-02 LAB
AORTIC ROOT ANNULUS: 2.65 CM
AORTIC VALVE CUSP SEPERATION: 1.86 CM
ASCENDING AORTA: 2.76 CM
AV INDEX (PROSTH): 0.58
AV MEAN GRADIENT: 6 MMHG
AV PEAK GRADIENT: 11 MMHG
AV VALVE AREA BY VELOCITY RATIO: 1.94 CM²
AV VALVE AREA: 1.87 CM²
AV VELOCITY RATIO: 0.6
BSA FOR ECHO PROCEDURE: 1.67 M2
CV ECHO LV RWT: 0.6 CM
DOP CALC AO PEAK VEL: 1.68 M/S
DOP CALC AO VTI: 34.1 CM
DOP CALC LVOT AREA: 3.2 CM2
DOP CALC LVOT DIAMETER: 2.03 CM
DOP CALC LVOT PEAK VEL: 1.01 M/S
DOP CALC LVOT STROKE VOLUME: 63.73 CM3
DOP CALC MV VTI: 22.3 CM
DOP CALCLVOT PEAK VEL VTI: 19.7 CM
E WAVE DECELERATION TIME: 201.24 MSEC
E/A RATIO: 0.75
E/E' RATIO: 13 M/S
ECHO LV POSTERIOR WALL: 1.21 CM (ref 0.6–1.1)
EJECTION FRACTION: 61 %
FRACTIONAL SHORTENING: 33 % (ref 28–44)
GLOBAL LONGITUIDAL STRAIN: 17 %
INTERVENTRICULAR SEPTUM: 1.44 CM (ref 0.6–1.1)
LA MAJOR: 2.87 CM
LA MINOR: 1.92 CM
LEFT ATRIUM SIZE: 3.63 CM
LEFT INTERNAL DIMENSION IN SYSTOLE: 2.72 CM (ref 2.1–4)
LEFT VENTRICLE DIASTOLIC VOLUME INDEX: 42.3 ML/M2
LEFT VENTRICLE DIASTOLIC VOLUME: 71.07 ML
LEFT VENTRICLE MASS INDEX: 116 G/M2
LEFT VENTRICLE SYSTOLIC VOLUME INDEX: 16.4 ML/M2
LEFT VENTRICLE SYSTOLIC VOLUME: 27.55 ML
LEFT VENTRICULAR INTERNAL DIMENSION IN DIASTOLE: 4.03 CM (ref 3.5–6)
LEFT VENTRICULAR MASS: 194.15 G
LV LATERAL E/E' RATIO: 13 M/S
LV SEPTAL E/E' RATIO: 13 M/S
LVOT MG: 1.93 MMHG
LVOT MV: 0.64 CM/S
MV MEAN GRADIENT: 2 MMHG
MV PEAK A VEL: 0.87 M/S
MV PEAK E VEL: 0.65 M/S
MV PEAK GRADIENT: 4 MMHG
MV STENOSIS PRESSURE HALF TIME: 58.36 MS
MV VALVE AREA BY CONTINUITY EQUATION: 2.86 CM2
MV VALVE AREA P 1/2 METHOD: 3.77 CM2
PISA MRMAX VEL: 2.35 M/S
PISA TR MAX VEL: 2.12 M/S
PV MV: 0.72 M/S
PV PEAK GRADIENT: 4 MMHG
PV PEAK VELOCITY: 0.97 M/S
RA MAJOR: 2.65 CM
RA PRESSURE ESTIMATED: 3 MMHG
RA WIDTH: 1.84 CM
RIGHT VENTRICULAR END-DIASTOLIC DIMENSION: 2.48 CM
RIGHT VENTRICULAR LENGTH IN DIASTOLE (APICAL 4-CHAMBER VIEW): 4.67 CM
RV MID DIAMA: 1.78 CM
RV TB RVSP: 5 MMHG
SINUS: 2.59 CM
STJ: 2.36 CM
TDI LATERAL: 0.05 M/S
TDI SEPTAL: 0.05 M/S
TDI: 0.05 M/S
TR MAX PG: 18 MMHG
TV REST PULMONARY ARTERY PRESSURE: 21 MMHG
Z-SCORE OF LEFT VENTRICULAR DIMENSION IN END DIASTOLE: -1.51
Z-SCORE OF LEFT VENTRICULAR DIMENSION IN END SYSTOLE: -0.52

## 2023-11-02 PROCEDURE — 93306 ECHO (CUPID ONLY): ICD-10-PCS | Mod: 26,,, | Performed by: INTERNAL MEDICINE

## 2023-11-02 PROCEDURE — 93306 TTE W/DOPPLER COMPLETE: CPT

## 2023-11-02 PROCEDURE — 93306 TTE W/DOPPLER COMPLETE: CPT | Mod: 26,,, | Performed by: INTERNAL MEDICINE

## 2023-11-03 ENCOUNTER — TELEPHONE (OUTPATIENT)
Dept: CARDIOLOGY | Facility: CLINIC | Age: 68
End: 2023-11-03
Payer: MEDICARE

## 2023-11-03 NOTE — TELEPHONE ENCOUNTER
Lvm to go over the below information.     ----- Message from Elizabeth Musa LPN sent at 11/2/2023  4:34 PM CDT -----  Regarding: FW: questions    ----- Message -----  From: Adan Trotter MD  Sent: 11/2/2023   4:27 PM CDT  To: Simran Pool MD; Elizabeth Musa LPN  Subject: RE: questions                                    Unfortunately you have already demonstrated multi-territory vascular blockages. Please review my notes for advice on treating this problem. Testing for blockages will not change that.    Dr. Trotter    ----- Message -----  From: Elizabeth Musa LPN  Sent: 11/2/2023   4:06 PM CDT  To: Adan Trotter MD  Subject: FW: questions                                    Wants a test for blockages. She stated she has a history of stroke and her family has heart attacks. She wants to make sure she does not have a blockage. Please advise.     Thanks,     Elizabeth Musa LPN  ----- Message -----  From: Adan Trotter MD  Sent: 11/2/2023   3:00 PM CDT  To: Simran Pool MD; Elizabeth Musa LPN  Subject: RE: questions                                    This is not a test to look for CAD.    Dr. Trotter    ----- Message -----  From: Elizabeth Musa LPN  Sent: 11/2/2023   2:51 PM CDT  To: Adan Trotter MD  Subject: questions                                        Called pt. She said Dr. Palacios told her not take take medication if her blood pressure is low. She couldn't tell me what numbers she decides to not take it but she stated when she feels its low she does not take the medication. She also asked if this means she does not need any stents and that there are no blockages.     Thanks,     Elizabeth Musa LPN  ----- Message -----  From: Adan Trotter MD  Sent: 11/2/2023   2:30 PM CDT  To: Simran Pool MD; Elizabeth Musa LPN    Have evidence for hypertensive heart disease. Recommend checking home blood pressure, 2 days weekly, do 2 readings within 5 minutes in AM and PM, keep log for review with provider. Target resting BP  is less than 130/80. Please review with patient.    Dr. Trotter     ----- Message -----  From: Adan Trotter MD  Sent: 11/2/2023   2:17 PM CDT  To: Adan Trotter MD

## 2023-11-20 RX ORDER — DULOXETIN HYDROCHLORIDE 30 MG/1
30 CAPSULE, DELAYED RELEASE ORAL DAILY
Qty: 90 CAPSULE | Refills: 3 | Status: SHIPPED | OUTPATIENT
Start: 2023-11-20

## 2023-11-20 NOTE — TELEPHONE ENCOUNTER
----- Message from Cristal Genao sent at 11/20/2023  3:25 PM CST -----  Contact: Self  Type: Needs Medical Advice  Who Called:  Pt     Pharmacy name and phone #:    [facility] [place] [facility] [address]  [address] [place] [address]  Phone: [phone] Fax: [phone]      Best Call Back Number: [phone]    Additional Information: DULoxetine (CYMBALTA) 30 MG capsule  -- pt states she has not had medication in about 2 weeks and has severe sciatic nerve pain and wants to know why dr brunner refill. Please call.

## 2023-11-24 ENCOUNTER — OFFICE VISIT (OUTPATIENT)
Dept: FAMILY MEDICINE | Facility: CLINIC | Age: 68
End: 2023-11-24
Payer: MEDICARE

## 2023-11-24 VITALS
HEIGHT: 67 IN | HEART RATE: 80 BPM | DIASTOLIC BLOOD PRESSURE: 66 MMHG | SYSTOLIC BLOOD PRESSURE: 118 MMHG | BODY MASS INDEX: 20.09 KG/M2 | RESPIRATION RATE: 16 BRPM | WEIGHT: 128 LBS

## 2023-11-24 DIAGNOSIS — Z12.31 SCREENING MAMMOGRAM FOR BREAST CANCER: ICD-10-CM

## 2023-11-24 DIAGNOSIS — F41.1 GAD (GENERALIZED ANXIETY DISORDER): Primary | ICD-10-CM

## 2023-11-24 DIAGNOSIS — M79.604 RIGHT LEG PAIN: ICD-10-CM

## 2023-11-24 DIAGNOSIS — Z87.81 S/P ORIF (OPEN REDUCTION INTERNAL FIXATION) FRACTURE: ICD-10-CM

## 2023-11-24 DIAGNOSIS — Z79.899 CHRONIC PRESCRIPTION BENZODIAZEPINE USE: ICD-10-CM

## 2023-11-24 DIAGNOSIS — Z98.890 S/P ORIF (OPEN REDUCTION INTERNAL FIXATION) FRACTURE: ICD-10-CM

## 2023-11-24 DIAGNOSIS — R30.0 DYSURIA: ICD-10-CM

## 2023-11-24 LAB
BILIRUBIN, UA POC OHS: NEGATIVE
BLOOD, UA POC OHS: ABNORMAL
CLARITY, UA POC OHS: CLEAR
COLOR, UA POC OHS: YELLOW
GLUCOSE, UA POC OHS: NEGATIVE
KETONES, UA POC OHS: NEGATIVE
LEUKOCYTES, UA POC OHS: ABNORMAL
NITRITE, UA POC OHS: NEGATIVE
PH, UA POC OHS: 6.5
PROTEIN, UA POC OHS: NEGATIVE
SPECIFIC GRAVITY, UA POC OHS: 1.02
UROBILINOGEN, UA POC OHS: 0.2

## 2023-11-24 PROCEDURE — 3008F PR BODY MASS INDEX (BMI) DOCUMENTED: ICD-10-PCS | Mod: CPTII,S$GLB,, | Performed by: FAMILY MEDICINE

## 2023-11-24 PROCEDURE — 4010F PR ACE/ARB THEARPY RXD/TAKEN: ICD-10-PCS | Mod: CPTII,S$GLB,, | Performed by: FAMILY MEDICINE

## 2023-11-24 PROCEDURE — 80307 DRUG TEST PRSMV CHEM ANLYZR: CPT | Performed by: FAMILY MEDICINE

## 2023-11-24 PROCEDURE — 3078F PR MOST RECENT DIASTOLIC BLOOD PRESSURE < 80 MM HG: ICD-10-PCS | Mod: CPTII,S$GLB,, | Performed by: FAMILY MEDICINE

## 2023-11-24 PROCEDURE — 99214 PR OFFICE/OUTPT VISIT, EST, LEVL IV, 30-39 MIN: ICD-10-PCS | Mod: S$GLB,,, | Performed by: FAMILY MEDICINE

## 2023-11-24 PROCEDURE — 99999 PR PBB SHADOW E&M-EST. PATIENT-LVL III: CPT | Mod: PBBFAC,,, | Performed by: FAMILY MEDICINE

## 2023-11-24 PROCEDURE — 3074F PR MOST RECENT SYSTOLIC BLOOD PRESSURE < 130 MM HG: ICD-10-PCS | Mod: CPTII,S$GLB,, | Performed by: FAMILY MEDICINE

## 2023-11-24 PROCEDURE — 81003 POCT URINALYSIS(INSTRUMENT): ICD-10-PCS | Mod: QW,S$GLB,, | Performed by: FAMILY MEDICINE

## 2023-11-24 PROCEDURE — 3078F DIAST BP <80 MM HG: CPT | Mod: CPTII,S$GLB,, | Performed by: FAMILY MEDICINE

## 2023-11-24 PROCEDURE — 99999 PR PBB SHADOW E&M-EST. PATIENT-LVL III: ICD-10-PCS | Mod: PBBFAC,,, | Performed by: FAMILY MEDICINE

## 2023-11-24 PROCEDURE — 3008F BODY MASS INDEX DOCD: CPT | Mod: CPTII,S$GLB,, | Performed by: FAMILY MEDICINE

## 2023-11-24 PROCEDURE — 4010F ACE/ARB THERAPY RXD/TAKEN: CPT | Mod: CPTII,S$GLB,, | Performed by: FAMILY MEDICINE

## 2023-11-24 PROCEDURE — 81003 URINALYSIS AUTO W/O SCOPE: CPT | Mod: QW,S$GLB,, | Performed by: FAMILY MEDICINE

## 2023-11-24 PROCEDURE — 1160F PR REVIEW ALL MEDS BY PRESCRIBER/CLIN PHARMACIST DOCUMENTED: ICD-10-PCS | Mod: CPTII,S$GLB,, | Performed by: FAMILY MEDICINE

## 2023-11-24 PROCEDURE — 3074F SYST BP LT 130 MM HG: CPT | Mod: CPTII,S$GLB,, | Performed by: FAMILY MEDICINE

## 2023-11-24 PROCEDURE — 99214 OFFICE O/P EST MOD 30 MIN: CPT | Mod: S$GLB,,, | Performed by: FAMILY MEDICINE

## 2023-11-24 PROCEDURE — 1159F PR MEDICATION LIST DOCUMENTED IN MEDICAL RECORD: ICD-10-PCS | Mod: CPTII,S$GLB,, | Performed by: FAMILY MEDICINE

## 2023-11-24 PROCEDURE — 1159F MED LIST DOCD IN RCRD: CPT | Mod: CPTII,S$GLB,, | Performed by: FAMILY MEDICINE

## 2023-11-24 PROCEDURE — 1160F RVW MEDS BY RX/DR IN RCRD: CPT | Mod: CPTII,S$GLB,, | Performed by: FAMILY MEDICINE

## 2023-11-24 RX ORDER — NITROFURANTOIN 25; 75 MG/1; MG/1
100 CAPSULE ORAL 2 TIMES DAILY
Qty: 10 CAPSULE | Refills: 0 | Status: SHIPPED | OUTPATIENT
Start: 2023-11-24 | End: 2023-11-29

## 2023-11-24 RX ORDER — ALPRAZOLAM 0.25 MG/1
0.25 TABLET ORAL 2 TIMES DAILY PRN
Qty: 60 TABLET | Refills: 2 | Status: SHIPPED | OUTPATIENT
Start: 2023-11-24 | End: 2024-02-14

## 2023-11-24 NOTE — PROGRESS NOTES
Subjective:       Patient ID: Comfort Cantu is a 68 y.o. female.    Chief Complaint: Follow-up (Patient would like to follow up. She hasn't been taking her BP meds daily, she says her BP is low she takes them only when its elevated. She thinks she may have a UTI and would like some referrals today to some specialist. )    Presents today for follow up.   Essential hypertension: Blood pressures have been well controlled and even low at times.  BP Readings from Last 3 Encounters:  11/24/23 : 118/66  09/13/23 : (!) 143/81  09/07/23 : (!) 146/90  Hypertension Medications      isosorbide mononitrate (IMDUR) 30 MG 24 hr tablet Take 1 tablet (30 mg   total) by mouth once daily.    olmesartan (BENICAR) 20 MG tablet TAKE ONE (1) TABLET ONCE DAILY FOR   BLOOD PRESSURE   Patient denies headache, chest pain, palpitations, shortness of breath.     She feels like she is having a UTI.     She is wanting to see another orthopedic specialist for her right leg. She is concerned because it healed crooked.         Review of Systems   Constitutional:  Negative for activity change, appetite change, fatigue and fever.   Respiratory:  Negative for shortness of breath.    Gastrointestinal:  Negative for abdominal pain.   Integumentary:  Negative for rash.         Objective:      Physical Exam  Vitals and nursing note reviewed.   Constitutional:       General: She is not in acute distress.     Appearance: She is not ill-appearing.   Cardiovascular:      Rate and Rhythm: Normal rate and regular rhythm.      Heart sounds: No murmur heard.  Pulmonary:      Effort: Pulmonary effort is normal.      Breath sounds: Normal breath sounds. No wheezing.   Skin:     General: Skin is warm and dry.      Findings: No rash.   Neurological:      Mental Status: She is alert.   Psychiatric:         Mood and Affect: Mood normal.         Behavior: Behavior normal.         Assessment:       1. ETHAN (generalized anxiety disorder)    2. Dysuria    3. Right leg  pain    4. S/P ORIF (open reduction internal fixation) fracture    5. Screening mammogram for breast cancer        Plan:       Problem List Items Addressed This Visit          Psychiatric    ETHAN (generalized anxiety disorder) - Primary    Relevant Medications    ALPRAZolam (XANAX) 0.25 MG tablet     Other Visit Diagnoses       Dysuria        Relevant Orders    POCT Urinalysis(Instrument)    Right leg pain        Relevant Orders    Ambulatory referral/consult to Orthopedics    S/P ORIF (open reduction internal fixation) fracture        Relevant Orders    Ambulatory referral/consult to Orthopedics    Screening mammogram for breast cancer        Relevant Orders    Mammo Digital Screening Bilat w/ Yovani

## 2023-11-27 LAB
AMPHET+METHAMPHET UR QL: NEGATIVE
BARBITURATES UR QL SCN>200 NG/ML: NEGATIVE
BENZODIAZ UR QL SCN>200 NG/ML: ABNORMAL
BZE UR QL SCN: NEGATIVE
CANNABINOIDS UR QL SCN: ABNORMAL
CREAT UR-MCNC: 48.2 MG/DL (ref 15–325)
METHADONE UR QL SCN>300 NG/ML: NEGATIVE
OPIATES UR QL SCN: NEGATIVE
PCP UR QL SCN>25 NG/ML: NEGATIVE
TOXICOLOGY INFORMATION: ABNORMAL

## 2023-12-06 ENCOUNTER — TELEPHONE (OUTPATIENT)
Dept: HEMATOLOGY/ONCOLOGY | Facility: CLINIC | Age: 68
End: 2023-12-06
Payer: MEDICARE

## 2023-12-20 ENCOUNTER — HOSPITAL ENCOUNTER (OUTPATIENT)
Dept: RADIOLOGY | Facility: HOSPITAL | Age: 68
Discharge: HOME | End: 2023-12-20
Attending: FAMILY MEDICINE
Payer: MEDICARE

## 2023-12-20 DIAGNOSIS — Z12.31 SCREENING MAMMOGRAM FOR BREAST CANCER: ICD-10-CM

## 2023-12-20 PROCEDURE — 77067 SCR MAMMO BI INCL CAD: CPT | Mod: 26,,, | Performed by: RADIOLOGY

## 2023-12-20 PROCEDURE — 77067 MAMMO DIGITAL SCREENING BILAT WITH TOMO: ICD-10-PCS | Mod: 26,,, | Performed by: RADIOLOGY

## 2023-12-20 PROCEDURE — 77063 BREAST TOMOSYNTHESIS BI: CPT | Mod: 26,,, | Performed by: RADIOLOGY

## 2023-12-20 PROCEDURE — 77063 MAMMO DIGITAL SCREENING BILAT WITH TOMO: ICD-10-PCS | Mod: 26,,, | Performed by: RADIOLOGY

## 2023-12-20 PROCEDURE — 77067 SCR MAMMO BI INCL CAD: CPT | Mod: TC

## 2023-12-21 ENCOUNTER — TELEPHONE (OUTPATIENT)
Dept: FAMILY MEDICINE | Facility: CLINIC | Age: 68
End: 2023-12-21
Payer: MEDICARE

## 2023-12-21 DIAGNOSIS — Z00.00 ANNUAL PHYSICAL EXAM: Primary | ICD-10-CM

## 2023-12-21 NOTE — TELEPHONE ENCOUNTER
Reviewed results with patient. Verbalized understanding. She mentioned that you all discussed a referral to OBGYN but she didn't seem too sure. Please advise.

## 2023-12-21 NOTE — TELEPHONE ENCOUNTER
----- Message from Polina Davis NP sent at 12/21/2023  8:51 AM CST -----  Please notify patient that mammogram is normal when compared to previous mammograms.  Continue self breast examinations and routine mammograms.

## 2023-12-21 NOTE — TELEPHONE ENCOUNTER
The patient is followed by Dr. Pool and unknown/never seen by myself.  I reviewed mammogram results while she is out of the office.     Please forward message related to gynecology referral to her as I am unsure if this was discussed/ reason for referral-   Thank you

## 2024-01-03 ENCOUNTER — OFFICE VISIT (OUTPATIENT)
Dept: HEMATOLOGY/ONCOLOGY | Facility: CLINIC | Age: 69
End: 2024-01-03
Payer: MEDICARE

## 2024-01-03 VITALS
BODY MASS INDEX: 19.81 KG/M2 | SYSTOLIC BLOOD PRESSURE: 156 MMHG | WEIGHT: 126.5 LBS | RESPIRATION RATE: 18 BRPM | OXYGEN SATURATION: 96 % | DIASTOLIC BLOOD PRESSURE: 80 MMHG | HEART RATE: 100 BPM

## 2024-01-03 DIAGNOSIS — D50.0 IRON DEFICIENCY ANEMIA DUE TO CHRONIC BLOOD LOSS: ICD-10-CM

## 2024-01-03 DIAGNOSIS — E61.1 IRON DEFICIENCY: Primary | ICD-10-CM

## 2024-01-03 PROCEDURE — 1159F MED LIST DOCD IN RCRD: CPT | Mod: CPTII,S$GLB,, | Performed by: NURSE PRACTITIONER

## 2024-01-03 PROCEDURE — 1100F PTFALLS ASSESS-DOCD GE2>/YR: CPT | Mod: CPTII,S$GLB,, | Performed by: NURSE PRACTITIONER

## 2024-01-03 PROCEDURE — 1126F AMNT PAIN NOTED NONE PRSNT: CPT | Mod: CPTII,S$GLB,, | Performed by: NURSE PRACTITIONER

## 2024-01-03 PROCEDURE — 99999 PR PBB SHADOW E&M-EST. PATIENT-LVL IV: CPT | Mod: PBBFAC,,, | Performed by: NURSE PRACTITIONER

## 2024-01-03 PROCEDURE — 3079F DIAST BP 80-89 MM HG: CPT | Mod: CPTII,S$GLB,, | Performed by: NURSE PRACTITIONER

## 2024-01-03 PROCEDURE — 3077F SYST BP >= 140 MM HG: CPT | Mod: CPTII,S$GLB,, | Performed by: NURSE PRACTITIONER

## 2024-01-03 PROCEDURE — 3288F FALL RISK ASSESSMENT DOCD: CPT | Mod: CPTII,S$GLB,, | Performed by: NURSE PRACTITIONER

## 2024-01-03 PROCEDURE — 99214 OFFICE O/P EST MOD 30 MIN: CPT | Mod: S$GLB,,, | Performed by: NURSE PRACTITIONER

## 2024-01-03 PROCEDURE — 3008F BODY MASS INDEX DOCD: CPT | Mod: CPTII,S$GLB,, | Performed by: NURSE PRACTITIONER

## 2024-01-03 RX ORDER — SODIUM CHLORIDE 0.9 % (FLUSH) 0.9 %
10 SYRINGE (ML) INJECTION
Status: CANCELLED | OUTPATIENT
Start: 2024-01-17

## 2024-01-03 RX ORDER — DIPHENHYDRAMINE HYDROCHLORIDE 50 MG/ML
50 INJECTION, SOLUTION INTRAMUSCULAR; INTRAVENOUS ONCE AS NEEDED
Status: CANCELLED | OUTPATIENT
Start: 2024-01-03

## 2024-01-03 RX ORDER — DIPHENHYDRAMINE HYDROCHLORIDE 50 MG/ML
50 INJECTION, SOLUTION INTRAMUSCULAR; INTRAVENOUS ONCE AS NEEDED
Status: CANCELLED | OUTPATIENT
Start: 2024-01-24

## 2024-01-03 RX ORDER — EPINEPHRINE 0.3 MG/.3ML
0.3 INJECTION SUBCUTANEOUS ONCE AS NEEDED
Status: CANCELLED | OUTPATIENT
Start: 2024-01-24

## 2024-01-03 RX ORDER — HEPARIN 100 UNIT/ML
500 SYRINGE INTRAVENOUS
Status: CANCELLED | OUTPATIENT
Start: 2024-01-31

## 2024-01-03 RX ORDER — DIPHENHYDRAMINE HYDROCHLORIDE 50 MG/ML
50 INJECTION, SOLUTION INTRAMUSCULAR; INTRAVENOUS ONCE AS NEEDED
Status: CANCELLED | OUTPATIENT
Start: 2024-01-31

## 2024-01-03 RX ORDER — HEPARIN 100 UNIT/ML
500 SYRINGE INTRAVENOUS
Status: CANCELLED | OUTPATIENT
Start: 2024-01-10

## 2024-01-03 RX ORDER — EPINEPHRINE 0.3 MG/.3ML
0.3 INJECTION SUBCUTANEOUS ONCE AS NEEDED
Status: CANCELLED | OUTPATIENT
Start: 2024-01-17

## 2024-01-03 RX ORDER — EPINEPHRINE 0.3 MG/.3ML
0.3 INJECTION SUBCUTANEOUS ONCE AS NEEDED
Status: CANCELLED | OUTPATIENT
Start: 2024-01-10

## 2024-01-03 RX ORDER — SODIUM CHLORIDE 0.9 % (FLUSH) 0.9 %
10 SYRINGE (ML) INJECTION
Status: CANCELLED | OUTPATIENT
Start: 2024-01-24

## 2024-01-03 RX ORDER — DIPHENHYDRAMINE HYDROCHLORIDE 50 MG/ML
50 INJECTION, SOLUTION INTRAMUSCULAR; INTRAVENOUS ONCE AS NEEDED
Status: CANCELLED | OUTPATIENT
Start: 2024-01-10

## 2024-01-03 RX ORDER — SODIUM CHLORIDE 0.9 % (FLUSH) 0.9 %
10 SYRINGE (ML) INJECTION
Status: CANCELLED | OUTPATIENT
Start: 2024-01-03

## 2024-01-03 RX ORDER — HEPARIN 100 UNIT/ML
500 SYRINGE INTRAVENOUS
Status: CANCELLED | OUTPATIENT
Start: 2024-01-17

## 2024-01-03 RX ORDER — EPINEPHRINE 0.3 MG/.3ML
0.3 INJECTION SUBCUTANEOUS ONCE AS NEEDED
Status: CANCELLED | OUTPATIENT
Start: 2024-01-31

## 2024-01-03 RX ORDER — SODIUM CHLORIDE 0.9 % (FLUSH) 0.9 %
10 SYRINGE (ML) INJECTION
Status: CANCELLED | OUTPATIENT
Start: 2024-01-31

## 2024-01-03 RX ORDER — HEPARIN 100 UNIT/ML
500 SYRINGE INTRAVENOUS
Status: CANCELLED | OUTPATIENT
Start: 2024-01-03

## 2024-01-03 RX ORDER — SODIUM CHLORIDE 0.9 % (FLUSH) 0.9 %
10 SYRINGE (ML) INJECTION
Status: CANCELLED | OUTPATIENT
Start: 2024-01-10

## 2024-01-03 RX ORDER — DIPHENHYDRAMINE HYDROCHLORIDE 50 MG/ML
50 INJECTION, SOLUTION INTRAMUSCULAR; INTRAVENOUS ONCE AS NEEDED
Status: CANCELLED | OUTPATIENT
Start: 2024-01-17

## 2024-01-03 RX ORDER — EPINEPHRINE 0.3 MG/.3ML
0.3 INJECTION SUBCUTANEOUS ONCE AS NEEDED
Status: CANCELLED | OUTPATIENT
Start: 2024-01-03

## 2024-01-03 RX ORDER — HEPARIN 100 UNIT/ML
500 SYRINGE INTRAVENOUS
Status: CANCELLED | OUTPATIENT
Start: 2024-01-24

## 2024-01-03 NOTE — PROGRESS NOTES
HPI    67-year-old female referred to Hematology Clinic for evaluation of severe anemia.  Patient was recently transfused in the ED with hemoglobin 6.2 grams/deciliter.  At the time patient is also microcytic.  The patient denies any overt bleeding.      Most recent lab show Hb of 11.8g/dL MCV 94\    Currently patient denies any overt bleeding.      4/10/2023:  She returns today follow-up with complaints of fatigue    8/28/2023:  She returns today follow-up.  Patient has no new symptoms and no new complaints    1/3/2024:  She returns today for follow-up  Past Medical History:   Diagnosis Date    Anticoagulant long-term use     Anxiety     Arthritis     Atherosclerosis     Closed displaced spiral fracture of shaft of right tibia 2/3/2020    Coronary artery disease     Depression     GERD (gastroesophageal reflux disease)     Hearing loss     [place] (hard of hearing)     Hearing aids    Hypertension     Long term (current) use of anticoagulants     Peripheral arterial disease     Tubulovillous adenoma of colon 2019    Wears dentures     Full-Upper; Partial- lower    Wound infection after surgery 06/2020     Social History     Socioeconomic History    Marital status:    Tobacco Use    Smoking status: Every Day     Current packs/day: 0.75     Average packs/day: 0.8 packs/day for 35.3 years (26.4 ttl pk-yrs)     Types: Cigarettes     Start date: 10/1/1988    Smokeless tobacco: Never   Substance and Sexual Activity    Alcohol use: Yes     Comment: occ    Drug use: Yes     Types: Oxycodone, Benzodiazepines     Comment: as prescribed    Sexual activity: Yes         Subjective      Review of Systems   Constitutional: Negative for appetite change, fatigue and unexpected weight change.  Positive for fatigue  HENT: Negative for mouth sores.   Eyes: Negative for visual disturbance.   Respiratory: Negative for cough and shortness of breath.   Cardiovascular: Negative for chest pain.   Gastrointestinal: Negative for  diarrhea.   Genitourinary: Negative for frequency.   Musculoskeletal: Negative for back pain.   Skin: Negative for rash.   Neurological: Negative for headaches.   Hematological: Negative for adenopathy.   Psychiatric/Behavioral: The patient is not nervous/anxious.   All other systems reviewed and are negative.     Objective    Physical Exam   There were no vitals filed for this visit.     Constitutional: patient is oriented to person, place, and time. patient appears well-developed and well-nourished. No distress.   HENT:   Right Ear: External ear normal.   Left Ear: External ear normal.   Nose: Nose normal.   Mouth/Throat: Oropharynx is clear and moist. No oropharyngeal exudate.   Teeth, gums and lips are normal   No sinus tenderness   Palate, tongue, posterior pharynx are normal   Eyes: Conjunctivae and lids are normal.   Neck: Trachea normal and normal range of motion. No thyromegaly   Cardiovascular: Normal rate, regular rhythm, normal heart sounds, intact distal pulses and normal pulses.   No murmur heard.   No edema, no tenderness in the extremities.   Pulmonary/Chest: Effort normal and breath sounds normal. No accessory muscle usage. patient has no wheezes..   Abdominal: Soft. Normal appearance and bowel sounds are normal. patient exhibits no distension and no mass. There is no hepatosplenomegaly. There is no tenderness.   Musculoskeletal: Normal range of motion.   Gait is normal   No clubbing, cyanosis       There were no vitals filed for this visit.       CMP  Sodium   Date Value Ref Range Status   12/20/2023 133 (L) 136 - 145 mmol/L Final     Potassium   Date Value Ref Range Status   12/20/2023 3.9 3.5 - 5.1 mmol/L Final     Chloride   Date Value Ref Range Status   12/20/2023 98 95 - 110 mmol/L Final     CO2   Date Value Ref Range Status   12/20/2023 23 23 - 29 mmol/L Final     Glucose   Date Value Ref Range Status   12/20/2023 121 (H) 70 - 110 mg/dL Final     BUN   Date Value Ref Range Status   12/20/2023  8 8 - 23 mg/dL Final     Creatinine   Date Value Ref Range Status   12/20/2023 0.8 0.5 - 1.4 mg/dL Final     Calcium   Date Value Ref Range Status   12/20/2023 9.1 8.7 - 10.5 mg/dL Final     Total Protein   Date Value Ref Range Status   12/20/2023 7.7 6.0 - 8.4 g/dL Final     Albumin   Date Value Ref Range Status   12/20/2023 4.0 3.5 - 5.2 g/dL Final     Total Bilirubin   Date Value Ref Range Status   12/20/2023 0.5 0.1 - 1.0 mg/dL Final     Comment:     For infants and newborns, interpretation of results should be based  on gestational age, weight and in agreement with clinical  observations.    Premature Infant recommended reference ranges:  Up to 24 hours.............<8.0 mg/dL  Up to 48 hours............<12.0 mg/dL  3-5 days..................<15.0 mg/dL  6-29 days.................<15.0 mg/dL       Alkaline Phosphatase   Date Value Ref Range Status   12/20/2023 111 55 - 135 U/L Final     AST   Date Value Ref Range Status   12/20/2023 20 10 - 40 U/L Final     ALT   Date Value Ref Range Status   12/20/2023 14 10 - 44 U/L Final     Anion Gap   Date Value Ref Range Status   12/20/2023 12 8 - 16 mmol/L Final     eGFR if    Date Value Ref Range Status   09/03/2021 >60.0 >60 mL/min/1.73 m^2 Final     eGFR if non    Date Value Ref Range Status   09/03/2021 >60.0 >60 mL/min/1.73 m^2 Final     Comment:     Calculation used to obtain the estimated glomerular filtration  rate (eGFR) is the CKD-EPI equation.        Lab Results   Component Value Date    WBC 6.10 12/20/2023    HGB 8.1 (L) 12/20/2023    HCT 27.7 (L) 12/20/2023    MCV 89 12/20/2023     (H) 12/20/2023           Assessment  Iron deficiency anemia    -Last transfusion 1 unit packed red blood cell on 09/02/2022.    -repeat labs show iron deficiency anemia despite being on oral iron  -proceed with Venofer 200 mg IV x5 doses  -see me in 3 months with repeat labs    Tubular adenoma with high-grade dysplasia in sigmoid  colon  -status post phlebotomy colectomy  -last colonoscopy 09/2020  -continue to do surveillance colonoscopy as patient is increased risk for colon cancer  -follow-up with GI for surveillance colonoscopy    Hypertension/coronary artery disease:   -follow-up with PCP    COPD:   -follow-up with PCP    Pulmonary nodules:   -CT chest with stable recommended 12 month follow-up with low-dose CT scan  -CT due 06/2024    Emphysema:   -has appointment with pulmonologist    Thrombocytosis:   -likely reactive and related to iron deficiency

## 2024-01-04 NOTE — TELEPHONE ENCOUNTER
It looks like I did not document our discussion re: Ob/gyn. Can you please ask her to refresh my memory why she wanted to see Ob/gyn. Happy to place a referral. -Dr. Pool

## 2024-01-19 ENCOUNTER — TELEPHONE (OUTPATIENT)
Dept: ADMINISTRATIVE | Facility: OTHER | Age: 69
End: 2024-01-19
Payer: MEDICARE

## 2024-01-19 NOTE — TELEPHONE ENCOUNTER
Patient states she has been trying to return a call regarding an iron infusion but has not been able to get through, requests someone call her back.

## 2024-01-22 RX ORDER — HEPARIN 100 UNIT/ML
500 SYRINGE INTRAVENOUS
Status: CANCELLED | OUTPATIENT
Start: 2024-01-22

## 2024-01-22 RX ORDER — DIPHENHYDRAMINE HYDROCHLORIDE 50 MG/ML
50 INJECTION INTRAMUSCULAR; INTRAVENOUS ONCE AS NEEDED
Status: CANCELLED | OUTPATIENT
Start: 2024-01-22

## 2024-01-22 RX ORDER — SODIUM CHLORIDE 0.9 % (FLUSH) 0.9 %
10 SYRINGE (ML) INJECTION
Status: CANCELLED | OUTPATIENT
Start: 2024-01-22

## 2024-01-22 RX ORDER — EPINEPHRINE 0.3 MG/.3ML
0.3 INJECTION SUBCUTANEOUS ONCE AS NEEDED
Status: CANCELLED | OUTPATIENT
Start: 2024-01-22

## 2024-01-25 ENCOUNTER — TELEPHONE (OUTPATIENT)
Dept: CARDIOLOGY | Facility: CLINIC | Age: 69
End: 2024-01-25
Payer: MEDICARE

## 2024-01-25 NOTE — TELEPHONE ENCOUNTER
LVM letting her know if it is an emergency to head to the ED. Sent message to doctor for any further instructions.     ----- Message from Nigel Hickman sent at 1/25/2024  3:15 PM CST -----  Type: Patient Call Back         Who called:         What is the request in detail: Pt called in regarding concerns on /104 and Heart Rate 52 . Pt states that she never had her heart rate that low          Can the clinic reply by [facility]?no          Would the patient rather a call back or a response via [facility]?call back          Best call back number:[phone] (mobile) pt          Additional Information:           Thank You

## 2024-01-29 ENCOUNTER — TELEPHONE (OUTPATIENT)
Dept: CARDIOLOGY | Facility: CLINIC | Age: 69
End: 2024-01-29
Payer: MEDICARE

## 2024-01-29 ENCOUNTER — INFUSION (OUTPATIENT)
Dept: INFUSION THERAPY | Facility: HOSPITAL | Age: 69
End: 2024-01-29
Attending: NURSE PRACTITIONER
Payer: MEDICARE

## 2024-01-29 VITALS
TEMPERATURE: 98 F | WEIGHT: 126 LBS | HEIGHT: 68 IN | OXYGEN SATURATION: 97 % | RESPIRATION RATE: 18 BRPM | DIASTOLIC BLOOD PRESSURE: 62 MMHG | BODY MASS INDEX: 19.1 KG/M2 | HEART RATE: 88 BPM | SYSTOLIC BLOOD PRESSURE: 116 MMHG

## 2024-01-29 DIAGNOSIS — D50.0 IRON DEFICIENCY ANEMIA DUE TO CHRONIC BLOOD LOSS: Primary | ICD-10-CM

## 2024-01-29 PROCEDURE — A4216 STERILE WATER/SALINE, 10 ML: HCPCS | Performed by: NURSE PRACTITIONER

## 2024-01-29 PROCEDURE — 96365 THER/PROPH/DIAG IV INF INIT: CPT

## 2024-01-29 PROCEDURE — 25000003 PHARM REV CODE 250: Performed by: NURSE PRACTITIONER

## 2024-01-29 PROCEDURE — 63600175 PHARM REV CODE 636 W HCPCS: Performed by: NURSE PRACTITIONER

## 2024-01-29 RX ORDER — HEPARIN 100 UNIT/ML
500 SYRINGE INTRAVENOUS
Status: CANCELLED | OUTPATIENT
Start: 2024-02-05

## 2024-01-29 RX ORDER — SODIUM CHLORIDE 0.9 % (FLUSH) 0.9 %
10 SYRINGE (ML) INJECTION
Status: CANCELLED | OUTPATIENT
Start: 2024-02-05

## 2024-01-29 RX ORDER — SODIUM CHLORIDE 0.9 % (FLUSH) 0.9 %
10 SYRINGE (ML) INJECTION
Status: DISCONTINUED | OUTPATIENT
Start: 2024-01-29 | End: 2024-01-29 | Stop reason: HOSPADM

## 2024-01-29 RX ORDER — DIPHENHYDRAMINE HYDROCHLORIDE 50 MG/ML
50 INJECTION INTRAMUSCULAR; INTRAVENOUS ONCE AS NEEDED
OUTPATIENT
Start: 2024-02-05

## 2024-01-29 RX ORDER — EPINEPHRINE 0.3 MG/.3ML
0.3 INJECTION SUBCUTANEOUS ONCE AS NEEDED
OUTPATIENT
Start: 2024-02-05

## 2024-01-29 RX ADMIN — Medication 10 ML: at 03:01

## 2024-01-29 RX ADMIN — SODIUM CHLORIDE 125 MG: 9 INJECTION, SOLUTION INTRAVENOUS at 02:01

## 2024-01-29 NOTE — TELEPHONE ENCOUNTER
Went over the information. Pt will work on this.   ----- Message from Adan Trotter MD sent at 1/26/2024  8:57 AM CST -----  Regarding: FW: advice  The heart rate is not the worrisome problem at 52 bpm, her HTN needs to be addressed. She is eligible for Digital Health if she is willing to get [facility] installed. Otherwise would recommend checking home blood pressure, 2 days weekly, do 2 readings within 5 minutes in AM and PM, keep log for review with provider. Target resting BP is less than 130/80. Please review with patient.    Dr. Trotter     ----- Message -----  From: Elizabeth Musa LPN  Sent: 1/25/2024   3:21 PM CST  To: Adan Trotter MD  Subject: advice                                           LVM twlling her if it is an emergency to head to the ED. Other than that do you have any advice?    Thanks,    Elizabeth Musa LPN  ----- Message -----  From: Nigel Hickman  Sent: 1/25/2024   3:17 PM CST  To: [facility]    Type: Patient Call Back         Who called:         What is the request in detail: Pt called in regarding concerns on /104 and Heart Rate 52 . Pt states that she never had her heart rate that low          Can the clinic reply by [facility]?no          Would the patient rather a call back or a response via [facility]?call back          Best call back number:[phone] (mobile) pt          Additional Information:           Thank You

## 2024-02-01 ENCOUNTER — OFFICE VISIT (OUTPATIENT)
Dept: GASTROENTEROLOGY | Facility: CLINIC | Age: 69
End: 2024-02-01
Payer: MEDICARE

## 2024-02-01 VITALS
HEART RATE: 87 BPM | WEIGHT: 124.31 LBS | BODY MASS INDEX: 18.84 KG/M2 | HEIGHT: 68 IN | DIASTOLIC BLOOD PRESSURE: 91 MMHG | SYSTOLIC BLOOD PRESSURE: 164 MMHG

## 2024-02-01 DIAGNOSIS — Z90.49 S/P COLECTOMY: ICD-10-CM

## 2024-02-01 DIAGNOSIS — D50.9 IRON DEFICIENCY ANEMIA, UNSPECIFIED IRON DEFICIENCY ANEMIA TYPE: Primary | ICD-10-CM

## 2024-02-01 DIAGNOSIS — R03.0 ELEVATED BLOOD PRESSURE READING: ICD-10-CM

## 2024-02-01 DIAGNOSIS — R19.5 DARK STOOLS: ICD-10-CM

## 2024-02-01 DIAGNOSIS — K86.2 PANCREAS CYST: ICD-10-CM

## 2024-02-01 DIAGNOSIS — R00.0 RACING HEART BEAT: ICD-10-CM

## 2024-02-01 DIAGNOSIS — K21.9 GASTROESOPHAGEAL REFLUX DISEASE, UNSPECIFIED WHETHER ESOPHAGITIS PRESENT: ICD-10-CM

## 2024-02-01 DIAGNOSIS — D12.6 HIGH GRADE DYSPLASIA IN COLONIC ADENOMA: ICD-10-CM

## 2024-02-01 DIAGNOSIS — K59.00 CONSTIPATION, UNSPECIFIED CONSTIPATION TYPE: ICD-10-CM

## 2024-02-01 DIAGNOSIS — I10 BENIGN ESSENTIAL HYPERTENSION: ICD-10-CM

## 2024-02-01 DIAGNOSIS — R10.30 LOWER ABDOMINAL PAIN: ICD-10-CM

## 2024-02-01 PROCEDURE — 3008F BODY MASS INDEX DOCD: CPT | Mod: CPTII,S$GLB,,

## 2024-02-01 PROCEDURE — 3288F FALL RISK ASSESSMENT DOCD: CPT | Mod: CPTII,S$GLB,,

## 2024-02-01 PROCEDURE — 1159F MED LIST DOCD IN RCRD: CPT | Mod: CPTII,S$GLB,,

## 2024-02-01 PROCEDURE — 3077F SYST BP >= 140 MM HG: CPT | Mod: CPTII,S$GLB,,

## 2024-02-01 PROCEDURE — 1126F AMNT PAIN NOTED NONE PRSNT: CPT | Mod: CPTII,S$GLB,,

## 2024-02-01 PROCEDURE — 99999 PR PBB SHADOW E&M-EST. PATIENT-LVL IV: CPT | Mod: PBBFAC,,,

## 2024-02-01 PROCEDURE — 99214 OFFICE O/P EST MOD 30 MIN: CPT | Mod: S$GLB,,,

## 2024-02-01 PROCEDURE — 3079F DIAST BP 80-89 MM HG: CPT | Mod: CPTII,S$GLB,,

## 2024-02-01 PROCEDURE — 1101F PT FALLS ASSESS-DOCD LE1/YR: CPT | Mod: CPTII,S$GLB,,

## 2024-02-01 PROCEDURE — 1160F RVW MEDS BY RX/DR IN RCRD: CPT | Mod: CPTII,S$GLB,,

## 2024-02-01 RX ORDER — OLMESARTAN MEDOXOMIL 20 MG/1
20 TABLET ORAL
Qty: 30 TABLET | Refills: 3 | Status: SHIPPED | OUTPATIENT
Start: 2024-02-01

## 2024-02-01 RX ORDER — PRASUGREL 5 MG/1
TABLET, FILM COATED ORAL
Qty: 90 TABLET | Refills: 0 | Status: SHIPPED | OUTPATIENT
Start: 2024-02-01 | End: 2024-04-23

## 2024-02-01 NOTE — PROGRESS NOTES
Subjective:       Patient ID: Comfort Cantu is a 68 y.o. female Body mass index is 18.91 kg/m².    Chief Complaint: Anemia    This patient is new to me.  Referring Provider: Zully Baker for [facility].  Established patient of Dr. Yancey.  Former patient of Dr. Alba.     Anemia  Presents for initial visit. Symptoms include abdominal pain (Intermittent lower abdominal ache that worsens when constipated), bruises/bleeds easily and palpitations (History of intermittent heart racing; followed by Dr. Trotter (cardiology)). There has been no confusion, fever, leg swelling, light-headedness, malaise/fatigue, pallor, paresthesias, pica or weight loss. (Patient also has constipation.  She typically has 1 BM every 2 days rated stool 1 through 3 on Enfield scale; does not feel complete after BMs.  She also has history of reflux that is well-controlled taking Protonix 40 mg p.r.n. about 3 times weekly Hx of reflux. Colonoscopy 01/23/20 - Non-bleeding internal hemorrhoids. Diverticulosis in the sigmoid colon, in the descending colon and in the transverse colon. Three 2 to 4 mm polyps in the sigmoid colon and in the ascending colon, removed with a cold biopsy forceps. Resected and retrieved. One 18 mm polyp in the ascending colon, removed with a hot snare and removed using injection-lift and a hot snare. Resected and retrieved. One 30 mm polyp in the sigmoid colon, removed with a hot snare, removed using injection-lift and a hot snare and removed piecemeal using a hot snare. Incomplete resection. Resected tissue retrieved. A tattoo was seen in the sigmoid colon. The rectum, cecum, appendiceal orifice and ileocecal valve are normal. The examined portion of the ileum was normal; patho: Tubular adenoma with high grade dysplasia. Robotic sigmoid colectomy 09/14/2020 for removal of tubular adenoma with high grade dysplasia) Signs of blood loss that are present include melena (Stools after she started oral iron supplements; denies  use of blood thinner or Pepto-Bismol). Signs of blood loss that are not present include hematemesis and hematochezia. Past treatments include oral iron supplements and oral vitamin B12 (Currently getting iron infusions and is currently taking oral iron supplements). There is no history of alcohol abuse, cancer, chronic liver disease, chronic renal disease, clotting disorder, dementia, heart failure, hemoglobinopathy, HIV/AIDS, hypothyroidism, inflammatory bowel disease, malabsorption, malnutrition, neuropathy, recent illness, recent surgery, recent trauma or rheumatic disease. Procedure history includes colonoscopy and EGD. There is no past history of bone marrow exam or FOBT.     Review of Systems   Constitutional:  Negative for activity change, appetite change, chills, diaphoresis, fatigue, fever, malaise/fatigue, unexpected weight change and weight loss.   HENT:  Negative for sore throat and trouble swallowing.    Respiratory:  Negative for cough, choking and shortness of breath.    Cardiovascular:  Positive for palpitations (History of intermittent heart racing; followed by Dr. Trotter (cardiology)). Negative for chest pain.   Gastrointestinal:  Positive for abdominal pain (Intermittent lower abdominal ache that worsens when constipated) and melena (Stools after she started oral iron supplements; denies use of blood thinner or Pepto-Bismol). Negative for abdominal distention, anal bleeding, blood in stool, constipation, diarrhea, hematemesis, hematochezia, nausea, rectal pain and vomiting.   Genitourinary:  Negative for hematuria.   Skin:  Negative for pallor and rash.   Neurological:  Negative for light-headedness and paresthesias.   Hematological:  Bruises/bleeds easily.   Psychiatric/Behavioral:  Negative for confusion.        No LMP recorded. Patient is postmenopausal.  Past Medical History:   Diagnosis Date    Anticoagulant long-term use     Anxiety     Arthritis     Atherosclerosis     Closed displaced spiral  fracture of shaft of right tibia 2/3/2020    Coronary artery disease     Depression     GERD (gastroesophageal reflux disease)     Hearing loss     [place] (hard of hearing)     Hearing aids    Hypertension     Long term (current) use of anticoagulants     Peripheral arterial disease     Tubulovillous adenoma of colon 2019    Wears dentures     Full-Upper; Partial- lower    Wound infection after surgery 06/2020     Past Surgical History:   Procedure Laterality Date    APPENDECTOMY      CAROTID STENT  2014    COLON SURGERY      COLONOSCOPY N/A 10/07/2019    Procedure: COLONOSCOPY;  Surgeon: Olaf Alba MD;  Location: [facility] ENDO;  Service: General;  Laterality: N/A;    COLONOSCOPY N/A 12/16/2019    Procedure: COLONOSCOPY;  Surgeon: Olaf Alba MD;  Location: [facility] ENDO;  Service: General;  Laterality: N/A;    COLONOSCOPY N/A 01/23/2020    Procedure: COLONOSCOPY (unable to come earlier);  Surgeon: Esteban Yancey MD;  Location: [facility] ENDO;  Service: Endoscopy;  Laterality: N/A;    ENDOSCOPIC ULTRASOUND OF UPPER GASTROINTESTINAL TRACT N/A 10/16/2018    Procedure: ULTRASOUND, UPPER GI TRACT, ENDOSCOPIC;  Surgeon: Kumar Wang MD;  Location: [facility] ENDO (2ND FLR);  Service: Endoscopy;  Laterality: N/A;  5 day hold Effient, Dr Сергей Malagon    ESOPHAGOGASTRODUODENOSCOPY N/A 10/07/2019    Procedure: ESOPHAGOGASTRODUODENOSCOPY (EGD);  Surgeon: Olaf Alba MD;  Location: [facility];  Service: General;  Laterality: N/A;    FEMORAL ARTERY STENT      HARDWARE REMOVAL Right 06/12/2020    Procedure: REMOVAL, HARDWARE;  Surgeon: Elbert Marrero II, MD;  Location: [facility] OR;  Service: Orthopedics;  Laterality: Right;  right distal tibia    ORIF TIBIA FRACTURE Right 02/14/2020    Procedure: ORIF, FRACTURE, TIBIA;  Surgeon: Elbert Marrero II, MD;  Location: [facility] OR;  Service: Orthopedics;  Laterality: Right;    ROBOT-ASSISTED LAPAROSCOPIC RESECTION OF SIGMOID COLON N/A 09/14/2020    Procedure: ROBOTIC  COLECTOMY, SIGMOID;  Surgeon: Terrance Candelaria MD;  Location: [facility];  Service: General;  Laterality: N/A;    UPPER GASTROINTESTINAL ENDOSCOPY       Family History   Problem Relation Age of Onset    Heart attack Brother     Diabetes Brother     Hyperlipidemia Brother     Eczema Neg Hx     Lupus Neg Hx     Psoriasis Neg Hx     Melanoma Neg Hx     Breast cancer Neg Hx     Ovarian cancer Neg Hx     Colon cancer Neg Hx     Esophageal cancer Neg Hx     Stomach cancer Neg Hx      Social History     Tobacco Use    Smoking status: Every Day     Current packs/day: 0.75     Average packs/day: 0.7 packs/day for 35.3 years (26.5 ttl pk-yrs)     Types: Cigarettes     Start date: 10/1/1988    Smokeless tobacco: Never   Substance Use Topics    Alcohol use: Yes     Comment: occ    Drug use: Yes     Types: Oxycodone, Benzodiazepines     Comment: as prescribed     Wt Readings from Last 10 Encounters:   02/01/24 56.4 kg (124 lb 5.4 oz)   01/29/24 57.2 kg (126 lb)   01/03/24 57.4 kg (126 lb 8 oz)   11/24/23 58.1 kg (128 lb)   11/02/23 59 kg (130 lb)   09/07/23 59.4 kg (130 lb 13.5 oz)   08/28/23 58.1 kg (128 lb)   08/24/23 58.6 kg (129 lb 1.6 oz)   05/22/23 55.9 kg (123 lb 3.2 oz)   04/10/23 55.8 kg (123 lb)     Lab Results   Component Value Date    WBC 6.10 12/20/2023    HGB 8.1 (L) 12/20/2023    HCT 27.7 (L) 12/20/2023    MCV 89 12/20/2023     (H) 12/20/2023     CMP  Sodium   Date Value Ref Range Status   12/20/2023 133 (L) 136 - 145 mmol/L Final     Potassium   Date Value Ref Range Status   12/20/2023 3.9 3.5 - 5.1 mmol/L Final     Chloride   Date Value Ref Range Status   12/20/2023 98 95 - 110 mmol/L Final     CO2   Date Value Ref Range Status   12/20/2023 23 23 - 29 mmol/L Final     Glucose   Date Value Ref Range Status   12/20/2023 121 (H) 70 - 110 mg/dL Final     BUN   Date Value Ref Range Status   12/20/2023 8 8 - 23 mg/dL Final     Creatinine   Date Value Ref Range Status   12/20/2023 0.8 0.5 - 1.4 mg/dL Final      Calcium   Date Value Ref Range Status   12/20/2023 9.1 8.7 - 10.5 mg/dL Final     Total Protein   Date Value Ref Range Status   12/20/2023 7.7 6.0 - 8.4 g/dL Final     Albumin   Date Value Ref Range Status   12/20/2023 4.0 3.5 - 5.2 g/dL Final     Total Bilirubin   Date Value Ref Range Status   12/20/2023 0.5 0.1 - 1.0 mg/dL Final     Comment:     For infants and newborns, interpretation of results should be based  on gestational age, weight and in agreement with clinical  observations.    Premature Infant recommended reference ranges:  Up to 24 hours.............<8.0 mg/dL  Up to 48 hours............<12.0 mg/dL  3-5 days..................<15.0 mg/dL  6-29 days.................<15.0 mg/dL       Alkaline Phosphatase   Date Value Ref Range Status   12/20/2023 111 55 - 135 U/L Final     AST   Date Value Ref Range Status   12/20/2023 20 10 - 40 U/L Final     ALT   Date Value Ref Range Status   12/20/2023 14 10 - 44 U/L Final     Anion Gap   Date Value Ref Range Status   12/20/2023 12 8 - 16 mmol/L Final     eGFR if    Date Value Ref Range Status   09/03/2021 >60.0 >60 mL/min/1.73 m^2 Final     eGFR if non    Date Value Ref Range Status   09/03/2021 >60.0 >60 mL/min/1.73 m^2 Final     Comment:     Calculation used to obtain the estimated glomerular filtration  rate (eGFR) is the CKD-EPI equation.        Lab Results   Component Value Date    LIPASE 169 (H) 07/14/2018     Lab Results   Component Value Date    TSH 2.910 06/07/2023     Lab Results   Component Value Date    IRON 26 (L) 12/20/2023    TRANSFERRIN 375 12/20/2023    TIBC 555 (H) 12/20/2023    FESATURATED 5 (L) 12/20/2023      Lab Results   Component Value Date    FERRITIN 13 (L) 12/20/2023     Reviewed prior medical records including radiology report of CT chest 06/07/2023, KUB 11/01/2018, MRI MRCP 12/14/2018, endoscopic ultrasound 10/16/2018, MRI abdomen 07/18/2018, CT abdomen and pelvis 07/12/2018 & endoscopy history (see  surgical history).    Objective:      Physical Exam  Vitals and nursing note reviewed.   Constitutional:       General: She is not in acute distress.     Appearance: Normal appearance. She is normal weight. She is not ill-appearing.   HENT:      Mouth/Throat:      Lips: Pink. No lesions.   Cardiovascular:      Rate and Rhythm: Normal rate.      Pulses: Normal pulses.      Heart sounds: Normal heart sounds.   Pulmonary:      Effort: Pulmonary effort is normal. No respiratory distress.      Breath sounds: Normal breath sounds.   Abdominal:      General: Bowel sounds are normal. There is no distension or abdominal bruit. There are no signs of injury.      Palpations: Abdomen is soft. There is no shifting dullness, fluid wave, hepatomegaly, splenomegaly or mass.      Tenderness: There is no abdominal tenderness. There is no guarding or rebound. Negative signs include De Santiago's sign, Rovsing's sign and McBurney's sign.   Skin:     General: Skin is warm and dry.      Coloration: Skin is not jaundiced or pale.   Neurological:      Mental Status: She is alert and oriented to person, place, and time.   Psychiatric:         Attention and Perception: Attention normal.         Mood and Affect: Mood normal.         Speech: Speech normal.         Behavior: Behavior normal.         Assessment:       1. Iron deficiency anemia, unspecified iron deficiency anemia type    2. Constipation, unspecified constipation type    3. Lower abdominal pain    4. Gastroesophageal reflux disease, unspecified whether esophagitis present    5. Dark stools    6. S/P colectomy    7. High grade dysplasia in colonic adenoma    8. History of racing heart beat    9. Elevated blood pressure reading    10. Pancreas cyst        Plan:       Iron deficiency anemia, unspecified iron deficiency anemia type  - schedule EGD, discussed procedure with patient, including risks and benefits, patient verbalized understanding  - schedule Colonoscopy, discussed procedure  with the patient, including risks and benefits, patient verbalized understanding  - discussed with patient the different ways that anemia occurs: blood loss (such as from the gi tract), the body is not making enough, or the body is breaking down the rbcs too quickly; recommend EGD and colonoscopy to further evaluate gi tract for possible blood loss and pending results of endoscopies, possible UGI with Small Bowel Follow Through/video capsule study  -follow-up with PCP and/or hematology for continued evaluation and management  -     Case Request Endoscopy: COLONOSCOPY, EGD (ESOPHAGOGASTRODUODENOSCOPY)    Constipation, unspecified constipation type  - schedule Colonoscopy, discussed procedure with the patient, including risks and benefits, patient verbalized understanding  -Recommend daily exercise as tolerated, adequate water intake (six 8-oz glasses of water daily), and high fiber diet. OTC fiber supplements are recommended if diet does not reach daily fiber goal (20-30 grams daily), such as Metamucil, Citrucel, or FiberCon (take as directed, separate from other oral medications by >2 hours).  -Recommend trying OTC MiraLax once daily (17g PO) as directed  -If no improvement with above recommendations, try intermittently dosed Dulcolax OTC as directed (every 3-4 days) PRN to facilitate bowel movements  -If no relief with this, consider adding a emollient laxative (castor oil or mineral oil) +/- enema  -If still no improvement with these measures, call/follow-up  -     Case Request Endoscopy: COLONOSCOPY, EGD (ESOPHAGOGASTRODUODENOSCOPY)    Lower abdominal pain  - schedule Colonoscopy, discussed procedure with the patient, including risks and benefits, patient verbalized understanding  -consider abdominal imaging if pain worsens or does not improve  -     Case Request Endoscopy: COLONOSCOPY, EGD (ESOPHAGOGASTRODUODENOSCOPY)    Gastroesophageal reflux disease, unspecified whether esophagitis present  - schedule EGD,  discussed procedure with patient, including risks and benefits, patient verbalized understanding  -Avoid large meals, avoid eating within 2-3 hours of bedtime (avoid late night eating & lying down soon after eating), elevate head of bed if nocturnal symptoms are present, smoking cessation (if current smoker), & weight loss (if overweight).   -Avoid known foods which trigger reflux symptoms & to minimize/avoid high-fat foods, chocolate, caffeine, citrus, alcohol, & tomato products.  -Avoid/limit use of NSAID's, since they can cause GI upset, bleeding, and/or ulcers. If needed, take with food.  - continue Protonix 40 mg once daily p.r.n.  -     Case Request Endoscopy: COLONOSCOPY, EGD (ESOPHAGOGASTRODUODENOSCOPY)    Dark stools  - schedule Colonoscopy, discussed procedure with the patient, including risks and benefits, patient verbalized understanding  - Discussed about possible adverse effects of iron use, including stool discoloration  -     Case Request Endoscopy: COLONOSCOPY, EGD (ESOPHAGOGASTRODUODENOSCOPY)    S/P colectomy & High grade dysplasia in colonic adenoma  - schedule Colonoscopy, discussed procedure with the patient, including risks and benefits, patient verbalized understanding  -     Case Request Endoscopy: COLONOSCOPY, EGD (ESOPHAGOGASTRODUODENOSCOPY)    History of racing heart beat & Elevated blood pressure reading  - follow-up with Cardiology for continued evaluation and management    Pancreas cyst  - follow-up with advanced GI for continued evaluation and management    Follow up in about 4 weeks (around 2/29/2024), or if symptoms worsen or fail to improve.      If no improvement in symptoms or symptoms worsen, call/follow-up at clinic or go to ER.        Total time spent on the encounter includes face to face time and non-face to face time preparing to see the patient (eg, review of tests), Obtaining and/or reviewing separately obtained history, Documenting clinical information in the electronic  or other health record, Independently interpreting results (not separately reported) and communicating results to the patient/family/caregiver, or Care coordination (not separately reported).     A dictation software program was used for this note. Please expect some simple typographical  errors in this note.

## 2024-02-01 NOTE — TELEPHONE ENCOUNTER
No care due was identified.  [facility] Embedded Care Due Messages. Reference number: [id number].   2/01/2024 4:09:44 PM CST

## 2024-02-02 NOTE — TELEPHONE ENCOUNTER
Refill Routing Note   Medication(s) are not appropriate for processing by [facility] for the following reason(s):        Required vitals abnormal: Benicar (164/91 )  No active prescription written by provider: Effient    [facility] action(s):  Defer               Appointments  past 12m or future 3m with PCP    Date Provider   Last Visit   11/24/2023 Simran Pool MD   Next Visit   Visit date not found Simran Pool MD   ED visits in past 90 days: 0        Note composed:6:35 PM 02/01/2024

## 2024-02-14 DIAGNOSIS — F41.1 GAD (GENERALIZED ANXIETY DISORDER): ICD-10-CM

## 2024-02-14 RX ORDER — ALPRAZOLAM 0.25 MG/1
TABLET ORAL
Qty: 60 TABLET | Refills: 0 | Status: SHIPPED | OUTPATIENT
Start: 2024-02-14 | End: 2024-04-08 | Stop reason: SDUPTHER

## 2024-02-14 NOTE — TELEPHONE ENCOUNTER
No care due was identified.  [facility] Embedded Care Due Messages. Reference number: [id number].   2/14/2024 2:05:39 PM CST

## 2024-02-21 ENCOUNTER — INFUSION (OUTPATIENT)
Dept: INFUSION THERAPY | Facility: HOSPITAL | Age: 69
End: 2024-02-21
Attending: NURSE PRACTITIONER
Payer: MEDICARE

## 2024-02-21 VITALS
SYSTOLIC BLOOD PRESSURE: 116 MMHG | OXYGEN SATURATION: 97 % | BODY MASS INDEX: 19.25 KG/M2 | HEIGHT: 68 IN | WEIGHT: 127 LBS | RESPIRATION RATE: 16 BRPM | TEMPERATURE: 98 F | DIASTOLIC BLOOD PRESSURE: 76 MMHG | HEART RATE: 94 BPM

## 2024-02-21 DIAGNOSIS — D50.0 IRON DEFICIENCY ANEMIA DUE TO CHRONIC BLOOD LOSS: Primary | ICD-10-CM

## 2024-02-21 PROCEDURE — 25000003 PHARM REV CODE 250: Performed by: NURSE PRACTITIONER

## 2024-02-21 PROCEDURE — 63600175 PHARM REV CODE 636 W HCPCS: Performed by: NURSE PRACTITIONER

## 2024-02-21 PROCEDURE — 96365 THER/PROPH/DIAG IV INF INIT: CPT

## 2024-02-21 PROCEDURE — A4216 STERILE WATER/SALINE, 10 ML: HCPCS | Performed by: NURSE PRACTITIONER

## 2024-02-21 RX ORDER — HEPARIN 100 UNIT/ML
500 SYRINGE INTRAVENOUS
Status: DISCONTINUED | OUTPATIENT
Start: 2024-02-21 | End: 2024-02-21 | Stop reason: HOSPADM

## 2024-02-21 RX ORDER — SODIUM CHLORIDE 0.9 % (FLUSH) 0.9 %
10 SYRINGE (ML) INJECTION
OUTPATIENT
Start: 2024-02-26

## 2024-02-21 RX ORDER — HEPARIN 100 UNIT/ML
500 SYRINGE INTRAVENOUS
OUTPATIENT
Start: 2024-02-26

## 2024-02-21 RX ORDER — SODIUM CHLORIDE 0.9 % (FLUSH) 0.9 %
10 SYRINGE (ML) INJECTION
Status: DISCONTINUED | OUTPATIENT
Start: 2024-02-21 | End: 2024-02-21 | Stop reason: HOSPADM

## 2024-02-21 RX ORDER — DIPHENHYDRAMINE HYDROCHLORIDE 50 MG/ML
50 INJECTION INTRAMUSCULAR; INTRAVENOUS ONCE AS NEEDED
OUTPATIENT
Start: 2024-02-26

## 2024-02-21 RX ORDER — EPINEPHRINE 0.3 MG/.3ML
0.3 INJECTION SUBCUTANEOUS ONCE AS NEEDED
OUTPATIENT
Start: 2024-02-26

## 2024-02-21 RX ADMIN — Medication 10 ML: at 12:02

## 2024-02-21 RX ADMIN — SODIUM CHLORIDE 125 MG: 9 INJECTION, SOLUTION INTRAVENOUS at 12:02

## 2024-02-21 NOTE — PLAN OF CARE
Problem: Adult Inpatient Plan of Care  Goal: Optimal Comfort and Wellbeing  Outcome: Met  Intervention: Provide Person-Centered Care  Flowsheets (Taken 2/21/2024 1237)  Trust Relationship/Rapport:   care explained   reassurance provided   choices provided   thoughts/feelings acknowledged   emotional support provided   empathic listening provided   questions answered   questions encouraged

## 2024-02-29 ENCOUNTER — TELEPHONE (OUTPATIENT)
Dept: PULMONOLOGY | Facility: CLINIC | Age: 69
End: 2024-02-29
Payer: MEDICARE

## 2024-02-29 NOTE — TELEPHONE ENCOUNTER
----- Message from Juju Michael sent at 2/29/2024  1:18 PM CST -----  Contact: self  Type:  Sooner Appointment Request  Name of Caller: Pt  When is the first available appointment? 3/5 @ 3 pm, however, pt states she's in a wheelchair coming from [place] and rely on her Sister for transportation and is requesting an appt 12 noon -2 pm, pt states Cardiologist is requesting the results of PFT, however, pt hasn't taken the PFT, orders still in the system ... Pt will be calling, March 18th [facility] [place] PFT Scheduling Dept. [phone] to get PFT testing done...   Would the patient rather a call back or a response via [facility]? call  Best Call Back Number: [phone]  Thank you...

## 2024-03-12 ENCOUNTER — TELEPHONE (OUTPATIENT)
Dept: GASTROENTEROLOGY | Facility: CLINIC | Age: 69
End: 2024-03-12
Payer: MEDICARE

## 2024-03-12 NOTE — TELEPHONE ENCOUNTER
----- Message from Pamella Jefferson LPN sent at 3/12/2024  9:42 AM CDT -----  Regarding: RE: BLOOD THINNER CLEARANCE  Hello,    The patient will need an appointment to obtain clearance.     Thank you  ----- Message -----  From: Chica Cotter LPN  Sent: 3/12/2024   9:41 AM CDT  To: Sergo RICE Staff; Yrn LLAMAS Staff  Subject: BLOOD THINNER CLEARANCE                          Good morning, Ms. Cantu in scheduled for an EGD and Colonoscopy (date to be determine). WE are requesting clearance to hold her Effient 5 days prior to this procedure. Please advised ASAP.    Sincerely,   WILL Coto

## 2024-03-12 NOTE — TELEPHONE ENCOUNTER
Call placed to Ms Cantu in regards to blood thinner clearance not received from Dr. Malagon. No answer, left message.     Procedure for 3/14 will need to be rescheduled

## 2024-03-12 NOTE — TELEPHONE ENCOUNTER
----- Message from Elizabeth Musa LPN sent at 3/12/2024 11:09 AM CDT -----  Regarding: RE: BLOOD THINNER CLEARANCE  3/12/2024  Comofrt Cantu has been seen within the last 6 months and if stable and asymptomatic from the cardiovascular standpoint they can be cleared for surgery and anesthesia with acceptable cardiac risk. The patient may be at increase risk for complications due to the patient's co-morbidities.         In regard to holding medications  If the patient is on antiplatelet drugs or warfarin / Coumadin (ASA, Plavix /clopidogrel, Pletal /cilostazol, Brilinta /ticagrelor) and if the patient has had no hospitalization for acute coronary syndrome in the past 12 months, then you can hold for 5-7 days at the discretion of the surgeon.    If the patient is on NOAC (Eliquis, Xarelto, Pradaxa) , then you can hold for 1-2 days per surgeon discretion.     Certain recommend resumption of these medications ASAP post procedure.       Thank you,  Dr. Adan Trotter         ----- Message -----  From: Adan Trotter MD  Sent: 3/12/2024  10:59 AM CDT  To: Elizabeth Musa LPN  Subject: RE: BLOOD THINNER CLEARANCE                      Can use my standard clearance. Thanks,    Dr. Trotter    ----- Message -----  From: Elizabeth Musa LPN  Sent: 3/12/2024  10:11 AM CDT  To: Adan Trotter MD  Subject: FW: BLOOD THINNER CLEARANCE                      LOV 09/13 this is right after the 6 months by a few days. Okay to clear?    Thanks,     Elizabeth Musa LPN    ----- Message -----  From: Chica Cotter LPN  Sent: 3/12/2024   9:58 AM CDT  To: Elizabeth Musa LPN  Subject: RE: BLOOD THINNER CLEARANCE                      It will need to be moved since she has not stopped her effient for 5 days. I am unable to get in touch with the pt to confirm she has not stopped the medication.   ----- Message -----  From: Elizabeth Musa LPN  Sent: 3/12/2024   9:57 AM CDT  To: Chica Colindres LPN  Subject: RE: BLOOD THINNER CLEARANCE                       Quick question. The system says the procedure is 03/14. Is that the date or it is getting moved?     Thanks,     Elizabeth Musa LPN  ----- Message -----  From: Chica Cotter LPN  Sent: 3/12/2024   9:41 AM CDT  To: Sergo RICE Staff; Yrn LLAMAS Staff  Subject: BLOOD THINNER CLEARANCE                          Good morning, Ms. Cantu in scheduled for an EGD and Colonoscopy (date to be determine). WE are requesting clearance to hold her Effient 5 days prior to this procedure. Please advised ASAP.    Sincerely,   WILL Coto

## 2024-03-12 NOTE — TELEPHONE ENCOUNTER
Call placed to Ms. Cantu in regards to clearance received. No answer, left message to return call.

## 2024-03-26 ENCOUNTER — TELEPHONE (OUTPATIENT)
Dept: HEMATOLOGY/ONCOLOGY | Facility: CLINIC | Age: 69
End: 2024-03-26
Payer: MEDICARE

## 2024-04-01 ENCOUNTER — LAB VISIT (OUTPATIENT)
Dept: LAB | Facility: HOSPITAL | Age: 69
End: 2024-04-01
Attending: NURSE PRACTITIONER
Payer: MEDICARE

## 2024-04-01 DIAGNOSIS — D50.0 IRON DEFICIENCY ANEMIA DUE TO CHRONIC BLOOD LOSS: ICD-10-CM

## 2024-04-01 LAB
ALBUMIN SERPL BCP-MCNC: 4 G/DL (ref 3.5–5.2)
ALP SERPL-CCNC: 103 U/L (ref 55–135)
ALT SERPL W/O P-5'-P-CCNC: 23 U/L (ref 10–44)
ANION GAP SERPL CALC-SCNC: 10 MMOL/L (ref 8–16)
AST SERPL-CCNC: 27 U/L (ref 10–40)
BASOPHILS # BLD AUTO: 0.04 K/UL (ref 0–0.2)
BASOPHILS NFR BLD: 0.6 % (ref 0–1.9)
BILIRUB SERPL-MCNC: 0.4 MG/DL (ref 0.1–1)
BUN SERPL-MCNC: 10 MG/DL (ref 8–23)
CALCIUM SERPL-MCNC: 9.5 MG/DL (ref 8.7–10.5)
CHLORIDE SERPL-SCNC: 91 MMOL/L (ref 95–110)
CO2 SERPL-SCNC: 23 MMOL/L (ref 23–29)
CREAT SERPL-MCNC: 0.7 MG/DL (ref 0.5–1.4)
DIFFERENTIAL METHOD BLD: ABNORMAL
EOSINOPHIL # BLD AUTO: 0.3 K/UL (ref 0–0.5)
EOSINOPHIL NFR BLD: 4 % (ref 0–8)
ERYTHROCYTE [DISTWIDTH] IN BLOOD BY AUTOMATED COUNT: 18.9 % (ref 11.5–14.5)
EST. GFR  (NO RACE VARIABLE): >60 ML/MIN/1.73 M^2
FERRITIN SERPL-MCNC: 38 NG/ML (ref 20–300)
GLUCOSE SERPL-MCNC: 97 MG/DL (ref 70–110)
HCT VFR BLD AUTO: 30.7 % (ref 37–48.5)
HGB BLD-MCNC: 10.3 G/DL (ref 12–16)
IMM GRANULOCYTES # BLD AUTO: 0.02 K/UL (ref 0–0.04)
IMM GRANULOCYTES NFR BLD AUTO: 0.3 % (ref 0–0.5)
IRON SERPL-MCNC: 32 UG/DL (ref 30–160)
LYMPHOCYTES # BLD AUTO: 0.7 K/UL (ref 1–4.8)
LYMPHOCYTES NFR BLD: 11.1 % (ref 18–48)
MCH RBC QN AUTO: 32 PG (ref 27–31)
MCHC RBC AUTO-ENTMCNC: 33.6 G/DL (ref 32–36)
MCV RBC AUTO: 95 FL (ref 82–98)
MONOCYTES # BLD AUTO: 0.6 K/UL (ref 0.3–1)
MONOCYTES NFR BLD: 10 % (ref 4–15)
NEUTROPHILS # BLD AUTO: 4.6 K/UL (ref 1.8–7.7)
NEUTROPHILS NFR BLD: 74 % (ref 38–73)
NRBC BLD-RTO: 0 /100 WBC
PLATELET # BLD AUTO: 295 K/UL (ref 150–450)
PMV BLD AUTO: 8.5 FL (ref 9.2–12.9)
POTASSIUM SERPL-SCNC: 4.1 MMOL/L (ref 3.5–5.1)
PROT SERPL-MCNC: 7.2 G/DL (ref 6–8.4)
RBC # BLD AUTO: 3.22 M/UL (ref 4–5.4)
SATURATED IRON: 7 % (ref 20–50)
SODIUM SERPL-SCNC: 124 MMOL/L (ref 136–145)
TOTAL IRON BINDING CAPACITY: 468 UG/DL (ref 250–450)
TRANSFERRIN SERPL-MCNC: 316 MG/DL (ref 200–375)
WBC # BLD AUTO: 6.21 K/UL (ref 3.9–12.7)

## 2024-04-01 PROCEDURE — 83540 ASSAY OF IRON: CPT | Performed by: NURSE PRACTITIONER

## 2024-04-01 PROCEDURE — 85025 COMPLETE CBC W/AUTO DIFF WBC: CPT | Performed by: NURSE PRACTITIONER

## 2024-04-01 PROCEDURE — 82728 ASSAY OF FERRITIN: CPT | Performed by: NURSE PRACTITIONER

## 2024-04-01 PROCEDURE — 36415 COLL VENOUS BLD VENIPUNCTURE: CPT | Performed by: NURSE PRACTITIONER

## 2024-04-01 PROCEDURE — 80053 COMPREHEN METABOLIC PANEL: CPT | Performed by: NURSE PRACTITIONER

## 2024-04-02 ENCOUNTER — ANESTHESIA EVENT (OUTPATIENT)
Dept: ENDOSCOPY | Facility: HOSPITAL | Age: 69
End: 2024-04-02
Payer: MEDICARE

## 2024-04-02 ENCOUNTER — ANESTHESIA (OUTPATIENT)
Dept: ENDOSCOPY | Facility: HOSPITAL | Age: 69
End: 2024-04-02
Payer: MEDICARE

## 2024-04-02 ENCOUNTER — HOSPITAL ENCOUNTER (OUTPATIENT)
Facility: HOSPITAL | Age: 69
Discharge: HOME | End: 2024-04-02
Attending: INTERNAL MEDICINE | Admitting: INTERNAL MEDICINE
Payer: MEDICARE

## 2024-04-02 VITALS
RESPIRATION RATE: 16 BRPM | WEIGHT: 127 LBS | HEIGHT: 68 IN | TEMPERATURE: 98 F | OXYGEN SATURATION: 99 % | HEART RATE: 94 BPM | DIASTOLIC BLOOD PRESSURE: 62 MMHG | SYSTOLIC BLOOD PRESSURE: 142 MMHG | BODY MASS INDEX: 19.25 KG/M2

## 2024-04-02 DIAGNOSIS — D50.9 IDA (IRON DEFICIENCY ANEMIA): ICD-10-CM

## 2024-04-02 DIAGNOSIS — D50.0 IRON DEFICIENCY ANEMIA DUE TO CHRONIC BLOOD LOSS: Primary | ICD-10-CM

## 2024-04-02 PROCEDURE — 25000003 PHARM REV CODE 250: Performed by: INTERNAL MEDICINE

## 2024-04-02 PROCEDURE — 45382 COLONOSCOPY W/CONTROL BLEED: CPT | Mod: ,,, | Performed by: INTERNAL MEDICINE

## 2024-04-02 PROCEDURE — 88312 SPECIAL STAINS GROUP 1: CPT | Mod: TC | Performed by: PATHOLOGY

## 2024-04-02 PROCEDURE — 43239 EGD BIOPSY SINGLE/MULTIPLE: CPT | Mod: ,,, | Performed by: INTERNAL MEDICINE

## 2024-04-02 PROCEDURE — 63600175 PHARM REV CODE 636 W HCPCS: Performed by: NURSE ANESTHETIST, CERTIFIED REGISTERED

## 2024-04-02 PROCEDURE — 45382 COLONOSCOPY W/CONTROL BLEED: CPT | Performed by: INTERNAL MEDICINE

## 2024-04-02 PROCEDURE — 43239 EGD BIOPSY SINGLE/MULTIPLE: CPT | Performed by: INTERNAL MEDICINE

## 2024-04-02 PROCEDURE — 88305 TISSUE EXAM BY PATHOLOGIST: CPT | Mod: TC,59 | Performed by: PATHOLOGY

## 2024-04-02 PROCEDURE — 37000009 HC ANESTHESIA EA ADD 15 MINS: Performed by: INTERNAL MEDICINE

## 2024-04-02 PROCEDURE — 25000003 PHARM REV CODE 250: Performed by: NURSE ANESTHETIST, CERTIFIED REGISTERED

## 2024-04-02 PROCEDURE — 37000008 HC ANESTHESIA 1ST 15 MINUTES: Performed by: INTERNAL MEDICINE

## 2024-04-02 PROCEDURE — 27201012 HC FORCEPS, HOT/COLD, DISP: Performed by: INTERNAL MEDICINE

## 2024-04-02 PROCEDURE — 43255 EGD CONTROL BLEEDING ANY: CPT | Mod: 59 | Performed by: INTERNAL MEDICINE

## 2024-04-02 PROCEDURE — 43255 EGD CONTROL BLEEDING ANY: CPT | Mod: 59,,, | Performed by: INTERNAL MEDICINE

## 2024-04-02 RX ORDER — EPHEDRINE SULFATE 50 MG/ML
INJECTION, SOLUTION INTRAVENOUS
Status: DISCONTINUED | OUTPATIENT
Start: 2024-04-02 | End: 2024-04-02

## 2024-04-02 RX ORDER — LIDOCAINE HYDROCHLORIDE 20 MG/ML
INJECTION INTRAVENOUS
Status: DISCONTINUED | OUTPATIENT
Start: 2024-04-02 | End: 2024-04-02

## 2024-04-02 RX ORDER — PROPOFOL 10 MG/ML
VIAL (ML) INTRAVENOUS
Status: DISCONTINUED | OUTPATIENT
Start: 2024-04-02 | End: 2024-04-02

## 2024-04-02 RX ORDER — SODIUM CHLORIDE 9 MG/ML
INJECTION, SOLUTION INTRAVENOUS CONTINUOUS
Status: DISCONTINUED | OUTPATIENT
Start: 2024-04-02 | End: 2024-04-02 | Stop reason: HOSPADM

## 2024-04-02 RX ADMIN — PROPOFOL 20 MG: 10 INJECTION, EMULSION INTRAVENOUS at 02:04

## 2024-04-02 RX ADMIN — PROPOFOL 20 MG: 10 INJECTION, EMULSION INTRAVENOUS at 03:04

## 2024-04-02 RX ADMIN — GLYCOPYRROLATE 0.1 MG: 0.2 INJECTION, SOLUTION INTRAMUSCULAR; INTRAVITREAL at 02:04

## 2024-04-02 RX ADMIN — SODIUM CHLORIDE: 9 INJECTION, SOLUTION INTRAVENOUS at 01:04

## 2024-04-02 RX ADMIN — LIDOCAINE HYDROCHLORIDE 80 MG: 20 INJECTION, SOLUTION INTRAVENOUS at 02:04

## 2024-04-02 RX ADMIN — PROPOFOL 100 MG: 10 INJECTION, EMULSION INTRAVENOUS at 02:04

## 2024-04-02 RX ADMIN — EPHEDRINE SULFATE 10 MG: 50 INJECTION INTRAVENOUS at 02:04

## 2024-04-02 NOTE — TRANSFER OF CARE
"Anesthesia Transfer of Care Note    Patient: Comfort Cantu    Procedure(s) Performed: Procedure(s) (LRB):  COLONOSCOPY(Does not want earlier time-arrive 1330) (N/A)  EGD (ESOPHAGOGASTRODUODENOSCOPY) (N/A)    Patient location: GI    Anesthesia Type: general    Transport from OR: Transported from OR on room air with adequate spontaneous ventilation    Post assessment: no apparent anesthetic complications    Post vital signs: stable    Level of consciousness: awake and alert    Nausea/Vomiting: no nausea/vomiting    Complications: none    Transfer of care protocol was followed    Last vitals: Visit Vitals  /64 (BP Location: Left arm, Patient Position: Lying)   Pulse 83   Temp 36.6 °C (97.9 °F) (Skin)   Resp 18   Ht 5' 8" (1.727 m)   Wt 57.6 kg (127 lb)   SpO2 (!) 92%   BMI 19.31 kg/m²     "

## 2024-04-02 NOTE — ANESTHESIA POSTPROCEDURE EVALUATION
Anesthesia Post Evaluation    Patient: Comfort Cantu    Procedure(s) Performed: Procedure(s) (LRB):  COLONOSCOPY(Does not want earlier time-arrive 1330) (N/A)  EGD (ESOPHAGOGASTRODUODENOSCOPY) (N/A)    Final Anesthesia Type: general      Patient location during evaluation: PACU  Patient participation: Yes- Able to Participate  Level of consciousness: sedated and awake  Post-procedure vital signs: reviewed and stable  Pain management: adequate  Airway patency: patent    PONV status at discharge: No PONV  Anesthetic complications: no      Cardiovascular status: hypertensive, blood pressure returned to baseline and hemodynamically stable  Respiratory status: spontaneous ventilation  Hydration status: euvolemic  Follow-up not needed.              Vitals Value Taken Time   /68 04/02/24 1525   Temp 36.6 °C (97.9 °F) 04/02/24 1515   Pulse 112 04/02/24 1525   Resp 16 04/02/24 1525   SpO2 96 % 04/02/24 1525         No case tracking events are documented in the log.      Pain/Deann Score: Deann Score: 10 (4/2/2024  3:20 PM)

## 2024-04-02 NOTE — PROVATION PATIENT INSTRUCTIONS
Discharge Summary/Instructions after an Endoscopic Procedure  Patient Name: Comfort Cantu  Patient MRN: [id number]  Patient YOB: 1955 Tuesday, April 2, 2024  Esteban Finch MD  Dear patient,  As a result of recent federal legislation (The Federal Cures Act), you may   receive lab or pathology results from your procedure in your [facility]   account before your physician is able to contact you. Your physician or   their representative will relay the results to you with their   recommendations at their soonest availability.  Thank you,  RESTRICTIONS:  During your procedure today, you received medications for sedation.  These   medications may affect your judgment, balance and coordination.  Therefore,   for 24 hours, you have the following restrictions:   - DO NOT drive a car, operate machinery, make legal/financial decisions,   sign important papers or drink alcohol.    ACTIVITY:  Today: no heavy lifting, straining or running due to procedural   sedation/anesthesia.  The following day: return to full activity including work.  DIET:  Eat and drink normally unless instructed otherwise.     TREATMENT FOR COMMON SIDE EFFECTS:  - Mild abdominal pain, nausea, belching, bloating or excessive gas:  rest,   eat lightly and use a heating pad.  - Sore Throat: treat with throat lozenges and/or gargle with warm salt   water.  - Because air was used during the procedure, expelling large amounts of air   from your rectum or belching is normal.  - If a bowel prep was taken, you may not have a bowel movement for 1-3 days.    This is normal.  SYMPTOMS TO WATCH FOR AND REPORT TO YOUR PHYSICIAN:  1. Abdominal pain or bloating, other than gas cramps.  2. Chest pain.  3. Back pain.  4. Signs of infection such as: chills or fever occurring within 24 hours   after the procedure.  5. Rectal bleeding, which would show as bright red, maroon, or black stools.   (A tablespoon of blood from the rectum is not serious, especially if    hemorrhoids are present.)  6. Vomiting.  7. Weakness or dizziness.  GO DIRECTLY TO THE NEAREST EMERGENCY ROOM IF YOU HAVE ANY OF THE FOLLOWING:      Difficulty breathing              Chills and/or fever over 101 F   Persistent vomiting and/or vomiting blood   Severe abdominal pain   Severe chest pain   Black, tarry stools   Bleeding- more than one tablespoon   Any other symptom or condition that you feel may need urgent attention  Your doctor recommends these additional instructions:  If any biopsies were taken, your doctors clinic will contact you in 1 to 2   weeks with any results.  - Patient has a contact number available for emergencies.  The signs and   symptoms of potential delayed complications were discussed with the   patient.  Return to normal activities tomorrow.  Written discharge   instructions were provided to the patient.   - Resume previous diet.   - Continue present medications.   - No aspirin, ibuprofen, naproxen, or other non-steroidal anti-inflammatory   drugs.   - Await pathology results.   - Discharge patient to home (ambulatory).   - Follow an antireflux regimen.   - Perform a colonoscopy today.   - Return to GI office after studies are complete.  For questions, problems or results please call your physician - Esteban Finch MD at Work:  [phone].  [facility], EMERGENCY ROOM PHONE NUMBER: [phone]  IF A COMPLICATION OR EMERGENCY SITUATION ARISES AND YOU ARE UNABLE TO REACH   YOUR PHYSICIAN - GO DIRECTLY TO THE EMERGENCY ROOM.  Esteban Finch MD  4/2/2024 2:48:18 PM  This report has been verified and signed electronically.  Dear patient,  As a result of recent federal legislation (The Federal Cures Act), you may   receive lab or pathology results from your procedure in your [facility]   account before your physician is able to contact you. Your physician or   their representative will relay the results to you with their   recommendations at their soonest  availability.  Thank you,  PROVATION

## 2024-04-02 NOTE — H&P
CC: MERON    68 year old female with above. States that symptoms are absent, no alleviating/exacerbating factors. No family history of colorectal CA. Positive personal history of polyps. No bleeding or weight loss.     ROS:  No headache, no fever/chills, no chest pain/SOB, no nausea/vomiting/diarrhea/constipation/GI bleeding/abdominal pain, no dysuria/hematuria.    VSSAF   Exam:   Alert and oriented x 3; no apparent distress   PERRLA, sclera anicteric  CV: Regular rate/rhythm, normal PMI   Lungs: Clear bilaterally with no wheeze/rales   Abdomen: Soft, NT/ND, normal bowel sounds   Ext: No cyanosis, clubbing     Impression:   As above    Plan:   Proceed with endoscopy. Further recs to follow.

## 2024-04-02 NOTE — PROVATION PATIENT INSTRUCTIONS
Discharge Summary/Instructions after an Endoscopic Procedure  Patient Name: Comfort Cantu  Patient MRN: [id number]  Patient YOB: 1955 Tuesday, April 2, 2024  Esteban Finch MD  Dear patient,  As a result of recent federal legislation (The Federal Cures Act), you may   receive lab or pathology results from your procedure in your [facility]   account before your physician is able to contact you. Your physician or   their representative will relay the results to you with their   recommendations at their soonest availability.  Thank you,  RESTRICTIONS:  During your procedure today, you received medications for sedation.  These   medications may affect your judgment, balance and coordination.  Therefore,   for 24 hours, you have the following restrictions:   - DO NOT drive a car, operate machinery, make legal/financial decisions,   sign important papers or drink alcohol.    ACTIVITY:  Today: no heavy lifting, straining or running due to procedural   sedation/anesthesia.  The following day: return to full activity including work.  DIET:  Eat and drink normally unless instructed otherwise.     TREATMENT FOR COMMON SIDE EFFECTS:  - Mild abdominal pain, nausea, belching, bloating or excessive gas:  rest,   eat lightly and use a heating pad.  - Sore Throat: treat with throat lozenges and/or gargle with warm salt   water.  - Because air was used during the procedure, expelling large amounts of air   from your rectum or belching is normal.  - If a bowel prep was taken, you may not have a bowel movement for 1-3 days.    This is normal.  SYMPTOMS TO WATCH FOR AND REPORT TO YOUR PHYSICIAN:  1. Abdominal pain or bloating, other than gas cramps.  2. Chest pain.  3. Back pain.  4. Signs of infection such as: chills or fever occurring within 24 hours   after the procedure.  5. Rectal bleeding, which would show as bright red, maroon, or black stools.   (A tablespoon of blood from the rectum is not serious, especially if    hemorrhoids are present.)  6. Vomiting.  7. Weakness or dizziness.  GO DIRECTLY TO THE NEAREST EMERGENCY ROOM IF YOU HAVE ANY OF THE FOLLOWING:      Difficulty breathing              Chills and/or fever over 101 F   Persistent vomiting and/or vomiting blood   Severe abdominal pain   Severe chest pain   Black, tarry stools   Bleeding- more than one tablespoon   Any other symptom or condition that you feel may need urgent attention  Your doctor recommends these additional instructions:  If any biopsies were taken, your doctors clinic will contact you in 1 to 2   weeks with any results.  - Patient has a contact number available for emergencies.  The signs and   symptoms of potential delayed complications were discussed with the   patient.  Return to normal activities tomorrow.  Written discharge   instructions were provided to the patient.   - High fiber diet.   - Continue present medications.   - No aspirin, ibuprofen, naproxen, or other non-steroidal anti-inflammatory   drugs.   - Resume Effient (prasugrel) at prior dose today.   - Repeat colonoscopy in 5 years for surveillance.   - Discharge patient to home (ambulatory).   - Return to GI office after studies are complete.  For questions, problems or results please call your physician - Esteban Finch MD at Work:  [phone].  [facility], EMERGENCY ROOM PHONE NUMBER: [phone]  IF A COMPLICATION OR EMERGENCY SITUATION ARISES AND YOU ARE UNABLE TO REACH   YOUR PHYSICIAN - GO DIRECTLY TO THE EMERGENCY ROOM.  Esteban Finch MD  4/2/2024 3:11:16 PM  This report has been verified and signed electronically.  Dear patient,  As a result of recent federal legislation (The Federal Cures Act), you may   receive lab or pathology results from your procedure in your [facility]   account before your physician is able to contact you. Your physician or   their representative will relay the results to you with their   recommendations at their soonest  availability.  Thank you,  PROVATION

## 2024-04-08 ENCOUNTER — TELEPHONE (OUTPATIENT)
Dept: HEMATOLOGY/ONCOLOGY | Facility: CLINIC | Age: 69
End: 2024-04-08
Payer: MEDICARE

## 2024-04-08 DIAGNOSIS — F41.1 GAD (GENERALIZED ANXIETY DISORDER): ICD-10-CM

## 2024-04-08 NOTE — TELEPHONE ENCOUNTER
Spoke to pt.  R/s appt for 4/22/24 at 2:00pm.     Pt agreed to this appt date and time             ----- Message from Eve Galo sent at 4/8/2024  2:03 PM CDT -----  Contact: PT  Type: Needs Medical Advice    Who Called: PT  Best Call Back Number: [phone]  Additional  Information: PT  asking for a call back to get appt  that was today for 2:40 canceled. States her brother passed away on Friday 4/5. There are doing an autopsy on him today  Please Advise- Thank you

## 2024-04-08 NOTE — TELEPHONE ENCOUNTER
----- Message from Joe Wilkes sent at 4/8/2024 11:38 AM CDT -----  Type:  RX Refill Request    Who Called:  pt  Refill or New Rx:  refill  RX Name and Strength:  ALPRAZolam (XANAX) 0.25 MG tablet  How is the patient currently taking it? (ex. 1XDay):  as directed  Is this a 30 day or 90 day RX:  30  Preferred Pharmacy with phone number:    [facility] - [place] - [address].  [facility] [address]  [place] [address]  Phone: [phone] Fax: [phone]  local or Mail Order:  local   Ordering Provider:  christoph Rodríguez Call Back Number:  [phone]  Additional Information:  Pt is calling to refill ALPRAZolam (XANAX) 0.25 MG tablet, states that her brother just passed away and needs it to be called in. Please call back to advise, thanks!

## 2024-04-08 NOTE — TELEPHONE ENCOUNTER
No care due was identified.  [facility] Embedded Care Due Messages. Reference number: [id number].   4/08/2024 11:46:53 AM CDT

## 2024-04-10 RX ORDER — ALPRAZOLAM 0.25 MG/1
TABLET ORAL
Qty: 60 TABLET | Refills: 0 | Status: SHIPPED | OUTPATIENT
Start: 2024-04-10 | End: 2024-05-21 | Stop reason: SDUPTHER

## 2024-04-12 DIAGNOSIS — M79.671 RIGHT FOOT PAIN: ICD-10-CM

## 2024-04-12 DIAGNOSIS — S82.391G OTHER CLOSED FRACTURE OF DISTAL END OF RIGHT TIBIA WITH DELAYED HEALING, SUBSEQUENT ENCOUNTER: Primary | ICD-10-CM

## 2024-04-18 ENCOUNTER — OFFICE VISIT (OUTPATIENT)
Dept: ORTHOPEDICS | Facility: CLINIC | Age: 69
End: 2024-04-18
Payer: MEDICARE

## 2024-04-18 ENCOUNTER — TELEPHONE (OUTPATIENT)
Dept: GASTROENTEROLOGY | Facility: CLINIC | Age: 69
End: 2024-04-18
Payer: MEDICARE

## 2024-04-18 ENCOUNTER — HOSPITAL ENCOUNTER (OUTPATIENT)
Dept: RADIOLOGY | Facility: HOSPITAL | Age: 69
Discharge: HOME | End: 2024-04-18
Attending: ORTHOPAEDIC SURGERY
Payer: MEDICARE

## 2024-04-18 VITALS — BODY MASS INDEX: 18.94 KG/M2 | OXYGEN SATURATION: 96 % | HEART RATE: 106 BPM | HEIGHT: 68 IN | WEIGHT: 125 LBS

## 2024-04-18 DIAGNOSIS — S82.391G OTHER CLOSED FRACTURE OF DISTAL END OF RIGHT TIBIA WITH DELAYED HEALING, SUBSEQUENT ENCOUNTER: Primary | ICD-10-CM

## 2024-04-18 DIAGNOSIS — M79.671 RIGHT FOOT PAIN: ICD-10-CM

## 2024-04-18 PROCEDURE — 1159F MED LIST DOCD IN RCRD: CPT | Mod: CPTII,S$GLB,, | Performed by: ORTHOPAEDIC SURGERY

## 2024-04-18 PROCEDURE — 3008F BODY MASS INDEX DOCD: CPT | Mod: CPTII,S$GLB,, | Performed by: ORTHOPAEDIC SURGERY

## 2024-04-18 PROCEDURE — 4010F ACE/ARB THERAPY RXD/TAKEN: CPT | Mod: CPTII,S$GLB,, | Performed by: ORTHOPAEDIC SURGERY

## 2024-04-18 PROCEDURE — 3288F FALL RISK ASSESSMENT DOCD: CPT | Mod: CPTII,S$GLB,, | Performed by: ORTHOPAEDIC SURGERY

## 2024-04-18 PROCEDURE — 99999 PR PBB SHADOW E&M-EST. PATIENT-LVL III: CPT | Mod: PBBFAC,,, | Performed by: ORTHOPAEDIC SURGERY

## 2024-04-18 PROCEDURE — 99213 OFFICE O/P EST LOW 20 MIN: CPT | Mod: S$GLB,,, | Performed by: ORTHOPAEDIC SURGERY

## 2024-04-18 PROCEDURE — 1160F RVW MEDS BY RX/DR IN RCRD: CPT | Mod: CPTII,S$GLB,, | Performed by: ORTHOPAEDIC SURGERY

## 2024-04-18 PROCEDURE — 73630 X-RAY EXAM OF FOOT: CPT | Mod: 26,RT,, | Performed by: RADIOLOGY

## 2024-04-18 PROCEDURE — 73630 X-RAY EXAM OF FOOT: CPT | Mod: TC,PN,RT

## 2024-04-18 PROCEDURE — 1100F PTFALLS ASSESS-DOCD GE2>/YR: CPT | Mod: CPTII,S$GLB,, | Performed by: ORTHOPAEDIC SURGERY

## 2024-04-18 NOTE — TELEPHONE ENCOUNTER
----- Message from Allison Wu sent at 4/18/2024  3:06 PM CDT -----  Contact: self  Type:  Test Results    Who Called:  the patient  Name of Test (Lab/Mammo/Etc):  colonoscopy/upper gi  Date of Test:  4/2  Ordering Provider:  Bc  Where the test was performed:  [facility]  Best Call Back Number:  [phone]  Additional Information:  pt states she was returning a call. She does not know who was calling with her result. Please call.

## 2024-04-18 NOTE — PROGRESS NOTES
Subjective:      Patient ID: Comfort Cantu is a 68 y.o. female.    Chief Complaint: Pain of the Right Foot    HPI  68-year-old female long history of right lower extremity pain.  She states that she has learned to deal with the pain but she is concerned about her foot rotating to the point making it difficult for her to ambulate.  She was treated recently with conservative treatment and a walking boot following possible re fracture of the distal tibia fracture.  ROS      Objective:    Ortho Exam     Constitutional:   Patient is alert  and oriented in no acute distress  HEENT:  normocephalic atraumatic; PERRL EOMI  Neck:  Supple without adenopathy  Cardiovascular:  Normal rate and rhythm  Pulmonary:  Normal respiratory effort normal chest wall expansion  Abdominal:  Nonprotuberant nondistended  Musculoskeletal:  Patient has a moderately antalgic gait with the external rotation of her right lower extremity  Minimal swelling mild tenderness over the distal tibia and ankle  She has intact skin sensation and brisk capillary refill of the digits.  Neurological:  No focal defect; cranial nerves 2-12 grossly intact  Psychiatric/behavioral:  Mood and behavior normal      X-Ray Foot Complete Right  EXAMINATION:  XR FOOT COMPLETE 3 VIEW RIGHT    CLINICAL HISTORY:  . Pain in right foot    TECHNIQUE:  AP, lateral, and oblique views of the right foot were performed.    COMPARISON:  06/25/2018.    FINDINGS:  Minimal hallux valgus deformity.  Mild hammertoe formation with mild degenerative osteoarthrosis involving the IP joints of the digits.  Tarsal bones intact with normal tarsometatarsal alignment.  There is bone demineralization.    Electronically signed by: Leroy Morgan  Date:    04/18/2024  Time:    13:03       My Radiographs Findings:    I have personally reviewed radiographs and concur with above findings    Assessment:       Encounter Diagnosis   Name Primary?    Other closed fracture of distal end of right tibia  with delayed healing, subsequent encounter Yes         Plan:       I have discussed medical condition treatment options with her at length and reviewed her previous radiographs of her lower extremity.  Looks like there was a subtle rotational deformity of the distal tibia including incomplete bridging callus and a persistent fracture line I have offered referral to 1 of the foot and ankle or trauma specialists for their evaluation and treatment recommendations I have discussed cautious activities and follow up with us can be as needed.        Past Medical History:   Diagnosis Date    Anticoagulant long-term use     Anxiety     Arthritis     Atherosclerosis     Closed displaced spiral fracture of shaft of right tibia 2/3/2020    Coronary artery disease     Depression     GERD (gastroesophageal reflux disease)     Hearing loss     [place] (hard of hearing)     Hearing aids    Hypertension     Long term (current) use of anticoagulants     Peripheral arterial disease     Tubulovillous adenoma of colon 2019    Wears dentures     Full-Upper; Partial- lower    Wound infection after surgery 06/2020     Past Surgical History:   Procedure Laterality Date    APPENDECTOMY      CAROTID STENT  2014    COLON SURGERY      COLONOSCOPY N/A 10/07/2019    Procedure: COLONOSCOPY;  Surgeon: Olaf Alba MD;  Location: [facility];  Service: General;  Laterality: N/A;    COLONOSCOPY N/A 12/16/2019    Procedure: COLONOSCOPY;  Surgeon: Olaf Alba MD;  Location: [facility];  Service: General;  Laterality: N/A;    COLONOSCOPY N/A 01/23/2020    Procedure: COLONOSCOPY (unable to come earlier);  Surgeon: Esteban Yancey MD;  Location: [facility];  Service: Endoscopy;  Laterality: N/A;    COLONOSCOPY N/A 4/2/2024    Procedure: COLONOSCOPY(Does not want earlier time-arrive 1330);  Surgeon: Esteban Yancey MD;  Location: [facility];  Service: Endoscopy;  Laterality: N/A;    ENDOSCOPIC ULTRASOUND OF UPPER GASTROINTESTINAL TRACT N/A  10/16/2018    Procedure: ULTRASOUND, UPPER GI TRACT, ENDOSCOPIC;  Surgeon: Kumar Wang MD;  Location: [facility] ENDO (2ND FLR);  Service: Endoscopy;  Laterality: N/A;  5 day hold Effient, Dr Сергей Malagon    ESOPHAGOGASTRODUODENOSCOPY N/A 10/07/2019    Procedure: ESOPHAGOGASTRODUODENOSCOPY (EGD);  Surgeon: Olaf Alba MD;  Location: [facility] ENDO;  Service: General;  Laterality: N/A;    ESOPHAGOGASTRODUODENOSCOPY N/A 4/2/2024    Procedure: EGD (ESOPHAGOGASTRODUODENOSCOPY);  Surgeon: Esteban Yancey MD;  Location: [facility] ENDO;  Service: Endoscopy;  Laterality: N/A;    FEMORAL ARTERY STENT      HARDWARE REMOVAL Right 06/12/2020    Procedure: REMOVAL, HARDWARE;  Surgeon: Elbert Marrero II, MD;  Location: [facility] OR;  Service: Orthopedics;  Laterality: Right;  right distal tibia    ORIF TIBIA FRACTURE Right 02/14/2020    Procedure: ORIF, FRACTURE, TIBIA;  Surgeon: Elbert Marrero II, MD;  Location: [facility] OR;  Service: Orthopedics;  Laterality: Right;    ROBOT-ASSISTED LAPAROSCOPIC RESECTION OF SIGMOID COLON N/A 09/14/2020    Procedure: ROBOTIC COLECTOMY, SIGMOID;  Surgeon: Terrance Candelaria MD;  Location: [facility] OR;  Service: General;  Laterality: N/A;    UPPER GASTROINTESTINAL ENDOSCOPY           Current Outpatient Medications:     ALPRAZolam (XANAX) 0.25 MG tablet, TAKE 1 TABLET 2 TIMES A DAY AS NEEDED FOR ANXIETY, Disp: 60 tablet, Rfl: 0    atorvastatin (LIPITOR) 40 MG tablet, Take 40 mg by mouth once daily., Disp: , Rfl:     busPIRone (BUSPAR) 15 MG tablet, TAKE 1 TABLET TWICE DAILY, Disp: 180 tablet, Rfl: 3    DULoxetine (CYMBALTA) 30 MG capsule, Take 1 capsule (30 mg total) by mouth once daily., Disp: 90 capsule, Rfl: 3    isosorbide mononitrate (IMDUR) 30 MG 24 hr tablet, Take 1 tablet (30 mg total) by mouth once daily., Disp: 90 tablet, Rfl: 3    olmesartan (BENICAR) 20 MG tablet, TAKE ONE (1) TABLET ONCE DAILY FOR BLOOD PRESSURE, Disp: 30 tablet, Rfl: 3    pantoprazole (PROTONIX) 40 MG tablet, TAKE 1  "TABLET EVERY DAY, Disp: 90 tablet, Rfl: 3    prasugreL (EFFIENT) 5 mg Tab, TAKE 1 TABLET ONCE DAILY FOR STROKE PREVENTION, Disp: 90 tablet, Rfl: 0    Review of patient's allergies indicates:   Allergen Reactions    Penicillins      Age 23, "crippled me"       Family History   Problem Relation Name Age of Onset    Heart attack Brother      Diabetes Brother      Hyperlipidemia Brother      Eczema Neg Hx      Lupus Neg Hx      Psoriasis Neg Hx      Melanoma Neg Hx      Breast cancer Neg Hx      Ovarian cancer Neg Hx      Colon cancer Neg Hx      Esophageal cancer Neg Hx      Stomach cancer Neg Hx       Social History     Occupational History    Not on file   Tobacco Use    Smoking status: Every Day     Current packs/day: 0.75     Average packs/day: 0.7 packs/day for 35.5 years (26.7 ttl pk-yrs)     Types: Cigarettes     Start date: 10/1/1988    Smokeless tobacco: Never   Substance and Sexual Activity    Alcohol use: Yes     Comment: occ    Drug use: Yes     Types: Oxycodone, Benzodiazepines     Comment: as prescribed    Sexual activity: Yes       "

## 2024-04-22 ENCOUNTER — OFFICE VISIT (OUTPATIENT)
Dept: HEMATOLOGY/ONCOLOGY | Facility: CLINIC | Age: 69
End: 2024-04-22
Payer: MEDICARE

## 2024-04-22 ENCOUNTER — OFFICE VISIT (OUTPATIENT)
Dept: PODIATRY | Facility: CLINIC | Age: 69
End: 2024-04-22
Payer: MEDICARE

## 2024-04-22 VITALS
OXYGEN SATURATION: 95 % | BODY MASS INDEX: 18.46 KG/M2 | RESPIRATION RATE: 20 BRPM | DIASTOLIC BLOOD PRESSURE: 83 MMHG | HEART RATE: 100 BPM | HEART RATE: 89 BPM | BODY MASS INDEX: 18.64 KG/M2 | DIASTOLIC BLOOD PRESSURE: 77 MMHG | RESPIRATION RATE: 18 BRPM | SYSTOLIC BLOOD PRESSURE: 135 MMHG | SYSTOLIC BLOOD PRESSURE: 148 MMHG | HEIGHT: 68 IN | WEIGHT: 122.63 LBS | WEIGHT: 121.81 LBS

## 2024-04-22 DIAGNOSIS — L60.0 INGROWN NAIL: Primary | ICD-10-CM

## 2024-04-22 DIAGNOSIS — Z87.891 PERSONAL HISTORY OF NICOTINE DEPENDENCE: ICD-10-CM

## 2024-04-22 DIAGNOSIS — D50.0 IRON DEFICIENCY ANEMIA DUE TO CHRONIC BLOOD LOSS: Primary | ICD-10-CM

## 2024-04-22 DIAGNOSIS — I73.9 PERIPHERAL VASCULAR DISEASE: ICD-10-CM

## 2024-04-22 PROCEDURE — 3079F DIAST BP 80-89 MM HG: CPT | Mod: CPTII,S$GLB,, | Performed by: PODIATRIST

## 2024-04-22 PROCEDURE — 1159F MED LIST DOCD IN RCRD: CPT | Mod: CPTII,S$GLB,, | Performed by: PODIATRIST

## 2024-04-22 PROCEDURE — 1159F MED LIST DOCD IN RCRD: CPT | Mod: CPTII,S$GLB,, | Performed by: NURSE PRACTITIONER

## 2024-04-22 PROCEDURE — 1160F RVW MEDS BY RX/DR IN RCRD: CPT | Mod: CPTII,S$GLB,, | Performed by: PODIATRIST

## 2024-04-22 PROCEDURE — 3077F SYST BP >= 140 MM HG: CPT | Mod: CPTII,S$GLB,, | Performed by: PODIATRIST

## 2024-04-22 PROCEDURE — 3008F BODY MASS INDEX DOCD: CPT | Mod: CPTII,S$GLB,, | Performed by: NURSE PRACTITIONER

## 2024-04-22 PROCEDURE — 1126F AMNT PAIN NOTED NONE PRSNT: CPT | Mod: CPTII,S$GLB,, | Performed by: NURSE PRACTITIONER

## 2024-04-22 PROCEDURE — 4010F ACE/ARB THERAPY RXD/TAKEN: CPT | Mod: CPTII,S$GLB,, | Performed by: PODIATRIST

## 2024-04-22 PROCEDURE — 3075F SYST BP GE 130 - 139MM HG: CPT | Mod: CPTII,S$GLB,, | Performed by: NURSE PRACTITIONER

## 2024-04-22 PROCEDURE — 1126F AMNT PAIN NOTED NONE PRSNT: CPT | Mod: CPTII,S$GLB,, | Performed by: PODIATRIST

## 2024-04-22 PROCEDURE — 4010F ACE/ARB THERAPY RXD/TAKEN: CPT | Mod: CPTII,S$GLB,, | Performed by: NURSE PRACTITIONER

## 2024-04-22 PROCEDURE — 1100F PTFALLS ASSESS-DOCD GE2>/YR: CPT | Mod: CPTII,S$GLB,, | Performed by: PODIATRIST

## 2024-04-22 PROCEDURE — 99999 PR PBB SHADOW E&M-EST. PATIENT-LVL IV: CPT | Mod: PBBFAC,,, | Performed by: PODIATRIST

## 2024-04-22 PROCEDURE — 3078F DIAST BP <80 MM HG: CPT | Mod: CPTII,S$GLB,, | Performed by: NURSE PRACTITIONER

## 2024-04-22 PROCEDURE — 99999 PR PBB SHADOW E&M-EST. PATIENT-LVL III: CPT | Mod: PBBFAC,,, | Performed by: NURSE PRACTITIONER

## 2024-04-22 PROCEDURE — 3288F FALL RISK ASSESSMENT DOCD: CPT | Mod: CPTII,S$GLB,, | Performed by: PODIATRIST

## 2024-04-22 PROCEDURE — 99214 OFFICE O/P EST MOD 30 MIN: CPT | Mod: S$GLB,,, | Performed by: NURSE PRACTITIONER

## 2024-04-22 PROCEDURE — 3008F BODY MASS INDEX DOCD: CPT | Mod: CPTII,S$GLB,, | Performed by: PODIATRIST

## 2024-04-22 PROCEDURE — 99203 OFFICE O/P NEW LOW 30 MIN: CPT | Mod: S$GLB,,, | Performed by: PODIATRIST

## 2024-04-22 RX ORDER — EPINEPHRINE 0.3 MG/.3ML
0.3 INJECTION SUBCUTANEOUS ONCE AS NEEDED
Status: CANCELLED | OUTPATIENT
Start: 2024-04-22

## 2024-04-22 RX ORDER — SODIUM CHLORIDE 0.9 % (FLUSH) 0.9 %
10 SYRINGE (ML) INJECTION
Status: CANCELLED | OUTPATIENT
Start: 2024-04-22

## 2024-04-22 RX ORDER — DIPHENHYDRAMINE HYDROCHLORIDE 50 MG/ML
50 INJECTION INTRAMUSCULAR; INTRAVENOUS ONCE AS NEEDED
Status: CANCELLED | OUTPATIENT
Start: 2024-04-22

## 2024-04-22 RX ORDER — HEPARIN 100 UNIT/ML
500 SYRINGE INTRAVENOUS
Status: CANCELLED | OUTPATIENT
Start: 2024-04-22

## 2024-04-22 NOTE — PROGRESS NOTES
HPI    67-year-old female referred to [facility] for evaluation of severe anemia.  Patient was recently transfused in the ED with hemoglobin 6.2 grams/deciliter.  At the time patient is also microcytic.  The patient denies any overt bleeding.      Most recent lab show Hb of 11.8g/dL MCV 94\    Currently patient denies any overt bleeding.      4/10/2023:  She returns today follow-up with complaints of fatigue    8/28/2023:  She returns today follow-up.  Patient has no new symptoms and no new complaints    1/3/2024:  She returns today for follow-up    4/22/2024:  She returns today for follow-up.  She has had an EGD and colonoscopies since previous visit  Past Medical History:   Diagnosis Date    Anticoagulant long-term use     Anxiety     Arthritis     Atherosclerosis     Closed displaced spiral fracture of shaft of right tibia 2/3/2020    Coronary artery disease     Depression     GERD (gastroesophageal reflux disease)     Hearing loss     [place] (hard of hearing)     Hearing aids    Hypertension     Long term (current) use of anticoagulants     Peripheral arterial disease     Tubulovillous adenoma of colon 2019    Wears dentures     Full-Upper; Partial- lower    Wound infection after surgery 06/2020     Social History     Socioeconomic History    Marital status:    Tobacco Use    Smoking status: Every Day     Current packs/day: 0.75     Average packs/day: 0.8 packs/day for 35.6 years (26.7 ttl pk-yrs)     Types: Cigarettes     Start date: 10/1/1988    Smokeless tobacco: Never   Substance and Sexual Activity    Alcohol use: Yes     Comment: occ    Drug use: Yes     Types: Oxycodone, Benzodiazepines     Comment: as prescribed    Sexual activity: Yes     Social Determinants of Health     Financial Resource Strain: Low Risk  (4/21/2024)    Overall Financial Resource Strain (CARDIA)     Difficulty of Paying Living Expenses: Not hard at all   Food Insecurity: No Food Insecurity (4/21/2024)    Hunger Vital  Sign     Worried About Running Out of Food in the Last Year: Never true     Ran Out of Food in the Last Year: Never true   Transportation Needs: No Transportation Needs (4/21/2024)    PRAPARE - Transportation     Lack of Transportation (Medical): No     Lack of Transportation (Non-Medical): No   Physical Activity: Unknown (4/21/2024)    Exercise Vital Sign     Days of Exercise per Week: 7 days   Stress: No Stress Concern Present (4/21/2024)    [place] [place] [employer] - Occupational Stress Questionnaire     Feeling of Stress : Only a little   Social Connections: Unknown (4/21/2024)    Social Connection and Isolation Panel [NHANES]     Frequency of Communication with Friends and Family: More than three times a week     Frequency of Social Gatherings with Friends and Family: More than three times a week     Active Member of Clubs or Organizations: No     Attends Club or Organization Meetings: Never     Marital Status:    Housing Stability: Unknown (4/21/2024)    Housing Stability Vital Sign     Unable to Pay for Housing in the Last Year: No         Subjective      Review of Systems   Constitutional: Negative for appetite change, fatigue and unexpected weight change.  Positive for fatigue  HENT: Negative for mouth sores.   Eyes: Negative for visual disturbance.   Respiratory: Negative for cough and shortness of breath.   Cardiovascular: Negative for chest pain.   Gastrointestinal: Negative for diarrhea.   Genitourinary: Negative for frequency.   Musculoskeletal: Negative for back pain.   Skin: Negative for rash.   Neurological: Negative for headaches.   Hematological: Negative for adenopathy.   Psychiatric/Behavioral: The patient is not nervous/anxious.   All other systems reviewed and are negative.     Objective    Physical Exam   There were no vitals filed for this visit.     Constitutional: patient is oriented to person, place, and time. patient appears well-developed and well-nourished. No  distress.   HENT:   Right Ear: External ear normal.   Left Ear: External ear normal.   Nose: Nose normal.   Mouth/Throat: Oropharynx is clear and moist. No oropharyngeal exudate.   Teeth, gums and lips are normal   No sinus tenderness   Palate, tongue, posterior pharynx are normal   Eyes: Conjunctivae and lids are normal.   Neck: Trachea normal and normal range of motion. No thyromegaly   Cardiovascular: Normal rate, regular rhythm, normal heart sounds, intact distal pulses and normal pulses.   No murmur heard.   No edema, no tenderness in the extremities.   Pulmonary/Chest: Effort normal and breath sounds normal. No accessory muscle usage. patient has no wheezes..   Abdominal: Soft. Normal appearance and bowel sounds are normal. patient exhibits no distension and no mass. There is no hepatosplenomegaly. There is no tenderness.   Musculoskeletal: Normal range of motion.   Gait is normal   No clubbing, cyanosis       There were no vitals filed for this visit.       CMP  Sodium   Date Value Ref Range Status   04/01/2024 124 (L) 136 - 145 mmol/L Final     Potassium   Date Value Ref Range Status   04/01/2024 4.1 3.5 - 5.1 mmol/L Final     Chloride   Date Value Ref Range Status   04/01/2024 91 (L) 95 - 110 mmol/L Final     CO2   Date Value Ref Range Status   04/01/2024 23 23 - 29 mmol/L Final     Glucose   Date Value Ref Range Status   04/01/2024 97 70 - 110 mg/dL Final     BUN   Date Value Ref Range Status   04/01/2024 10 8 - 23 mg/dL Final     Creatinine   Date Value Ref Range Status   04/01/2024 0.7 0.5 - 1.4 mg/dL Final     Calcium   Date Value Ref Range Status   04/01/2024 9.5 8.7 - 10.5 mg/dL Final     Total Protein   Date Value Ref Range Status   04/01/2024 7.2 6.0 - 8.4 g/dL Final     Albumin   Date Value Ref Range Status   04/01/2024 4.0 3.5 - 5.2 g/dL Final     Total Bilirubin   Date Value Ref Range Status   04/01/2024 0.4 0.1 - 1.0 mg/dL Final     Comment:     For infants and newborns, interpretation of  results should be based  on gestational age, weight and in agreement with clinical  observations.    Premature Infant recommended reference ranges:  Up to 24 hours.............<8.0 mg/dL  Up to 48 hours............<12.0 mg/dL  3-5 days..................<15.0 mg/dL  6-29 days.................<15.0 mg/dL       Alkaline Phosphatase   Date Value Ref Range Status   04/01/2024 103 55 - 135 U/L Final     AST   Date Value Ref Range Status   04/01/2024 27 10 - 40 U/L Final     ALT   Date Value Ref Range Status   04/01/2024 23 10 - 44 U/L Final     Anion Gap   Date Value Ref Range Status   04/01/2024 10 8 - 16 mmol/L Final     eGFR if    Date Value Ref Range Status   09/03/2021 >60.0 >60 mL/min/1.73 m^2 Final     eGFR if non    Date Value Ref Range Status   09/03/2021 >60.0 >60 mL/min/1.73 m^2 Final     Comment:     Calculation used to obtain the estimated glomerular filtration  rate (eGFR) is the CKD-EPI equation.        Lab Results   Component Value Date    WBC 6.21 04/01/2024    HGB 10.3 (L) 04/01/2024    HCT 30.7 (L) 04/01/2024    MCV 95 04/01/2024     04/01/2024     EGD:   A single bleeding angioectasia in the stomach.                          Treated with argon plasma coagulation     Colonoscopy:   The perianal and digital rectal examinations were normal.        Non-bleeding internal hemorrhoids were found during retroflexion.        The hemorrhoids were small.        Multiple small-mouthed diverticula were found in the sigmoid colon        and descending colon.        There was evidence of a prior end-to-end colo-colonic anastomosis in        the sigmoid colon. This was patent and was characterized by healthy        appearing mucosa. The anastomosis was traversed.        A single small localized angiodysplastic lesion with bleeding on        contact was found in the cecum. Coagulation for hemostasis using        argon plasma at 0.5 liters/minute and 20 hayes was successful.         The transverse colon, ascending colon, appendiceal orifice and        ileocecal valve appeared normal.        The terminal ileum appeared normal.   Impression:            - Non-bleeding internal hemorrhoids.                          - Diverticulosis in the sigmoid colon and in the                          descending colon.                          - Patent end-to-end colo-colonic anastomosis,                          characterized by healthy appearing mucosa.                          - A single colonic angiodysplastic lesion. Treated                          with argon plasma coagulation (APC).                          - The transverse colon, ascending colon, ileocecal                          valve and appendiceal orifice are normal.                          - The examined portion of the ileum was normal.                          - No specimens collected.   Assessment  Iron deficiency anemia    -Last transfusion 1 unit packed red blood cell on 09/02/2022.    -repeat labs show iron deficiency anemia despite being on oral iron  -repeat labs show iron-deficiency anemia despite receiving IV iron with Ferrlecit  -Venofer has not available at this location.  We will request authorization for Feraheme x2 dose    Tubular adenoma with high-grade dysplasia in sigmoid colon  -status post phlebotomy colectomy  -last EGD and colonoscopy 04/2024  -continue to do surveillance colonoscopy as patient is increased risk for colon cancer  -follow-up with GI for surveillance colonoscopy    Hypertension/coronary artery disease:   -follow-up with PCP    COPD:   -follow-up with PCP    Pulmonary nodules:   -CT chest with stable recommended 12 month follow-up with low-dose CT scan  -arrange annual low-dose CT scan in June    Emphysema:   -continue to follow with pulmonology    Thrombocytosis:   -likely reactive and related to iron deficiency

## 2024-04-23 DIAGNOSIS — F41.1 GAD (GENERALIZED ANXIETY DISORDER): ICD-10-CM

## 2024-04-23 PROBLEM — L60.0 INGROWN NAIL: Status: ACTIVE | Noted: 2024-04-23

## 2024-04-23 RX ORDER — ALPRAZOLAM 0.25 MG/1
TABLET ORAL
Qty: 60 TABLET | Refills: 0 | OUTPATIENT
Start: 2024-04-23

## 2024-04-23 RX ORDER — PRASUGREL 5 MG/1
TABLET, FILM COATED ORAL
Qty: 90 TABLET | Refills: 0 | Status: SHIPPED | OUTPATIENT
Start: 2024-04-23

## 2024-04-23 NOTE — TELEPHONE ENCOUNTER
Refill Routing Note   Medication(s) are not appropriate for processing by [facility] for the following reason(s):        Required labs abnormal: Effient  Outside of protocol: Xanax    [facility] action(s):  Defer               Appointments  past 12m or future 3m with PCP    Date Provider   Last Visit   11/24/2023 Simran Pool MD   Next Visit   Visit date not found Simran Pool MD   ED visits in past 90 days: 0        Note composed:12:14 PM 04/23/2024

## 2024-04-23 NOTE — PROGRESS NOTES
Subjective:       Patient ID: Comfort Cantu is a 68 y.o. female.    Chief Complaint: Nail Problem  Patient presents today she is complaining about an ingrown toenail on her left great toe the nail has become very thick it can rub in her shoe depending upon what she is wearing she relates trauma to this toe many years ago and states ever since that trauma it did not grow back correctly patient currently ambulates utilizing a walker.    Past Medical History:   Diagnosis Date    Anticoagulant long-term use     Anxiety     Arthritis     Atherosclerosis     Closed displaced spiral fracture of shaft of right tibia 2/3/2020    Coronary artery disease     Depression     GERD (gastroesophageal reflux disease)     Hearing loss     [place] (hard of hearing)     Hearing aids    Hypertension     Long term (current) use of anticoagulants     Peripheral arterial disease     Tubulovillous adenoma of colon 2019    Wears dentures     Full-Upper; Partial- lower    Wound infection after surgery 06/2020     Past Surgical History:   Procedure Laterality Date    APPENDECTOMY      CAROTID STENT  2014    COLON SURGERY      COLONOSCOPY N/A 10/07/2019    Procedure: COLONOSCOPY;  Surgeon: Olaf Alba MD;  Location: [facility];  Service: General;  Laterality: N/A;    COLONOSCOPY N/A 12/16/2019    Procedure: COLONOSCOPY;  Surgeon: Olaf Alba MD;  Location: [facility];  Service: General;  Laterality: N/A;    COLONOSCOPY N/A 01/23/2020    Procedure: COLONOSCOPY (unable to come earlier);  Surgeon: Esteban Yancey MD;  Location: [facility];  Service: Endoscopy;  Laterality: N/A;    COLONOSCOPY N/A 4/2/2024    Procedure: COLONOSCOPY(Does not want earlier time-arrive 1330);  Surgeon: Esteban Yancey MD;  Location: [facility];  Service: Endoscopy;  Laterality: N/A;    ENDOSCOPIC ULTRASOUND OF UPPER GASTROINTESTINAL TRACT N/A 10/16/2018    Procedure: ULTRASOUND, UPPER GI TRACT, ENDOSCOPIC;  Surgeon: Kumar Wang MD;   Location: [facility] ENDO (2ND FLR);  Service: Endoscopy;  Laterality: N/A;  5 day hold Effient, Dr Сергей Malagon    ESOPHAGOGASTRODUODENOSCOPY N/A 10/07/2019    Procedure: ESOPHAGOGASTRODUODENOSCOPY (EGD);  Surgeon: Olaf Alba MD;  Location: [facility] ENDO;  Service: General;  Laterality: N/A;    ESOPHAGOGASTRODUODENOSCOPY N/A 4/2/2024    Procedure: EGD (ESOPHAGOGASTRODUODENOSCOPY);  Surgeon: Esteban Yancey MD;  Location: [facility] ENDO;  Service: Endoscopy;  Laterality: N/A;    FEMORAL ARTERY STENT      HARDWARE REMOVAL Right 06/12/2020    Procedure: REMOVAL, HARDWARE;  Surgeon: Elbert Marrero II, MD;  Location: [facility] OR;  Service: Orthopedics;  Laterality: Right;  right distal tibia    ORIF TIBIA FRACTURE Right 02/14/2020    Procedure: ORIF, FRACTURE, TIBIA;  Surgeon: Elbert Marrero II, MD;  Location: [facility] OR;  Service: Orthopedics;  Laterality: Right;    ROBOT-ASSISTED LAPAROSCOPIC RESECTION OF SIGMOID COLON N/A 09/14/2020    Procedure: ROBOTIC COLECTOMY, SIGMOID;  Surgeon: Terrance Candelaria MD;  Location: [facility] OR;  Service: General;  Laterality: N/A;    UPPER GASTROINTESTINAL ENDOSCOPY       Family History   Problem Relation Name Age of Onset    Heart attack Brother      Diabetes Brother      Hyperlipidemia Brother      Eczema Neg Hx      Lupus Neg Hx      Psoriasis Neg Hx      Melanoma Neg Hx      Breast cancer Neg Hx      Ovarian cancer Neg Hx      Colon cancer Neg Hx      Esophageal cancer Neg Hx      Stomach cancer Neg Hx       Social History     Socioeconomic History    Marital status:    Tobacco Use    Smoking status: Every Day     Current packs/day: 0.75     Average packs/day: 0.8 packs/day for 35.6 years (26.7 ttl pk-yrs)     Types: Cigarettes     Start date: 10/1/1988    Smokeless tobacco: Never   Substance and Sexual Activity    Alcohol use: Yes     Comment: occ    Drug use: Yes     Types: Oxycodone, Benzodiazepines     Comment: as prescribed    Sexual activity: Yes     Social Determinants of  Health     Financial Resource Strain: Low Risk  (4/21/2024)    Overall Financial Resource Strain (CARDIA)     Difficulty of Paying Living Expenses: Not hard at all   Food Insecurity: No Food Insecurity (4/21/2024)    Hunger Vital Sign     Worried About Running Out of Food in the Last Year: Never true     Ran Out of Food in the Last Year: Never true   Transportation Needs: No Transportation Needs (4/21/2024)    PRAPARE - Transportation     Lack of Transportation (Medical): No     Lack of Transportation (Non-Medical): No   Physical Activity: Unknown (4/21/2024)    Exercise Vital Sign     Days of Exercise per Week: 7 days   Stress: No Stress Concern Present (4/21/2024)    [employer] [place] [employer] - Occupational Stress Questionnaire     Feeling of Stress : Only a little   Social Connections: Unknown (4/21/2024)    Social Connection and Isolation Panel [NHANES]     Frequency of Communication with Friends and Family: More than three times a week     Frequency of Social Gatherings with Friends and Family: More than three times a week     Active Member of Clubs or Organizations: No     Attends Club or Organization Meetings: Never     Marital Status:    Housing Stability: Unknown (4/21/2024)    Housing Stability Vital Sign     Unable to Pay for Housing in the Last Year: No       Current Outpatient Medications   Medication Sig Dispense Refill    ALPRAZolam (XANAX) 0.25 MG tablet TAKE 1 TABLET 2 TIMES A DAY AS NEEDED FOR ANXIETY 60 tablet 0    atorvastatin (LIPITOR) 40 MG tablet Take 40 mg by mouth once daily.      busPIRone (BUSPAR) 15 MG tablet TAKE 1 TABLET TWICE DAILY 180 tablet 3    DULoxetine (CYMBALTA) 30 MG capsule Take 1 capsule (30 mg total) by mouth once daily. 90 capsule 3    isosorbide mononitrate (IMDUR) 30 MG 24 hr tablet Take 1 tablet (30 mg total) by mouth once daily. 90 tablet 3    olmesartan (BENICAR) 20 MG tablet TAKE ONE (1) TABLET ONCE DAILY FOR BLOOD PRESSURE 30 tablet 3     "pantoprazole (PROTONIX) 40 MG tablet TAKE 1 TABLET EVERY DAY 90 tablet 3    prasugreL (EFFIENT) 5 mg Tab TAKE 1 TABLET ONCE DAILY FOR STROKE PREVENTION 90 tablet 0     No current facility-administered medications for this visit.     Review of patient's allergies indicates:   Allergen Reactions    Penicillins      Age 23, "crippled me"       Review of Systems   Musculoskeletal:  Positive for arthralgias.   Skin:  Positive for color change.   All other systems reviewed and are negative.      Objective:      Vitals:    04/22/24 1318   BP: (!) 148/83   Pulse: 89   Resp: 18   Weight: 55.2 kg (121 lb 12.8 oz)   Height: 5' 8" (1.727 m)     Physical Exam  Vitals and nursing note reviewed.   Constitutional:       Appearance: Normal appearance.   Cardiovascular:      Pulses:           Dorsalis pedis pulses are 1+ on the right side and 1+ on the left side.        Posterior tibial pulses are 0 on the right side and 0 on the left side.   Pulmonary:      Effort: Pulmonary effort is normal.   Musculoskeletal:         General: Tenderness present.      Left foot: Deformity present.        Feet:    Feet:      Right foot:      Protective Sensation: 2 sites tested.  2 sites sensed.      Skin integrity: Skin integrity normal.      Toenail Condition: Right toenails are abnormally thick and ingrown. Fungal disease present.     Left foot:      Protective Sensation: 2 sites tested.  2 sites sensed.      Skin integrity: Erythema and warmth present.      Toenail Condition: Left toenails are abnormally thick, long and ingrown. Fungal disease present.  Skin:     Capillary Refill: Capillary refill takes more than 3 seconds.      Findings: Erythema present.   Neurological:      General: No focal deficit present.      Mental Status: She is alert.   Psychiatric:         Mood and Affect: Mood normal.         Behavior: Behavior normal.                            Assessment:       1. Ingrown nail    2. Peripheral vascular disease        Plan:     "   Patient presents today she is complaining about an ingrown toenail on her left great toe the nail has become very thick it can rub in her shoe depending upon what she is wearing she relates trauma to this toe many years ago and states ever since that trauma it did not grow back correctly patient currently ambulates utilizing a walker.  A comprehensive new patient evaluation was performed patient has obviously had previous trauma to the left hallux nail the nail has subsequently had severe fungal infection the fungal infection has now caused pinching of the skin underlying the nail and the nail has become somewhat ingrown patient is at high risk for complication due to her poor circulation any type of infection in this area could be very problematic because of her circulatory problems.  I was able to aggressively trim and remove the ingrowing nail freeing up some of the nail that was growing into the skin medial lateral and distal aspect of the left hallux I have recommended the application of Vicks vapor rub this will help to soften the nail at it will help to treat the fungal infection on the nail I also had some ingrowing toenail that I needed to trim on the 5th digit right I did advised the patient any type of nail Polish will certainly make the fungus worse she needs to discontinue the nail Polish and start applying the Vicks twice a day every day she understands this can take up to 6-8 months to start seeing results.  Plan follow-up as needed patient had no discomfort following removal of the ingrowing nail.This note was created using "[employer] voice recognition software that occasionally misinterpreted phrases or words.

## 2024-04-29 ENCOUNTER — INFUSION (OUTPATIENT)
Dept: INFUSION THERAPY | Facility: HOSPITAL | Age: 69
End: 2024-04-29
Attending: NURSE PRACTITIONER
Payer: MEDICARE

## 2024-04-29 VITALS
HEART RATE: 102 BPM | DIASTOLIC BLOOD PRESSURE: 51 MMHG | TEMPERATURE: 97 F | BODY MASS INDEX: 18.49 KG/M2 | OXYGEN SATURATION: 100 % | WEIGHT: 122 LBS | SYSTOLIC BLOOD PRESSURE: 97 MMHG | HEIGHT: 68 IN | RESPIRATION RATE: 18 BRPM

## 2024-04-29 DIAGNOSIS — D50.0 IRON DEFICIENCY ANEMIA DUE TO CHRONIC BLOOD LOSS: Primary | ICD-10-CM

## 2024-04-29 PROCEDURE — 96365 THER/PROPH/DIAG IV INF INIT: CPT

## 2024-04-29 PROCEDURE — 63600175 PHARM REV CODE 636 W HCPCS: Mod: JZ,JG | Performed by: NURSE PRACTITIONER

## 2024-04-29 PROCEDURE — 25000003 PHARM REV CODE 250: Performed by: NURSE PRACTITIONER

## 2024-04-29 RX ORDER — DIPHENHYDRAMINE HYDROCHLORIDE 50 MG/ML
50 INJECTION INTRAMUSCULAR; INTRAVENOUS ONCE AS NEEDED
Status: CANCELLED | OUTPATIENT
Start: 2024-04-29

## 2024-04-29 RX ORDER — EPINEPHRINE 0.3 MG/.3ML
0.3 INJECTION SUBCUTANEOUS ONCE AS NEEDED
Status: CANCELLED | OUTPATIENT
Start: 2024-04-29

## 2024-04-29 RX ORDER — SODIUM CHLORIDE 0.9 % (FLUSH) 0.9 %
10 SYRINGE (ML) INJECTION
Status: CANCELLED | OUTPATIENT
Start: 2024-04-29

## 2024-04-29 RX ORDER — SODIUM CHLORIDE 0.9 % (FLUSH) 0.9 %
10 SYRINGE (ML) INJECTION
Status: DISCONTINUED | OUTPATIENT
Start: 2024-04-29 | End: 2024-04-29 | Stop reason: HOSPADM

## 2024-04-29 RX ORDER — HEPARIN 100 UNIT/ML
500 SYRINGE INTRAVENOUS
Status: CANCELLED | OUTPATIENT
Start: 2024-04-29

## 2024-04-29 RX ADMIN — FERUMOXYTOL 510 MG: 510 INJECTION INTRAVENOUS at 01:04

## 2024-04-29 NOTE — PLAN OF CARE
Problem: Fatigue  Goal: Improved Activity Tolerance  Intervention: Promote Improved Energy  Flowsheets (Taken 4/29/2024 6074)  Fatigue Management: activity assistance provided  Sleep/Rest Enhancement:   relaxation techniques promoted   room darkened   therapeutic touch utilized   noise level reduced  Environmental Support:   calm environment promoted   personal routine supported   rest periods encouraged

## 2024-04-29 NOTE — NURSING
Patient tolerated treatment well. No further questions or concerns at this time. Appointment booked for 2nd dose in one week.

## 2024-05-09 ENCOUNTER — INFUSION (OUTPATIENT)
Dept: INFUSION THERAPY | Facility: HOSPITAL | Age: 69
End: 2024-05-09
Attending: NURSE PRACTITIONER
Payer: MEDICARE

## 2024-05-09 VITALS
BODY MASS INDEX: 18.49 KG/M2 | DIASTOLIC BLOOD PRESSURE: 67 MMHG | RESPIRATION RATE: 20 BRPM | TEMPERATURE: 97 F | OXYGEN SATURATION: 96 % | HEIGHT: 68 IN | SYSTOLIC BLOOD PRESSURE: 114 MMHG | WEIGHT: 122 LBS | HEART RATE: 90 BPM

## 2024-05-09 DIAGNOSIS — D50.0 IRON DEFICIENCY ANEMIA DUE TO CHRONIC BLOOD LOSS: Primary | ICD-10-CM

## 2024-05-09 PROCEDURE — 25000003 PHARM REV CODE 250: Performed by: NURSE PRACTITIONER

## 2024-05-09 PROCEDURE — 96367 TX/PROPH/DG ADDL SEQ IV INF: CPT

## 2024-05-09 PROCEDURE — 63600175 PHARM REV CODE 636 W HCPCS: Mod: JZ,JG | Performed by: NURSE PRACTITIONER

## 2024-05-09 RX ORDER — HEPARIN 100 UNIT/ML
500 SYRINGE INTRAVENOUS
OUTPATIENT
Start: 2024-05-09

## 2024-05-09 RX ORDER — SODIUM CHLORIDE 0.9 % (FLUSH) 0.9 %
10 SYRINGE (ML) INJECTION
Status: DISCONTINUED | OUTPATIENT
Start: 2024-05-09 | End: 2024-05-09 | Stop reason: HOSPADM

## 2024-05-09 RX ORDER — SODIUM CHLORIDE 0.9 % (FLUSH) 0.9 %
10 SYRINGE (ML) INJECTION
Status: CANCELLED | OUTPATIENT
Start: 2024-05-09

## 2024-05-09 RX ORDER — DIPHENHYDRAMINE HYDROCHLORIDE 50 MG/ML
50 INJECTION INTRAMUSCULAR; INTRAVENOUS ONCE AS NEEDED
OUTPATIENT
Start: 2024-05-09

## 2024-05-09 RX ORDER — EPINEPHRINE 0.3 MG/.3ML
0.3 INJECTION SUBCUTANEOUS ONCE AS NEEDED
OUTPATIENT
Start: 2024-05-09

## 2024-05-09 RX ADMIN — FERUMOXYTOL 510 MG: 510 INJECTION INTRAVENOUS at 01:05

## 2024-05-15 ENCOUNTER — TELEPHONE (OUTPATIENT)
Dept: FAMILY MEDICINE | Facility: CLINIC | Age: 69
End: 2024-05-15
Payer: MEDICARE

## 2024-05-15 NOTE — TELEPHONE ENCOUNTER
----- Message from Bernadine Adriana sent at 5/15/2024  1:12 PM CDT -----  Contact: self  Type:  Sooner Appointment Request    Caller is requesting a sooner appointment.  Caller declined first available appointment listed below.  Caller will not accept being placed on the waitlist and is requesting a message be sent to doctor.    Name of Caller:  pt  When is the first available appointment?  6/6  Symptoms:  needs med check for ALPRAZolam (XANAX) 0.25 MG tablet  Medication  Date: 4/10/2024 Department: [facility] Ordering/Authorizing: Simran Pool MD       And also has a uti  Would the patient rather a call back or a response via [facility]? call  Best Call Back Number:  [phone]   Additional Information:  pt has enough medication for 5 days please call

## 2024-05-21 ENCOUNTER — OFFICE VISIT (OUTPATIENT)
Dept: FAMILY MEDICINE | Facility: CLINIC | Age: 69
End: 2024-05-21
Payer: MEDICARE

## 2024-05-21 VITALS
OXYGEN SATURATION: 98 % | TEMPERATURE: 98 F | SYSTOLIC BLOOD PRESSURE: 90 MMHG | BODY MASS INDEX: 17.79 KG/M2 | HEIGHT: 68 IN | WEIGHT: 117.38 LBS | RESPIRATION RATE: 12 BRPM | DIASTOLIC BLOOD PRESSURE: 60 MMHG | HEART RATE: 91 BPM

## 2024-05-21 DIAGNOSIS — J43.1 PANLOBULAR EMPHYSEMA: ICD-10-CM

## 2024-05-21 DIAGNOSIS — E46 PROTEIN-CALORIE MALNUTRITION, UNSPECIFIED SEVERITY: ICD-10-CM

## 2024-05-21 DIAGNOSIS — F13.20 BENZODIAZEPINE DEPENDENCE: ICD-10-CM

## 2024-05-21 DIAGNOSIS — R39.9 UTI SYMPTOMS: ICD-10-CM

## 2024-05-21 DIAGNOSIS — F41.1 GAD (GENERALIZED ANXIETY DISORDER): ICD-10-CM

## 2024-05-21 DIAGNOSIS — R53.83 FATIGUE, UNSPECIFIED TYPE: ICD-10-CM

## 2024-05-21 DIAGNOSIS — Z79.899 CHRONIC PRESCRIPTION BENZODIAZEPINE USE: Primary | ICD-10-CM

## 2024-05-21 DIAGNOSIS — N30.01 ACUTE CYSTITIS WITH HEMATURIA: ICD-10-CM

## 2024-05-21 DIAGNOSIS — E78.2 MIXED HYPERLIPIDEMIA: ICD-10-CM

## 2024-05-21 LAB
AMP AMPHETAMINE 1000 NM/ML POC: NEGATIVE
BAR BARBITURATES 300 NG/ML POC: NEGATIVE
BILIRUB SERPL-MCNC: NEGATIVE MG/DL
BLOOD URINE, POC: ABNORMAL
BUP BUPRENORPHINE 10 NG/ML POC: NEGATIVE
BZO BENZODIAZEPINES 300 NG/ML POC: ABNORMAL
CLARITY, POC UA: CLEAR
COC COCAINE 300 NG/ML POC: NEGATIVE
COLOR, POC UA: YELLOW
CREATININE (CR) POC: 10
CTP QC/QA: YES
GLUCOSE UR QL STRIP: NEGATIVE
KETONES UR QL STRIP: NEGATIVE
LEUKOCYTE ESTERASE URINE, POC: ABNORMAL
MET METHAMPHETAMINE 1000 NG/ML POC: NEGATIVE
MOP/OPI300 MORPHINE 300 NG/ML POC: NEGATIVE
MTD METHADONE 300 NG/ML POC: NEGATIVE
NITRITE, POC UA: NEGATIVE
OXIDANT (OX) POC: NEGATIVE
OXY OXYCODONE 100 NG/ML POC: NEGATIVE
PH, POC UA: 7
PROTEIN, POC: NEGATIVE
SPECIFIC GRAVITY (SG) POC: 1.01
SPECIFIC GRAVITY, POC UA: 1.01
TEMPERATURE (°F) POC: 92
THC MARIJUANA 50 NG/ML POC: NEGATIVE
UROBILINOGEN, POC UA: 0.2

## 2024-05-21 PROCEDURE — 1159F MED LIST DOCD IN RCRD: CPT | Mod: CPTII,S$GLB,, | Performed by: NURSE PRACTITIONER

## 2024-05-21 PROCEDURE — 99999 PR PBB SHADOW E&M-EST. PATIENT-LVL IV: CPT | Mod: PBBFAC,,, | Performed by: NURSE PRACTITIONER

## 2024-05-21 PROCEDURE — 1101F PT FALLS ASSESS-DOCD LE1/YR: CPT | Mod: CPTII,S$GLB,, | Performed by: NURSE PRACTITIONER

## 2024-05-21 PROCEDURE — 80305 DRUG TEST PRSMV DIR OPT OBS: CPT | Mod: QW,59,S$GLB, | Performed by: NURSE PRACTITIONER

## 2024-05-21 PROCEDURE — 3288F FALL RISK ASSESSMENT DOCD: CPT | Mod: CPTII,S$GLB,, | Performed by: NURSE PRACTITIONER

## 2024-05-21 PROCEDURE — 87086 URINE CULTURE/COLONY COUNT: CPT | Performed by: NURSE PRACTITIONER

## 2024-05-21 PROCEDURE — 3008F BODY MASS INDEX DOCD: CPT | Mod: CPTII,S$GLB,, | Performed by: NURSE PRACTITIONER

## 2024-05-21 PROCEDURE — 4010F ACE/ARB THERAPY RXD/TAKEN: CPT | Mod: CPTII,S$GLB,, | Performed by: NURSE PRACTITIONER

## 2024-05-21 PROCEDURE — 81002 URINALYSIS NONAUTO W/O SCOPE: CPT | Mod: S$GLB,,, | Performed by: NURSE PRACTITIONER

## 2024-05-21 PROCEDURE — 99214 OFFICE O/P EST MOD 30 MIN: CPT | Mod: S$GLB,,, | Performed by: NURSE PRACTITIONER

## 2024-05-21 PROCEDURE — 3078F DIAST BP <80 MM HG: CPT | Mod: CPTII,S$GLB,, | Performed by: NURSE PRACTITIONER

## 2024-05-21 PROCEDURE — 1126F AMNT PAIN NOTED NONE PRSNT: CPT | Mod: CPTII,S$GLB,, | Performed by: NURSE PRACTITIONER

## 2024-05-21 PROCEDURE — 3074F SYST BP LT 130 MM HG: CPT | Mod: CPTII,S$GLB,, | Performed by: NURSE PRACTITIONER

## 2024-05-21 RX ORDER — SULFAMETHOXAZOLE AND TRIMETHOPRIM 800; 160 MG/1; MG/1
1 TABLET ORAL 2 TIMES DAILY
Qty: 5 TABLET | Refills: 0 | Status: SHIPPED | OUTPATIENT
Start: 2024-05-21 | End: 2024-05-31 | Stop reason: ALTCHOICE

## 2024-05-21 RX ORDER — ALPRAZOLAM 0.25 MG/1
TABLET ORAL
Qty: 60 TABLET | Refills: 0 | Status: SHIPPED | OUTPATIENT
Start: 2024-05-21 | End: 2024-06-18 | Stop reason: SDUPTHER

## 2024-05-21 RX ORDER — ALPRAZOLAM 0.25 MG/1
TABLET ORAL
Qty: 60 TABLET | Refills: 0 | Status: CANCELLED | OUTPATIENT
Start: 2024-05-21

## 2024-05-21 NOTE — PROGRESS NOTES
Subjective:       Patient ID: Comfort Cantu is a 68 y.o. female.    Chief Complaint: Medication Refill and Urinary Tract Infection    Comfort Cantu presents to the clinic today for follow up for medication refill/ possible UTI   Established within the clinic, new patient to myself    Under the care of the following:  PCP: Dr. Pool, recently established, previous patient of Dr. Riddle   Cardiology: Dr. Trotter  Hematology/Oncology: FELIX Baker  Vascular: Dr. Malagon  Podiatry: Dr. Bar  Pulmonology: Dr. Samuels  Orthopedics: Dr. Domingo  Gastroenterology: Dr. 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  · Sedentary lifestyle, since ankle problem 2-3 years ago.  · Engages in binge consumption of alcohol  · Benzodiazepine dependence, diazepam 5 mg daily for many years.  · Iron deficiency anemia secondary to inadequate dietary iron intake  · At risk for cardiovascular event, very HIGH risk  · At risk for falling  · Family history of premature CAD  · Iron deficiency anemia due to chronic blood loss  · Ingrown nail      Medication Refill  She has chronic anxiety and she takes cymbalta, buspar, and alprazolam,  Historically was on valium, but was having issues obtaining medication due to national shortage.     UTI  Feels like she has a UTI. Dysuria, increased urgency/frequency, feels like incomplete emptying, foul odor.        Review of Systems   HENT:  Positive for hearing loss, sore throat and trouble swallowing.    Genitourinary:  Positive for difficulty urinating, dysuria and frequency. Negative for urgency.   Musculoskeletal:  Positive for arthralgias, back pain and gait problem.   Neurological:  Positive for numbness.       Patient Active Problem List   Diagnosis    ETHAN (generalized anxiety disorder)    Backache    Primary hypertension    Cardiovascular system problem    Hyperlipidemia    Knee pain    Smoker, started age 20, now up to 2 ppd    Abdominal pain, acute    Pancreas cyst    Nephrolithiasis    Hyponatremia    Peripheral vascular disease, bilateral stent    Anemia    High grade dysplasia in colonic adenoma    Hx of colonic polyps    Infection of lower extremity associated with hardware    Colon polyp    CAD (coronary artery disease)    Convulsions, unspecified convulsion type    COPD exacerbation    Infarction of parietal lobe    Protein-calorie malnutrition, unspecified severity    Panic disorder    Mixed conductive and sensorineural hearing loss of both ears    Bronchiectasis without complication    Bilateral carotid artery stenosis, post surgeries.    Sedentary lifestyle, since ankle problem 2-3 years ago.    Engages in binge consumption of alcohol    Benzodiazepine dependence, diazepam 5 mg daily for many years.    Iron deficiency anemia secondary to inadequate dietary iron intake    At risk for cardiovascular event, very HIGH risk    At risk for falling    Family history of premature CAD    Iron deficiency anemia due to chronic blood loss    Ingrown nail       Objective:      Physical Exam  Vitals and nursing note reviewed.   Constitutional:       General: She is not in acute distress.     Appearance:  She is not ill-appearing.      Comments: Ambulates with Rolator    HENT:      Right Ear: Decreased hearing noted. There is impacted cerumen.      Left Ear: Decreased hearing noted. There is impacted cerumen.      Ears:      Comments: Hearing aid bilaterally   Eyes:      Extraocular Movements: Extraocular movements intact.      Conjunctiva/sclera: Conjunctivae normal.   Cardiovascular:      Rate and Rhythm: Normal rate and regular rhythm.      Heart sounds: No murmur heard.  Pulmonary:      Effort: Pulmonary effort is normal.      Breath sounds: Normal breath sounds. No wheezing.   Musculoskeletal:      Right lower leg: No edema.      Left lower leg: No edema.   Lymphadenopathy:      Cervical: No cervical adenopathy.   Skin:     General: Skin is warm and dry.      Findings: No rash.   Neurological:      Mental Status: She is alert and oriented to person, place, and time.   Psychiatric:         Mood and Affect: Mood normal.         Behavior: Behavior normal.         Lab Results   Component Value Date    WBC 6.21 04/01/2024    HGB 10.3 (L) 04/01/2024    HCT 30.7 (L) 04/01/2024     04/01/2024    CHOL 208 (H) 09/13/2023    TRIG 313 (H) 09/13/2023    HDL 66 09/13/2023    ALT 23 04/01/2024    AST 27 04/01/2024     (L) 04/01/2024    K 4.1 04/01/2024    CL 91 (L) 04/01/2024    CREATININE 0.7 04/01/2024    BUN 10 04/01/2024    CO2 23 04/01/2024    TSH 2.910 06/07/2023    INR 1.1 02/10/2020    HGBA1C 4.9 07/16/2021     The 10-year ASCVD risk score (Neisha SIMON, et al., 2019) is: 8.4%    Values used to calculate the score:      Age: 68 years      Sex: Female      Is Non- : No      Diabetic: No      Tobacco smoker: Yes      Systolic Blood Pressure: 90 mmHg      Is BP treated: Yes      HDL Cholesterol: 66 mg/dL      Total Cholesterol: 208 mg/dL  Visit Vitals  BP 90/60 (BP Location: Right arm, Patient Position: Sitting, BP Method: Small (Manual))   Pulse 91   Temp 97.5 °F (36.4 °C) (Oral)   Resp  "12   Ht 5' 8" (1.727 m)   Wt 53.3 kg (117 lb 6.4 oz)   SpO2 98%   BMI 17.85 kg/m²      Assessment:       1. Chronic prescription benzodiazepine use    2. ETHAN (generalized anxiety disorder)    3. UTI symptoms    4. Acute cystitis with hematuria    5. Mixed hyperlipidemia    6. Fatigue, unspecified type    7. Panlobular emphysema    8. Benzodiazepine dependence    9. Protein-calorie malnutrition, unspecified severity        Plan:       1. Chronic prescription benzodiazepine use  -     POCT Urine Drug Screen (With BUP)  -     ALPRAZolam (XANAX) 0.25 MG tablet; TAKE 1 TABLET 2 TIMES A DAY AS NEEDED FOR ANXIETY  Dispense: 60 tablet; Refill: 0   reviewed- WNL without discrepancies  UDS obtained  Continue current medication regimen   2. ETHAN (generalized anxiety disorder)  -     ALPRAZolam (XANAX) 0.25 MG tablet; TAKE 1 TABLET 2 TIMES A DAY AS NEEDED FOR ANXIETY  Dispense: 60 tablet; Refill: 0    3. UTI symptoms  -     POCT URINE DIPSTICK WITHOUT MICROSCOPE    4. Acute cystitis with hematuria  -     sulfamethoxazole-trimethoprim 800-160mg (BACTRIM DS) 800-160 mg Tab; Take 1 tablet by mouth 2 (two) times daily.  Dispense: 5 tablet; Refill: 0  -     Urine culture  Take medication as prescribed with food until all gone  Good jonas hygiene  Maintain hydration- drinks mostly tea since not consuming alcohol any longer- incorporate water encouraged   Will send for culture   5. Mixed hyperlipidemia  -     Lipid Panel; Future; Expected date: 05/21/2024    6. Fatigue, unspecified type  -     TSH; Future; Expected date: 05/21/2024    7. Panlobular emphysema  Smoking cessation encouraged   8. Benzodiazepine dependence    9. Protein-calorie malnutrition, unspecified severity  Alcohol avoidance      Follow up in about 3 months (around 8/21/2024).      Future Appointments       Date Provider Specialty Appt Notes    5/30/2024 Jaylan Samuels MD Pulmonology follow up    6/21/2024  Radiology Hemonc    7/19/2024  Lab hemonc    " 7/22/2024 Zully Baker NP Hematology and Oncology Anemia/3mthF/U/Labs/post-Feraheme    8/13/2024  Lab lab    8/21/2024 Simran Pool MD Family Medicine 3 month follow up    9/13/2024 Adan Trotter MD Cardiology Annual

## 2024-05-22 LAB
BACTERIA UR CULT: NORMAL
BACTERIA UR CULT: NORMAL

## 2024-05-29 ENCOUNTER — TELEPHONE (OUTPATIENT)
Dept: FAMILY MEDICINE | Facility: CLINIC | Age: 69
End: 2024-05-29
Payer: MEDICARE

## 2024-05-29 NOTE — TELEPHONE ENCOUNTER
----- Message from Crystal Woods sent at 5/29/2024  2:07 PM CDT -----  Regarding: UTI  Type:  Needs Medical Advice    Who Called: Pt    [facility] - [place], [place] - [address]  [place] [address]  Phone: [phone] Fax: [phone]      Would the patient rather a call back or a response via [facility]? Call back    Best Call Back Number: [phone]    Additional Information: Pt sts she was given 5 pill ( antibiotic)  for her UTI and it did not help and need more to help. Thank you

## 2024-05-29 NOTE — TELEPHONE ENCOUNTER
----- Message from Yary Knowles sent at 5/29/2024  3:49 PM CDT -----  Regarding: Returning phone call  Contact: pt  Type:  Patient Returning Call    Who Called:pt    Who Left Message for Patient:anabella    Does the patient know what this is regarding?:antibiotics    Would the patient rather a call back or a response via [facility]? Call back    Best Call Back Number:[phone]    Additional Information: pt still has symptoms from the UTI and is requesting more sulfamethoxazole-trimethoprim 800-160mg (BACTRIM DS) 800-160 mg Tab.     [facility] - [place] - [address] [facility].  [address] [place] [address].  [place] [address]  Phone: [phone] Fax: [phone]

## 2024-05-31 DIAGNOSIS — R30.0 DYSURIA: Primary | ICD-10-CM

## 2024-05-31 RX ORDER — BUSPIRONE HYDROCHLORIDE 15 MG/1
15 TABLET ORAL 2 TIMES DAILY
Qty: 180 TABLET | Refills: 3 | Status: SHIPPED | OUTPATIENT
Start: 2024-05-31

## 2024-05-31 RX ORDER — SULFAMETHOXAZOLE AND TRIMETHOPRIM 800; 160 MG/1; MG/1
1 TABLET ORAL 2 TIMES DAILY
Qty: 5 TABLET | Refills: 0 | Status: CANCELLED | OUTPATIENT
Start: 2024-05-31

## 2024-05-31 NOTE — TELEPHONE ENCOUNTER
I have placed another urinalysis and culture for patient to have completed- if this is normal/shows no growth I recommend following up with her Gynecologist as her symptoms could be more related to post menopausal vaginal irritation/dryness versus urinary tract infection.

## 2024-05-31 NOTE — TELEPHONE ENCOUNTER
Spoke with pt. Pt c/o urinary frequency, burning still effective, abx not effective. Please advise

## 2024-05-31 NOTE — TELEPHONE ENCOUNTER
----- Message from Tanesha Sb sent at 5/31/2024  4:40 PM CDT -----  Contact: PT  Type:  RX Refill Request    Who Called: PT   Refill or New Rx:REFILL   RX Name and Strength:busPIRone (BUSPAR) 15 MG tablet  How is the patient currently taking it? (ex. 1XDay):ONE TAB TWICE A DAY   Is this a 30 day or 90 day RX: 90  Preferred Pharmacy with phone number:    [facility] - [place] - [address]  [facility] [address]  [place] [address]  Phone: [phone] Fax: [phone]  Local or Mail Order: LOCAL  Ordering Provider:MOISES  Would the patient rather a call back or a response via [facility]? CALL   Best Call Back Number:[phone] (home)   Additional Information: PT IS OUT OF HER MEDICATION AND NEEDS A SCRIPT SENT TO HER LOCAL PHARMACY  THANK YOU

## 2024-05-31 NOTE — TELEPHONE ENCOUNTER
----- Message from Tanesha Basurto sent at 5/31/2024  1:15 PM CDT -----  Type:  Patient Returning Call    Who Called:PT   Who Left Message for Patient:DUY  Does the patient know what this is regarding?: RX REFILL   Would the patient rather a call back or a response via [facility]? CALL   Best Call Back Number: [phone] ([place])   Additional Information: THANK YOU

## 2024-05-31 NOTE — TELEPHONE ENCOUNTER
No care due was identified.  [facility] Embedded Care Due Messages. Reference number: [id number].   5/31/2024 4:46:53 PM CDT

## 2024-06-03 ENCOUNTER — TELEPHONE (OUTPATIENT)
Dept: FAMILY MEDICINE | Facility: CLINIC | Age: 69
End: 2024-06-03
Payer: MEDICARE

## 2024-06-03 NOTE — TELEPHONE ENCOUNTER
----- Message from Zully Rao sent at 6/3/2024  8:35 AM CDT -----  Abel is calling from [employer] and she needs to verify this pat has been diagnosed cardiovascular disease and/or chronic heart failure  Please call back to advise [phone]  ref # [id number]

## 2024-06-03 NOTE — TELEPHONE ENCOUNTER
----- Message from Tanesha Basurto sent at 6/3/2024  3:31 PM CDT -----  Contact: PT  Type:  Patient Returning Call    Who Called:PT   Who Left Message for Patient:DUY  Does the patient know what this is regarding?: RX REFILL UPDATE   Would the patient rather a call back or a response via [facility]? CALL   Best Call Back Number: [phone] (home)   Additional Information: THANK YOU   within normal limits

## 2024-06-03 NOTE — TELEPHONE ENCOUNTER
Spoke with pt. Informed pt will need to complete a new UA due to s/s not resolving. Pt will complete when able.

## 2024-06-04 ENCOUNTER — LAB VISIT (OUTPATIENT)
Dept: LAB | Facility: HOSPITAL | Age: 69
End: 2024-06-04
Attending: NURSE PRACTITIONER
Payer: MEDICARE

## 2024-06-04 DIAGNOSIS — R30.0 DYSURIA: ICD-10-CM

## 2024-06-04 PROCEDURE — 87086 URINE CULTURE/COLONY COUNT: CPT | Performed by: NURSE PRACTITIONER

## 2024-06-06 ENCOUNTER — TELEPHONE (OUTPATIENT)
Dept: FAMILY MEDICINE | Facility: CLINIC | Age: 69
End: 2024-06-06
Payer: MEDICARE

## 2024-06-06 LAB
BACTERIA UR CULT: NORMAL
BACTERIA UR CULT: NORMAL

## 2024-06-06 NOTE — TELEPHONE ENCOUNTER
----- Message from Natasha Murray sent at 6/6/2024  3:50 PM CDT -----  Type:  Patient Returning Call    Who Called:  pt   Who Left Message for Patient:  nurse   Does the patient know what this is regarding?:  yes   Best Call Back Number:  [phone] (home)     Additional Information:  please advise

## 2024-06-16 DIAGNOSIS — Z79.899 CHRONIC PRESCRIPTION BENZODIAZEPINE USE: ICD-10-CM

## 2024-06-16 DIAGNOSIS — F41.1 GAD (GENERALIZED ANXIETY DISORDER): ICD-10-CM

## 2024-06-16 RX ORDER — ALPRAZOLAM 0.25 MG/1
TABLET ORAL
Qty: 60 TABLET | Refills: 0 | Status: CANCELLED | OUTPATIENT
Start: 2024-06-16

## 2024-06-16 NOTE — TELEPHONE ENCOUNTER
No care due was identified.  [facility] Embedded Care Due Messages. Reference number: [id number].   6/16/2024 2:52:38 PM CDT

## 2024-06-21 ENCOUNTER — TELEPHONE (OUTPATIENT)
Dept: HEMATOLOGY/ONCOLOGY | Facility: CLINIC | Age: 69
End: 2024-06-21
Payer: MEDICARE

## 2024-06-21 ENCOUNTER — HOSPITAL ENCOUNTER (OUTPATIENT)
Dept: RADIOLOGY | Facility: HOSPITAL | Age: 69
Discharge: HOME | End: 2024-06-21
Attending: NURSE PRACTITIONER
Payer: MEDICARE

## 2024-06-21 DIAGNOSIS — D50.0 IRON DEFICIENCY ANEMIA DUE TO CHRONIC BLOOD LOSS: ICD-10-CM

## 2024-06-21 DIAGNOSIS — Z87.891 PERSONAL HISTORY OF NICOTINE DEPENDENCE: ICD-10-CM

## 2024-06-21 PROCEDURE — 71271 CT THORAX LUNG CANCER SCR C-: CPT | Mod: TC

## 2024-06-21 PROCEDURE — 71271 CT THORAX LUNG CANCER SCR C-: CPT | Mod: 26,,, | Performed by: RADIOLOGY

## 2024-06-21 NOTE — TELEPHONE ENCOUNTER
----- Message from Carli Landa sent at 6/21/2024  1:45 PM CDT -----  The patient left a [employer] form at the  for your staff to complete, it is in the blue folder.     Carli

## 2024-06-24 ENCOUNTER — TELEPHONE (OUTPATIENT)
Dept: HEMATOLOGY/ONCOLOGY | Facility: CLINIC | Age: 69
End: 2024-06-24
Payer: MEDICARE

## 2024-06-24 ENCOUNTER — TELEPHONE (OUTPATIENT)
Dept: FAMILY MEDICINE | Facility: CLINIC | Age: 69
End: 2024-06-24
Payer: MEDICARE

## 2024-06-24 NOTE — TELEPHONE ENCOUNTER
----- Message from Tanesha Basurto sent at 6/24/2024 11:07 AM CDT -----  Contact: [facility]  Type:  Needs Medical Advice    Who Called: [facility]   Symptoms (please be specific): GENESIS OF CHRONIC CONDITION PAPERWORK BEING FAX TODAY    Would the patient rather a call back or a response via [facility]? CALL   Best Call Back Number:  [phone]   Additional Information: THANK YOU

## 2024-07-08 ENCOUNTER — PROCEDURE VISIT (OUTPATIENT)
Dept: RESPIRATORY THERAPY | Facility: HOSPITAL | Age: 69
End: 2024-07-08
Attending: INTERNAL MEDICINE
Payer: MEDICARE

## 2024-07-08 DIAGNOSIS — Z87.891 PERSONAL HISTORY OF NICOTINE DEPENDENCE: ICD-10-CM

## 2024-07-08 DIAGNOSIS — J43.1 PANLOBULAR EMPHYSEMA: ICD-10-CM

## 2024-07-08 PROCEDURE — 94727 GAS DIL/WSHOT DETER LNG VOL: CPT

## 2024-07-08 PROCEDURE — 25000242 PHARM REV CODE 250 ALT 637 W/ HCPCS

## 2024-07-08 PROCEDURE — 94060 EVALUATION OF WHEEZING: CPT

## 2024-07-08 PROCEDURE — 94729 DIFFUSING CAPACITY: CPT

## 2024-07-08 RX ORDER — ALBUTEROL SULFATE 0.83 MG/ML
2.5 SOLUTION RESPIRATORY (INHALATION) ONCE
Status: DISPENSED | OUTPATIENT
Start: 2024-07-08

## 2024-07-08 RX ORDER — ALBUTEROL SULFATE 0.83 MG/ML
SOLUTION RESPIRATORY (INHALATION)
Status: COMPLETED
Start: 2024-07-08 | End: 2024-07-08

## 2024-07-08 RX ADMIN — ALBUTEROL SULFATE 2.5 MG: 2.5 SOLUTION RESPIRATORY (INHALATION) at 02:07

## 2024-07-09 LAB
BRPFT: NORMAL
DLCO ADJ PRE: 10.9 ML/(MIN*MMHG)
DLCO SINGLE BREATH LLN: 18.39
DLCO SINGLE BREATH PRE REF: 45.2 %
DLCO SINGLE BREATH REF: 24.13
DLCOC SBVA LLN: 3.02
DLCOC SBVA PRE REF: 73 %
DLCOC SBVA REF: 4.29
DLCOC SINGLE BREATH LLN: 18.39
DLCOC SINGLE BREATH PRE REF: 45.2 %
DLCOC SINGLE BREATH REF: 24.13
DLCOVA LLN: 3.02
DLCOVA PRE REF: 73 %
DLCOVA PRE: 3.13 ML/(MIN*MMHG*L)
DLCOVA REF: 4.29
DLVAADJ PRE: 3.13 ML/(MIN*MMHG*L)
ERVN2 LLN: -16449.28
ERVN2 PRE REF: 104.3 %
ERVN2 PRE: 0.76 L
ERVN2 REF: 0.72
FEF 25 75 CHG: 17.9 %
FEF 25 75 LLN: 1.37
FEF 25 75 POST REF: 39.2 %
FEF 25 75 PRE REF: 33.2 %
FEF 25 75 REF: 2.77
FET100 CHG: 10.9 %
FEV1 CHG: 27.5 %
FEV1 FVC CHG: 25.6 %
FEV1 FVC LLN: 65
FEV1 FVC POST REF: 88.5 %
FEV1 FVC PRE REF: 70.5 %
FEV1 FVC REF: 78
FEV1 LLN: 1.88
FEV1 POST REF: 81.4 %
FEV1 PRE REF: 63.8 %
FEV1 REF: 2.57
FRCN2 LLN: 2.12
FRCN2 PRE REF: 85.1 %
FRCN2 REF: 2.94
FVC CHG: 1.5 %
FVC LLN: 2.44
FVC POST REF: 91.1 %
FVC PRE REF: 89.7 %
FVC REF: 3.33
IVC PRE: 2.46 L
IVC SINGLE BREATH LLN: 2.44
IVC SINGLE BREATH PRE REF: 73.9 %
IVC SINGLE BREATH REF: 3.33
MVV LLN: 86
MVV PRE REF: 77 %
MVV REF: 101
PEF CHG: 98.4 %
PEF LLN: 4.51
PEF POST REF: 80.1 %
PEF PRE REF: 40.4 %
PEF REF: 6.44
POST FEF 25 75: 1.08 L/S
POST FET 100: 14.03 SEC
POST FEV1 FVC: 68.84 %
POST FEV1: 2.09 L
POST FVC: 3.03 L
POST PEF: 5.16 L/S
PRE DLCO: 10.9 ML/(MIN*MMHG)
PRE FEF 25 75: 0.92 L/S
PRE FET 100: 12.65 SEC
PRE FEV1 FVC: 54.8 %
PRE FEV1: 1.64 L
PRE FRC N2: 2.51 L
PRE FVC: 2.99 L
PRE MVV: 78 L/MIN
PRE PEF: 2.6 L/S
RVN2 LLN: 1.64
RVN2 PRE REF: 68.6 %
RVN2 PRE: 1.52 L
RVN2 REF: 2.22
RVN2TLCN2 LLN: 32.49
RVN2TLCN2 PRE REF: 80.2 %
RVN2TLCN2 PRE: 33.74 %
RVN2TLCN2 REF: 42.08
TLCN2 LLN: 4.64
TLCN2 PRE REF: 80.2 %
TLCN2 PRE: 4.51 L
TLCN2 REF: 5.63
VA PRE: 3.48 L
VA SINGLE BREATH LLN: 5.48
VA SINGLE BREATH PRE REF: 63.6 %
VA SINGLE BREATH REF: 5.48
VCMAXN2 LLN: 2.44
VCMAXN2 PRE REF: 89.7 %
VCMAXN2 PRE: 2.99 L
VCMAXN2 REF: 3.33

## 2024-07-16 DIAGNOSIS — F41.1 GAD (GENERALIZED ANXIETY DISORDER): ICD-10-CM

## 2024-07-16 DIAGNOSIS — Z79.899 CHRONIC PRESCRIPTION BENZODIAZEPINE USE: ICD-10-CM

## 2024-07-16 RX ORDER — PRASUGREL 5 MG/1
TABLET, FILM COATED ORAL
Qty: 90 TABLET | Refills: 1 | Status: SHIPPED | OUTPATIENT
Start: 2024-07-16

## 2024-07-16 RX ORDER — ALPRAZOLAM 0.25 MG/1
TABLET ORAL
Qty: 60 TABLET | Refills: 0 | Status: SHIPPED | OUTPATIENT
Start: 2024-07-16

## 2024-07-16 NOTE — TELEPHONE ENCOUNTER
No care due was identified.  [facility] Embedded Care Due Messages. Reference number: [id number].   7/16/2024 3:11:27 PM CDT

## 2024-07-16 NOTE — TELEPHONE ENCOUNTER
Refill Routing Note   Medication(s) are not appropriate for processing by [facility] for the following reason(s):        Required labs abnormal    [facility] action(s):  Defer             Appointments  past 12m or future 3m with PCP    Date Provider   Last Visit   11/24/2023 Simran Pool MD   Next Visit   8/21/2024 Simran Pool MD   ED visits in past 90 days: 0        Note composed:3:36 PM 07/16/2024

## 2024-07-17 RX ORDER — ATORVASTATIN CALCIUM 40 MG/1
40 TABLET, FILM COATED ORAL DAILY
Qty: 90 TABLET | Refills: 0 | Status: SHIPPED | OUTPATIENT
Start: 2024-07-17 | End: 2024-07-19 | Stop reason: SDUPTHER

## 2024-07-19 ENCOUNTER — LAB VISIT (OUTPATIENT)
Dept: LAB | Facility: HOSPITAL | Age: 69
End: 2024-07-19
Attending: NURSE PRACTITIONER
Payer: MEDICARE

## 2024-07-19 DIAGNOSIS — E78.49 OTHER HYPERLIPIDEMIA: Primary | ICD-10-CM

## 2024-07-19 DIAGNOSIS — D50.0 IRON DEFICIENCY ANEMIA DUE TO CHRONIC BLOOD LOSS: ICD-10-CM

## 2024-07-19 LAB
ALBUMIN SERPL BCP-MCNC: 4 G/DL (ref 3.5–5.2)
ALP SERPL-CCNC: 114 U/L (ref 55–135)
ALT SERPL W/O P-5'-P-CCNC: 28 U/L (ref 10–44)
ANION GAP SERPL CALC-SCNC: 10 MMOL/L (ref 8–16)
AST SERPL-CCNC: 22 U/L (ref 10–40)
BASOPHILS # BLD AUTO: 0.05 K/UL (ref 0–0.2)
BASOPHILS NFR BLD: 1.2 % (ref 0–1.9)
BILIRUB SERPL-MCNC: 0.3 MG/DL (ref 0.1–1)
BUN SERPL-MCNC: 9 MG/DL (ref 8–23)
CALCIUM SERPL-MCNC: 9.6 MG/DL (ref 8.7–10.5)
CHLORIDE SERPL-SCNC: 98 MMOL/L (ref 95–110)
CO2 SERPL-SCNC: 25 MMOL/L (ref 23–29)
CREAT SERPL-MCNC: 0.7 MG/DL (ref 0.5–1.4)
DIFFERENTIAL METHOD BLD: ABNORMAL
EOSINOPHIL # BLD AUTO: 0.2 K/UL (ref 0–0.5)
EOSINOPHIL NFR BLD: 3.5 % (ref 0–8)
ERYTHROCYTE [DISTWIDTH] IN BLOOD BY AUTOMATED COUNT: 13.1 % (ref 11.5–14.5)
EST. GFR  (NO RACE VARIABLE): >60 ML/MIN/1.73 M^2
FERRITIN SERPL-MCNC: 41 NG/ML (ref 20–300)
GLUCOSE SERPL-MCNC: 93 MG/DL (ref 70–110)
HCT VFR BLD AUTO: 39.3 % (ref 37–48.5)
HGB BLD-MCNC: 13.3 G/DL (ref 12–16)
IMM GRANULOCYTES # BLD AUTO: 0.01 K/UL (ref 0–0.04)
IMM GRANULOCYTES NFR BLD AUTO: 0.2 % (ref 0–0.5)
IRON SERPL-MCNC: 72 UG/DL (ref 30–160)
LYMPHOCYTES # BLD AUTO: 1 K/UL (ref 1–4.8)
LYMPHOCYTES NFR BLD: 23.1 % (ref 18–48)
MCH RBC QN AUTO: 33.4 PG (ref 27–31)
MCHC RBC AUTO-ENTMCNC: 33.8 G/DL (ref 32–36)
MCV RBC AUTO: 99 FL (ref 82–98)
MONOCYTES # BLD AUTO: 0.4 K/UL (ref 0.3–1)
MONOCYTES NFR BLD: 8.5 % (ref 4–15)
NEUTROPHILS # BLD AUTO: 2.7 K/UL (ref 1.8–7.7)
NEUTROPHILS NFR BLD: 63.5 % (ref 38–73)
NRBC BLD-RTO: 0 /100 WBC
PLATELET # BLD AUTO: 314 K/UL (ref 150–450)
PMV BLD AUTO: 8.6 FL (ref 9.2–12.9)
POTASSIUM SERPL-SCNC: 4.6 MMOL/L (ref 3.5–5.1)
PROT SERPL-MCNC: 7.5 G/DL (ref 6–8.4)
RBC # BLD AUTO: 3.98 M/UL (ref 4–5.4)
SATURATED IRON: 18 % (ref 20–50)
SODIUM SERPL-SCNC: 133 MMOL/L (ref 136–145)
TOTAL IRON BINDING CAPACITY: 400 UG/DL (ref 250–450)
TRANSFERRIN SERPL-MCNC: 270 MG/DL (ref 200–375)
WBC # BLD AUTO: 4.24 K/UL (ref 3.9–12.7)

## 2024-07-19 PROCEDURE — 82728 ASSAY OF FERRITIN: CPT | Performed by: NURSE PRACTITIONER

## 2024-07-19 PROCEDURE — 84466 ASSAY OF TRANSFERRIN: CPT | Performed by: NURSE PRACTITIONER

## 2024-07-19 PROCEDURE — 83540 ASSAY OF IRON: CPT | Performed by: NURSE PRACTITIONER

## 2024-07-19 PROCEDURE — 85025 COMPLETE CBC W/AUTO DIFF WBC: CPT | Performed by: NURSE PRACTITIONER

## 2024-07-19 PROCEDURE — 36415 COLL VENOUS BLD VENIPUNCTURE: CPT | Performed by: NURSE PRACTITIONER

## 2024-07-19 PROCEDURE — 80053 COMPREHEN METABOLIC PANEL: CPT | Performed by: NURSE PRACTITIONER

## 2024-07-19 RX ORDER — ATORVASTATIN CALCIUM 40 MG/1
40 TABLET, FILM COATED ORAL DAILY
Qty: 90 TABLET | Refills: 0 | Status: SHIPPED | OUTPATIENT
Start: 2024-07-19

## 2024-07-19 NOTE — TELEPHONE ENCOUNTER
Refill Routing Note   Medication(s) are not appropriate for processing by [facility] for the following reason(s):        Drug-disease interaction    [facility] action(s):  Defer        Medication Therapy Plan: Recent BP passing 90/60; ok to approve per protocols; Drug-Disease: isosorbide mononitrate and Iron deficiency anemia secondary to inadequate dietary iron intake; Anemia; Iron deficiency anemia due to chronic blood loss    Pharmacist review requested: Yes     Appointments  past 12m or future 3m with PCP    Date Provider   Last Visit   11/24/2023 Simran Pool MD   Next Visit   8/21/2024 Simran Pool MD   ED visits in past 90 days: 0        Note composed:2:41 PM 07/19/2024

## 2024-07-19 NOTE — TELEPHONE ENCOUNTER
No care due was identified.  [facility] Embedded Care Due Messages. Reference number: [id number].   7/19/2024 1:47:18 PM CDT

## 2024-07-21 RX ORDER — ISOSORBIDE MONONITRATE 30 MG/1
TABLET, EXTENDED RELEASE ORAL
Qty: 90 TABLET | Refills: 1 | Status: SHIPPED | OUTPATIENT
Start: 2024-07-21

## 2024-07-21 NOTE — TELEPHONE ENCOUNTER
Refill Routing Note   Medication(s) are not appropriate for processing by [facility] for the following reason(s):        Required vitals abnormal    [facility] action(s):  Defer               Appointments  past 12m or future 3m with PCP    Date Provider   Last Visit   11/24/2023 Simran Pool MD   Next Visit   8/21/2024 Simran Pool MD   ED visits in past 90 days: 0        Note composed:1:57 AM 07/21/2024

## 2024-07-22 ENCOUNTER — OFFICE VISIT (OUTPATIENT)
Dept: HEMATOLOGY/ONCOLOGY | Facility: CLINIC | Age: 69
End: 2024-07-22
Payer: MEDICARE

## 2024-07-22 VITALS
SYSTOLIC BLOOD PRESSURE: 139 MMHG | DIASTOLIC BLOOD PRESSURE: 69 MMHG | BODY MASS INDEX: 18.53 KG/M2 | HEART RATE: 93 BPM | RESPIRATION RATE: 18 BRPM | OXYGEN SATURATION: 97 % | WEIGHT: 121.88 LBS

## 2024-07-22 DIAGNOSIS — D50.0 IRON DEFICIENCY ANEMIA DUE TO CHRONIC BLOOD LOSS: ICD-10-CM

## 2024-07-22 DIAGNOSIS — R91.1 SOLITARY PULMONARY NODULE: Primary | ICD-10-CM

## 2024-07-22 PROCEDURE — 99214 OFFICE O/P EST MOD 30 MIN: CPT | Mod: S$PBB,,, | Performed by: NURSE PRACTITIONER

## 2024-07-22 PROCEDURE — 99213 OFFICE O/P EST LOW 20 MIN: CPT | Mod: PBBFAC | Performed by: NURSE PRACTITIONER

## 2024-07-22 PROCEDURE — 99999 PR PBB SHADOW E&M-EST. PATIENT-LVL III: CPT | Mod: PBBFAC,,, | Performed by: NURSE PRACTITIONER

## 2024-07-22 NOTE — PROGRESS NOTES
HPI    67-year-old female referred to [facility] for evaluation of severe anemia.  Patient was recently transfused in the ED with hemoglobin 6.2 grams/deciliter.  At the time patient is also microcytic.  The patient denies any overt bleeding.      Most recent lab show Hb of 11.8g/dL MCV 94\    Currently patient denies any overt bleeding.      4/10/2023:  She returns today follow-up with complaints of fatigue    8/28/2023:  She returns today follow-up.  Patient has no new symptoms and no new complaints    1/3/2024:  She returns today for follow-up    4/22/2024:  She returns today for follow-up.  She has had an EGD and colonoscopies since previous visit    7/22/2024:  She returns today for follow-up.  Patient has no new symptoms and no new complaints  Past Medical History:   Diagnosis Date    Anticoagulant long-term use     Anxiety     Arthritis     Atherosclerosis     Closed displaced spiral fracture of shaft of right tibia 2/3/2020    Coronary artery disease     Depression     GERD (gastroesophageal reflux disease)     Hearing loss     [place] (hard of hearing)     Hearing aids    Hypertension     Long term (current) use of anticoagulants     Peripheral arterial disease     Tubulovillous adenoma of colon 2019    Wears dentures     Full-Upper; Partial- lower    Wound infection after surgery 06/2020     Social History     Socioeconomic History    Marital status:    Tobacco Use    Smoking status: Every Day     Current packs/day: 0.75     Average packs/day: 0.8 packs/day for 35.8 years (26.9 ttl pk-yrs)     Types: Cigarettes     Start date: 10/1/1988    Smokeless tobacco: Never   Substance and Sexual Activity    Alcohol use: Not Currently     Comment: occ    Drug use: Yes     Types: Benzodiazepines     Comment: as prescribed    Sexual activity: Yes     Social Determinants of Health     Financial Resource Strain: Low Risk  (4/21/2024)    Overall Financial Resource Strain (CARDIA)     Difficulty of Paying  Living Expenses: Not hard at all   Food Insecurity: No Food Insecurity (4/21/2024)    Hunger Vital Sign     Worried About Running Out of Food in the Last Year: Never true     Ran Out of Food in the Last Year: Never true   Transportation Needs: No Transportation Needs (4/21/2024)    PRAPARE - Transportation     Lack of Transportation (Medical): No     Lack of Transportation (Non-Medical): No   Physical Activity: Unknown (4/21/2024)    Exercise Vital Sign     Days of Exercise per Week: 7 days   Stress: No Stress Concern Present (4/21/2024)    [employer] [place] [employer] - Occupational Stress Questionnaire     Feeling of Stress : Only a little   Housing Stability: Unknown (4/21/2024)    Housing Stability Vital Sign     Unable to Pay for Housing in the Last Year: No         Subjective      Review of Systems   Constitutional: Negative for appetite change, fatigue and unexpected weight change.  Positive for fatigue  HENT: Negative for mouth sores.   Eyes: Negative for visual disturbance.   Respiratory: Negative for cough and shortness of breath.   Cardiovascular: Negative for chest pain.   Gastrointestinal: Negative for diarrhea.   Genitourinary: Negative for frequency.   Musculoskeletal: Negative for back pain.   Skin: Negative for rash.   Neurological: Negative for headaches.   Hematological: Negative for adenopathy.   Psychiatric/Behavioral: The patient is not nervous/anxious.   All other systems reviewed and are negative.     Objective    Physical Exam   There were no vitals filed for this visit.     Constitutional: patient is oriented to person, place, and time. patient appears well-developed and well-nourished. No distress.   HENT:   Right Ear: External ear normal.   Left Ear: External ear normal.   Nose: Nose normal.   Mouth/Throat: Oropharynx is clear and moist. No oropharyngeal exudate.   Teeth, gums and lips are normal   No sinus tenderness   Palate, tongue, posterior pharynx are normal   Eyes:  Conjunctivae and lids are normal.   Neck: Trachea normal and normal range of motion. No thyromegaly   Cardiovascular: Normal rate, regular rhythm, normal heart sounds, intact distal pulses and normal pulses.   No murmur heard.   No edema, no tenderness in the extremities.   Pulmonary/Chest: Effort normal and breath sounds normal. No accessory muscle usage. patient has no wheezes..   Abdominal: Soft. Normal appearance and bowel sounds are normal. patient exhibits no distension and no mass. There is no hepatosplenomegaly. There is no tenderness.   Musculoskeletal: Normal range of motion.   Gait is normal   No clubbing, cyanosis       There were no vitals filed for this visit.       CMP  Sodium   Date Value Ref Range Status   07/19/2024 133 (L) 136 - 145 mmol/L Final     Potassium   Date Value Ref Range Status   07/19/2024 4.6 3.5 - 5.1 mmol/L Final     Chloride   Date Value Ref Range Status   07/19/2024 98 95 - 110 mmol/L Final     CO2   Date Value Ref Range Status   07/19/2024 25 23 - 29 mmol/L Final     Glucose   Date Value Ref Range Status   07/19/2024 93 70 - 110 mg/dL Final     BUN   Date Value Ref Range Status   07/19/2024 9 8 - 23 mg/dL Final     Creatinine   Date Value Ref Range Status   07/19/2024 0.7 0.5 - 1.4 mg/dL Final     Calcium   Date Value Ref Range Status   07/19/2024 9.6 8.7 - 10.5 mg/dL Final     Total Protein   Date Value Ref Range Status   07/19/2024 7.5 6.0 - 8.4 g/dL Final     Albumin   Date Value Ref Range Status   07/19/2024 4.0 3.5 - 5.2 g/dL Final     Total Bilirubin   Date Value Ref Range Status   07/19/2024 0.3 0.1 - 1.0 mg/dL Final     Comment:     For infants and newborns, interpretation of results should be based  on gestational age, weight and in agreement with clinical  observations.    Premature Infant recommended reference ranges:  Up to 24 hours.............<8.0 mg/dL  Up to 48 hours............<12.0 mg/dL  3-5 days..................<15.0 mg/dL  6-29 days.................<15.0  mg/dL       Alkaline Phosphatase   Date Value Ref Range Status   07/19/2024 114 55 - 135 U/L Final     AST   Date Value Ref Range Status   07/19/2024 22 10 - 40 U/L Final     ALT   Date Value Ref Range Status   07/19/2024 28 10 - 44 U/L Final     Anion Gap   Date Value Ref Range Status   07/19/2024 10 8 - 16 mmol/L Final     eGFR if    Date Value Ref Range Status   09/03/2021 >60.0 >60 mL/min/1.73 m^2 Final     eGFR if non    Date Value Ref Range Status   09/03/2021 >60.0 >60 mL/min/1.73 m^2 Final     Comment:     Calculation used to obtain the estimated glomerular filtration  rate (eGFR) is the CKD-EPI equation.        Lab Results   Component Value Date    WBC 4.24 07/19/2024    HGB 13.3 07/19/2024    HCT 39.3 07/19/2024    MCV 99 (H) 07/19/2024     07/19/2024     EGD:   A single bleeding angioectasia in the stomach.                          Treated with argon plasma coagulation     Colonoscopy:   The perianal and digital rectal examinations were normal.        Non-bleeding internal hemorrhoids were found during retroflexion.        The hemorrhoids were small.        Multiple small-mouthed diverticula were found in the sigmoid colon        and descending colon.        There was evidence of a prior end-to-end colo-colonic anastomosis in        the sigmoid colon. This was patent and was characterized by healthy        appearing mucosa. The anastomosis was traversed.        A single small localized angiodysplastic lesion with bleeding on        contact was found in the cecum. Coagulation for hemostasis using        argon plasma at 0.5 liters/minute and 20 hayes was successful.        The transverse colon, ascending colon, appendiceal orifice and        ileocecal valve appeared normal.        The terminal ileum appeared normal.   Impression:            - Non-bleeding internal hemorrhoids.                          - Diverticulosis in the sigmoid colon and in the                           descending colon.                          - Patent end-to-end colo-colonic anastomosis,                          characterized by healthy appearing mucosa.                          - A single colonic angiodysplastic lesion. Treated                          with argon plasma coagulation (APC).                          - The transverse colon, ascending colon, ileocecal                          valve and appendiceal orifice are normal.                          - The examined portion of the ileum was normal.                          - No specimens collected.   Assessment  Iron deficiency anemia    -Last transfusion 1 unit packed red blood cell on 09/02/2022.    -repeat labs show iron deficiency anemia despite being on oral iron  -repeat labs show iron-deficiency anemia despite receiving IV iron with Ferrlecit  -status post Feraheme x2  -repeat labs show mild iron-deficiency without anemia    Tubular adenoma with high-grade dysplasia in sigmoid colon  -status post phlebotomy colectomy  -last EGD and colonoscopy 04/2024  -continue to do surveillance colonoscopy as patient is increased risk for colon cancer  -continue to follow-up with GI for surveillance colonoscopy    Hypertension/coronary artery disease:   -follow-up with PCP    COPD:   -follow-up with PCP    Pulmonary nodules:   -repeat CT of the chest showed Slight interval increase in size of a small nodule in the right lower lobe, too small to evaluate with PET.   -or repeat CT of chest in 3 months    Emphysema:   -continue to follow with pulmonology    Thrombocytosis:   -likely reactive and related to iron deficiency  -resolved

## 2024-08-04 RX ORDER — DULOXETIN HYDROCHLORIDE 30 MG/1
CAPSULE, DELAYED RELEASE ORAL
Qty: 90 CAPSULE | Refills: 1 | Status: SHIPPED | OUTPATIENT
Start: 2024-08-04

## 2024-08-13 ENCOUNTER — LAB VISIT (OUTPATIENT)
Dept: LAB | Facility: HOSPITAL | Age: 69
End: 2024-08-13
Attending: NURSE PRACTITIONER
Payer: MEDICARE

## 2024-08-13 DIAGNOSIS — E78.2 MIXED HYPERLIPIDEMIA: ICD-10-CM

## 2024-08-13 DIAGNOSIS — R53.83 FATIGUE, UNSPECIFIED TYPE: ICD-10-CM

## 2024-08-13 LAB
CHOLEST SERPL-MCNC: 167 MG/DL (ref 120–199)
CHOLEST/HDLC SERPL: 3.4 {RATIO} (ref 2–5)
HDLC SERPL-MCNC: 49 MG/DL (ref 40–75)
HDLC SERPL: 29.3 % (ref 20–50)
LDLC SERPL CALC-MCNC: 91.6 MG/DL (ref 63–159)
NONHDLC SERPL-MCNC: 118 MG/DL
TRIGL SERPL-MCNC: 132 MG/DL (ref 30–150)
TSH SERPL DL<=0.005 MIU/L-ACNC: 2.75 UIU/ML (ref 0.4–4)

## 2024-08-13 PROCEDURE — 80061 LIPID PANEL: CPT | Performed by: NURSE PRACTITIONER

## 2024-08-13 PROCEDURE — 36415 COLL VENOUS BLD VENIPUNCTURE: CPT | Performed by: NURSE PRACTITIONER

## 2024-08-13 PROCEDURE — 84443 ASSAY THYROID STIM HORMONE: CPT | Performed by: NURSE PRACTITIONER

## 2024-08-16 ENCOUNTER — HOSPITAL ENCOUNTER (EMERGENCY)
Facility: HOSPITAL | Age: 69
Discharge: HOME | End: 2024-08-16
Attending: EMERGENCY MEDICINE
Payer: MEDICARE

## 2024-08-16 VITALS
RESPIRATION RATE: 16 BRPM | WEIGHT: 121 LBS | OXYGEN SATURATION: 98 % | DIASTOLIC BLOOD PRESSURE: 93 MMHG | BODY MASS INDEX: 18.34 KG/M2 | HEIGHT: 68 IN | SYSTOLIC BLOOD PRESSURE: 173 MMHG | TEMPERATURE: 99 F | HEART RATE: 83 BPM

## 2024-08-16 DIAGNOSIS — M54.2 NECK PAIN: ICD-10-CM

## 2024-08-16 DIAGNOSIS — K04.7 DENTAL INFECTION: Primary | ICD-10-CM

## 2024-08-16 DIAGNOSIS — I10 HYPERTENSION, UNSPECIFIED TYPE: ICD-10-CM

## 2024-08-16 PROCEDURE — 99284 EMERGENCY DEPT VISIT MOD MDM: CPT

## 2024-08-16 PROCEDURE — 25000003 PHARM REV CODE 250: Performed by: EMERGENCY MEDICINE

## 2024-08-16 PROCEDURE — 63600175 PHARM REV CODE 636 W HCPCS: Performed by: EMERGENCY MEDICINE

## 2024-08-16 RX ORDER — CLINDAMYCIN HYDROCHLORIDE 150 MG/1
300 CAPSULE ORAL
Status: COMPLETED | OUTPATIENT
Start: 2024-08-16 | End: 2024-08-16

## 2024-08-16 RX ORDER — DIPHENHYDRAMINE HCL 25 MG
25 CAPSULE ORAL EVERY 6 HOURS PRN
Qty: 20 CAPSULE | Refills: 0 | Status: SHIPPED | OUTPATIENT
Start: 2024-08-16

## 2024-08-16 RX ORDER — DIPHENHYDRAMINE HCL 25 MG
25 CAPSULE ORAL ONCE
Status: COMPLETED | OUTPATIENT
Start: 2024-08-16 | End: 2024-08-16

## 2024-08-16 RX ORDER — CLINDAMYCIN HYDROCHLORIDE 300 MG/1
300 CAPSULE ORAL EVERY 8 HOURS
Qty: 21 CAPSULE | Refills: 0 | Status: SHIPPED | OUTPATIENT
Start: 2024-08-16

## 2024-08-16 RX ORDER — PREDNISONE 50 MG/1
50 TABLET ORAL DAILY
Qty: 4 TABLET | Refills: 0 | Status: SHIPPED | OUTPATIENT
Start: 2024-08-16

## 2024-08-16 RX ORDER — PREDNISONE 20 MG/1
60 TABLET ORAL
Status: COMPLETED | OUTPATIENT
Start: 2024-08-16 | End: 2024-08-16

## 2024-08-16 RX ADMIN — CLINDAMYCIN HYDROCHLORIDE 300 MG: 150 CAPSULE ORAL at 02:08

## 2024-08-16 RX ADMIN — PREDNISONE 60 MG: 20 TABLET ORAL at 02:08

## 2024-08-16 RX ADMIN — DIPHENHYDRAMINE HYDROCHLORIDE 25 MG: 25 CAPSULE ORAL at 02:08

## 2024-08-16 NOTE — ED PROVIDER NOTES
"Encounter Date: 8/16/2024       History     Chief Complaint   Patient presents with    Oral Swelling     Pt reports swelling to left side of neck and throat onset last night. Took 50mg benadryl around 1200 today. C/o difficulty swallowing.     60-year-old female past history of hypertension, anxiety, GERD, former smoker presents for evaluation of pain, swelling to left side of her jaw and neck after eating yesterday.  States it felt like it was swollen and red after she ate.  She took a Benadryl and it went down.  She ate and drink again today and she says she had some more swelling.  No voice changes.  No difficulty swallowing.  No fever.  No drooling.  No falls or injuries reported.  No history of diabetes per patient.  No other exacerbating or relieving factors identified.      Review of patient's allergies indicates:   Allergen Reactions    Penicillins      Age 23, "crippled me"     Past Medical History:   Diagnosis Date    Anticoagulant long-term use     Anxiety     Arthritis     Atherosclerosis     Closed displaced spiral fracture of shaft of right tibia 2/3/2020    Coronary artery disease     Depression     GERD (gastroesophageal reflux disease)     Hearing loss     [place] (hard of hearing)     Hearing aids    Hypertension     Long term (current) use of anticoagulants     Peripheral arterial disease     Tubulovillous adenoma of colon 2019    Wears dentures     Full-Upper; Partial- lower    Wound infection after surgery 06/2020     Past Surgical History:   Procedure Laterality Date    APPENDECTOMY      CAROTID STENT  2014    COLON SURGERY      COLONOSCOPY N/A 10/07/2019    Procedure: COLONOSCOPY;  Surgeon: Olaf Alba MD;  Location: [facility] ENDO;  Service: General;  Laterality: N/A;    COLONOSCOPY N/A 12/16/2019    Procedure: COLONOSCOPY;  Surgeon: Olaf Alba MD;  Location: [facility] ENDO;  Service: General;  Laterality: N/A;    COLONOSCOPY N/A 01/23/2020    Procedure: COLONOSCOPY (unable to come " earlier);  Surgeon: Esteban Yancey MD;  Location: [facility];  Service: Endoscopy;  Laterality: N/A;    COLONOSCOPY N/A 4/2/2024    Procedure: COLONOSCOPY(Does not want earlier time-arrive 1330);  Surgeon: Esteban Yancey MD;  Location: [facility];  Service: Endoscopy;  Laterality: N/A;    ENDOSCOPIC ULTRASOUND OF UPPER GASTROINTESTINAL TRACT N/A 10/16/2018    Procedure: ULTRASOUND, UPPER GI TRACT, ENDOSCOPIC;  Surgeon: Kumar Wang MD;  Location: [facility] ENDO (2ND FLR);  Service: Endoscopy;  Laterality: N/A;  5 day hold Effient, Dr Сергей Malagon    ESOPHAGOGASTRODUODENOSCOPY N/A 10/07/2019    Procedure: ESOPHAGOGASTRODUODENOSCOPY (EGD);  Surgeon: Olaf Alba MD;  Location: [facility];  Service: General;  Laterality: N/A;    ESOPHAGOGASTRODUODENOSCOPY N/A 4/2/2024    Procedure: EGD (ESOPHAGOGASTRODUODENOSCOPY);  Surgeon: Esteban Yancey MD;  Location: [facility];  Service: Endoscopy;  Laterality: N/A;    FEMORAL ARTERY STENT      HARDWARE REMOVAL Right 06/12/2020    Procedure: REMOVAL, HARDWARE;  Surgeon: Elbert Marrero II, MD;  Location: [facility] OR;  Service: Orthopedics;  Laterality: Right;  right distal tibia    ORIF TIBIA FRACTURE Right 02/14/2020    Procedure: ORIF, FRACTURE, TIBIA;  Surgeon: Elbert Marrero II, MD;  Location: [facility] OR;  Service: Orthopedics;  Laterality: Right;    ROBOT-ASSISTED LAPAROSCOPIC RESECTION OF SIGMOID COLON N/A 09/14/2020    Procedure: ROBOTIC COLECTOMY, SIGMOID;  Surgeon: Terrance Candelaria MD;  Location: [facility] OR;  Service: General;  Laterality: N/A;    UPPER GASTROINTESTINAL ENDOSCOPY       Family History   Problem Relation Name Age of Onset    Heart attack Brother      Diabetes Brother      Hyperlipidemia Brother      Eczema Neg Hx      Lupus Neg Hx      Psoriasis Neg Hx      Melanoma Neg Hx      Breast cancer Neg Hx      Ovarian cancer Neg Hx      Colon cancer Neg Hx      Esophageal cancer Neg Hx      Stomach cancer Neg Hx       Social History     Tobacco Use     Smoking status: Every Day     Current packs/day: 0.75     Average packs/day: 0.8 packs/day for 35.9 years (26.9 ttl pk-yrs)     Types: Cigarettes     Start date: 10/1/1988    Smokeless tobacco: Never   Substance Use Topics    Alcohol use: Not Currently     Comment: occ    Drug use: Yes     Types: Benzodiazepines     Comment: as prescribed     Review of Systems   Constitutional:  Negative for fever.   HENT:  Positive for dental problem. Negative for sore throat.    Respiratory:  Negative for shortness of breath.    Cardiovascular:  Negative for chest pain.   Gastrointestinal:  Negative for nausea.   Genitourinary:  Negative for dysuria.   Musculoskeletal:  Positive for neck pain. Negative for back pain.   Skin:  Negative for rash.   Neurological:  Negative for weakness.   Hematological:  Does not bruise/bleed easily.       Physical Exam     Initial Vitals [08/16/24 1353]   BP Pulse Resp Temp SpO2   (!) 181/80 99 16 98.7 °F (37.1 °C) 96 %      MAP       --         Physical Exam    Nursing note and vitals reviewed.  Constitutional: She appears well-developed and well-nourished.   HENT:   Head: Normocephalic and atraumatic.   Tiny ulcer anterior surface of the tongue with white discoloration.  Dentures in place.  No drooling.  Posterior oropharyngeal area unremarkable.  Uvula midline.  No exudate or abscess.  No trismus.  No appreciable abscess   Eyes: EOM are normal. Pupils are equal, round, and reactive to light.   Neck: Neck supple.   Slight tenderness left mandible area with slight fullness.  No significant submandibular swelling, crepitus, fluctuance.  Minimal erythema to this area.  Full range of motion of the neck.  No meningismus.  No appreciable lymph nodes   Normal range of motion.  Cardiovascular:  Normal rate, regular rhythm and normal heart sounds.           Pulmonary/Chest: Breath sounds normal. No respiratory distress.   Abdominal: Abdomen is soft. Bowel sounds are normal.   Musculoskeletal:          General: Normal range of motion.      Cervical back: Normal range of motion and neck supple.     Neurological: She is alert and oriented to person, place, and time. She has normal strength. No cranial nerve deficit or sensory deficit.   Skin: Skin is warm and dry.   Psychiatric: She has a normal mood and affect.         ED Course   Procedures  Labs Reviewed - No data to display       Imaging Results    None          Medications   diphenhydrAMINE capsule 25 mg (25 mg Oral Given 8/16/24 1420)   predniSONE tablet 60 mg (60 mg Oral Given 8/16/24 1419)   clindamycin capsule 300 mg (300 mg Oral Given 8/16/24 1420)     Medical Decision Making  Differential diagnosis; oral infection, parotitis, lymphadenitis, cellulitis, head and neck CA    Patient very well-appearing and in no overt distress.  She is mildly hypertensive here and I counseled the patient and monitor blood pressure at home and take her blood pressure medicine as prescribed.  No indication for emergent labs or imaging at this time.  We will give Benadryl, steroid in case an allergic component and also prescribe antibiotics in case there is some small infection as well.  Strict follow up and return instructions given.  Stable for discharge, well-appearing and nontoxic    Risk  OTC drugs.  Prescription drug management.                                      Clinical Impression:  Final diagnoses:  [K04.7] Dental infection (Primary)  [M54.2] Neck pain  [I10] Hypertension, unspecified type          ED Disposition Condition    Discharge Stable          ED Prescriptions       Medication Sig Dispense Start Date End Date Auth. Provider    clindamycin (CLEOCIN) 300 MG capsule Take 1 capsule (300 mg total) by mouth every 8 (eight) hours. 21 capsule 8/16/2024 -- Richard Martins MD    diphenhydrAMINE (BENADRYL) 25 mg capsule Take 1 capsule (25 mg total) by mouth every 6 (six) hours as needed for Itching or Allergies. 20 capsule 8/16/2024 -- Richard Martins MD    predniSONE  (DELTASONE) 50 MG Tab Take 1 tablet (50 mg total) by mouth once daily. 4 tablet 8/16/2024 -- Richard Martins MD          Follow-up Information       Follow up With Specialties Details Why Contact Info    Simran Pool MD Family Medicine In 3 days  [address]  [address]  [place] [address] [phone]               Richard Martins MD  08/17/24 1016

## 2024-08-19 ENCOUNTER — OFFICE VISIT (OUTPATIENT)
Dept: FAMILY MEDICINE | Facility: CLINIC | Age: 69
End: 2024-08-19
Payer: MEDICARE

## 2024-08-19 VITALS
RESPIRATION RATE: 18 BRPM | BODY MASS INDEX: 18.41 KG/M2 | HEART RATE: 75 BPM | WEIGHT: 121.5 LBS | SYSTOLIC BLOOD PRESSURE: 134 MMHG | DIASTOLIC BLOOD PRESSURE: 86 MMHG | OXYGEN SATURATION: 99 % | HEIGHT: 68 IN

## 2024-08-19 DIAGNOSIS — F41.0 PANIC DISORDER: ICD-10-CM

## 2024-08-19 DIAGNOSIS — F41.1 GAD (GENERALIZED ANXIETY DISORDER): ICD-10-CM

## 2024-08-19 DIAGNOSIS — R73.9 HYPERGLYCEMIA: Primary | ICD-10-CM

## 2024-08-19 DIAGNOSIS — R56.9 CONVULSIONS, UNSPECIFIED CONVULSION TYPE: ICD-10-CM

## 2024-08-19 DIAGNOSIS — R35.0 URINARY FREQUENCY: ICD-10-CM

## 2024-08-19 DIAGNOSIS — E87.1 HYPONATREMIA: ICD-10-CM

## 2024-08-19 DIAGNOSIS — Z79.899 CHRONIC PRESCRIPTION BENZODIAZEPINE USE: ICD-10-CM

## 2024-08-19 LAB
AMP AMPHETAMINE 1000 NM/ML POC: NEGATIVE
BAR BARBITURATES 300 NG/ML POC: NEGATIVE
BILIRUB UR QL STRIP: NEGATIVE
BUP BUPRENORPHINE 10 NG/ML POC: NEGATIVE
BZO BENZODIAZEPINES 300 NG/ML POC: ABNORMAL
CLARITY UR: CLEAR
COC COCAINE 300 NG/ML POC: NEGATIVE
COLOR UR: YELLOW
CREATININE (CR) POC: 50
CTP QC/QA: YES
GLUCOSE UR QL STRIP: NEGATIVE
HGB UR QL STRIP: ABNORMAL
KETONES UR QL STRIP: NEGATIVE
LEUKOCYTE ESTERASE UR QL STRIP: NEGATIVE
MET METHAMPHETAMINE 1000 NG/ML POC: NEGATIVE
MOP/OPI300 MORPHINE 300 NG/ML POC: NEGATIVE
MTD METHADONE 300 NG/ML POC: NEGATIVE
NITRITE UR QL STRIP: NEGATIVE
OXIDANT (OX) POC: NEGATIVE
OXY OXYCODONE 100 NG/ML POC: NEGATIVE
PH UR STRIP: 7 [PH] (ref 5–8)
PROT UR QL STRIP: NEGATIVE
SP GR UR STRIP: 1.01 (ref 1–1.03)
SPECIFIC GRAVITY (SG) POC: 1.02
TEMPERATURE (°F) POC: 92
THC MARIJUANA 50 NG/ML POC: NEGATIVE
URN SPEC COLLECT METH UR: ABNORMAL
UROBILINOGEN UR STRIP-ACNC: NEGATIVE EU/DL

## 2024-08-19 PROCEDURE — 3075F SYST BP GE 130 - 139MM HG: CPT | Mod: CPTII,S$GLB,, | Performed by: FAMILY MEDICINE

## 2024-08-19 PROCEDURE — 81003 URINALYSIS AUTO W/O SCOPE: CPT | Performed by: FAMILY MEDICINE

## 2024-08-19 PROCEDURE — 4010F ACE/ARB THERAPY RXD/TAKEN: CPT | Mod: CPTII,S$GLB,, | Performed by: FAMILY MEDICINE

## 2024-08-19 PROCEDURE — 99214 OFFICE O/P EST MOD 30 MIN: CPT | Mod: S$GLB,,, | Performed by: FAMILY MEDICINE

## 2024-08-19 PROCEDURE — 1126F AMNT PAIN NOTED NONE PRSNT: CPT | Mod: CPTII,S$GLB,, | Performed by: FAMILY MEDICINE

## 2024-08-19 PROCEDURE — 1159F MED LIST DOCD IN RCRD: CPT | Mod: CPTII,S$GLB,, | Performed by: FAMILY MEDICINE

## 2024-08-19 PROCEDURE — 99999 PR PBB SHADOW E&M-EST. PATIENT-LVL IV: CPT | Mod: PBBFAC,,, | Performed by: FAMILY MEDICINE

## 2024-08-19 PROCEDURE — 3288F FALL RISK ASSESSMENT DOCD: CPT | Mod: CPTII,S$GLB,, | Performed by: FAMILY MEDICINE

## 2024-08-19 PROCEDURE — 80305 DRUG TEST PRSMV DIR OPT OBS: CPT | Mod: QW,S$GLB,, | Performed by: FAMILY MEDICINE

## 2024-08-19 PROCEDURE — 1101F PT FALLS ASSESS-DOCD LE1/YR: CPT | Mod: CPTII,S$GLB,, | Performed by: FAMILY MEDICINE

## 2024-08-19 PROCEDURE — 3079F DIAST BP 80-89 MM HG: CPT | Mod: CPTII,S$GLB,, | Performed by: FAMILY MEDICINE

## 2024-08-19 PROCEDURE — 3008F BODY MASS INDEX DOCD: CPT | Mod: CPTII,S$GLB,, | Performed by: FAMILY MEDICINE

## 2024-08-19 RX ORDER — ALPRAZOLAM 0.25 MG/1
TABLET ORAL
Qty: 60 TABLET | Refills: 0 | Status: SHIPPED | OUTPATIENT
Start: 2024-08-19

## 2024-08-19 RX ORDER — DULOXETIN HYDROCHLORIDE 60 MG/1
60 CAPSULE, DELAYED RELEASE ORAL DAILY
Qty: 90 CAPSULE | Refills: 2 | Status: SHIPPED | OUTPATIENT
Start: 2024-08-19

## 2024-08-19 RX ORDER — ALPRAZOLAM 0.25 MG/1
TABLET ORAL
Qty: 60 TABLET | Refills: 0 | Status: CANCELLED | OUTPATIENT
Start: 2024-08-19

## 2024-08-19 NOTE — PROGRESS NOTES
Subjective:       Patient ID: Comfort Cantu is a 68 y.o. female.    Chief Complaint: Follow-up    Ms. Cantu presents today for follow up.   She tells me that she has been having some pain and swelling in the left face. She thought she was having an allergic reaction to the food she had eaten. She is still on antibiotics. She tells me that when this first popped up. Will let me know if it does not resolve in the next week when she completes her antibiotics- she will let me know if she is not better.     She would like a test for diabetes due to family history.     Would like a change in her depression medication as she does not feel it is working.     She has not been sleeping well. She tells me that a week and a half ago when she would go to bed- she has to pee every hour on the hour.     Follow-up  Pertinent negatives include no abdominal pain, fatigue, fever or rash.       .  Patient Active Problem List   Diagnosis    ETHAN (generalized anxiety disorder)    Backache    Primary hypertension    Cardiovascular system problem    Hyperlipidemia    Knee pain    Smoker, started age 20, now up to 2 ppd    Abdominal pain, acute    Pancreas cyst    Nephrolithiasis    Hyponatremia    Peripheral vascular disease, bilateral stent    Anemia    High grade dysplasia in colonic adenoma    Hx of colonic polyps    Infection of lower extremity associated with hardware    Colon polyp    CAD (coronary artery disease)    Convulsions, unspecified convulsion type    COPD exacerbation    Infarction of parietal lobe    Protein-calorie malnutrition, unspecified severity    Panic disorder    Mixed conductive and sensorineural hearing loss of both ears    Bronchiectasis without complication    Bilateral carotid artery stenosis, post surgeries.    Sedentary lifestyle, since ankle problem 2-3 years ago.    Engages in binge consumption of alcohol    Benzodiazepine dependence, diazepam 5 mg daily for many years.    Iron deficiency anemia  secondary to inadequate dietary iron intake    At risk for cardiovascular event, very HIGH risk    At risk for falling    Family history of premature CAD    Iron deficiency anemia due to chronic blood loss    Peter Moyer has a current medication list which includes the following prescription(s): atorvastatin, buspirone, clindamycin, diphenhydramine, isosorbide mononitrate, prasugrel, prednisone, alprazolam, duloxetine, olmesartan, and pantoprazole, and the following Facility-Administered Medications: albuterol.    Review of Systems   Constitutional:  Negative for activity change, appetite change, fatigue and fever.   Respiratory:  Negative for shortness of breath.    Gastrointestinal:  Negative for abdominal pain.   Integumentary:  Negative for rash.         Health Maintenance Due   Topic Date Due    Pneumococcal Vaccines (Age 65+) (1 of 2 - PCV) Never done    TETANUS VACCINE  Never done    Shingles Vaccine (1 of 2) Never done    RSV Vaccine (Age 60+ and Pregnant patients) (1 - 1-dose 60+ series) Never done    COVID-19 Vaccine (4 - 2023-24 season) 09/01/2023      Health Maintenance reviewed and discussed- No vaccines needed today.   Objective:      Physical Exam  Vitals and nursing note reviewed.   Constitutional:       General: She is not in acute distress.     Appearance: She is not ill-appearing.   Cardiovascular:      Rate and Rhythm: Normal rate and regular rhythm.      Heart sounds: No murmur heard.  Pulmonary:      Effort: Pulmonary effort is normal.      Breath sounds: Normal breath sounds. No wheezing.   Skin:     General: Skin is warm and dry.      Findings: No rash.   Neurological:      Mental Status: She is alert.   Psychiatric:         Mood and Affect: Mood normal.         Behavior: Behavior normal.         Assessment:       1. Hyperglycemia    2. Chronic prescription benzodiazepine use    3. ETHAN (generalized anxiety disorder)    4. Hyponatremia    5. Panic disorder    6. Urinary frequency     7. Convulsions, unspecified convulsion type        Plan:       1. Hyperglycemia  -     Hemoglobin A1c; Future; Expected date: 08/19/2024    2. Chronic prescription benzodiazepine use  -     POCT Urine Drug Screen (With BUP)  -     ALPRAZolam (XANAX) 0.25 MG tablet; TAKE 1 TABLET 2 TIMES A DAY AS NEEDED FOR ANXIETY  Dispense: 60 tablet; Refill: 0    3. ETHAN (generalized anxiety disorder)  -     DULoxetine (CYMBALTA) 60 MG capsule; Take 1 capsule (60 mg total) by mouth once daily.  Dispense: 90 capsule; Refill: 2  -     POCT Urine Drug Screen (With BUP)  -     ALPRAZolam (XANAX) 0.25 MG tablet; TAKE 1 TABLET 2 TIMES A DAY AS NEEDED FOR ANXIETY  Dispense: 60 tablet; Refill: 0    4. Hyponatremia  Overview:  Hyponatremia with a sodium 121.  Potassium 3.3 glucose of 208.  The patient has a history of hypertension.  She says she has been compliant with her medication.  She felt that she has been eating too much salt and recent day so she increased her fluids.  She had seizure activity at home tonic colonic according to the history.  Patient given Ativan by EMS and was drowsy but responsive and appropriate except for history she gave the ER MD of feeling like she was at home and course the patient was at home prior to the seizure need MS bring her to the hospital.  She also had 1 mg of Ativan.  Patient will be placed in the ICU electrolytes will be corrected normal saline and if needed 3% saline.  Potassium monitor the glucose.  Lab monitor.    Orders:  -     Basic metabolic panel; Future; Expected date: 08/19/2024    5. Panic disorder    6. Urinary frequency  -     Urinalysis, Reflex to Urine Culture Urine, Clean Catch    7. Convulsions, unspecified convulsion type  Assessment & Plan:  No recent seizures                Increasing cymbalta.   Checking labs for rule out diabetes

## 2024-08-22 ENCOUNTER — TELEPHONE (OUTPATIENT)
Dept: FAMILY MEDICINE | Facility: CLINIC | Age: 69
End: 2024-08-22
Payer: MEDICARE

## 2024-08-22 NOTE — TELEPHONE ENCOUNTER
----- Message from Kenzie Walsh sent at 8/22/2024  3:18 PM CDT -----  Contact: self  Type:  Needs Medical Advice    Who Called: self  Symptoms (please be specific): pt needs to speak to nurse concerning her medication. Pt sts she noticed her dosage never changed when she picked up her depression medication. DULoxetine (CYMBALTA) 60 MG capsule. Pt sts she received a 30 mg.     Pharmacy name and phone #:    [facility] - [place], [place] - [address]  [address]  [place] [address]  Phone: [phone] Fax: [phone]      Would the patient rather a call back or a response via [facility]? call  Best Call Back Number: [phone] (home)     Additional Information: please advise and thank you.

## 2024-08-22 NOTE — TELEPHONE ENCOUNTER
Spoke with pt. Patient picked up Cymbalta from [facility], 30 mg vs 60 mg. 8/19/24 Cymbalta 60 mg sent into pharmacy. Recommended pt contact pharmacy   Pt voiced understanding.

## 2024-08-26 ENCOUNTER — TELEPHONE (OUTPATIENT)
Dept: FAMILY MEDICINE | Facility: CLINIC | Age: 69
End: 2024-08-26
Payer: MEDICARE

## 2024-08-26 NOTE — TELEPHONE ENCOUNTER
Pt requesting thyroid labs to be added to her lab appointment tomorrow morning. Please review & advise.

## 2024-08-27 ENCOUNTER — LAB VISIT (OUTPATIENT)
Dept: LAB | Facility: HOSPITAL | Age: 69
End: 2024-08-27
Attending: FAMILY MEDICINE
Payer: MEDICARE

## 2024-08-27 DIAGNOSIS — E87.1 HYPONATREMIA: ICD-10-CM

## 2024-08-27 DIAGNOSIS — R73.9 HYPERGLYCEMIA: ICD-10-CM

## 2024-08-27 LAB
ANION GAP SERPL CALC-SCNC: 12 MMOL/L (ref 8–16)
BUN SERPL-MCNC: 11 MG/DL (ref 8–23)
CALCIUM SERPL-MCNC: 9.5 MG/DL (ref 8.7–10.5)
CHLORIDE SERPL-SCNC: 102 MMOL/L (ref 95–110)
CO2 SERPL-SCNC: 22 MMOL/L (ref 23–29)
CREAT SERPL-MCNC: 0.7 MG/DL (ref 0.5–1.4)
EST. GFR  (NO RACE VARIABLE): >60 ML/MIN/1.73 M^2
ESTIMATED AVG GLUCOSE: 108 MG/DL (ref 68–131)
GLUCOSE SERPL-MCNC: 100 MG/DL (ref 70–110)
HBA1C MFR BLD: 5.4 % (ref 4–5.6)
POTASSIUM SERPL-SCNC: 4.2 MMOL/L (ref 3.5–5.1)
SODIUM SERPL-SCNC: 136 MMOL/L (ref 136–145)

## 2024-08-27 PROCEDURE — 80048 BASIC METABOLIC PNL TOTAL CA: CPT | Performed by: FAMILY MEDICINE

## 2024-08-27 PROCEDURE — 83036 HEMOGLOBIN GLYCOSYLATED A1C: CPT | Performed by: FAMILY MEDICINE

## 2024-08-29 ENCOUNTER — PATIENT MESSAGE (OUTPATIENT)
Dept: FAMILY MEDICINE | Facility: CLINIC | Age: 69
End: 2024-08-29
Payer: MEDICARE

## 2024-08-29 DIAGNOSIS — R30.0 DYSURIA: Primary | ICD-10-CM

## 2024-09-13 ENCOUNTER — PATIENT OUTREACH (OUTPATIENT)
Dept: ADMINISTRATIVE | Facility: HOSPITAL | Age: 69
End: 2024-09-13
Payer: MEDICARE

## 2024-09-13 ENCOUNTER — OFFICE VISIT (OUTPATIENT)
Dept: CARDIOLOGY | Facility: CLINIC | Age: 69
End: 2024-09-13
Payer: MEDICARE

## 2024-09-13 VITALS
OXYGEN SATURATION: 95 % | HEIGHT: 68 IN | RESPIRATION RATE: 20 BRPM | HEART RATE: 94 BPM | WEIGHT: 128.31 LBS | SYSTOLIC BLOOD PRESSURE: 167 MMHG | BODY MASS INDEX: 19.45 KG/M2 | DIASTOLIC BLOOD PRESSURE: 72 MMHG

## 2024-09-13 DIAGNOSIS — F13.20 BENZODIAZEPINE DEPENDENCE: ICD-10-CM

## 2024-09-13 DIAGNOSIS — Z91.81 AT RISK FOR FALLING: ICD-10-CM

## 2024-09-13 DIAGNOSIS — I10 PRIMARY HYPERTENSION: ICD-10-CM

## 2024-09-13 DIAGNOSIS — Z91.89 AT RISK FOR CARDIOVASCULAR EVENT: Primary | ICD-10-CM

## 2024-09-13 DIAGNOSIS — I73.9 PERIPHERAL VASCULAR DISEASE: ICD-10-CM

## 2024-09-13 DIAGNOSIS — I65.23 BILATERAL CAROTID ARTERY STENOSIS: ICD-10-CM

## 2024-09-13 DIAGNOSIS — Z91.89 SEDENTARY LIFESTYLE: ICD-10-CM

## 2024-09-13 DIAGNOSIS — E78.00 PURE HYPERCHOLESTEROLEMIA: ICD-10-CM

## 2024-09-13 DIAGNOSIS — F17.200 SMOKER: ICD-10-CM

## 2024-09-13 DIAGNOSIS — R07.9 LEFT-SIDED CHEST PAIN: ICD-10-CM

## 2024-09-13 DIAGNOSIS — I11.9 LVH (LEFT VENTRICULAR HYPERTROPHY) DUE TO HYPERTENSIVE DISEASE, WITHOUT HEART FAILURE: ICD-10-CM

## 2024-09-13 DIAGNOSIS — I10 WHITE COAT SYNDROME WITH DIAGNOSIS OF HYPERTENSION: ICD-10-CM

## 2024-09-13 LAB
OHS QRS DURATION: 74 MS
OHS QTC CALCULATION: 456 MS

## 2024-09-13 PROCEDURE — 99999 PR PBB SHADOW E&M-EST. PATIENT-LVL III: CPT | Mod: PBBFAC,,, | Performed by: INTERNAL MEDICINE

## 2024-09-13 RX ORDER — ATORVASTATIN CALCIUM 80 MG/1
80 TABLET, FILM COATED ORAL DAILY
Qty: 90 TABLET | Refills: 3 | Status: SHIPPED | OUTPATIENT
Start: 2024-09-13

## 2024-09-13 RX ORDER — OLMESARTAN MEDOXOMIL 20 MG/1
10 TABLET ORAL DAILY
Qty: 45 TABLET | Refills: 3 | Status: SHIPPED | OUTPATIENT
Start: 2024-09-13

## 2024-09-13 NOTE — PROGRESS NOTES
"Subjective:    Patient ID:  Comfort Cantu is a 68 y.o. female who presents for evaluation of No chief complaint on file.  PCP: Simran Pool MD   Prior cardiologist: Pepe Moctezuma MD, last seen 4/2018  Vascular: Dr. He in [facility]  Lives alone, have dogs  Sister, Sylvia, in [place]  Retired  at [employer]    Social Determinate of Health: Do you have any problem with the following: hearing aides, memory difficulties, difficult historian.  Health Literacy (understanding): low to medium  Vaccination: up to date yes, covid, covid infection no  Activities: does not work out, ankle broken then infected, use a cane, lives in mobile home, mostly sedentary.  Nicotine: smoker  (almost 2 ppd x 47 yrs), stopped 7/2024  Alcohol: Beer occasionally, max 6 beers in any 24 hours, 1-2 times weekly, quit 4/2024  Illicit Drugs: none, on Rx Valium 5 mg daily for many years now on Xanax bid since beginning of 2024  Cardiac Symptoms: none  Home BP: yes, 97/50s to SBP 130s  Medication Compliance: yes, off ARB for over 4 months due to "low BP", SBP 80s to 90s.   Diet: Regular, use salt  Caffeine: Coffee 3 - 4 cups a day   Labs:   Lab Results   Component Value Date    TSH 2.746 08/13/2024        Lab Results   Component Value Date    HGBA1C 5.4 08/27/2024       Lab Results   Component Value Date    WBC 4.24 07/19/2024    HGB 13.3 07/19/2024    HCT 39.3 07/19/2024    MCV 99 (H) 07/19/2024     07/19/2024       CMP  Sodium   Date Value Ref Range Status   08/27/2024 136 136 - 145 mmol/L Final     Potassium   Date Value Ref Range Status   08/27/2024 4.2 3.5 - 5.1 mmol/L Final     Chloride   Date Value Ref Range Status   08/27/2024 102 95 - 110 mmol/L Final     CO2   Date Value Ref Range Status   08/27/2024 22 (L) 23 - 29 mmol/L Final     Glucose   Date Value Ref Range Status   08/27/2024 100 70 - 110 mg/dL Final     BUN   Date Value Ref Range Status   08/27/2024 11 8 - 23 mg/dL Final     Creatinine   Date Value Ref Range Status "   08/27/2024 0.7 0.5 - 1.4 mg/dL Final     Calcium   Date Value Ref Range Status   08/27/2024 9.5 8.7 - 10.5 mg/dL Final     Total Protein   Date Value Ref Range Status   07/19/2024 7.5 6.0 - 8.4 g/dL Final     Albumin   Date Value Ref Range Status   07/19/2024 4.0 3.5 - 5.2 g/dL Final     Total Bilirubin   Date Value Ref Range Status   07/19/2024 0.3 0.1 - 1.0 mg/dL Final     Comment:     For infants and newborns, interpretation of results should be based  on gestational age, weight and in agreement with clinical  observations.    Premature Infant recommended reference ranges:  Up to 24 hours.............<8.0 mg/dL  Up to 48 hours............<12.0 mg/dL  3-5 days..................<15.0 mg/dL  6-29 days.................<15.0 mg/dL       Alkaline Phosphatase   Date Value Ref Range Status   07/19/2024 114 55 - 135 U/L Final     AST   Date Value Ref Range Status   07/19/2024 22 10 - 40 U/L Final     ALT   Date Value Ref Range Status   07/19/2024 28 10 - 44 U/L Final     Anion Gap   Date Value Ref Range Status   08/27/2024 12 8 - 16 mmol/L Final     eGFR if    Date Value Ref Range Status   09/03/2021 >60.0 >60 mL/min/1.73 m^2 Final     eGFR if non    Date Value Ref Range Status   09/03/2021 >60.0 >60 mL/min/1.73 m^2 Final     Comment:     Calculation used to obtain the estimated glomerular filtration  rate (eGFR) is the CKD-EPI equation.        @labrcntip(troponini)@    BNP   Date Value Ref Range Status   02/23/2021 36 0 - 99 pg/mL Final     Comment:     Values of less than 100 pg/ml are consistent with non-CHF populations.   }   Lab Results   Component Value Date    CHOL 167 08/13/2024    CHOL 208 (H) 09/13/2023    CHOL 176 07/16/2021     Lab Results   Component Value Date    HDL 49 08/13/2024    HDL 66 09/13/2023    HDL 46 07/16/2021     Lab Results   Component Value Date    LDLCALC 91.6 08/13/2024    LDLCALC 79.4 09/13/2023    LDLCALC 87.8 07/16/2021     Lab Results   Component Value  "Date    TRIG 132 08/13/2024    TRIG 313 (H) 09/13/2023    TRIG 211 (H) 07/16/2021     Lab Results   Component Value Date    CHOLHDL 29.3 08/13/2024    CHOLHDL 31.7 09/13/2023    CHOLHDL 26.1 07/16/2021       Last Echo: 11/2023  Last stress test: none  Cardiovascular angiogram: none  ECG: NSR with PVC, rate 79, low voltage  Fundoscopic exam: due    In 9/2023:  WF here with request change from Pepe Moctezuma MD for CV care. Have multi-territory ASCVD on 40 mg of atorvastatin, no recent lipid panel. Continue to smoke heavily and marked physical limitations. Denies any CP but have SOB likely due to COPD. Have premature family history for CAD. Brother's first MI in his 30s and mother MI in her 60s.    CT 6/2023 - The aorta is normal in caliber with moderate calcific plaque.  There are coronary artery calcifications.  No pleural effusion is present.     Emphysema is present.  There is extensive biapical scarring with areas of calcification, right greater than left, unchanged.  Mild bronchiectasis is present.  Extensive calcifications associated with the kidneys appear to be primarily vascular, although renal calculi are not ruled out.  There is an 11 mm cyst at the upper pole of the right kidney.     Simran Pool MD noted 8/24/2023 "lower extremities pain (weakness), and Hypertension"    Pepe Moctezuma MD noted 4/2018 "Atherosclerotic heart disease of native coronary artery without angina pectoris  Essential (primary) hypertension  Dizziness and giddiness  Shortness of breath    HPI comments: in 9/2024, return for review. Had stopped all BP medications due to low reading at home without clear symptoms. Recall falling after medication increased from 5 mg to 20 mg about 4 years ago causing her to fall and broke her ankle in 5 placed. Have quit smoking and alcohol over the past few months. Long explanation on proper treatment for HTN.    Echo 11/2023 -  Left Ventricle: The left ventricle is normal in size. Mildly increased wall " thickness. Normal wall motion. There is normal systolic function with a visually estimated ejection fraction of 60 - 65%. Ejection fraction by visual approximation is 61%. There is normal diastolic function.    Right Ventricle: Normal right ventricular cavity size. Systolic function is normal.    IVC/SVC: Normal venous pressure at 3 mmHg.    Pericardium: There is a trivial effusion.    CT chest 6/2024 - aorta is normal in caliber with extensive calcific plaque.  There are coronary artery calcifications.     There is no pleural effusion.     Emphysema is present.    Review of Systems   Constitutional: Negative for diaphoresis, fever, malaise/fatigue, night sweats and weight gain.        Weight stable from 9/2023   HENT:  Positive for hearing loss (bilateral) and hoarse voice. Negative for nosebleeds and tinnitus.    Eyes:  Negative for discharge and visual disturbance.   Cardiovascular:  Positive for chest pain (left side). Negative for claudication, cyanosis, dyspnea on exertion, irregular heartbeat, leg swelling, near-syncope, orthopnea, palpitations and paroxysmal nocturnal dyspnea.   Respiratory:  Negative for cough, shortness of breath, sleep disturbances due to breathing, snoring and wheezing.         [place] score 1 today a 3, awaken alright   Endocrine: Negative for polydipsia and polyuria.   Hematologic/Lymphatic: Does not bruise/bleed easily.   Skin:  Negative for color change, flushing, nail changes, poor wound healing and suspicious lesions.   Musculoskeletal:  Negative for arthritis, falls, gout, joint pain, joint swelling, muscle cramps, muscle weakness, myalgias and neck pain.   Gastrointestinal:  Positive for constipation. Negative for heartburn, hematemesis, hematochezia, melena, nausea and vomiting.   Genitourinary:  Positive for hematuria. Negative for dysuria.   Neurological:  Positive for loss of balance. Negative for disturbances in coordination, excessive daytime sleepiness, dizziness, focal  "weakness, headaches, light-headedness, numbness, vertigo and weakness.   Psychiatric/Behavioral:  Positive for depression. Negative for substance abuse. The patient is nervous/anxious. The patient does not have insomnia.    Allergic/Immunologic: Positive for environmental allergies.     Answers submitted by the patient for this visit:  Review of Systems Questionnaire (Submitted on 9/12/2024)  activity change: No  unexpected weight change: No  rhinorrhea: No  trouble swallowing: Yes  chest tightness: No  blood in stool: No  difficulty urinating: No  menstrual problem: No  arthralgias: No  confusion: No  dysphoric mood: Yes     Objective:    Physical Exam  Constitutional:       Appearance: She is well-developed.      Comments: RA O2 sat 95%  Orthostatic VS: sitting 127/59, standing 167/72   HENT:      Head: Normocephalic.   Eyes:      Conjunctiva/sclera: Conjunctivae normal.      Pupils: Pupils are equal, round, and reactive to light.   Neck:      Thyroid: No thyromegaly.      Vascular: No JVD.   Cardiovascular:      Rate and Rhythm: Normal rate. Rhythm irregular.      Pulses: Intact distal pulses.           Carotid pulses are 1+ on the right side and 1+ on the left side.       Radial pulses are 1+ on the right side and 1+ on the left side.        Dorsalis pedis pulses are 0 on the right side and 0 on the left side.        Posterior tibial pulses are 0 on the right side and 0 on the left side.      Heart sounds: Heart sounds are distant. No murmur heard.     No friction rub. No gallop.   Pulmonary:      Effort: Pulmonary effort is normal.      Breath sounds: No rales.      Comments: Diminished breath sounds and prolong expiration.       Chest:      Chest wall: No tenderness.   Abdominal:      General: Bowel sounds are normal.      Palpations: Abdomen is soft.      Tenderness: There is no abdominal tenderness.      Comments: Waist 32"   Musculoskeletal:         General: Normal range of motion.      Cervical back: " Normal range of motion and neck supple.   Lymphadenopathy:      Cervical: No cervical adenopathy.   Skin:     General: Skin is warm and dry.      Findings: No rash.   Neurological:      Mental Status: She is alert and oriented to person, place, and time.           Assessment:       1. At risk for cardiovascular event, very HIGH risk    2. Primary hypertension    3. LVH (left ventricular hypertrophy) due to hypertensive disease, without heart failure    4. Sedentary lifestyle, since ankle problem 2-3 years ago.    5. White coat syndrome with diagnosis of hypertension    6. Pure hypercholesterolemia    7. Smoker, started age 20, now up to 2 ppd    8. Peripheral vascular disease, bilateral stent    9. Bilateral carotid artery stenosis, post surgeries.    10. Benzodiazepine dependence, diazepam 5 mg daily, Xanax bid for many years.    11. At risk for falling    12. Left-sided chest pain         Plan:       Diagnoses and all orders for this visit:    At risk for cardiovascular event, very HIGH risk  -     atorvastatin (LIPITOR) 80 MG tablet; Take 1 tablet (80 mg total) by mouth once daily.  -     Lipid Panel; Future  -     Stress Echo Which stress agent will be used? Pharmacological; Color Flow Doppler? Yes; Future    Primary hypertension  -     IN OFFICE EKG 12-LEAD (to Muse)  -     olmesartan (BENICAR) 20 MG tablet; Take 0.5 tablets (10 mg total) by mouth once daily. For blood pressure    LVH (left ventricular hypertrophy) due to hypertensive disease, without heart failure  -     olmesartan (BENICAR) 20 MG tablet; Take 0.5 tablets (10 mg total) by mouth once daily. For blood pressure  -     Stress Echo Which stress agent will be used? Pharmacological; Color Flow Doppler? Yes; Future    Sedentary lifestyle, since ankle problem 2-3 years ago.    White coat syndrome with diagnosis of hypertension    Pure hypercholesterolemia  -     atorvastatin (LIPITOR) 80 MG tablet; Take 1 tablet (80 mg total) by mouth once daily.  -      Lipid Panel; Future  -     CRP, High Sensitivity; Future    Smoker, started age 20, now up to 2 ppd    Peripheral vascular disease, bilateral stent  -     atorvastatin (LIPITOR) 80 MG tablet; Take 1 tablet (80 mg total) by mouth once daily.  -     Lipid Panel; Future  -     CRP, High Sensitivity; Future    Bilateral carotid artery stenosis, post surgeries.  -     atorvastatin (LIPITOR) 80 MG tablet; Take 1 tablet (80 mg total) by mouth once daily.  -     Lipid Panel; Future  -     CRP, High Sensitivity; Future    Benzodiazepine dependence, diazepam 5 mg daily, Xanax bid for many years.    At risk for falling  -     Stress Echo Which stress agent will be used? Pharmacological; Color Flow Doppler? Yes; Future    Left-sided chest pain  -     Stress Echo Which stress agent will be used? Pharmacological; Color Flow Doppler? Yes; Future     - All medical issues reviewed, willing to increase statin, target LDL-C is < 70  - Warning signs of MI and stroke given, if symptoms last more than 5 minutes, stop immediately and call 911, then chew 2-4 low-dose ASA (81 mg).   - CV status and all medications reviewed, patient acknowledge good understanding.  - Recommend healthy living: no nicotine, moderate alcohol, healthy diet and regular exercise aiming for fitness, restorative sleep and weight control  - Discussed healthy alcohol daily limit of 0.5 oz of pure alcohol in any 24 hours (roughly one 12-oz beers, 5 oz of wine (8%-12% alcohol), or 0.75 oz (half a shot) of liquor (80 proof)), can not save up.   - Need good exercise program, 4 key elements: 1. Aerobic (walking, swimming, dancing, jogging, biking, etc, 2. Muscle strengthening / resistance exercise, need to do 2-3 times weekly, 3. Stretching daily, good stretch takes a whole  total minute. 4. Balance exercise daily.   - Encourage activities as much as tolerated. Any activity is better than none!   - Instruction for Mediterranean diet and heart healthy exercise  given.  - Check home blood pressure, 2 days weekly, do 2 readings within 5 minutes in AM and PM, keep log for review. Target resting BP is less than 130/80.   - Highly recommend 30-60 minutes of exercise / activities daily, can have Sunday off, with 2-3 sessions of muscle strengthening weekly. A  would be very helpful.  - Recommend at least annual cardiovascular evaluation in view of patient's significant risk factors.  - Phone review / encourage use of [facility]    Total time spend including review of record prior to face-to-face visit is 40 minutes. Greater than 50% of the time was spent in counseling and coordination of care. The above assessment and plan have been discussed at length. Referring provider's note reviewed. Labs and procedure over the last 6 months reviewed. Problem List updated. Asked to bring in all active medications / pills bottles with next visit. Will send note to referring / PCP.

## 2024-09-18 DIAGNOSIS — Z79.899 CHRONIC PRESCRIPTION BENZODIAZEPINE USE: ICD-10-CM

## 2024-09-18 DIAGNOSIS — F41.1 GAD (GENERALIZED ANXIETY DISORDER): ICD-10-CM

## 2024-09-18 RX ORDER — ALPRAZOLAM 0.25 MG/1
TABLET ORAL
Qty: 60 TABLET | Refills: 1 | Status: SHIPPED | OUTPATIENT
Start: 2024-09-18

## 2024-09-18 NOTE — TELEPHONE ENCOUNTER
No care due was identified.  [facility] Embedded Care Due Messages. Reference number: [id number].   9/18/2024 1:13:35 PM CDT

## 2024-09-27 ENCOUNTER — HOSPITAL ENCOUNTER (OUTPATIENT)
Dept: RADIOLOGY | Facility: HOSPITAL | Age: 69
Discharge: HOME | End: 2024-09-27
Attending: NURSE PRACTITIONER
Payer: MEDICARE

## 2024-09-27 DIAGNOSIS — D50.0 IRON DEFICIENCY ANEMIA DUE TO CHRONIC BLOOD LOSS: ICD-10-CM

## 2024-09-27 DIAGNOSIS — R91.1 SOLITARY PULMONARY NODULE: ICD-10-CM

## 2024-09-27 PROCEDURE — 25500020 PHARM REV CODE 255: Performed by: NURSE PRACTITIONER

## 2024-09-27 PROCEDURE — 71260 CT THORAX DX C+: CPT | Mod: TC

## 2024-09-27 PROCEDURE — 71260 CT THORAX DX C+: CPT | Mod: 26,,, | Performed by: RADIOLOGY

## 2024-09-27 RX ADMIN — IOHEXOL 75 ML: 350 INJECTION, SOLUTION INTRAVENOUS at 10:09

## 2024-10-02 ENCOUNTER — TELEPHONE (OUTPATIENT)
Dept: PULMONOLOGY | Facility: CLINIC | Age: 69
End: 2024-10-02

## 2024-10-02 ENCOUNTER — OFFICE VISIT (OUTPATIENT)
Dept: HEMATOLOGY/ONCOLOGY | Facility: CLINIC | Age: 69
End: 2024-10-02
Payer: MEDICARE

## 2024-10-02 VITALS
SYSTOLIC BLOOD PRESSURE: 106 MMHG | DIASTOLIC BLOOD PRESSURE: 63 MMHG | HEART RATE: 94 BPM | BODY MASS INDEX: 19.43 KG/M2 | RESPIRATION RATE: 18 BRPM | WEIGHT: 127.81 LBS | OXYGEN SATURATION: 96 %

## 2024-10-02 DIAGNOSIS — D50.0 IRON DEFICIENCY ANEMIA DUE TO CHRONIC BLOOD LOSS: Primary | ICD-10-CM

## 2024-10-02 DIAGNOSIS — J43.9 PULMONARY EMPHYSEMA, UNSPECIFIED EMPHYSEMA TYPE: ICD-10-CM

## 2024-10-02 DIAGNOSIS — R91.1 SOLITARY PULMONARY NODULE: ICD-10-CM

## 2024-10-02 PROCEDURE — 3078F DIAST BP <80 MM HG: CPT | Mod: CPTII,S$GLB,, | Performed by: NURSE PRACTITIONER

## 2024-10-02 PROCEDURE — 3288F FALL RISK ASSESSMENT DOCD: CPT | Mod: CPTII,S$GLB,, | Performed by: NURSE PRACTITIONER

## 2024-10-02 PROCEDURE — 4010F ACE/ARB THERAPY RXD/TAKEN: CPT | Mod: CPTII,S$GLB,, | Performed by: NURSE PRACTITIONER

## 2024-10-02 PROCEDURE — 99999 PR PBB SHADOW E&M-EST. PATIENT-LVL V: CPT | Mod: PBBFAC,,, | Performed by: NURSE PRACTITIONER

## 2024-10-02 PROCEDURE — 3044F HG A1C LEVEL LT 7.0%: CPT | Mod: CPTII,S$GLB,, | Performed by: NURSE PRACTITIONER

## 2024-10-02 PROCEDURE — 3008F BODY MASS INDEX DOCD: CPT | Mod: CPTII,S$GLB,, | Performed by: NURSE PRACTITIONER

## 2024-10-02 PROCEDURE — 1159F MED LIST DOCD IN RCRD: CPT | Mod: CPTII,S$GLB,, | Performed by: NURSE PRACTITIONER

## 2024-10-02 PROCEDURE — 1126F AMNT PAIN NOTED NONE PRSNT: CPT | Mod: CPTII,S$GLB,, | Performed by: NURSE PRACTITIONER

## 2024-10-02 PROCEDURE — 1101F PT FALLS ASSESS-DOCD LE1/YR: CPT | Mod: CPTII,S$GLB,, | Performed by: NURSE PRACTITIONER

## 2024-10-02 PROCEDURE — 3074F SYST BP LT 130 MM HG: CPT | Mod: CPTII,S$GLB,, | Performed by: NURSE PRACTITIONER

## 2024-10-02 PROCEDURE — 99214 OFFICE O/P EST MOD 30 MIN: CPT | Mod: S$GLB,,, | Performed by: NURSE PRACTITIONER

## 2024-10-02 NOTE — PROGRESS NOTES
HPI    67-year-old female referred to [facility] for evaluation of severe anemia.  Patient was recently transfused in the ED with hemoglobin 6.2 grams/deciliter.  At the time patient is also microcytic.  The patient denies any overt bleeding.      Most recent lab show Hb of 11.8g/dL MCV 94\    Currently patient denies any overt bleeding.      4/10/2023:  She returns today follow-up with complaints of fatigue    2023:  She returns today follow-up.  Patient has no new symptoms and no new complaints    1/3/2024:  She returns today for follow-up    2024:  She returns today for follow-up.  She has had an EGD and colonoscopies since previous visit    2024:  She returns today for follow-up.  Patient has no new symptoms and no new complaints    10/2/2024:  She returns today for follow-up.  Patient has no new symptoms and no new complaints  Past Medical History:   Diagnosis Date    Anticoagulant long-term use     Anxiety     Arthritis     Atherosclerosis     Closed displaced spiral fracture of shaft of right tibia 2/3/2020    Coronary artery disease     Depression     GERD (gastroesophageal reflux disease)     Hearing loss     [place] (hard of hearing)     Hearing aids    Hypertension     Long term (current) use of anticoagulants     Peripheral arterial disease     Tubulovillous adenoma of colon 2019    Wears dentures     Full-Upper; Partial- lower    Wound infection after surgery 2020     Social History     Socioeconomic History    Marital status:    Tobacco Use    Smoking status: Former     Current packs/day: 0.00     Average packs/day: 0.8 packs/day for 35.7 years (26.8 ttl pk-yrs)     Types: Cigarettes     Start date: 10/1/1988     Quit date: 2024     Years since quittin.2    Smokeless tobacco: Never   Substance and Sexual Activity    Drug use: Yes     Types: Benzodiazepines     Comment: as prescribed    Sexual activity: Yes     Social Drivers of Health     Financial Resource  Strain: Low Risk  (4/21/2024)    Overall Financial Resource Strain (CARDIA)     Difficulty of Paying Living Expenses: Not hard at all   Food Insecurity: No Food Insecurity (4/21/2024)    Hunger Vital Sign     Worried About Running Out of Food in the Last Year: Never true     Ran Out of Food in the Last Year: Never true   Transportation Needs: No Transportation Needs (4/21/2024)    PRAPARE - Transportation     Lack of Transportation (Medical): No     Lack of Transportation (Non-Medical): No   Physical Activity: Unknown (4/21/2024)    Exercise Vital Sign     Days of Exercise per Week: 7 days   Stress: No Stress Concern Present (4/21/2024)    [employer] - Occupational Stress Questionnaire     Feeling of Stress : Only a little   Housing Stability: Unknown (4/21/2024)    Housing Stability Vital Sign     Unable to Pay for Housing in the Last Year: No         Subjective      Review of Systems   Constitutional: Negative for appetite change, fatigue and unexpected weight change.  Positive for fatigue  HENT: Negative for mouth sores.   Eyes: Negative for visual disturbance.   Respiratory: Negative for cough and shortness of breath.   Cardiovascular: Negative for chest pain.   Gastrointestinal: Negative for diarrhea.   Genitourinary: Negative for frequency.   Musculoskeletal: Negative for back pain.   Skin: Negative for rash.   Neurological: Negative for headaches.   Hematological: Negative for adenopathy.   Psychiatric/Behavioral: The patient is not nervous/anxious.   All other systems reviewed and are negative.     Objective    Physical Exam   There were no vitals filed for this visit.     Constitutional: patient is oriented to person, place, and time. patient appears well-developed and well-nourished. No distress.   HENT:   Right Ear: External ear normal.   Left Ear: External ear normal.   Nose: Nose normal.   Mouth/Throat: Oropharynx is clear and moist. No oropharyngeal exudate.   Teeth, gums  and lips are normal   No sinus tenderness   Palate, tongue, posterior pharynx are normal   Eyes: Conjunctivae and lids are normal.   Neck: Trachea normal and normal range of motion. No thyromegaly   Cardiovascular: Normal rate, regular rhythm, normal heart sounds, intact distal pulses and normal pulses.   No murmur heard.   No edema, no tenderness in the extremities.   Pulmonary/Chest: Effort normal and breath sounds normal. No accessory muscle usage. patient has no wheezes..   Abdominal: Soft. Normal appearance and bowel sounds are normal. patient exhibits no distension and no mass. There is no hepatosplenomegaly. There is no tenderness.   Musculoskeletal: Normal range of motion.   Gait is normal   No clubbing, cyanosis       There were no vitals filed for this visit.       CMP  Sodium   Date Value Ref Range Status   09/27/2024 139 136 - 145 mmol/L Final     Potassium   Date Value Ref Range Status   09/27/2024 4.3 3.5 - 5.1 mmol/L Final     Chloride   Date Value Ref Range Status   09/27/2024 104 95 - 110 mmol/L Final     CO2   Date Value Ref Range Status   09/27/2024 23 23 - 29 mmol/L Final     Glucose   Date Value Ref Range Status   09/27/2024 100 70 - 110 mg/dL Final     BUN   Date Value Ref Range Status   09/27/2024 15 8 - 23 mg/dL Final     Creatinine   Date Value Ref Range Status   09/27/2024 0.7 0.5 - 1.4 mg/dL Final     Calcium   Date Value Ref Range Status   09/27/2024 9.7 8.7 - 10.5 mg/dL Final     Total Protein   Date Value Ref Range Status   09/27/2024 7.5 6.0 - 8.4 g/dL Final     Albumin   Date Value Ref Range Status   09/27/2024 4.2 3.5 - 5.2 g/dL Final     Total Bilirubin   Date Value Ref Range Status   09/27/2024 0.6 0.1 - 1.0 mg/dL Final     Comment:     For infants and newborns, interpretation of results should be based  on gestational age, weight and in agreement with clinical  observations.    Premature Infant recommended reference ranges:  Up to 24 hours.............<8.0 mg/dL  Up to 48  hours............<12.0 mg/dL  3-5 days..................<15.0 mg/dL  6-29 days.................<15.0 mg/dL       Alkaline Phosphatase   Date Value Ref Range Status   09/27/2024 90 55 - 135 U/L Final     AST   Date Value Ref Range Status   09/27/2024 31 10 - 40 U/L Final     ALT   Date Value Ref Range Status   09/27/2024 47 (H) 10 - 44 U/L Final     Anion Gap   Date Value Ref Range Status   09/27/2024 12 8 - 16 mmol/L Final     eGFR if    Date Value Ref Range Status   09/03/2021 >60.0 >60 mL/min/1.73 m^2 Final     eGFR if non    Date Value Ref Range Status   09/03/2021 >60.0 >60 mL/min/1.73 m^2 Final     Comment:     Calculation used to obtain the estimated glomerular filtration  rate (eGFR) is the CKD-EPI equation.        Lab Results   Component Value Date    WBC 5.58 09/27/2024    HGB 12.2 09/27/2024    HCT 36.0 (L) 09/27/2024    MCV 99 (H) 09/27/2024     09/27/2024     EGD:   A single bleeding angioectasia in the stomach.                          Treated with argon plasma coagulation     Colonoscopy:   The perianal and digital rectal examinations were normal.        Non-bleeding internal hemorrhoids were found during retroflexion.        The hemorrhoids were small.        Multiple small-mouthed diverticula were found in the sigmoid colon        and descending colon.        There was evidence of a prior end-to-end colo-colonic anastomosis in        the sigmoid colon. This was patent and was characterized by healthy        appearing mucosa. The anastomosis was traversed.        A single small localized angiodysplastic lesion with bleeding on        contact was found in the cecum. Coagulation for hemostasis using        argon plasma at 0.5 liters/minute and 20 hayes was successful.        The transverse colon, ascending colon, appendiceal orifice and        ileocecal valve appeared normal.        The terminal ileum appeared normal.   Impression:            - Non-bleeding  internal hemorrhoids.                          - Diverticulosis in the sigmoid colon and in the                          descending colon.                          - Patent end-to-end colo-colonic anastomosis,                          characterized by healthy appearing mucosa.                          - A single colonic angiodysplastic lesion. Treated                          with argon plasma coagulation (APC).                          - The transverse colon, ascending colon, ileocecal                          valve and appendiceal orifice are normal.                          - The examined portion of the ileum was normal.                          - No specimens collected.   Assessment  Iron deficiency anemia    -Last transfusion 1 unit packed red blood cell on 09/02/2022.    -repeat labs show iron deficiency anemia despite being on oral iron  -repeat labs show iron-deficiency anemia despite receiving IV iron with Ferrlecit  -status post Feraheme x2  -iron-deficiency anemia has resolved    Tubular adenoma with high-grade dysplasia in sigmoid colon  -status post phlebotomy colectomy  -last EGD and colonoscopy 04/2024  -continue to do surveillance colonoscopy as patient is increased risk for colon cancer  -continue to follow-up with GI for surveillance colonoscopy    Hypertension/coronary artery disease:   -follow-up with PCP    COPD:   -follow-up with PCP    Pulmonary nodules:   -repeat CT of the chest showed Slight interval increase in size of a small nodule in the right lower lobe, too small to evaluate with PET.   -CT of chest 09/2024 was stable  -repeat in 6 months    Emphysema:   -referral placed to pulmonology    Thrombocytosis:   -likely reactive and related to iron deficiency  -resolved

## 2024-10-11 DIAGNOSIS — Z79.899 CHRONIC PRESCRIPTION BENZODIAZEPINE USE: ICD-10-CM

## 2024-10-11 DIAGNOSIS — F41.1 GAD (GENERALIZED ANXIETY DISORDER): ICD-10-CM

## 2024-10-11 RX ORDER — ALPRAZOLAM 0.25 MG/1
TABLET ORAL
Qty: 60 TABLET | Refills: 1 | Status: SHIPPED | OUTPATIENT
Start: 2024-10-11

## 2024-10-11 NOTE — TELEPHONE ENCOUNTER
Refill Routing Note   Medication(s) are not appropriate for processing by [facility] for the following reason(s):        Outside of protocol    [facility] action(s):  Route               Appointments  past 12m or future 3m with PCP    Date Provider   Last Visit   8/19/2024 Simran Pool MD   Next Visit   11/27/2024 Simran Pool MD   ED visits in past 90 days: 1        Note composed:11:59 AM 10/11/2024

## 2024-10-11 NOTE — TELEPHONE ENCOUNTER
No care due was identified.  [facility] Embedded Care Due Messages. Reference number: [id number].   10/11/2024 9:59:32 AM CDT

## 2024-10-21 DIAGNOSIS — I10 PRIMARY HYPERTENSION: ICD-10-CM

## 2024-10-21 DIAGNOSIS — I11.9 LVH (LEFT VENTRICULAR HYPERTROPHY) DUE TO HYPERTENSIVE DISEASE, WITHOUT HEART FAILURE: ICD-10-CM

## 2024-10-21 RX ORDER — OLMESARTAN MEDOXOMIL 20 MG/1
20 TABLET ORAL
Qty: 30 TABLET | Refills: 3 | OUTPATIENT
Start: 2024-10-21

## 2024-10-21 NOTE — TELEPHONE ENCOUNTER
No care due was identified.  [facility] Embedded Care Due Messages. Reference number: [id number].   10/21/2024 2:48:43 PM CDT

## 2024-10-22 NOTE — TELEPHONE ENCOUNTER
Refill Decision Note   Comfort Cantu  is requesting a refill authorization.  Brief Assessment and Rationale for Refill:  Quick Discontinue     Medication Therapy Plan:  Dose adjustment 9/13/24      Comments:     Note composed:10:13 PM 10/21/2024

## 2024-11-06 ENCOUNTER — HOSPITAL ENCOUNTER (OUTPATIENT)
Dept: CARDIOLOGY | Facility: HOSPITAL | Age: 69
Discharge: HOME | End: 2024-11-06
Attending: INTERNAL MEDICINE
Payer: MEDICARE

## 2024-11-06 VITALS — BODY MASS INDEX: 17.78 KG/M2 | WEIGHT: 127 LBS | HEIGHT: 71 IN

## 2024-11-06 DIAGNOSIS — Z91.89 AT RISK FOR CARDIOVASCULAR EVENT: ICD-10-CM

## 2024-11-06 DIAGNOSIS — I11.9 LVH (LEFT VENTRICULAR HYPERTROPHY) DUE TO HYPERTENSIVE DISEASE, WITHOUT HEART FAILURE: ICD-10-CM

## 2024-11-06 DIAGNOSIS — R07.9 LEFT-SIDED CHEST PAIN: ICD-10-CM

## 2024-11-06 DIAGNOSIS — Z91.81 AT RISK FOR FALLING: ICD-10-CM

## 2024-11-06 PROCEDURE — 93320 DOPPLER ECHO COMPLETE: CPT

## 2024-11-06 PROCEDURE — 63600175 PHARM REV CODE 636 W HCPCS: Performed by: INTERNAL MEDICINE

## 2024-11-06 RX ORDER — DOBUTAMINE HYDROCHLORIDE 400 MG/100ML
10-50 INJECTION INTRAVENOUS CONTINUOUS
Status: DISCONTINUED | OUTPATIENT
Start: 2024-11-06 | End: 2024-11-07 | Stop reason: HOSPADM

## 2024-11-06 RX ADMIN — DOBUTAMINE HYDROCHLORIDE 10 MCG/KG/MIN: 400 INJECTION INTRAVENOUS at 11:11

## 2024-11-07 ENCOUNTER — TELEPHONE (OUTPATIENT)
Dept: CARDIOLOGY | Facility: CLINIC | Age: 69
End: 2024-11-07
Payer: MEDICARE

## 2024-11-07 ENCOUNTER — PATIENT MESSAGE (OUTPATIENT)
Dept: CARDIOLOGY | Facility: CLINIC | Age: 69
End: 2024-11-07
Payer: MEDICARE

## 2024-11-07 LAB
AORTIC VALVE MEAN VELOCITY: 0.83 M/S
APICAL FOUR CHAMBER EJECTION FRACTION: 57 %
ASCENDING AORTA: 2.4 CM
AV LVOT MEAN GRADIENT: 2 MMHG
AV LVOT PEAK GRADIENT: 2 MMHG
AV MEAN GRADIENT: 3.3 MMHG
AV PEAK GRADIENT: 6.8 MMHG
BSA FOR ECHO PROCEDURE: 1.7 M2
CV ECHO LV RWT: 0.67 CM
CV STRESS BASE HR: 76 BPM
DIASTOLIC BLOOD PRESSURE: 94 MMHG
DOP CALC AO PEAK VEL: 1.3 M/S
DOP CALC LVOT AREA: 3.8 CM2
DOP CALC LVOT DIAMETER: 2.2 CM
DOP CALC LVOT STROKE VOLUME: 74.5 CM3
DOP CALCLVOT PEAK VEL VTI: 19.6 CM
ECHO LV POSTERIOR WALL: 1.2 CM (ref 0.6–1.1)
EJECTION FRACTION: 61 %
FRACTIONAL SHORTENING: 33.3 % (ref 28–44)
INTERVENTRICULAR SEPTUM IN END SYSTOLE: 1.4 CM
INTERVENTRICULAR SEPTUM: 1.2 CM (ref 0.6–1.1)
IVC DIAMETER: 8.3 CM
LA MAJOR: 2.9 CM
LA MINOR: 2.4 CM
LEFT ATRIUM SIZE: 3.5 CM
LEFT ATRIUM VOLUME INDEX MOD: 13.2 ML/M2
LEFT ATRIUM VOLUME MOD A4C: 25 ML
LEFT ATRIUM VOLUME MOD: 23 ML
LEFT INTERNAL DIMENSION IN SYSTOLE: 2.4 CM (ref 2.1–4)
LEFT VENTRICLE DIASTOLIC VOLUME INDEX: 37.36 ML/M2
LEFT VENTRICLE DIASTOLIC VOLUME: 65 ML
LEFT VENTRICLE END SYSTOLIC VOLUME APICAL 4 CHAMBER: 28 ML
LEFT VENTRICLE MASS INDEX: 81.3 G/M2
LEFT VENTRICULAR INTERNAL DIMENSION IN DIASTOLE: 3.6 CM (ref 3.5–6)
LEFT VENTRICULAR MASS: 141.5 G
LEFT VENTRICULAR POSTERIOR WALL IN END SYSTOLE: 1.4 CM
LVED V (TEICH): 54 ML
MITRAL VALVE MEAN VELOCITY: 0.6 M/S
MV MEAN GRADIENT: 1 MMHG
MV PEAK GRADIENT: 3 MMHG
MV STENOSIS PRESSURE HALF TIME: 51 MS
MV VALVE AREA P 1/2 METHOD: 4.31 CM2
OHS CV CPX 85 PERCENT MAX PREDICTED HEART RATE MALE: 129
OHS CV CPX MAX PREDICTED HEART RATE: 152
OHS CV CPX PATIENT IS FEMALE: 1
OHS CV CPX PATIENT IS MALE: 0
OHS CV CPX PEAK DIASTOLIC BLOOD PRESSURE: 77 MMHG
OHS CV CPX PEAK HEAR RATE: 139 BPM
OHS CV CPX PEAK RATE PRESSURE PRODUCT: ABNORMAL
OHS CV CPX PEAK SYSTOLIC BLOOD PRESSURE: 156 MMHG
OHS CV CPX PERCENT MAX PREDICTED HEART RATE ACHIEVED: 95
OHS CV CPX RATE PRESSURE PRODUCT PRESENTING: ABNORMAL
OHS CV INITIAL DOSE: 10 MCG/KG/MIN
OHS CV PEAK DOSE: 50 MCG/KG/MIN
OHS CV RV/LV RATIO: 0.64 CM
PV MV: 0.7 M/S
PV PEAK GRADIENT: 3 MMHG
PV PEAK VELOCITY: 0.85 M/S
RA MAJOR: 2.8 CM
RA PRESSURE ESTIMATED: 3 MMHG
RA WIDTH: 2.2 CM
RIGHT VENTRICLE DIASTOLIC BASEL DIMENSION: 2.3 CM
RIGHT VENTRICLE DIASTOLIC MID DIMENSION: 2.1 CM
RIGHT VENTRICULAR LENGTH IN DIASTOLE (APICAL 4-CHAMBER VIEW): 5.1 CM
RV MID DIAMA: 3.2 CM
SINUS: 3 CM
STJ: 2.8 CM
SYSTOLIC BLOOD PRESSURE: 141 MMHG
TDI LATERAL: 0.06 M/S
TDI SEPTAL: 0.05 M/S
TDI: 0.06 M/S
TRICUSPID ANNULAR PLANE SYSTOLIC EXCURSION: 1.7 CM
TV PEAK GRADIENT: 14 MMHG
Z-SCORE OF LEFT VENTRICULAR DIMENSION IN END DIASTOLE: -2.91
Z-SCORE OF LEFT VENTRICULAR DIMENSION IN END SYSTOLE: -1.71
Z-SCORE OF LEFT VENTRICULAR POSTERIOR WALL IN END SYSTOLE: 0.25

## 2024-11-07 NOTE — TELEPHONE ENCOUNTER
----- Message from Nurse Cooper sent at 11/7/2024  4:42 PM CST -----    ----- Message -----  From: Adan Trotter MD  Sent: 11/7/2024   4:28 PM CST  To: Simran Pool MD; [facility] Staff    Have evidence for hypertensive heart disease. Recommend checking home blood pressure, 2 days weekly, do 2 readings within 5 minutes in AM and PM, keep log for review with provider. Target resting BP is less than 130/80. Please review with patient.    Dr. Trotter  ----- Message -----  From: Adan Trotter MD  Sent: 11/7/2024   4:26 PM CST  To: Adan Trotter MD

## 2024-11-11 ENCOUNTER — TELEPHONE (OUTPATIENT)
Dept: FAMILY MEDICINE | Facility: CLINIC | Age: 69
End: 2024-11-11
Payer: MEDICARE

## 2024-11-11 ENCOUNTER — PATIENT MESSAGE (OUTPATIENT)
Dept: FAMILY MEDICINE | Facility: CLINIC | Age: 69
End: 2024-11-11
Payer: MEDICARE

## 2024-11-11 DIAGNOSIS — N32.81 OAB (OVERACTIVE BLADDER): Primary | ICD-10-CM

## 2024-11-22 ENCOUNTER — PATIENT MESSAGE (OUTPATIENT)
Dept: FAMILY MEDICINE | Facility: CLINIC | Age: 69
End: 2024-11-22
Payer: MEDICARE

## 2024-11-26 ENCOUNTER — OFFICE VISIT (OUTPATIENT)
Dept: FAMILY MEDICINE | Facility: CLINIC | Age: 69
End: 2024-11-26
Payer: MEDICARE

## 2024-11-26 VITALS
OXYGEN SATURATION: 96 % | BODY MASS INDEX: 18.89 KG/M2 | HEIGHT: 68 IN | RESPIRATION RATE: 16 BRPM | WEIGHT: 124.63 LBS | SYSTOLIC BLOOD PRESSURE: 124 MMHG | HEART RATE: 93 BPM | DIASTOLIC BLOOD PRESSURE: 60 MMHG

## 2024-11-26 DIAGNOSIS — Z23 ENCOUNTER FOR ADMINISTRATION OF VACCINE: ICD-10-CM

## 2024-11-26 DIAGNOSIS — Z79.899 CHRONIC PRESCRIPTION BENZODIAZEPINE USE: Primary | ICD-10-CM

## 2024-11-26 DIAGNOSIS — I10 PRIMARY HYPERTENSION: ICD-10-CM

## 2024-11-26 LAB
AMP AMPHETAMINE 1000 NM/ML POC: NEGATIVE
BAR BARBITURATES 300 NG/ML POC: NEGATIVE
BUP BUPRENORPHINE 10 NG/ML POC: NEGATIVE
BZO BENZODIAZEPINES 300 NG/ML POC: ABNORMAL
COC COCAINE 300 NG/ML POC: NEGATIVE
CREATININE (CR) POC: 20
CTP QC/QA: YES
MET METHAMPHETAMINE 1000 NG/ML POC: NEGATIVE
MOP/OPI300 MORPHINE 300 NG/ML POC: NEGATIVE
MTD METHADONE 300 NG/ML POC: NEGATIVE
OXIDANT (OX) POC: NEGATIVE
OXY OXYCODONE 100 NG/ML POC: NEGATIVE
SPECIFIC GRAVITY (SG) POC: 1
TEMPERATURE (°F) POC: 90
THC MARIJUANA 50 NG/ML POC: NEGATIVE

## 2024-11-26 PROCEDURE — 3078F DIAST BP <80 MM HG: CPT | Mod: CPTII,S$GLB,, | Performed by: NURSE PRACTITIONER

## 2024-11-26 PROCEDURE — 1160F RVW MEDS BY RX/DR IN RCRD: CPT | Mod: CPTII,S$GLB,, | Performed by: NURSE PRACTITIONER

## 2024-11-26 PROCEDURE — 1159F MED LIST DOCD IN RCRD: CPT | Mod: CPTII,S$GLB,, | Performed by: NURSE PRACTITIONER

## 2024-11-26 PROCEDURE — 1101F PT FALLS ASSESS-DOCD LE1/YR: CPT | Mod: CPTII,S$GLB,, | Performed by: NURSE PRACTITIONER

## 2024-11-26 PROCEDURE — 1126F AMNT PAIN NOTED NONE PRSNT: CPT | Mod: CPTII,S$GLB,, | Performed by: NURSE PRACTITIONER

## 2024-11-26 PROCEDURE — 99213 OFFICE O/P EST LOW 20 MIN: CPT | Mod: S$GLB,,, | Performed by: NURSE PRACTITIONER

## 2024-11-26 PROCEDURE — 3044F HG A1C LEVEL LT 7.0%: CPT | Mod: CPTII,S$GLB,, | Performed by: NURSE PRACTITIONER

## 2024-11-26 PROCEDURE — 3288F FALL RISK ASSESSMENT DOCD: CPT | Mod: CPTII,S$GLB,, | Performed by: NURSE PRACTITIONER

## 2024-11-26 PROCEDURE — 99999 PR PBB SHADOW E&M-EST. PATIENT-LVL III: CPT | Mod: PBBFAC,,, | Performed by: NURSE PRACTITIONER

## 2024-11-26 PROCEDURE — G0008 ADMIN INFLUENZA VIRUS VAC: HCPCS | Mod: S$GLB,,, | Performed by: NURSE PRACTITIONER

## 2024-11-26 PROCEDURE — 3074F SYST BP LT 130 MM HG: CPT | Mod: CPTII,S$GLB,, | Performed by: NURSE PRACTITIONER

## 2024-11-26 PROCEDURE — 80305 DRUG TEST PRSMV DIR OPT OBS: CPT | Mod: QW,S$GLB,, | Performed by: NURSE PRACTITIONER

## 2024-11-26 PROCEDURE — 3008F BODY MASS INDEX DOCD: CPT | Mod: CPTII,S$GLB,, | Performed by: NURSE PRACTITIONER

## 2024-11-26 PROCEDURE — 90653 IIV ADJUVANT VACCINE IM: CPT | Mod: S$GLB,,, | Performed by: NURSE PRACTITIONER

## 2024-11-26 PROCEDURE — 4010F ACE/ARB THERAPY RXD/TAKEN: CPT | Mod: CPTII,S$GLB,, | Performed by: NURSE PRACTITIONER

## 2024-11-26 NOTE — PROGRESS NOTES
Subjective:       Patient ID: Comfort Cantu is a 69 y.o. female.    Chief Complaint: Medication Refill    Comfort Cantu presents to the clinic today for follow up     Under the care of the following:  PCP: Dr. Pool  Cardiology: Dr. Trotter  Hematology/Oncology: FELIX Baker  Vascular: Dr. Malagon  Podiatry: Dr. Bar  Pulmonology: Dr. Samuels  Orthopedics: Dr. Domingo  Gastroenterology: Dr. Yancey     Patient Active Problem List  Diagnosis  ·ETHAN (generalized anxiety disorder)  ·Backache  ·Primary hypertension  ·Cardiovascular system problem  ·Hyperlipidemia  ·Knee pain  ·Smoker, started age 20, now up to 2 ppd  ·Abdominal pain, acute  ·Pancreas cyst  ·Nephrolithiasis  ·Hyponatremia  ·Peripheral vascular disease, bilateral stent  ·Anemia  ·High grade dysplasia in colonic adenoma  ·Hx of colonic polyps  ·Infection of lower extremity associated with hardware  ·Colon polyp  ·CAD (coronary artery disease)  ·Convulsions, unspecified convulsion type  ·COPD exacerbation  ·Infarction of parietal lobe  ·Protein-calorie malnutrition, unspecified severity  ·Panic disorder  ·Mixed conductive and sensorineural hearing loss of both ears  ·Bronchiectasis without complication  ·Bilateral carotid artery stenosis, post surgeries.  ·Sedentary lifestyle, since ankle problem 2-3 years ago.  ·Engages in binge consumption of alcohol  ·Benzodiazepine dependence, diazepam 5 mg daily for many years.  ·Iron deficiency anemia secondary to inadequate dietary iron intake  ·At risk for cardiovascular event, very HIGH risk  ·At risk for falling  ·Family history of premature CAD  ·Iron deficiency anemia due to chronic blood loss  ·Ingrown nail        Medication Refill  She has chronic anxiety and she takes cymbalta, buspar, and alprazolam    Would like influenza vaccine            Review of Systems    Patient Active Problem List   Diagnosis    ETHAN (generalized anxiety disorder)    Backache    Primary hypertension    Cardiovascular system problem     Hyperlipidemia    Knee pain    Smoker, started age 20, now up to 2 ppd    Abdominal pain, acute    Pancreas cyst    Nephrolithiasis    Hyponatremia    Peripheral vascular disease, bilateral stent    Anemia    High grade dysplasia in colonic adenoma    Hx of colonic polyps    Infection of lower extremity associated with hardware    Colon polyp    CAD (coronary artery disease)    Convulsions, unspecified convulsion type    COPD exacerbation    Infarction of parietal lobe    Protein-calorie malnutrition, unspecified severity    Panic disorder    Mixed conductive and sensorineural hearing loss of both ears    Bronchiectasis without complication    Bilateral carotid artery stenosis, post surgeries.    Sedentary lifestyle, since ankle problem 2020    Engages in binge consumption of alcohol    Benzodiazepine dependence, diazepam 5 mg daily, Xanax bid for many years.    Iron deficiency anemia secondary to inadequate dietary iron intake    At risk for cardiovascular event, very HIGH risk    At risk for falling    Family history of premature CAD    Iron deficiency anemia due to chronic blood loss    Ingrown nail    LVH (left ventricular hypertrophy) due to hypertensive disease, without heart failure    White coat syndrome with diagnosis of hypertension    Left-sided chest pain       Objective:      Physical Exam  Vitals and nursing note reviewed.   Constitutional:       General: She is not in acute distress.     Appearance: She is not ill-appearing.   Cardiovascular:      Rate and Rhythm: Normal rate and regular rhythm.      Heart sounds: Normal heart sounds. No murmur heard.  Pulmonary:      Effort: Pulmonary effort is normal. No respiratory distress.      Breath sounds: Normal breath sounds. No wheezing.   Skin:     General: Skin is warm and dry.      Findings: No rash.   Neurological:      Mental Status: She is alert.   Psychiatric:         Mood and Affect: Mood normal.         Behavior: Behavior normal.         Lab  "Results   Component Value Date    WBC 5.58 09/27/2024    HGB 12.2 09/27/2024    HCT 36.0 (L) 09/27/2024     09/27/2024    CHOL 167 08/13/2024    TRIG 132 08/13/2024    HDL 49 08/13/2024    ALT 47 (H) 09/27/2024    AST 31 09/27/2024     09/27/2024    K 4.3 09/27/2024     09/27/2024    CREATININE 0.7 09/27/2024    BUN 15 09/27/2024    CO2 23 09/27/2024    TSH 2.746 08/13/2024    INR 1.1 02/10/2020    HGBA1C 5.4 08/27/2024     The 10-year ASCVD risk score (Neisha SIMON, et al., 2019) is: 10.3%    Values used to calculate the score:      Age: 69 years      Sex: Female      Is Non- : No      Diabetic: No      Tobacco smoker: No      Systolic Blood Pressure: 124 mmHg      Is BP treated: Yes      HDL Cholesterol: 49 mg/dL      Total Cholesterol: 167 mg/dL  Visit Vitals  /60 (BP Location: Left arm, Patient Position: Sitting)   Pulse 93   Resp 16   Ht 5' 8" (1.727 m)   Wt 56.5 kg (124 lb 9.6 oz)   SpO2 96%   BMI 18.95 kg/m²      Assessment:       1. Chronic prescription benzodiazepine use    2. Encounter for administration of vaccine    3. Primary hypertension        Plan:       1. Chronic prescription benzodiazepine use  -     POCT Urine Drug Screen (With BUP)   WNL  UDS updated. Not currently do for medication refill at this time.   RTC Q3 months for medication evaluation  Patient is aware of risk vs benefits of long term use/ block box warning of medication.    2. Encounter for administration of vaccine  -     influenza (adjuvanted) (Fluad) 45 mcg/0.5 mL IM vaccine (> or = 64 yo) 0.5 mL    3. Primary hypertension  Stable continue current medication regimen   Follow up in about 3 months (around 2/26/2025).      Future Appointments       Date Provider Specialty Appt Notes    12/13/2024  Lab lab    12/20/2024  Radiology     2/27/2025 Simran Pool MD Family Medicine 3 month f/u    3/26/2025  Lab STAT for CT - Hemonc    3/26/2025  Radiology     4/2/2025 Zully Baker NP " Hematology and Oncology Anemia/6mthF/U/Labs/CT

## 2024-12-04 ENCOUNTER — TELEPHONE (OUTPATIENT)
Dept: UROLOGY | Facility: CLINIC | Age: 69
End: 2024-12-04
Payer: MEDICARE

## 2024-12-04 NOTE — TELEPHONE ENCOUNTER
----- Message from [employer] sent at 12/4/2024 10:20 AM CST -----  Contact: self  Type:  Sooner Appointment Request    Caller is requesting a sooner appointment.  Caller declined first available appointment listed below.  Caller will not accept being placed on the waitlist and is requesting a message be sent to doctor.    Name of Caller:  Patient  When is the first available appointment?  April 2025  Symptoms:  OAB (overactive bladder)  Would the patient rather a call back or a response via [facility]? Call back  Best Call Back Number:  [phone]    Additional Information:  Pt states she would [facility] with [facility] and has referral.Please call back

## 2024-12-04 NOTE — TELEPHONE ENCOUNTER
Returned call and spoke with patient, stated she needed to be seen for frequent urination and next available in [place] was Feb.  informed the soonest appts available are in [place], appt made with the NP and directions given, patient verbally understood.

## 2024-12-05 DIAGNOSIS — I11.9 LVH (LEFT VENTRICULAR HYPERTROPHY) DUE TO HYPERTENSIVE DISEASE, WITHOUT HEART FAILURE: ICD-10-CM

## 2024-12-05 DIAGNOSIS — I10 PRIMARY HYPERTENSION: ICD-10-CM

## 2024-12-05 RX ORDER — OLMESARTAN MEDOXOMIL 20 MG/1
10 TABLET ORAL DAILY
Qty: 45 TABLET | Refills: 3 | Status: SHIPPED | OUTPATIENT
Start: 2024-12-05

## 2024-12-12 ENCOUNTER — OFFICE VISIT (OUTPATIENT)
Dept: UROLOGY | Facility: CLINIC | Age: 69
End: 2024-12-12
Payer: MEDICARE

## 2024-12-12 VITALS — WEIGHT: 124.56 LBS | BODY MASS INDEX: 18.88 KG/M2 | HEIGHT: 68 IN

## 2024-12-12 DIAGNOSIS — R35.1 NOCTURIA: Primary | ICD-10-CM

## 2024-12-12 DIAGNOSIS — R30.0 DYSURIA: ICD-10-CM

## 2024-12-12 LAB
BACTERIA #/AREA URNS AUTO: NORMAL /HPF
BILIRUBIN, UA POC OHS: NEGATIVE
BLOOD, UA POC OHS: ABNORMAL
CLARITY, UA POC OHS: CLEAR
COLOR, UA POC OHS: YELLOW
GLUCOSE, UA POC OHS: NEGATIVE
KETONES, UA POC OHS: NEGATIVE
LEUKOCYTES, UA POC OHS: NEGATIVE
MICROSCOPIC COMMENT: NORMAL
NITRITE, UA POC OHS: NEGATIVE
PH, UA POC OHS: 5.5
PROTEIN, UA POC OHS: NEGATIVE
SPECIFIC GRAVITY, UA POC OHS: <=1.005
SQUAMOUS #/AREA URNS AUTO: 0 /HPF
UROBILINOGEN, UA POC OHS: 0.2
WBC #/AREA URNS AUTO: 0 /HPF (ref 0–5)

## 2024-12-12 PROCEDURE — 81001 URINALYSIS AUTO W/SCOPE: CPT | Performed by: NURSE PRACTITIONER

## 2024-12-12 PROCEDURE — 87086 URINE CULTURE/COLONY COUNT: CPT | Performed by: NURSE PRACTITIONER

## 2024-12-12 PROCEDURE — 99999 PR PBB SHADOW E&M-EST. PATIENT-LVL III: CPT | Mod: PBBFAC,,, | Performed by: NURSE PRACTITIONER

## 2024-12-12 NOTE — PATIENT INSTRUCTIONS
Conservative measures to control urgency and frequency including   1. Avoiding/minimizing bladder irritants (see below), especially in afternoon and evening hours    Discussed bladder irritants include coffee (even decaf), tea, alcohol, soda, spicy foods, acidic juices (orange, tomato), vinegar, and artificial sweeteners/sugary beverages.    2. timed voiding - empty on a schedule (approx ~2-3 hours) in spite of need to urinate, to get ahead of urge    3. dont postponing voiding - dont hold it on purpose     4. bowel regimen as distended bowel has extrinsic compressive effect on bladder.   - any or all of the following in any combination, titrate to soft daily bowel movement without pushing or straining  - colace/stool softener capsule - once to twice daily  - miralax - 1 capful daily to start, can increase to 2x daily (or decrease to 1/2 cap daily)  - increase dietary fibery  - fiber supplements, such as metamucil  - prunes, prune juice    5. INCREASE water intake    6. Stop fluids 2 hours before bed, and urinate just before bed    Do recommend to follow up with PCP regarding sleep apnea, which contributes to worsening nocturia.

## 2024-12-12 NOTE — PROGRESS NOTES
[facility]  Group: Yamil/Zack/Scotty/Kendall  NPs: Lesa Chino/Sujey Harris    Note today written by:Sujey Harris  Date of Service: 12/12/2024      CHIEF COMPLAINT: Nocturia.    PRESENTING ILLNESS:    Comfort Cantu is a 69 y.o. female who presents for nocturia. Last seen by Dr Esquivel 12/13/18 for kidney stones.    12/12/24  Pt presents for nocturia.  Pt voids every 3 hours during the day. Denies urgency. Only has UUI if she holds too long after urge occurs, she has mild leakage getting to the bathroom.  Wears bladder pads as precaution but typically dry  Increased bladder pressure at night causing nocturia every hour, has been occurring for 4 months. This is most bothersome to her.  Drinks liquids until bedtime.   C/o dysuria yesterday. Increased water intake and dysuria improved  Denies gross hematuria, flank pain, fever, chills, nausea or vomiting.  IO cath UA mod blood / PVR 10 ml    Denies hx of hysterectomy or bladder surgeries    + snoring    + constipation, has BM every 2-3 days. Takes laxatives as needed    Drinks a lot of tea, 4-5 bottles water per day, and coffee    History of kidney stones: has never passed a stone or surgery to remove stone  History of recurrent UTI: denies  Personal or family hx of  malignancy: denies  Smoking history: quit 7/2024    Urine cultures:   Lab Results   Component Value Date    LABURIN  06/04/2024     Multiple organisms isolated. None in predominance.  Repeat if    LABURIN clinically necessary. 06/04/2024    LABURIN  05/21/2024     Multiple organisms isolated. None in predominance.  Repeat if    LABURIN clinically necessary. 05/21/2024    LABURIN  09/02/2022     Multiple organisms isolated. None in predominance.  Repeat if    LABURIN clinically necessary. 09/02/2022         REVIEW OF SYSTEMS: as stated above in hpi    PATIENT HISTORY:  Past Medical History:   Diagnosis Date    Anticoagulant  long-term use     Anxiety     Arthritis     Atherosclerosis     Closed displaced spiral fracture of shaft of right tibia 2/3/2020    Coronary artery disease     Depression     GERD (gastroesophageal reflux disease)     Hearing loss     [place] (hard of hearing)     Hearing aids    Hypertension     Long term (current) use of anticoagulants     Peripheral arterial disease     Tubulovillous adenoma of colon 2019    Wears dentures     Full-Upper; Partial- lower    Wound infection after surgery 06/2020       Past Surgical History:   Procedure Laterality Date    APPENDECTOMY      CAROTID STENT  2014    COLON SURGERY      COLONOSCOPY N/A 10/07/2019    Procedure: COLONOSCOPY;  Surgeon: Olaf Alba MD;  Location: [facility];  Service: General;  Laterality: N/A;    COLONOSCOPY N/A 12/16/2019    Procedure: COLONOSCOPY;  Surgeon: Olaf Alba MD;  Location: [facility];  Service: General;  Laterality: N/A;    COLONOSCOPY N/A 01/23/2020    Procedure: COLONOSCOPY (unable to come earlier);  Surgeon: Esteban Yancey MD;  Location: [facility];  Service: Endoscopy;  Laterality: N/A;    COLONOSCOPY N/A 4/2/2024    Procedure: COLONOSCOPY(Does not want earlier time-arrive 1330);  Surgeon: Esteban Yancey MD;  Location: [facility];  Service: Endoscopy;  Laterality: N/A;    ENDOSCOPIC ULTRASOUND OF UPPER GASTROINTESTINAL TRACT N/A 10/16/2018    Procedure: ULTRASOUND, UPPER GI TRACT, ENDOSCOPIC;  Surgeon: Kumar Wang MD;  Location: [facility] ([address] [place] [address]);  Service: Endoscopy;  Laterality: N/A;  5 day hold Effient, Dr Сергей Malagon    ESOPHAGOGASTRODUODENOSCOPY N/A 10/07/2019    Procedure: ESOPHAGOGASTRODUODENOSCOPY (EGD);  Surgeon: Olaf Alba MD;  Location: [facility];  Service: General;  Laterality: N/A;    ESOPHAGOGASTRODUODENOSCOPY N/A 4/2/2024    Procedure: EGD (ESOPHAGOGASTRODUODENOSCOPY);  Surgeon: Esteban Yancey MD;  Location: [facility];  Service: Endoscopy;  Laterality: N/A;    FEMORAL ARTERY  STENT      HARDWARE REMOVAL Right 06/12/2020    Procedure: REMOVAL, HARDWARE;  Surgeon: Elbert Marrero II, MD;  Location: [facility] OR;  Service: Orthopedics;  Laterality: Right;  right distal tibia    ORIF TIBIA FRACTURE Right 02/14/2020    Procedure: ORIF, FRACTURE, TIBIA;  Surgeon: Elbert Marrero II, MD;  Location: [facility] OR;  Service: Orthopedics;  Laterality: Right;    ROBOT-ASSISTED LAPAROSCOPIC RESECTION OF SIGMOID COLON N/A 09/14/2020    Procedure: ROBOTIC COLECTOMY, SIGMOID;  Surgeon: Terrance Candelaria MD;  Location: [facility] OR;  Service: General;  Laterality: N/A;    UPPER GASTROINTESTINAL ENDOSCOPY         Allergies:  Penicillins      PHYSICAL EXAMINATION:  Constitutional: She is oriented to person, place, and time. She appears well-developed and well-nourished.  She is in no apparent distress.  Abdominal:  She exhibits no distension.  There is no CVA tenderness.   Neurological: She is alert and oriented to person, place, and time.   Psych: Cooperative with normal affect.  Genitourinary:  Consent verbally obtained. And in and out cath was performed under sterile conditions immediately after voiding. The PVR was 10 ml    Physical Exam      LABS:      Lab Results   Component Value Date    CREATININE 0.7 09/27/2024     Lab Results   Component Value Date    HGBA1C 5.4 08/27/2024         IMPRESSION:    Encounter Diagnoses   Name Primary?    Nocturia Yes    Dysuria        PLAN:  -Catheterized urine sent for micro UA and culture. Will call with results  If + microscopic hematuria, will proceed with CTU and cysto, both discussed with pt.  If negative for micro hematuria, will get CT RSS only to evaluate kidney stones.    -Pt has no daytime issues. She only has issues with nocturia.  Do recommend sleep study, ordered by PCP, to r/o sleep apnea as cause of nocturia since she does snore at night.  -Conservative measures to control urgency and frequency including   1. Avoiding/minimizing bladder irritants (see below),  especially in afternoon and evening hours  Discussed bladder irritants include coffee (even decaf), tea, alcohol, soda, spicy foods, acidic juices (orange, tomato), vinegar, and artificial sweeteners/sugary beverages.  2. timed voiding - empty on a schedule (approx ~2-3 hours) in spite of need to urinate, to get ahead of urge  3. dont postponing voiding - dont hold it on purpose   4. bowel regimen as distended bowel has extrinsic compressive effect on bladder.   - any or all of the following in any combination, titrate to soft daily bowel movement without pushing or straining  - colace/stool softener capsule - once to twice daily  - miralax - 1 capful daily to start, can increase to 2x daily (or decrease to 1/2 cap daily)  - increase dietary fibery  - fiber supplements, such as metamucil  - prunes, prune juice  5. INCREASE water intake  6. Stop fluids 2 hours before bed, and urinate just before bed    -RTC based on results    I encouraged her or any of her family members to call or email me with questions and/or concerns.      45 minutes of total time spent on the encounter, which includes face to face time and non-face to face time preparing to see the patient (eg, review of tests), Obtaining and/or reviewing separately obtained history, Documenting clinical information in the electronic or other health record, Independently interpreting results (not separately reported) and communicating results to the patient/family/caregiver, or Care coordination (not separately reported).

## 2024-12-13 ENCOUNTER — LAB VISIT (OUTPATIENT)
Dept: LAB | Facility: HOSPITAL | Age: 69
End: 2024-12-13
Attending: INTERNAL MEDICINE
Payer: MEDICARE

## 2024-12-13 DIAGNOSIS — E78.00 PURE HYPERCHOLESTEROLEMIA: ICD-10-CM

## 2024-12-13 DIAGNOSIS — Z91.89 AT RISK FOR CARDIOVASCULAR EVENT: ICD-10-CM

## 2024-12-13 DIAGNOSIS — I65.23 BILATERAL CAROTID ARTERY STENOSIS: ICD-10-CM

## 2024-12-13 DIAGNOSIS — I73.9 PERIPHERAL VASCULAR DISEASE: ICD-10-CM

## 2024-12-13 LAB
BACTERIA UR CULT: NO GROWTH
CHOLEST SERPL-MCNC: 130 MG/DL (ref 120–199)
CHOLEST/HDLC SERPL: 3 {RATIO} (ref 2–5)
CRP SERPL-MCNC: 0.5 MG/L (ref 0–3.19)
HDLC SERPL-MCNC: 43 MG/DL (ref 40–75)
HDLC SERPL: 33.1 % (ref 20–50)
LDLC SERPL CALC-MCNC: 65.4 MG/DL (ref 63–159)
NONHDLC SERPL-MCNC: 87 MG/DL
TRIGL SERPL-MCNC: 108 MG/DL (ref 30–150)

## 2024-12-13 PROCEDURE — 36415 COLL VENOUS BLD VENIPUNCTURE: CPT | Performed by: INTERNAL MEDICINE

## 2024-12-13 PROCEDURE — 86141 C-REACTIVE PROTEIN HS: CPT | Performed by: INTERNAL MEDICINE

## 2024-12-13 PROCEDURE — 80061 LIPID PANEL: CPT | Performed by: INTERNAL MEDICINE

## 2024-12-16 DIAGNOSIS — Z87.442 HISTORY OF KIDNEY STONES: Primary | ICD-10-CM

## 2024-12-20 ENCOUNTER — HOSPITAL ENCOUNTER (OUTPATIENT)
Dept: RADIOLOGY | Facility: HOSPITAL | Age: 69
Discharge: HOME | End: 2024-12-20
Attending: NURSE PRACTITIONER
Payer: MEDICARE

## 2024-12-20 ENCOUNTER — HOSPITAL ENCOUNTER (OUTPATIENT)
Dept: RADIOLOGY | Facility: HOSPITAL | Age: 69
Discharge: HOME | End: 2024-12-20
Attending: FAMILY MEDICINE
Payer: MEDICARE

## 2024-12-20 DIAGNOSIS — Z87.442 HISTORY OF KIDNEY STONES: ICD-10-CM

## 2024-12-20 DIAGNOSIS — Z12.31 ENCOUNTER FOR SCREENING MAMMOGRAM FOR BREAST CANCER: ICD-10-CM

## 2024-12-20 PROCEDURE — 74176 CT ABD & PELVIS W/O CONTRAST: CPT | Mod: TC

## 2024-12-20 PROCEDURE — 77067 SCR MAMMO BI INCL CAD: CPT | Mod: TC

## 2024-12-20 PROCEDURE — 74176 CT ABD & PELVIS W/O CONTRAST: CPT | Mod: 26,,, | Performed by: RADIOLOGY

## 2024-12-20 PROCEDURE — 77067 SCR MAMMO BI INCL CAD: CPT | Mod: 26,,, | Performed by: RADIOLOGY

## 2024-12-20 PROCEDURE — 77063 BREAST TOMOSYNTHESIS BI: CPT | Mod: 26,,, | Performed by: RADIOLOGY

## 2024-12-23 ENCOUNTER — PATIENT MESSAGE (OUTPATIENT)
Dept: FAMILY MEDICINE | Facility: CLINIC | Age: 69
End: 2024-12-23
Payer: MEDICARE

## 2024-12-23 DIAGNOSIS — R35.1 NOCTURIA: Primary | ICD-10-CM

## 2024-12-27 NOTE — TELEPHONE ENCOUNTER
For atherosclerosis, constipation, hiatal hernia- no specialist may be necessary as we can manage in primary care. I will place order for sleep study.

## 2024-12-28 DIAGNOSIS — F41.1 GAD (GENERALIZED ANXIETY DISORDER): ICD-10-CM

## 2024-12-28 DIAGNOSIS — Z79.899 CHRONIC PRESCRIPTION BENZODIAZEPINE USE: ICD-10-CM

## 2024-12-28 NOTE — TELEPHONE ENCOUNTER
No care due was identified.  [facility] Embedded Care Due Messages. Reference number: [id number].   12/28/2024 10:42:22 AM CST

## 2025-01-02 RX ORDER — ALPRAZOLAM 0.25 MG/1
TABLET ORAL
Qty: 60 TABLET | Refills: 1 | Status: SHIPPED | OUTPATIENT
Start: 2025-01-02

## 2025-01-07 ENCOUNTER — PATIENT MESSAGE (OUTPATIENT)
Dept: FAMILY MEDICINE | Facility: CLINIC | Age: 70
End: 2025-01-07
Payer: MEDICARE

## 2025-01-07 DIAGNOSIS — R35.1 NOCTURIA: Primary | ICD-10-CM

## 2025-01-13 RX ORDER — ISOSORBIDE MONONITRATE 30 MG/1
TABLET, EXTENDED RELEASE ORAL
Qty: 90 TABLET | Refills: 2 | Status: SHIPPED | OUTPATIENT
Start: 2025-01-13

## 2025-01-13 NOTE — TELEPHONE ENCOUNTER
No care due was identified.  Health [facility] Embedded Care Due Messages. Reference number: [id number].   1/13/2025 1:16:01 PM CST

## 2025-01-13 NOTE — TELEPHONE ENCOUNTER
Refill Routing Note   Medication(s) are not appropriate for processing by [facility] for the following reason(s):        Drug-disease interaction    [facility] action(s):  Defer        Medication Therapy Plan: Drug-Disease: isosorbide mononitrate and Iron deficiency anemia secondary to inadequate dietary iron intake; Anemia; Iron deficiency anemia due to chronic blood loss    Pharmacist review requested: Yes     Appointments  past 12m or future 3m with PCP    Date Provider   Last Visit   8/19/2024 Simran Pool MD   Next Visit   2/27/2025 Simran Pool MD   ED visits in past 90 days: 0        Note composed:5:57 PM 01/13/2025

## 2025-01-13 NOTE — TELEPHONE ENCOUNTER
Office visit with urology 12.12.24  CT resulted 12.16.24    Pt requesting GYN referral per urology recommendations. Referral pended

## 2025-01-14 NOTE — TELEPHONE ENCOUNTER
Refill Decision Note   Comfort Cantu  is requesting a refill authorization.  Brief Assessment and Rationale for Refill:  Approve     Medication Therapy Plan:         Pharmacist review requested: Yes   Extended chart review required: Yes   Comments:     Note composed:9:32 PM 01/13/2025

## 2025-01-30 DIAGNOSIS — Z79.899 CHRONIC PRESCRIPTION BENZODIAZEPINE USE: ICD-10-CM

## 2025-01-30 DIAGNOSIS — F41.1 GAD (GENERALIZED ANXIETY DISORDER): ICD-10-CM

## 2025-01-30 RX ORDER — BUSPIRONE HYDROCHLORIDE 15 MG/1
TABLET ORAL
Qty: 180 TABLET | Refills: 1 | Status: SHIPPED | OUTPATIENT
Start: 2025-01-30

## 2025-01-30 RX ORDER — PRASUGREL 5 MG/1
TABLET, FILM COATED ORAL
Qty: 90 TABLET | Refills: 1 | Status: SHIPPED | OUTPATIENT
Start: 2025-01-30

## 2025-01-30 RX ORDER — DULOXETIN HYDROCHLORIDE 60 MG/1
60 CAPSULE, DELAYED RELEASE ORAL DAILY
Qty: 90 CAPSULE | Refills: 1 | Status: SHIPPED | OUTPATIENT
Start: 2025-01-30

## 2025-01-30 NOTE — TELEPHONE ENCOUNTER
Refill Routing Note   Medication(s) are not appropriate for processing by [facility] for the following reason(s):        Outside of protocol  Required labs abnormal: Cr    [facility] action(s):  Route  Defer               Appointments  past 12m or future 3m with PCP    Date Provider   Last Visit   8/19/2024 Simran Pool MD   Next Visit   1/30/2025 Simran Pool MD   ED visits in past 90 days: 0        Note composed:4:24 PM 01/30/2025

## 2025-01-30 NOTE — TELEPHONE ENCOUNTER
Refill Decision Note   Comfort Cantu  is requesting a refill authorization.  Brief Assessment and Rationale for Refill:  Approve     Medication Therapy Plan: PCP previously prescribed rx      Pharmacist review requested: Yes   Comments:     Note composed:2:49 PM 01/30/2025

## 2025-01-30 NOTE — TELEPHONE ENCOUNTER
No care due was identified.  [facility] Embedded Care Due Messages. Reference number: [id number].   1/30/2025 1:29:45 PM CST

## 2025-01-30 NOTE — TELEPHONE ENCOUNTER
No care due was identified.  Health [facility] Embedded Care Due Messages. Reference number: [id number].   1/30/2025 1:26:28 PM CST

## 2025-01-30 NOTE — TELEPHONE ENCOUNTER
Refill Routing Note   Medication(s) are not appropriate for processing by [facility] for the following reason(s):        Drug-disease interaction    [facility] action(s):  Defer             Pharmacist review requested: Yes     Appointments  past 12m or future 3m with PCP    Date Provider   Last Visit   8/19/2024 Simran Pool MD   Next Visit   2/27/2025 Simran Pool MD   ED visits in past 90 days: 0        Note composed:2:11 PM 01/30/2025

## 2025-01-30 NOTE — TELEPHONE ENCOUNTER
Refill Routing Note   Medication(s) are not appropriate for processing by [facility] for the following reason(s):        Outside of protocol    [facility] action(s):  Route  Approve      Medication Therapy Plan: blood cr wnl      Appointments  past 12m or future 3m with PCP    Date Provider   Last Visit   8/19/2024 Simran Pool MD   Next Visit   1/30/2025 Simran Pool MD   ED visits in past 90 days: 0        Note composed:4:42 PM 01/30/2025

## 2025-02-04 RX ORDER — ALPRAZOLAM 0.25 MG/1
TABLET ORAL
Qty: 60 TABLET | Refills: 1 | Status: SHIPPED | OUTPATIENT
Start: 2025-02-04

## 2025-02-13 ENCOUNTER — OFFICE VISIT (OUTPATIENT)
Dept: OBSTETRICS AND GYNECOLOGY | Facility: CLINIC | Age: 70
End: 2025-02-13
Payer: MEDICARE

## 2025-02-13 VITALS
BODY MASS INDEX: 18.91 KG/M2 | WEIGHT: 124.75 LBS | HEIGHT: 68 IN | SYSTOLIC BLOOD PRESSURE: 110 MMHG | HEART RATE: 110 BPM | DIASTOLIC BLOOD PRESSURE: 55 MMHG

## 2025-02-13 DIAGNOSIS — N32.81 OAB (OVERACTIVE BLADDER): Primary | ICD-10-CM

## 2025-02-13 PROCEDURE — 1101F PT FALLS ASSESS-DOCD LE1/YR: CPT | Mod: CPTII,S$GLB,, | Performed by: STUDENT IN AN ORGANIZED HEALTH CARE EDUCATION/TRAINING PROGRAM

## 2025-02-13 PROCEDURE — 3078F DIAST BP <80 MM HG: CPT | Mod: CPTII,S$GLB,, | Performed by: STUDENT IN AN ORGANIZED HEALTH CARE EDUCATION/TRAINING PROGRAM

## 2025-02-13 PROCEDURE — 99203 OFFICE O/P NEW LOW 30 MIN: CPT | Mod: S$GLB,,, | Performed by: STUDENT IN AN ORGANIZED HEALTH CARE EDUCATION/TRAINING PROGRAM

## 2025-02-13 PROCEDURE — 1160F RVW MEDS BY RX/DR IN RCRD: CPT | Mod: CPTII,S$GLB,, | Performed by: STUDENT IN AN ORGANIZED HEALTH CARE EDUCATION/TRAINING PROGRAM

## 2025-02-13 PROCEDURE — 1126F AMNT PAIN NOTED NONE PRSNT: CPT | Mod: CPTII,S$GLB,, | Performed by: STUDENT IN AN ORGANIZED HEALTH CARE EDUCATION/TRAINING PROGRAM

## 2025-02-13 PROCEDURE — 1159F MED LIST DOCD IN RCRD: CPT | Mod: CPTII,S$GLB,, | Performed by: STUDENT IN AN ORGANIZED HEALTH CARE EDUCATION/TRAINING PROGRAM

## 2025-02-13 PROCEDURE — 3288F FALL RISK ASSESSMENT DOCD: CPT | Mod: CPTII,S$GLB,, | Performed by: STUDENT IN AN ORGANIZED HEALTH CARE EDUCATION/TRAINING PROGRAM

## 2025-02-13 PROCEDURE — 3074F SYST BP LT 130 MM HG: CPT | Mod: CPTII,S$GLB,, | Performed by: STUDENT IN AN ORGANIZED HEALTH CARE EDUCATION/TRAINING PROGRAM

## 2025-02-13 PROCEDURE — 3008F BODY MASS INDEX DOCD: CPT | Mod: CPTII,S$GLB,, | Performed by: STUDENT IN AN ORGANIZED HEALTH CARE EDUCATION/TRAINING PROGRAM

## 2025-02-13 PROCEDURE — 99999 PR PBB SHADOW E&M-EST. PATIENT-LVL III: CPT | Mod: PBBFAC,,, | Performed by: STUDENT IN AN ORGANIZED HEALTH CARE EDUCATION/TRAINING PROGRAM

## 2025-02-13 RX ORDER — SOLIFENACIN SUCCINATE 5 MG/1
5 TABLET, FILM COATED ORAL DAILY
Qty: 30 TABLET | Refills: 2 | Status: SHIPPED | OUTPATIENT
Start: 2025-02-13

## 2025-02-13 NOTE — PROGRESS NOTES
Chief Complaint   Patient presents with    Follow-up     Cyst and bladder problems       History of Present Illness   Comfort Cantu is 69 y.o.   patient who presents today c/o urinary frequency.  Patient states that void Q1h at night.  Patient saw urology on 24.  She still drinks coffee and teas in the AM as well as water throughout the day and has 1 glass of milk at night.  She states that she goes to bed around midnight now and still sips water prior.  Patient has history of chronic constipation and has not started any Miralax as recommended.      CT Renal:  Extensive calcifications associated with the kidneys, many of which are likely vascular.  No hydronephrosis or ureteral calculus.     Marked atherosclerosis.  Constipation.  Previous colon surgery.  Right renal cyst.  Hiatal hernia.    History  PMH: Anxiety, Arthritis, Atherosclerosis, CAD, Depression, Hearing loss, HTN, PAD  Psx: appy, carotid stent, femoral artery stent, ORIF R tibia, Robotic sigmoidectomy  All: PCN  OB:   GYN: Denies any STIs or abnl Pap  FH: Denies any female/colon cancer or MI prior to 49yo  SH: Denies VIDHYA  Meds:  Current Outpatient Medications   Medication Instructions    ALPRAZolam (XANAX) 0.25 MG tablet TAKE ONE (1) TABLET TWO (2) TIMES A DAY AS NEEDED FOR ANXIETY    atorvastatin (LIPITOR) 80 mg, Oral, Daily    busPIRone (BUSPAR) 15 MG tablet TAKE 1 TABLET 2 TIMES A DAY (EVERYDAY) FOR ANXIETY    DULoxetine (CYMBALTA) 60 mg, Oral, Daily    isosorbide mononitrate (IMDUR) 30 MG 24 hr tablet TAKE 1 TABLET EVERY MORNING FOR HEART    olmesartan (BENICAR) 10 mg, Oral, Daily, For blood pressure    prasugreL (EFFIENT) 5 mg Tab TAKE ONE (1) TABLET ONCE DAILY FOR STROKE PREVENTION    solifenacin (VESICARE) 5 mg, Oral, Daily       Review of Systems   Constitutional:  Negative for chills and fever.   Eyes:  Negative for visual disturbance.   Respiratory:  Negative for cough and shortness of breath.    Cardiovascular:   "Negative for chest pain and palpitations.   Gastrointestinal:  Negative for abdominal pain, nausea and vomiting.   Genitourinary:  Positive for frequency. Negative for dysmenorrhea, vaginal bleeding, vaginal discharge, vaginal pain, postmenopausal bleeding and vaginal odor.   Neurological:  Negative for headaches.   Psychiatric/Behavioral:  Negative for depression and sleep disturbance. The patient is not nervous/anxious.    All other systems reviewed and are negative.  Breast: Negative for lump and mastodynia        Physical Examination:  Vitals:    02/13/25 1432   BP: (!) 110/55   BP Location: Right arm   Patient Position: Sitting   Pulse: 110   Weight: 56.6 kg (124 lb 12.5 oz)   Height: 5' 8" (1.727 m)        Physical Exam  Vitals reviewed.   Constitutional:       Appearance: Normal appearance.   HENT:      Head: Normocephalic and atraumatic.   Eyes:      Conjunctiva/sclera: Conjunctivae normal.   Cardiovascular:      Rate and Rhythm: Normal rate and regular rhythm.   Pulmonary:      Effort: Pulmonary effort is normal.      Breath sounds: Normal breath sounds. No wheezing.   Abdominal:      General: Abdomen is flat.      Palpations: Abdomen is soft.      Tenderness: There is no abdominal tenderness.   Musculoskeletal:         General: Normal range of motion.      Cervical back: Normal range of motion.   Skin:     General: Skin is warm and dry.   Neurological:      General: No focal deficit present.      Mental Status: She is alert and oriented to person, place, and time.   Psychiatric:         Mood and Affect: Mood normal.         Behavior: Behavior normal.         Thought Content: Thought content normal.         Judgment: Judgment normal.          Assessment:    1. OAB (overactive bladder)  solifenacin (VESICARE) 5 MG tablet          Plan:  Reviewed US.  Reassurance that small uterus size is common in the postmenopausal period.  Discussed small R renal cyst noted.  Emphasized importance of liquid restrictions at " night to decrease symptoms.  Patient to minimize caffeinated beverages and stop liquids at 6pm.  Counseled on importance of constipation management.  Chronic constipation may exacerbate frequency symptoms.  Discussed use of Vesicare to help with bladder spasms  RV in 6 weeks for follow up    I spent a total of 35 minutes on the day of the visit.This includes face to face time and non-face to face time preparing to see the patient (eg, review of tests), obtaining and/or reviewing separately obtained history, documenting clinical information in the electronic or other health record, independently interpreting results and communicating results to the patient/family/caregiver, or care coordinator.

## 2025-03-10 DIAGNOSIS — I10 PRIMARY HYPERTENSION: ICD-10-CM

## 2025-03-10 DIAGNOSIS — I11.9 LVH (LEFT VENTRICULAR HYPERTROPHY) DUE TO HYPERTENSIVE DISEASE, WITHOUT HEART FAILURE: ICD-10-CM

## 2025-03-11 RX ORDER — OLMESARTAN MEDOXOMIL 20 MG/1
TABLET ORAL
Qty: 45 TABLET | Refills: 2 | Status: SHIPPED | OUTPATIENT
Start: 2025-03-11

## 2025-03-20 RX ORDER — PRASUGREL 5 MG/1
TABLET, FILM COATED ORAL
Qty: 90 TABLET | Refills: 1 | Status: SHIPPED | OUTPATIENT
Start: 2025-03-20

## 2025-03-20 NOTE — TELEPHONE ENCOUNTER
Refill Routing Note   Medication(s) are not appropriate for processing by [facility] for the following reason(s):        Drug-disease interaction    [facility] action(s):  Defer             Pharmacist review requested: Yes     Appointments  past 12m or future 3m with PCP    Date Provider   Last Visit   8/19/2024 Simran Pool MD   Next Visit   4/14/2025 Simran Pool MD   ED visits in past 90 days: 0        Note composed:2:53 PM 03/20/2025

## 2025-03-20 NOTE — TELEPHONE ENCOUNTER
Refill Decision Note   Comfort Cantu  is requesting a refill authorization.  Brief Assessment and Rationale for Refill:  Approve     Medication Therapy Plan:         Pharmacist review requested: Yes   Extended chart review required: Yes   Comments:     Note composed:3:29 PM 03/20/2025

## 2025-03-20 NOTE — TELEPHONE ENCOUNTER
No care due was identified.  [facility] Embedded Care Due Messages. Reference number: [id number].   3/20/2025 11:19:17 AM CDT

## 2025-03-26 ENCOUNTER — HOSPITAL ENCOUNTER (OUTPATIENT)
Dept: RADIOLOGY | Facility: HOSPITAL | Age: 70
Discharge: HOME | End: 2025-03-26
Attending: NURSE PRACTITIONER
Payer: MEDICARE

## 2025-03-26 DIAGNOSIS — R91.1 SOLITARY PULMONARY NODULE: ICD-10-CM

## 2025-03-26 DIAGNOSIS — D50.0 IRON DEFICIENCY ANEMIA DUE TO CHRONIC BLOOD LOSS: ICD-10-CM

## 2025-03-26 PROCEDURE — 25500020 PHARM REV CODE 255: Performed by: NURSE PRACTITIONER

## 2025-03-26 PROCEDURE — 71260 CT THORAX DX C+: CPT | Mod: 26,,, | Performed by: RADIOLOGY

## 2025-03-26 PROCEDURE — 71260 CT THORAX DX C+: CPT | Mod: TC

## 2025-03-26 RX ADMIN — IOHEXOL 75 ML: 350 INJECTION, SOLUTION INTRAVENOUS at 03:03

## 2025-04-02 ENCOUNTER — TELEPHONE (OUTPATIENT)
Dept: HEMATOLOGY/ONCOLOGY | Facility: CLINIC | Age: 70
End: 2025-04-02
Payer: MEDICARE

## 2025-04-07 ENCOUNTER — PATIENT MESSAGE (OUTPATIENT)
Dept: OBSTETRICS AND GYNECOLOGY | Facility: CLINIC | Age: 70
End: 2025-04-07
Payer: MEDICARE

## 2025-04-07 ENCOUNTER — PATIENT MESSAGE (OUTPATIENT)
Dept: FAMILY MEDICINE | Facility: CLINIC | Age: 70
End: 2025-04-07
Payer: MEDICARE

## 2025-04-07 ENCOUNTER — PATIENT MESSAGE (OUTPATIENT)
Dept: HEMATOLOGY/ONCOLOGY | Facility: CLINIC | Age: 70
End: 2025-04-07
Payer: MEDICARE

## 2025-04-14 ENCOUNTER — OFFICE VISIT (OUTPATIENT)
Dept: FAMILY MEDICINE | Facility: CLINIC | Age: 70
End: 2025-04-14
Payer: MEDICARE

## 2025-04-14 VITALS
SYSTOLIC BLOOD PRESSURE: 117 MMHG | HEART RATE: 103 BPM | HEIGHT: 68 IN | RESPIRATION RATE: 16 BRPM | OXYGEN SATURATION: 98 % | WEIGHT: 126.5 LBS | DIASTOLIC BLOOD PRESSURE: 78 MMHG | BODY MASS INDEX: 19.17 KG/M2

## 2025-04-14 DIAGNOSIS — Z79.899 LONG-TERM USE OF HIGH-RISK MEDICATION: Primary | ICD-10-CM

## 2025-04-14 DIAGNOSIS — F41.1 GAD (GENERALIZED ANXIETY DISORDER): ICD-10-CM

## 2025-04-14 DIAGNOSIS — F13.20 BENZODIAZEPINE DEPENDENCE: ICD-10-CM

## 2025-04-14 DIAGNOSIS — M54.42 ACUTE BILATERAL LOW BACK PAIN WITH BILATERAL SCIATICA: ICD-10-CM

## 2025-04-14 DIAGNOSIS — Z79.899 CHRONIC PRESCRIPTION BENZODIAZEPINE USE: ICD-10-CM

## 2025-04-14 DIAGNOSIS — J43.9 PULMONARY EMPHYSEMA, UNSPECIFIED EMPHYSEMA TYPE: ICD-10-CM

## 2025-04-14 DIAGNOSIS — M54.41 ACUTE BILATERAL LOW BACK PAIN WITH BILATERAL SCIATICA: ICD-10-CM

## 2025-04-14 DIAGNOSIS — R56.9 CONVULSIONS, UNSPECIFIED CONVULSION TYPE: ICD-10-CM

## 2025-04-14 LAB
AMP AMPHETAMINE 1000 NM/ML POC: NEGATIVE
BAR BARBITURATES 300 NG/ML POC: NEGATIVE
BUP BUPRENORPHINE 10 NG/ML POC: NEGATIVE
BZO BENZODIAZEPINES 300 NG/ML POC: ABNORMAL
COC COCAINE 300 NG/ML POC: NEGATIVE
CREATININE (CR) POC: NEGATIVE
CTP QC/QA: YES
MET METHAMPHETAMINE 1000 NG/ML POC: NEGATIVE
MOP/OPI300 MORPHINE 300 NG/ML POC: NEGATIVE
MTD METHADONE 300 NG/ML POC: NEGATIVE
OXIDANT (OX) POC: NEGATIVE
OXY OXYCODONE 100 NG/ML POC: NEGATIVE
SPECIFIC GRAVITY (SG) POC: 1
TEMPERATURE (°F) POC: 90
THC MARIJUANA 50 NG/ML POC: NEGATIVE

## 2025-04-14 PROCEDURE — 99999 PR PBB SHADOW E&M-EST. PATIENT-LVL III: CPT | Mod: PBBFAC,,, | Performed by: FAMILY MEDICINE

## 2025-04-14 RX ORDER — ALPRAZOLAM 0.25 MG/1
TABLET ORAL
Qty: 60 TABLET | Refills: 2 | Status: SHIPPED | OUTPATIENT
Start: 2025-04-25

## 2025-04-14 NOTE — PROGRESS NOTES
Subjective:       Patient ID: Comfort Cantu is a 69 y.o. female.    Chief Complaint: Medication Refill    History of Present Illness    CHIEF COMPLAINT:  Ms. Cantu presents today for back pain    BACK PAIN:  She reports agonizing lower back and inter-scapular pain that worsens with prolonged standing. Pain severity requires breaks during activities of daily living such as washing dishes and cooking. She recently experienced exacerbation of body aches after a long drive.    SURGICAL HISTORY:  Five years ago, she had plate and screws placed for a leg injury. Hardware was subsequently removed due to infection. While using a walker during recovery, she fell when her walker hit a dryer door, resulting in a fracture of the dorsal foot requiring walking boot treatment.    FUNCTIONAL STATUS:  She expresses frustration with her current ambulatory status, noting significant decrease from her previously fast walking pace.    CURRENT MEDICATIONS:  She continues Cymbalta and Alprazolam as prescribed.      ROS:  Genitourinary: +urinary incontinence  Musculoskeletal: +back pain, +pain with movement, +body aches, +difficulty walking           Problem List[1]  Comfort has a current medication list which includes the following prescription(s): atorvastatin, buspirone, duloxetine, isosorbide mononitrate, olmesartan, prasugrel hcl, solifenacin, and [START ON 4/25/2025] alprazolam, and the following Facility-Administered Medications: albuterol.        Health Maintenance Due   Topic Date Due    TETANUS VACCINE  Never done    Pneumococcal Vaccines (Age 50+) (1 of 2 - PCV) Never done    Shingles Vaccine (1 of 2) Never done    RSV Vaccine (Age 60+ and Pregnant patients) (1 - Risk 60-74 years 1-dose series) Never done    COVID-19 Vaccine (4 - 2024-25 season) 09/01/2024      Health Maintenance reviewed and discussed.   Objective:      Vitals:    04/14/25 1552   BP: 117/78   Pulse: 103   Resp: 16   SpO2: 98%   Weight: 57.4 kg (126 lb 8  "oz)   Height: 5' 8" (1.727 m)   PainSc: 0-No pain     Body mass index is 19.23 kg/m².  Physical Exam  Vitals and nursing note reviewed.   Constitutional:       General: She is not in acute distress.     Appearance: She is not ill-appearing.   Neck:      Vascular: Carotid bruit (positive on the right) present.   Cardiovascular:      Rate and Rhythm: Normal rate and regular rhythm.      Heart sounds: No murmur heard.  Pulmonary:      Effort: Pulmonary effort is normal.      Breath sounds: Normal breath sounds. No wheezing.   Musculoskeletal:      Comments: Back pain described. Changes positions frequently; right lower leg externally rotated. Walking with with foot rotated externally   Skin:     General: Skin is warm and dry.      Findings: No rash.   Neurological:      Mental Status: She is alert.   Psychiatric:         Mood and Affect: Mood normal.         Behavior: Behavior normal.               Assessment:       Assessment & Plan    1. Considered reported back pain, particularly in lower back and between shoulder blades.  2. Noted history of leg fracture and subsequent complications from 5 years ago.  3. Acknowledged existing medications (Cymbalta, B Spa, Xanax) and their current effectiveness.           Plan:       1. Long-term use of high-risk medication  -     POCT Urine Drug Screen (With BUP)    2. ETHAN (generalized anxiety disorder)  -     ALPRAZolam (XANAX) 0.25 MG tablet; TAKE ONE (1) TABLET TWO (2) TIMES A DAY AS NEEDED FOR ANXIETY  Dispense: 60 tablet; Refill: 2    3. Benzodiazepine dependence  Assessment & Plan:  Chronic. Stable. Uds consistent today. Continue xanax bid. No escalation.       4. Convulsions, unspecified convulsion type  Assessment & Plan:  No recent seizures.       5. Pulmonary emphysema, unspecified emphysema type  Assessment & Plan:  No acute complaints.       6. Acute bilateral low back pain with bilateral sciatica    7. Chronic prescription benzodiazepine use  -     ALPRAZolam (XANAX) " 0.25 MG tablet; TAKE ONE (1) TABLET TWO (2) TIMES A DAY AS NEEDED FOR ANXIETY  Dispense: 60 tablet; Refill: 2         This note was generated with the assistance of ambient listening technology. Verbal consent was obtained by the patient and accompanying visitor(s) for the recording of patient appointment to facilitate this note. I attest to having reviewed and edited the generated note for accuracy, though some syntax or spelling errors may persist. Please contact the author of this note for any clarification.         [1]   Patient Active Problem List  Diagnosis    ETHAN (generalized anxiety disorder)    Backache    Primary hypertension    Cardiovascular system problem    Hyperlipidemia    Knee pain    Smoker, started age 20, now up to 2 ppd    Abdominal pain, acute    Pancreas cyst    Nephrolithiasis    Hyponatremia    Peripheral vascular disease, bilateral stent    Anemia    High grade dysplasia in colonic adenoma    Hx of colonic polyps    Infection of lower extremity associated with hardware    Colon polyp    CAD (coronary artery disease)    Convulsions, unspecified convulsion type    COPD exacerbation    Infarction of parietal lobe    Protein-calorie malnutrition, unspecified severity    Panic disorder    Mixed conductive and sensorineural hearing loss of both ears    Bronchiectasis without complication    Bilateral carotid artery stenosis, post surgeries.    Sedentary lifestyle, since ankle problem 2020    Engages in binge consumption of alcohol    Benzodiazepine dependence, diazepam 5 mg daily, Xanax bid for many years.    Iron deficiency anemia secondary to inadequate dietary iron intake    At risk for cardiovascular event, very HIGH risk    At risk for falling    Family history of premature CAD    Iron deficiency anemia due to chronic blood loss    Ingrown nail    LVH (left ventricular hypertrophy) due to hypertensive disease, without heart failure    White coat syndrome with diagnosis of hypertension     Left-sided chest pain

## 2025-04-24 DIAGNOSIS — N32.81 OAB (OVERACTIVE BLADDER): ICD-10-CM

## 2025-04-24 RX ORDER — SOLIFENACIN SUCCINATE 5 MG/1
TABLET, FILM COATED ORAL
Qty: 90 TABLET | Refills: 3 | Status: SHIPPED | OUTPATIENT
Start: 2025-04-24

## 2025-05-27 DIAGNOSIS — I65.23 BILATERAL CAROTID ARTERY STENOSIS: ICD-10-CM

## 2025-05-27 DIAGNOSIS — Z91.89 AT RISK FOR CARDIOVASCULAR EVENT: ICD-10-CM

## 2025-05-27 DIAGNOSIS — I73.9 PERIPHERAL VASCULAR DISEASE: ICD-10-CM

## 2025-05-27 DIAGNOSIS — E78.00 PURE HYPERCHOLESTEROLEMIA: ICD-10-CM

## 2025-05-28 RX ORDER — ATORVASTATIN CALCIUM 80 MG/1
80 TABLET, FILM COATED ORAL DAILY
Qty: 90 TABLET | Refills: 1 | Status: SHIPPED | OUTPATIENT
Start: 2025-05-28

## 2025-06-11 ENCOUNTER — PATIENT MESSAGE (OUTPATIENT)
Dept: CARDIOLOGY | Facility: CLINIC | Age: 70
End: 2025-06-11
Payer: MEDICARE

## 2025-06-22 ENCOUNTER — PATIENT MESSAGE (OUTPATIENT)
Dept: HEMATOLOGY/ONCOLOGY | Facility: CLINIC | Age: 70
End: 2025-06-22
Payer: MEDICARE

## 2025-06-22 ENCOUNTER — PATIENT MESSAGE (OUTPATIENT)
Dept: FAMILY MEDICINE | Facility: CLINIC | Age: 70
End: 2025-06-22
Payer: MEDICARE

## 2025-06-23 ENCOUNTER — TELEPHONE (OUTPATIENT)
Dept: HEMATOLOGY/ONCOLOGY | Facility: CLINIC | Age: 70
End: 2025-06-23
Payer: MEDICARE

## 2025-07-02 ENCOUNTER — TELEPHONE (OUTPATIENT)
Dept: HEMATOLOGY/ONCOLOGY | Facility: CLINIC | Age: 70
End: 2025-07-02

## 2025-07-02 DIAGNOSIS — D50.0 IRON DEFICIENCY ANEMIA DUE TO CHRONIC BLOOD LOSS: Primary | ICD-10-CM

## 2025-07-07 ENCOUNTER — LAB VISIT (OUTPATIENT)
Dept: LAB | Facility: HOSPITAL | Age: 70
End: 2025-07-07
Attending: NURSE PRACTITIONER
Payer: MEDICARE

## 2025-07-07 DIAGNOSIS — D50.0 IRON DEFICIENCY ANEMIA DUE TO CHRONIC BLOOD LOSS: ICD-10-CM

## 2025-07-07 LAB
ABSOLUTE EOSINOPHIL (OHS): 0.23 K/UL
ABSOLUTE MONOCYTE (OHS): 0.74 K/UL (ref 0.3–1)
ABSOLUTE NEUTROPHIL COUNT (OHS): 3.58 K/UL (ref 1.8–7.7)
ALBUMIN SERPL BCP-MCNC: 4.1 G/DL (ref 3.5–5.2)
ALP SERPL-CCNC: 104 UNIT/L (ref 40–150)
ALT SERPL W/O P-5'-P-CCNC: 21 UNIT/L (ref 10–44)
ANION GAP (OHS): 13 MMOL/L (ref 8–16)
AST SERPL-CCNC: 22 UNIT/L (ref 11–45)
BASOPHILS # BLD AUTO: 0.07 K/UL
BASOPHILS NFR BLD AUTO: 1.1 %
BILIRUB SERPL-MCNC: 0.3 MG/DL (ref 0.1–1)
BUN SERPL-MCNC: 9 MG/DL (ref 8–23)
CALCIUM SERPL-MCNC: 9.2 MG/DL (ref 8.7–10.5)
CHLORIDE SERPL-SCNC: 100 MMOL/L (ref 95–110)
CO2 SERPL-SCNC: 19 MMOL/L (ref 23–29)
CREAT SERPL-MCNC: 0.8 MG/DL (ref 0.5–1.4)
ERYTHROCYTE [DISTWIDTH] IN BLOOD BY AUTOMATED COUNT: 13.7 % (ref 11.5–14.5)
GFR SERPLBLD CREATININE-BSD FMLA CKD-EPI: >60 ML/MIN/1.73/M2
GLUCOSE SERPL-MCNC: 102 MG/DL (ref 70–110)
HCT VFR BLD AUTO: 27.9 % (ref 37–48.5)
HGB BLD-MCNC: 8.8 GM/DL (ref 12–16)
IMM GRANULOCYTES # BLD AUTO: 0.01 K/UL (ref 0–0.04)
IMM GRANULOCYTES NFR BLD AUTO: 0.2 % (ref 0–0.5)
IRON SATN MFR SERPL: 7 % (ref 20–50)
IRON SERPL-MCNC: 35 UG/DL (ref 30–160)
LYMPHOCYTES # BLD AUTO: 1.74 K/UL (ref 1–4.8)
MCH RBC QN AUTO: 27.4 PG (ref 27–31)
MCHC RBC AUTO-ENTMCNC: 31.5 G/DL (ref 32–36)
MCV RBC AUTO: 87 FL (ref 82–98)
NUCLEATED RBC (/100WBC) (OHS): 0 /100 WBC
PLATELET # BLD AUTO: 327 K/UL (ref 150–450)
PMV BLD AUTO: 9.1 FL (ref 9.2–12.9)
POTASSIUM SERPL-SCNC: 4 MMOL/L (ref 3.5–5.1)
PROT SERPL-MCNC: 7.4 GM/DL (ref 6–8.4)
RBC # BLD AUTO: 3.21 M/UL (ref 4–5.4)
RELATIVE EOSINOPHIL (OHS): 3.6 %
RELATIVE LYMPHOCYTE (OHS): 27.3 % (ref 18–48)
RELATIVE MONOCYTE (OHS): 11.6 % (ref 4–15)
RELATIVE NEUTROPHIL (OHS): 56.2 % (ref 38–73)
SODIUM SERPL-SCNC: 132 MMOL/L (ref 136–145)
TIBC SERPL-MCNC: 525 UG/DL (ref 250–450)
TRANSFERRIN SERPL-MCNC: 355 MG/DL (ref 200–375)
WBC # BLD AUTO: 6.37 K/UL (ref 3.9–12.7)

## 2025-07-07 PROCEDURE — 36415 COLL VENOUS BLD VENIPUNCTURE: CPT

## 2025-07-07 PROCEDURE — 82728 ASSAY OF FERRITIN: CPT

## 2025-07-07 PROCEDURE — 85025 COMPLETE CBC W/AUTO DIFF WBC: CPT

## 2025-07-07 PROCEDURE — 82040 ASSAY OF SERUM ALBUMIN: CPT

## 2025-07-07 PROCEDURE — 84466 ASSAY OF TRANSFERRIN: CPT

## 2025-07-08 LAB — FERRITIN SERPL-MCNC: 11 NG/ML (ref 20–300)

## 2025-07-09 ENCOUNTER — OFFICE VISIT (OUTPATIENT)
Dept: HEMATOLOGY/ONCOLOGY | Facility: CLINIC | Age: 70
End: 2025-07-09
Payer: MEDICARE

## 2025-07-09 VITALS
OXYGEN SATURATION: 98 % | SYSTOLIC BLOOD PRESSURE: 112 MMHG | BODY MASS INDEX: 19.93 KG/M2 | HEART RATE: 105 BPM | DIASTOLIC BLOOD PRESSURE: 68 MMHG | RESPIRATION RATE: 18 BRPM | WEIGHT: 131.13 LBS

## 2025-07-09 DIAGNOSIS — D50.0 IRON DEFICIENCY ANEMIA DUE TO CHRONIC BLOOD LOSS: Primary | ICD-10-CM

## 2025-07-09 PROCEDURE — 3008F BODY MASS INDEX DOCD: CPT | Mod: CPTII,S$GLB,, | Performed by: NURSE PRACTITIONER

## 2025-07-09 PROCEDURE — 1159F MED LIST DOCD IN RCRD: CPT | Mod: CPTII,S$GLB,, | Performed by: NURSE PRACTITIONER

## 2025-07-09 PROCEDURE — 3078F DIAST BP <80 MM HG: CPT | Mod: CPTII,S$GLB,, | Performed by: NURSE PRACTITIONER

## 2025-07-09 PROCEDURE — 1126F AMNT PAIN NOTED NONE PRSNT: CPT | Mod: CPTII,S$GLB,, | Performed by: NURSE PRACTITIONER

## 2025-07-09 PROCEDURE — 3288F FALL RISK ASSESSMENT DOCD: CPT | Mod: CPTII,S$GLB,, | Performed by: NURSE PRACTITIONER

## 2025-07-09 PROCEDURE — 99999 PR PBB SHADOW E&M-EST. PATIENT-LVL III: CPT | Mod: PBBFAC,,, | Performed by: NURSE PRACTITIONER

## 2025-07-09 PROCEDURE — 1101F PT FALLS ASSESS-DOCD LE1/YR: CPT | Mod: CPTII,S$GLB,, | Performed by: NURSE PRACTITIONER

## 2025-07-09 PROCEDURE — 99214 OFFICE O/P EST MOD 30 MIN: CPT | Mod: S$GLB,,, | Performed by: NURSE PRACTITIONER

## 2025-07-09 PROCEDURE — 4010F ACE/ARB THERAPY RXD/TAKEN: CPT | Mod: CPTII,S$GLB,, | Performed by: NURSE PRACTITIONER

## 2025-07-09 PROCEDURE — 3074F SYST BP LT 130 MM HG: CPT | Mod: CPTII,S$GLB,, | Performed by: NURSE PRACTITIONER

## 2025-07-09 RX ORDER — SODIUM CHLORIDE 0.9 % (FLUSH) 0.9 %
10 SYRINGE (ML) INJECTION
OUTPATIENT
Start: 2025-07-09

## 2025-07-09 NOTE — PROGRESS NOTES
HPI    67-year-old female referred to [facility] for evaluation of severe anemia.  Patient was recently transfused in the ED with hemoglobin 6.2 grams/deciliter.  At the time patient is also microcytic.  The patient denies any overt bleeding.      Most recent lab show Hb of 11.8g/dL MCV 94\    Currently patient denies any overt bleeding.      4/10/2023:  She returns today follow-up with complaints of fatigue    2023:  She returns today follow-up.  Patient has no new symptoms and no new complaints    1/3/2024:  She returns today for follow-up    2024:  She returns today for follow-up.  She has had an EGD and colonoscopies since previous visit    2024:  She returns today for follow-up.  Patient has no new symptoms and no new complaints    10/2/2024:  She returns today for follow-up.  Patient has no new symptoms and no new complaints  Past Medical History:   Diagnosis Date    Anticoagulant long-term use     Anxiety     Arthritis     Atherosclerosis     Closed displaced spiral fracture of shaft of right tibia 2/3/2020    Coronary artery disease     Depression     GERD (gastroesophageal reflux disease)     Hearing loss     [place] (hard of hearing)     Hearing aids    Hypertension     Long term (current) use of anticoagulants     Peripheral arterial disease     Tubulovillous adenoma of colon 2019    Wears dentures     Full-Upper; Partial- lower    Wound infection after surgery 2020     Social History     Socioeconomic History    Marital status:    Tobacco Use    Smoking status: Former     Current packs/day: 0.00     Average packs/day: 0.8 packs/day for 35.7 years (26.8 ttl pk-yrs)     Types: Cigarettes     Start date: 10/1/1988     Quit date: 2024     Years since quittin.0    Smokeless tobacco: Never   Substance and Sexual Activity    Drug use: Yes     Types: Benzodiazepines     Comment: as prescribed    Sexual activity: Yes     Social Drivers of Health     Financial Resource  Strain: Low Risk  (7/7/2025)    Overall Financial Resource Strain (CARDIA)     Difficulty of Paying Living Expenses: Not hard at all   Food Insecurity: No Food Insecurity (7/7/2025)    Hunger Vital Sign     Worried About Running Out of Food in the Last Year: Never true     Ran Out of Food in the Last Year: Never true   Transportation Needs: No Transportation Needs (7/7/2025)    PRAPARE - Transportation     Lack of Transportation (Medical): No     Lack of Transportation (Non-Medical): No   Physical Activity: Insufficiently Active (7/7/2025)    Exercise Vital Sign     Days of Exercise per Week: 3 days     Minutes of Exercise per Session: 20 min   Stress: Stress Concern Present (7/7/2025)    [employer] [place] [employer] - Occupational Stress Questionnaire     Feeling of Stress : Rather much   Housing Stability: Low Risk  (7/7/2025)    Housing Stability Vital Sign     Unable to Pay for Housing in the Last Year: No     Number of Times Moved in the Last Year: 0     Homeless in the Last Year: No         Subjective      Review of Systems   Constitutional: Negative for appetite change, fatigue and unexpected weight change.  Positive for fatigue  HENT: Negative for mouth sores.   Eyes: Negative for visual disturbance.   Respiratory: Negative for cough and shortness of breath.   Cardiovascular: Negative for chest pain.   Gastrointestinal: Negative for diarrhea.   Genitourinary: Negative for frequency.   Musculoskeletal: Negative for back pain.   Skin: Negative for rash.   Neurological: Negative for headaches.   Hematological: Negative for adenopathy.   Psychiatric/Behavioral: The patient is not nervous/anxious.   All other systems reviewed and are negative.     Objective    Physical Exam   There were no vitals filed for this visit.     Constitutional: patient is oriented to person, place, and time. patient appears well-developed and well-nourished. No distress.   HENT:   Right Ear: External ear normal.   Left Ear:  External ear normal.   Nose: Nose normal.   Mouth/Throat: Oropharynx is clear and moist. No oropharyngeal exudate.   Teeth, gums and lips are normal   No sinus tenderness   Palate, tongue, posterior pharynx are normal   Eyes: Conjunctivae and lids are normal.   Neck: Trachea normal and normal range of motion. No thyromegaly   Cardiovascular: Normal rate, regular rhythm, normal heart sounds, intact distal pulses and normal pulses.   No murmur heard.   No edema, no tenderness in the extremities.   Pulmonary/Chest: Effort normal and breath sounds normal. No accessory muscle usage. patient has no wheezes..   Abdominal: Soft. Normal appearance and bowel sounds are normal. patient exhibits no distension and no mass. There is no hepatosplenomegaly. There is no tenderness.   Musculoskeletal: Normal range of motion.   Gait is normal   No clubbing, cyanosis       There were no vitals filed for this visit.       CMP  Sodium   Date Value Ref Range Status   07/07/2025 132 (L) 136 - 145 mmol/L Final   09/27/2024 139 136 - 145 mmol/L Final     Potassium   Date Value Ref Range Status   07/07/2025 4.0 3.5 - 5.1 mmol/L Final   09/27/2024 4.3 3.5 - 5.1 mmol/L Final     Chloride   Date Value Ref Range Status   07/07/2025 100 95 - 110 mmol/L Final   09/27/2024 104 95 - 110 mmol/L Final     CO2   Date Value Ref Range Status   07/07/2025 19 (L) 23 - 29 mmol/L Final   09/27/2024 23 23 - 29 mmol/L Final     Glucose   Date Value Ref Range Status   07/07/2025 102 70 - 110 mg/dL Final   09/27/2024 100 70 - 110 mg/dL Final     BUN   Date Value Ref Range Status   07/07/2025 9 8 - 23 mg/dL Final     Creatinine   Date Value Ref Range Status   07/07/2025 0.8 0.5 - 1.4 mg/dL Final     Calcium   Date Value Ref Range Status   07/07/2025 9.2 8.7 - 10.5 mg/dL Final   09/27/2024 9.7 8.7 - 10.5 mg/dL Final     Protein Total   Date Value Ref Range Status   07/07/2025 7.4 6.0 - 8.4 gm/dL Final     Total Protein   Date Value Ref Range Status   09/27/2024 7.5  6.0 - 8.4 g/dL Final     Albumin   Date Value Ref Range Status   07/07/2025 4.1 3.5 - 5.2 g/dL Final   09/27/2024 4.2 3.5 - 5.2 g/dL Final     Total Bilirubin   Date Value Ref Range Status   09/27/2024 0.6 0.1 - 1.0 mg/dL Final     Comment:     For infants and newborns, interpretation of results should be based  on gestational age, weight and in agreement with clinical  observations.    Premature Infant recommended reference ranges:  Up to 24 hours.............<8.0 mg/dL  Up to 48 hours............<12.0 mg/dL  3-5 days..................<15.0 mg/dL  6-29 days.................<15.0 mg/dL       Bilirubin Total   Date Value Ref Range Status   07/07/2025 0.3 0.1 - 1.0 mg/dL Final     Comment:     For infants and newborns, interpretation of results should be based   on gestational age, weight and in agreement with clinical   observations.    Premature Infant recommended reference ranges:   0-24 hours:  <8.0 mg/dL   24-48 hours: <12.0 mg/dL   3-5 days:    <15.0 mg/dL   6-29 days:   <15.0 mg/dL     Alkaline Phosphatase   Date Value Ref Range Status   09/27/2024 90 55 - 135 U/L Final     ALP   Date Value Ref Range Status   07/07/2025 104 40 - 150 unit/L Final     AST   Date Value Ref Range Status   07/07/2025 22 11 - 45 unit/L Final   09/27/2024 31 10 - 40 U/L Final     ALT   Date Value Ref Range Status   07/07/2025 21 10 - 44 unit/L Final   09/27/2024 47 (H) 10 - 44 U/L Final     Anion Gap   Date Value Ref Range Status   07/07/2025 13 8 - 16 mmol/L Final     eGFR if    Date Value Ref Range Status   09/03/2021 >60.0 >60 mL/min/1.73 m^2 Final     eGFR if non    Date Value Ref Range Status   09/03/2021 >60.0 >60 mL/min/1.73 m^2 Final     Comment:     Calculation used to obtain the estimated glomerular filtration  rate (eGFR) is the CKD-EPI equation.        Lab Results   Component Value Date    WBC 6.37 07/07/2025    HGB 8.8 (L) 07/07/2025    HCT 27.9 (L) 07/07/2025    MCV 87 07/07/2025    PLT  327 07/07/2025     EGD:   A single bleeding angioectasia in the stomach.                          Treated with argon plasma coagulation     Colonoscopy:   The perianal and digital rectal examinations were normal.        Non-bleeding internal hemorrhoids were found during retroflexion.        The hemorrhoids were small.        Multiple small-mouthed diverticula were found in the sigmoid colon        and descending colon.        There was evidence of a prior end-to-end colo-colonic anastomosis in        the sigmoid colon. This was patent and was characterized by healthy        appearing mucosa. The anastomosis was traversed.        A single small localized angiodysplastic lesion with bleeding on        contact was found in the cecum. Coagulation for hemostasis using        argon plasma at 0.5 liters/minute and 20 hayes was successful.        The transverse colon, ascending colon, appendiceal orifice and        ileocecal valve appeared normal.        The terminal ileum appeared normal.   Impression:            - Non-bleeding internal hemorrhoids.                          - Diverticulosis in the sigmoid colon and in the                          descending colon.                          - Patent end-to-end colo-colonic anastomosis,                          characterized by healthy appearing mucosa.                          - A single colonic angiodysplastic lesion. Treated                          with argon plasma coagulation (APC).                          - The transverse colon, ascending colon, ileocecal                          valve and appendiceal orifice are normal.                          - The examined portion of the ileum was normal.                          - No specimens collected.   Assessment  Iron deficiency anemia    -Last transfusion 1 unit packed red blood cell on 09/02/2022.    -repeat labs show iron deficiency anemia despite being on oral iron  -repeat labs show iron-deficiency anemia despite  receiving IV iron with Ferrlecit  -resolved after treatment with Feraheme  -repeat labs show iron-deficiency anemia  -we will arrange Feraheme x2    Tubular adenoma with high-grade dysplasia in sigmoid colon  -status post phlebotomy colectomy  -last EGD and colonoscopy 04/2024  -continue to do surveillance colonoscopy as patient is increased risk for colon cancer  -continue to follow-up with GI for surveillance colonoscopy    Hypertension/coronary artery disease:   -follow-up with PCP    COPD:   -follow-up with PCP    Pulmonary nodules:   -repeat CT of the chest showed increase in size in 1 of the pulmonary nodules.  Now measures 6 mm.  Two small for PET evaluation  -arrange follow-up with pulmonology  -repeat in 6 months    Emphysema:   -referral placed to pulmonology    Thrombocytosis:   -likely reactive and related to iron deficiency  -resolved    Hyponatremia:   -add Gatorade/Powerade to daily hydration

## 2025-07-20 ENCOUNTER — PATIENT MESSAGE (OUTPATIENT)
Dept: HEMATOLOGY/ONCOLOGY | Facility: CLINIC | Age: 70
End: 2025-07-20
Payer: MEDICARE

## 2025-07-24 ENCOUNTER — TELEPHONE (OUTPATIENT)
Dept: HEMATOLOGY/ONCOLOGY | Facility: CLINIC | Age: 70
End: 2025-07-24
Payer: MEDICARE

## 2025-07-24 ENCOUNTER — PATIENT MESSAGE (OUTPATIENT)
Dept: HEMATOLOGY/ONCOLOGY | Facility: CLINIC | Age: 70
End: 2025-07-24
Payer: MEDICARE

## 2025-07-24 DIAGNOSIS — F41.1 GAD (GENERALIZED ANXIETY DISORDER): ICD-10-CM

## 2025-07-24 DIAGNOSIS — Z79.899 CHRONIC PRESCRIPTION BENZODIAZEPINE USE: ICD-10-CM

## 2025-07-24 DIAGNOSIS — I25.10 CORONARY ARTERY DISEASE, UNSPECIFIED VESSEL OR LESION TYPE, UNSPECIFIED WHETHER ANGINA PRESENT, UNSPECIFIED WHETHER NATIVE OR TRANSPLANTED HEART: Primary | ICD-10-CM

## 2025-07-24 RX ORDER — SODIUM CHLORIDE 0.9 % (FLUSH) 0.9 %
10 SYRINGE (ML) INJECTION
OUTPATIENT
Start: 2025-07-24

## 2025-07-24 RX ORDER — HEPARIN 100 UNIT/ML
500 SYRINGE INTRAVENOUS
OUTPATIENT
Start: 2025-07-24

## 2025-07-24 RX ORDER — EPINEPHRINE 0.3 MG/.3ML
0.3 INJECTION SUBCUTANEOUS ONCE AS NEEDED
OUTPATIENT
Start: 2025-07-24

## 2025-07-24 NOTE — TELEPHONE ENCOUNTER
Sent a staff message to [employer] for an update.      Copied from CRM #[id number]. Topic: General Inquiry - Patient Advice  >> Jul 24, 2025  1:45 PM Nuvia wrote:  Type:  Needs Medical Advice    Who Called: pt  Symptoms (please be specific):    How long has patient had these symptoms:    Pharmacy name and phone #:    Would the patient rather a call back or a response via [facility]? call  Best Call Back Number: [phone]  Additional Information: office would need to call insurance so the pt can get an infusion appt

## 2025-07-24 NOTE — TELEPHONE ENCOUNTER
No care due was identified.  [facility] Embedded Care Due Messages. Reference number: [id number].   7/24/2025 1:23:54 PM CDT

## 2025-07-25 RX ORDER — ALPRAZOLAM 0.25 MG/1
TABLET ORAL
Qty: 60 TABLET | Refills: 2 | Status: SHIPPED | OUTPATIENT
Start: 2025-07-25

## 2025-07-25 RX ORDER — BUSPIRONE HYDROCHLORIDE 15 MG/1
TABLET ORAL
Qty: 180 TABLET | Refills: 1 | Status: SHIPPED | OUTPATIENT
Start: 2025-07-25

## 2025-07-25 RX ORDER — PRASUGREL 5 MG/1
TABLET, FILM COATED ORAL
Qty: 90 TABLET | Refills: 0 | Status: SHIPPED | OUTPATIENT
Start: 2025-07-25

## 2025-07-25 NOTE — TELEPHONE ENCOUNTER
Refill Routing Note   Medication(s) are not appropriate for processing by [facility] for the following reason(s):        Outside of protocol  Required labs outdated    [facility] action(s):  Route  Defer               Appointments  past 12m or future 3m with PCP    Date Provider   Last Visit   4/14/2025 Simran Pool MD   Next Visit   8/28/2025 Simran Pool MD   ED visits in past 90 days: 0        Note composed:10:24 AM 07/25/2025

## 2025-07-28 ENCOUNTER — PATIENT MESSAGE (OUTPATIENT)
Dept: HEMATOLOGY/ONCOLOGY | Facility: CLINIC | Age: 70
End: 2025-07-28
Payer: MEDICARE

## 2025-08-04 RX ORDER — HEPARIN 100 UNIT/ML
500 SYRINGE INTRAVENOUS
OUTPATIENT
Start: 2025-08-04

## 2025-08-04 RX ORDER — EPINEPHRINE 0.3 MG/.3ML
0.3 INJECTION SUBCUTANEOUS ONCE AS NEEDED
OUTPATIENT
Start: 2025-08-04

## 2025-08-04 RX ORDER — SODIUM FERRIC GLUCONATE COMPLEX IN SUCROSE 12.5 MG/ML
125 INJECTION INTRAVENOUS
OUTPATIENT
Start: 2025-08-04

## 2025-08-04 RX ORDER — SODIUM CHLORIDE 0.9 % (FLUSH) 0.9 %
10 SYRINGE (ML) INJECTION
OUTPATIENT
Start: 2025-08-04

## 2025-08-11 ENCOUNTER — INFUSION (OUTPATIENT)
Dept: INFUSION THERAPY | Facility: HOSPITAL | Age: 70
End: 2025-08-11
Attending: NURSE PRACTITIONER
Payer: MEDICARE

## 2025-08-11 VITALS
HEIGHT: 68 IN | RESPIRATION RATE: 20 BRPM | OXYGEN SATURATION: 96 % | DIASTOLIC BLOOD PRESSURE: 58 MMHG | SYSTOLIC BLOOD PRESSURE: 100 MMHG | WEIGHT: 135 LBS | TEMPERATURE: 97 F | HEART RATE: 74 BPM | BODY MASS INDEX: 20.46 KG/M2

## 2025-08-11 DIAGNOSIS — D50.0 IRON DEFICIENCY ANEMIA DUE TO CHRONIC BLOOD LOSS: Primary | ICD-10-CM

## 2025-08-11 PROCEDURE — 63600175 PHARM REV CODE 636 W HCPCS: Performed by: NURSE PRACTITIONER

## 2025-08-11 PROCEDURE — 96374 THER/PROPH/DIAG INJ IV PUSH: CPT

## 2025-08-11 RX ORDER — SODIUM FERRIC GLUCONATE COMPLEX IN SUCROSE 12.5 MG/ML
125 INJECTION INTRAVENOUS
OUTPATIENT
Start: 2025-08-18

## 2025-08-11 RX ORDER — SODIUM CHLORIDE 0.9 % (FLUSH) 0.9 %
10 SYRINGE (ML) INJECTION
OUTPATIENT
Start: 2025-08-18

## 2025-08-11 RX ORDER — SODIUM CHLORIDE 0.9 % (FLUSH) 0.9 %
10 SYRINGE (ML) INJECTION
Status: DISCONTINUED | OUTPATIENT
Start: 2025-08-11 | End: 2025-08-11 | Stop reason: HOSPADM

## 2025-08-11 RX ORDER — EPINEPHRINE 0.3 MG/.3ML
0.3 INJECTION SUBCUTANEOUS ONCE AS NEEDED
OUTPATIENT
Start: 2025-08-18

## 2025-08-11 RX ORDER — HEPARIN 100 UNIT/ML
500 SYRINGE INTRAVENOUS
OUTPATIENT
Start: 2025-08-18

## 2025-08-11 RX ORDER — HEPARIN 100 UNIT/ML
500 SYRINGE INTRAVENOUS
Status: DISCONTINUED | OUTPATIENT
Start: 2025-08-11 | End: 2025-08-11 | Stop reason: HOSPADM

## 2025-08-11 RX ORDER — SODIUM FERRIC GLUCONATE COMPLEX IN SUCROSE 12.5 MG/ML
125 INJECTION INTRAVENOUS
Status: COMPLETED | OUTPATIENT
Start: 2025-08-11 | End: 2025-08-11

## 2025-08-11 RX ADMIN — SODIUM FERRIC GLUCONATE COMPLEX IN SUCROSE 125 MG: 12.5 INJECTION INTRAVENOUS at 02:08

## 2025-08-19 ENCOUNTER — OFFICE VISIT (OUTPATIENT)
Dept: CARDIOLOGY | Facility: CLINIC | Age: 70
End: 2025-08-19
Payer: MEDICARE

## 2025-08-19 VITALS
WEIGHT: 129.63 LBS | SYSTOLIC BLOOD PRESSURE: 103 MMHG | DIASTOLIC BLOOD PRESSURE: 63 MMHG | OXYGEN SATURATION: 97 % | HEART RATE: 92 BPM | HEIGHT: 68 IN | BODY MASS INDEX: 19.65 KG/M2

## 2025-08-19 DIAGNOSIS — F17.200 SMOKER: ICD-10-CM

## 2025-08-19 DIAGNOSIS — D64.9 ANEMIA, UNSPECIFIED TYPE: ICD-10-CM

## 2025-08-19 DIAGNOSIS — Z91.89 AT RISK FOR CARDIOVASCULAR EVENT: Primary | ICD-10-CM

## 2025-08-19 DIAGNOSIS — J44.1 COPD EXACERBATION: ICD-10-CM

## 2025-08-19 DIAGNOSIS — F13.20 BENZODIAZEPINE DEPENDENCE: ICD-10-CM

## 2025-08-19 DIAGNOSIS — I10 PRIMARY HYPERTENSION: ICD-10-CM

## 2025-08-19 DIAGNOSIS — I25.10 CORONARY ARTERY DISEASE, UNSPECIFIED VESSEL OR LESION TYPE, UNSPECIFIED WHETHER ANGINA PRESENT, UNSPECIFIED WHETHER NATIVE OR TRANSPLANTED HEART: ICD-10-CM

## 2025-08-19 DIAGNOSIS — I11.9 LVH (LEFT VENTRICULAR HYPERTROPHY) DUE TO HYPERTENSIVE DISEASE, WITHOUT HEART FAILURE: ICD-10-CM

## 2025-08-19 DIAGNOSIS — R06.09 DOE (DYSPNEA ON EXERTION): ICD-10-CM

## 2025-08-19 DIAGNOSIS — E61.1 IRON DEFICIENCY: ICD-10-CM

## 2025-08-19 LAB
OHS QRS DURATION: 78 MS
OHS QTC CALCULATION: 459 MS

## 2025-08-19 PROCEDURE — 1159F MED LIST DOCD IN RCRD: CPT | Mod: CPTII,S$GLB,, | Performed by: INTERNAL MEDICINE

## 2025-08-19 PROCEDURE — 3288F FALL RISK ASSESSMENT DOCD: CPT | Mod: CPTII,S$GLB,, | Performed by: INTERNAL MEDICINE

## 2025-08-19 PROCEDURE — 1101F PT FALLS ASSESS-DOCD LE1/YR: CPT | Mod: CPTII,S$GLB,, | Performed by: INTERNAL MEDICINE

## 2025-08-19 PROCEDURE — 99999 PR PBB SHADOW E&M-EST. PATIENT-LVL III: CPT | Mod: PBBFAC,,, | Performed by: INTERNAL MEDICINE

## 2025-08-19 PROCEDURE — 3078F DIAST BP <80 MM HG: CPT | Mod: CPTII,S$GLB,, | Performed by: INTERNAL MEDICINE

## 2025-08-19 PROCEDURE — 4010F ACE/ARB THERAPY RXD/TAKEN: CPT | Mod: CPTII,S$GLB,, | Performed by: INTERNAL MEDICINE

## 2025-08-19 PROCEDURE — 93000 ELECTROCARDIOGRAM COMPLETE: CPT | Mod: S$GLB,,, | Performed by: INTERNAL MEDICINE

## 2025-08-19 PROCEDURE — 99215 OFFICE O/P EST HI 40 MIN: CPT | Mod: 25,S$GLB,, | Performed by: INTERNAL MEDICINE

## 2025-08-19 PROCEDURE — 1126F AMNT PAIN NOTED NONE PRSNT: CPT | Mod: CPTII,S$GLB,, | Performed by: INTERNAL MEDICINE

## 2025-08-19 PROCEDURE — 3008F BODY MASS INDEX DOCD: CPT | Mod: CPTII,S$GLB,, | Performed by: INTERNAL MEDICINE

## 2025-08-19 PROCEDURE — 3074F SYST BP LT 130 MM HG: CPT | Mod: CPTII,S$GLB,, | Performed by: INTERNAL MEDICINE

## 2025-08-22 ENCOUNTER — PATIENT MESSAGE (OUTPATIENT)
Dept: FAMILY MEDICINE | Facility: CLINIC | Age: 70
End: 2025-08-22
Payer: MEDICARE

## 2025-08-22 DIAGNOSIS — Z79.899 CHRONIC PRESCRIPTION BENZODIAZEPINE USE: ICD-10-CM

## 2025-08-22 DIAGNOSIS — I25.10 CORONARY ARTERY DISEASE, UNSPECIFIED VESSEL OR LESION TYPE, UNSPECIFIED WHETHER ANGINA PRESENT, UNSPECIFIED WHETHER NATIVE OR TRANSPLANTED HEART: ICD-10-CM

## 2025-08-22 DIAGNOSIS — F41.1 GAD (GENERALIZED ANXIETY DISORDER): ICD-10-CM

## 2025-08-22 RX ORDER — PRASUGREL 5 MG/1
TABLET, FILM COATED ORAL
Qty: 90 TABLET | Refills: 1 | Status: SHIPPED | OUTPATIENT
Start: 2025-08-22

## 2025-08-22 RX ORDER — ALPRAZOLAM 0.25 MG/1
TABLET ORAL
Qty: 60 TABLET | Refills: 2 | Status: SHIPPED | OUTPATIENT
Start: 2025-08-22

## 2025-08-25 ENCOUNTER — INFUSION (OUTPATIENT)
Dept: INFUSION THERAPY | Facility: HOSPITAL | Age: 70
End: 2025-08-25
Attending: NURSE PRACTITIONER
Payer: MEDICARE

## 2025-08-25 VITALS
SYSTOLIC BLOOD PRESSURE: 132 MMHG | HEIGHT: 68 IN | BODY MASS INDEX: 20.38 KG/M2 | TEMPERATURE: 97 F | DIASTOLIC BLOOD PRESSURE: 64 MMHG | RESPIRATION RATE: 16 BRPM | WEIGHT: 134.5 LBS | OXYGEN SATURATION: 98 % | HEART RATE: 78 BPM

## 2025-08-25 DIAGNOSIS — D50.0 IRON DEFICIENCY ANEMIA DUE TO CHRONIC BLOOD LOSS: Primary | ICD-10-CM

## 2025-08-25 PROCEDURE — 63600175 PHARM REV CODE 636 W HCPCS: Performed by: NURSE PRACTITIONER

## 2025-08-25 PROCEDURE — 96374 THER/PROPH/DIAG INJ IV PUSH: CPT

## 2025-08-25 RX ORDER — EPINEPHRINE 0.3 MG/.3ML
0.3 INJECTION SUBCUTANEOUS ONCE AS NEEDED
OUTPATIENT
Start: 2025-08-25 | End: 2037-01-21

## 2025-08-25 RX ORDER — HEPARIN 100 UNIT/ML
500 SYRINGE INTRAVENOUS
OUTPATIENT
Start: 2025-08-25

## 2025-08-25 RX ORDER — SODIUM FERRIC GLUCONATE COMPLEX IN SUCROSE 12.5 MG/ML
125 INJECTION INTRAVENOUS
OUTPATIENT
Start: 2025-08-25 | End: 2025-08-25

## 2025-08-25 RX ORDER — SODIUM FERRIC GLUCONATE COMPLEX IN SUCROSE 12.5 MG/ML
125 INJECTION INTRAVENOUS
Status: COMPLETED | OUTPATIENT
Start: 2025-08-25 | End: 2025-08-25

## 2025-08-25 RX ORDER — HEPARIN 100 UNIT/ML
500 SYRINGE INTRAVENOUS
Status: DISCONTINUED | OUTPATIENT
Start: 2025-08-25 | End: 2025-08-25 | Stop reason: HOSPADM

## 2025-08-25 RX ORDER — SODIUM CHLORIDE 0.9 % (FLUSH) 0.9 %
10 SYRINGE (ML) INJECTION
OUTPATIENT
Start: 2025-08-25

## 2025-08-25 RX ORDER — SODIUM CHLORIDE 0.9 % (FLUSH) 0.9 %
10 SYRINGE (ML) INJECTION
Status: DISCONTINUED | OUTPATIENT
Start: 2025-08-25 | End: 2025-08-25 | Stop reason: HOSPADM

## 2025-08-25 RX ADMIN — SODIUM FERRIC GLUCONATE COMPLEX IN SUCROSE 125 MG: 12.5 INJECTION INTRAVENOUS at 01:08

## 2025-08-26 RX ORDER — ISOSORBIDE MONONITRATE 30 MG/1
TABLET, EXTENDED RELEASE ORAL
Qty: 90 TABLET | Refills: 2 | Status: SHIPPED | OUTPATIENT
Start: 2025-08-26

## 2025-08-28 ENCOUNTER — OFFICE VISIT (OUTPATIENT)
Dept: FAMILY MEDICINE | Facility: CLINIC | Age: 70
End: 2025-08-28
Payer: MEDICARE

## 2025-08-28 VITALS
RESPIRATION RATE: 14 BRPM | WEIGHT: 132 LBS | OXYGEN SATURATION: 98 % | SYSTOLIC BLOOD PRESSURE: 118 MMHG | HEART RATE: 81 BPM | DIASTOLIC BLOOD PRESSURE: 62 MMHG | HEIGHT: 68 IN | BODY MASS INDEX: 20 KG/M2

## 2025-08-28 DIAGNOSIS — F41.1 GAD (GENERALIZED ANXIETY DISORDER): ICD-10-CM

## 2025-08-28 DIAGNOSIS — Z79.899 CHRONIC PRESCRIPTION BENZODIAZEPINE USE: Primary | ICD-10-CM

## 2025-08-28 DIAGNOSIS — D50.0 IRON DEFICIENCY ANEMIA DUE TO CHRONIC BLOOD LOSS: ICD-10-CM

## 2025-08-28 DIAGNOSIS — Z79.899 LONG-TERM USE OF HIGH-RISK MEDICATION: ICD-10-CM

## 2025-08-28 LAB
AMP AMPHETAMINE 1000 NM/ML POC: NEGATIVE
BAR BARBITURATES 300 NG/ML POC: NEGATIVE
BUP BUPRENORPHINE 10 NG/ML POC: NEGATIVE
BZO BENZODIAZEPINES 300 NG/ML POC: ABNORMAL
COC COCAINE 300 NG/ML POC: NEGATIVE
CREATININE (CR) POC: 20
CTP QC/QA: YES
MET METHAMPHETAMINE 1000 NG/ML POC: NEGATIVE
MOP/OPI300 MORPHINE 300 NG/ML POC: NEGATIVE
MTD METHADONE 300 NG/ML POC: NEGATIVE
OXIDANT (OX) POC: NEGATIVE
OXY OXYCODONE 100 NG/ML POC: NEGATIVE
SPECIFIC GRAVITY (SG) POC: 1.01
TEMPERATURE (°F) POC: 90
THC MARIJUANA 50 NG/ML POC: NEGATIVE

## 2025-08-28 PROCEDURE — 99999 PR PBB SHADOW E&M-EST. PATIENT-LVL III: CPT | Mod: PBBFAC,,, | Performed by: NURSE PRACTITIONER

## 2025-08-28 RX ORDER — DULOXETIN HYDROCHLORIDE 60 MG/1
60 CAPSULE, DELAYED RELEASE ORAL DAILY
Qty: 90 CAPSULE | Refills: 1 | Status: SHIPPED | OUTPATIENT
Start: 2025-08-28

## (undated) DEVICE — PACK CUSTOM UNIV BASIN SLI

## (undated) DEVICE — SOL CLEARIFY VISUALIZATION LAP

## (undated) DEVICE — STAPLER ECHELON PWR CIR 29MM

## (undated) DEVICE — Device

## (undated) DEVICE — SEE MEDLINE ITEM 157116

## (undated) DEVICE — KIT WING PAD POSITIONING

## (undated) DEVICE — SUT SA85H SILK 2-0

## (undated) DEVICE — DISSECTOR CURVED 5DCD

## (undated) DEVICE — SUT ETHICON 3-0 BLK MONO PS

## (undated) DEVICE — SEE MEDLINE ITEM 153151

## (undated) DEVICE — STOPCOCK DISCOFIX 3 WAY

## (undated) DEVICE — SUT 3-0 VICRYL SH CR/8 18

## (undated) DEVICE — SEE MEDLINE ITEM 146292

## (undated) DEVICE — SOL IRRI STRL WATER 1000ML

## (undated) DEVICE — LINER SUCTION 3000CC

## (undated) DEVICE — SCISSOR 5MMX35CM DIRECT DRIVE

## (undated) DEVICE — SUT SILK 0 STRANDS 30IN BLK

## (undated) DEVICE — UNDERGLOVES BIOGEL PI SIZE 8

## (undated) DEVICE — SEE MEDLINE ITEM 146345

## (undated) DEVICE — SPONGE SUPER KERLIX 6X6.75IN

## (undated) DEVICE — SUT CTD VICRYL 0 UND BR

## (undated) DEVICE — SET TUBE PNEUMOCLEAR SE HI FLO

## (undated) DEVICE — CARTRIDGE BABCOCK GRASPER 5X45

## (undated) DEVICE — UNDERGLOVES BIOGEL PI SZ 7 LF

## (undated) DEVICE — KIT ROBOTIC 4 ARM DA VINCI SI

## (undated) DEVICE — BLADE SURG CARBON STEEL SZ11

## (undated) DEVICE — SCRUB 10% POVIDONE IODINE 4OZ

## (undated) DEVICE — ELECTRODE REM PLYHSV RETURN 9

## (undated) DEVICE — SEE MEDLINE ITEM 157118

## (undated) DEVICE — DRAPE STERI U-SHAPED 47X51IN

## (undated) DEVICE — APPLICATOR CHLORAPREP ORN 26ML

## (undated) DEVICE — SUT ETHILON 3/0 18IN PS-1

## (undated) DEVICE — DRESSING XEROFORM FOIL PK 1X8

## (undated) DEVICE — GLOVE SURG ULTRA TOUCH 8

## (undated) DEVICE — SPLINT PLASTER FAST SET 5X30IN

## (undated) DEVICE — SLEEVE SCD EXPRESS CALF MEDIUM

## (undated) DEVICE — SYR SLIP TIP 5CC

## (undated) DEVICE — SEE MEDLINE ITEM 157171

## (undated) DEVICE — SOL 9P NACL IRR PIC IL

## (undated) DEVICE — SEALER ENDOWRIST ONE VESSEL

## (undated) DEVICE — SEE MEDLINE ITEM 157216

## (undated) DEVICE — TOURNIQUET SB QC DP 34X4IN

## (undated) DEVICE — SUT MONOCRYL 4-0 PS-2

## (undated) DEVICE — KIT ENDO CARRY ON PROCEDURE

## (undated) DEVICE — DRAPE PLASTIC U 60X72

## (undated) DEVICE — RELOAD ENDOWRIST BLUE 45MM

## (undated) DEVICE — PAD CAST SPECIALIST STRL 6

## (undated) DEVICE — LUBRICANT SURGILUBE 2 OZ

## (undated) DEVICE — PACK BASIC

## (undated) DEVICE — SHEATH ENDOWRIST 45MM

## (undated) DEVICE — SUT 2/0 18IN COATED VICRYL

## (undated) DEVICE — CANNULA ENDOWRIST SEAL

## (undated) DEVICE — STRAP OR TABLE 5IN X 72IN

## (undated) DEVICE — SYR 30CC LUER LOCK

## (undated) DEVICE — CANISTER SUCTION 3000CC

## (undated) DEVICE — LEGGINGS X LARGE 6IN CUFF

## (undated) DEVICE — SEE MEDLINE ITEM 146308

## (undated) DEVICE — OBTURATOR 8MM BLADELESS

## (undated) DEVICE — SEE MEDLINE ITEM 146347

## (undated) DEVICE — DRAPE ABDOMINAL TIBURON 14X11

## (undated) DEVICE — GLOVE SURGEONS ULTRA TOUCH 6.5

## (undated) DEVICE — SEE MEDLINE ITEM 157117

## (undated) DEVICE — UNDERGLOVES BIOGEL PI SIZE 8.5

## (undated) DEVICE — DRESSING ABSRBNT ISLAND 3.6X8

## (undated) DEVICE — GLOVE SURG ULTRA TOUCH 7.5

## (undated) DEVICE — BIT DRILL 2.8

## (undated) DEVICE — TUBE SUCTION YANKAUER HI CAP

## (undated) DEVICE — SEE MEDLINE ITEM 152622

## (undated) DEVICE — BIT DRILL 2.5

## (undated) DEVICE — TROCAR ENDOPATH XCEL 5X100MM

## (undated) DEVICE — CLOSURE SKIN STERI STRIP 1/2X4

## (undated) DEVICE — SUCTION IRRIGATR ENDOWRIST ONE

## (undated) DEVICE — SYR ONLY LUER LOCK 20CC

## (undated) DEVICE — ALCOHOL 70% ISOP RUBBING 4OZ

## (undated) DEVICE — PAD CAST SPECIALIST STRL 4

## (undated) DEVICE — BANDAGE ESMARK 6X12

## (undated) DEVICE — GLOVE SURG ULTRA TOUCH 7

## (undated) DEVICE — SUT 1 36IN PDS II

## (undated) DEVICE — SEE MEDLINE ITEM 107746

## (undated) DEVICE — SEE MEDLINE ITEM 157131

## (undated) DEVICE — GLOVE SURG ULTRA TOUCH 8.5

## (undated) DEVICE — ELECTRODE BLD EXT 6.50 ST DISP

## (undated) DEVICE — SOL ELECTROLUBE ANTI-STIC

## (undated) DEVICE — SEALS

## (undated) DEVICE — DRAPE C ARM 42 X 120 10/BX

## (undated) DEVICE — TROCAR ENDOPATH XCEL 12X100MM

## (undated) DEVICE — SEE MEDLINE ITEM 146231

## (undated) DEVICE — NDL INSUF ULTRA VERESS 120MM

## (undated) DEVICE — GELPOINT MINI KIT ENDOSCOPIC

## (undated) DEVICE — CORD BIPOLAR 12 FOOT

## (undated) DEVICE — COVER TIP CURVED SCISSORS XI

## (undated) DEVICE — NDL SAFETY 21G X 1 1/2 ECLPSE

## (undated) DEVICE — SYR 10CC LUER LOCK